# Patient Record
Sex: MALE | Race: WHITE | NOT HISPANIC OR LATINO | Employment: OTHER | URBAN - METROPOLITAN AREA
[De-identification: names, ages, dates, MRNs, and addresses within clinical notes are randomized per-mention and may not be internally consistent; named-entity substitution may affect disease eponyms.]

---

## 2017-01-23 DIAGNOSIS — E78.5 DYSLIPIDEMIA: Primary | ICD-10-CM

## 2017-01-23 RX ORDER — ATORVASTATIN CALCIUM 20 MG/1
20 TABLET, FILM COATED ORAL DAILY
Qty: 90 TABLET | Refills: 2 | Status: SHIPPED | OUTPATIENT
Start: 2017-01-23 | End: 2017-03-03

## 2017-01-23 NOTE — TELEPHONE ENCOUNTER
Lipitor     Last Written Prescription Date: 09/12/16  Last Fill Quantity: 90, # refills: 1  Last Office Visit with [facility], [facility] or [facility] prescribing provider: 03/08/16       CHOL      123   9/9/2016  HDL       68   9/9/2016  LDL       36   9/9/2016  TRIG       96   9/9/2016  CHOLHDLRATIO      2.6   4/21/2015

## 2017-02-02 ENCOUNTER — TRANSFERRED RECORDS (OUTPATIENT)
Dept: HEALTH INFORMATION MANAGEMENT | Facility: CLINIC | Age: 62
End: 2017-02-02

## 2017-02-10 DIAGNOSIS — E11.65 TYPE 2 DIABETES MELLITUS WITH HYPERGLYCEMIA (H): Primary | ICD-10-CM

## 2017-03-03 ENCOUNTER — MYC MEDICAL ADVICE (OUTPATIENT)
Dept: FAMILY MEDICINE | Facility: CLINIC | Age: 62
End: 2017-03-03

## 2017-03-03 DIAGNOSIS — I10 ESSENTIAL HYPERTENSION, BENIGN: ICD-10-CM

## 2017-03-03 DIAGNOSIS — E78.5 DYSLIPIDEMIA: ICD-10-CM

## 2017-03-03 DIAGNOSIS — E11.65 TYPE 2 DIABETES MELLITUS WITH HYPERGLYCEMIA, WITHOUT LONG-TERM CURRENT USE OF INSULIN (H): Primary | ICD-10-CM

## 2017-03-03 RX ORDER — DOXAZOSIN 4 MG/1
4 TABLET ORAL DAILY
Qty: 90 TABLET | Refills: 0 | Status: SHIPPED | OUTPATIENT
Start: 2017-03-03 | End: 2017-08-07

## 2017-03-03 RX ORDER — ATORVASTATIN CALCIUM 20 MG/1
20 TABLET, FILM COATED ORAL DAILY
Qty: 90 TABLET | Refills: 0 | Status: SHIPPED | OUTPATIENT
Start: 2017-03-03 | End: 2017-08-07

## 2017-03-03 RX ORDER — METFORMIN HCL 500 MG
1000 TABLET, EXTENDED RELEASE 24 HR ORAL
Qty: 180 TABLET | Refills: 0 | Status: SHIPPED | OUTPATIENT
Start: 2017-03-03 | End: 2017-08-07

## 2017-03-03 NOTE — TELEPHONE ENCOUNTER
lipitor    Last Written Prescription Date: 12/07/2016  Last Fill Quantity: 90, # refills: 2  Last Office Visit with [facility], [facility] or  [facility] prescribing provider: 09/10/2016       Lab Results   Component Value Date    CHOL 123 09/09/2016     Lab Results   Component Value Date    HDL 68 09/09/2016     Lab Results   Component Value Date    LDL 36 09/09/2016     Lab Results   Component Value Date    TRIG 96 09/09/2016     Lab Results   Component Value Date    CHOLHDLRATIO 2.6 04/21/2015     glucophage   Last Written Prescription Date: 12/07/2016  Last Fill Quantity: 180, # refills: 1  Last Office Visit with [facility], [facility] or [facility] prescribing provider:  09/10/2016        BP Readings from Last 3 Encounters:   03/08/16 130/84   02/02/16 145/90   04/21/15 134/86     Lab Results   Component Value Date    MICROL 48 09/09/2016     No results found for: MICROALBUMIN  Creatinine   Date Value Ref Range Status   03/03/2016 0.86 0.66 - 1.25 mg/dL Final   ]  GFR Estimate   Date Value Ref Range Status   03/03/2016 >90  Non  GFR Calc   >60 mL/min/1.7m2 Final   04/21/2015 89 >60 mL/min/1.7m2 Final     Comment:     Non  GFR Calc   12/26/2012 85 >60 mL/min/1.7m2 Final     GFR Estimate If Black   Date Value Ref Range Status   03/03/2016 >90   GFR Calc   >60 mL/min/1.7m2 Final   04/21/2015 >90   GFR Calc   >60 mL/min/1.7m2 Final   12/26/2012 >90 >60 mL/min/1.7m2 Final     Lab Results   Component Value Date    CHOL 123 09/09/2016     Lab Results   Component Value Date    HDL 68 09/09/2016     Lab Results   Component Value Date    LDL 36 09/09/2016     Lab Results   Component Value Date    TRIG 96 09/09/2016     Lab Results   Component Value Date    CHOLHDLRATIO 2.6 04/21/2015     No results found for: AST  Lab Results   Component Value Date    ALT 38 08/04/2011     Lab Results   Component Value Date    A1C 6.7 09/09/2016    A1C 9.0 03/03/2016    A1C 8.8 04/21/2015    A1C 6.3  12/26/2012    A1C 6.1 08/04/2011     Potassium   Date Value Ref Range Status   03/03/2016 4.4 3.4 - 5.3 mmol/L Final     cardura     Last Written Prescription Date: 12/07/2016  Last Fill Quantity: 90, # refills: 3    Last Office Visit with [facility] or [facility] prescribing provider:  09/10/2016   Future Office Visit:      BP Readings from Last 3 Encounters:   03/08/16 130/84   02/02/16 145/90   04/21/15 134/86

## 2017-03-27 ENCOUNTER — DOCUMENTATION ONLY (OUTPATIENT)
Dept: LAB | Facility: CLINIC | Age: 62
End: 2017-03-27

## 2017-03-27 DIAGNOSIS — I10 ESSENTIAL HYPERTENSION: ICD-10-CM

## 2017-03-27 DIAGNOSIS — E11.65 TYPE 2 DIABETES MELLITUS WITH HYPERGLYCEMIA, WITHOUT LONG-TERM CURRENT USE OF INSULIN (H): Primary | ICD-10-CM

## 2017-08-04 DIAGNOSIS — E78.2 MIXED HYPERLIPIDEMIA: ICD-10-CM

## 2017-08-04 DIAGNOSIS — E11.65 TYPE 2 DIABETES MELLITUS WITH HYPERGLYCEMIA, WITHOUT LONG-TERM CURRENT USE OF INSULIN (H): ICD-10-CM

## 2017-08-04 DIAGNOSIS — I10 ESSENTIAL HYPERTENSION: ICD-10-CM

## 2017-08-04 LAB
ANION GAP SERPL CALCULATED.3IONS-SCNC: 5 MMOL/L (ref 3–14)
BUN SERPL-MCNC: 21 MG/DL (ref 7–30)
CALCIUM SERPL-MCNC: 9.4 MG/DL (ref 8.5–10.1)
CHLORIDE SERPL-SCNC: 103 MMOL/L (ref 94–109)
CHOLEST SERPL-MCNC: 181 MG/DL
CO2 SERPL-SCNC: 27 MMOL/L (ref 20–32)
CREAT SERPL-MCNC: 0.83 MG/DL (ref 0.66–1.25)
GFR SERPL CREATININE-BSD FRML MDRD: ABNORMAL ML/MIN/1.7M2
GLUCOSE SERPL-MCNC: 228 MG/DL (ref 70–99)
HBA1C MFR BLD: 8.4 % (ref 4.3–6)
HDLC SERPL-MCNC: 57 MG/DL
LDLC SERPL CALC-MCNC: 92 MG/DL
NONHDLC SERPL-MCNC: 124 MG/DL
POTASSIUM SERPL-SCNC: 4.2 MMOL/L (ref 3.4–5.3)
SODIUM SERPL-SCNC: 135 MMOL/L (ref 133–144)
TRIGL SERPL-MCNC: 159 MG/DL
TSH SERPL DL<=0.005 MIU/L-ACNC: 1.25 MU/L (ref 0.4–4)

## 2017-08-04 PROCEDURE — 36415 COLL VENOUS BLD VENIPUNCTURE: CPT | Performed by: PHYSICIAN ASSISTANT

## 2017-08-04 PROCEDURE — 80061 LIPID PANEL: CPT | Performed by: PHYSICIAN ASSISTANT

## 2017-08-04 PROCEDURE — 83036 HEMOGLOBIN GLYCOSYLATED A1C: CPT | Performed by: PHYSICIAN ASSISTANT

## 2017-08-04 PROCEDURE — 80048 BASIC METABOLIC PNL TOTAL CA: CPT | Performed by: PHYSICIAN ASSISTANT

## 2017-08-04 PROCEDURE — 84443 ASSAY THYROID STIM HORMONE: CPT | Performed by: PHYSICIAN ASSISTANT

## 2017-08-04 NOTE — LETTER
Celestine Simon  [address] [facility] [address]        August 9, 2017          Dear ,    We are writing to inform you of your test results. Your labs show cholesterol, kidney function and thyroid ok.  A1c is 8.4 which means that diabetes is not controlled.  I have not seen you in over a year.  Please set up an appointment to work on diabetes.    Please make an appointment with the doctor to review or follow up on your test results.  Appointments can be made by calling [phone].    Resulted Orders   Lipid Profile with reflex to direct LDL   Result Value Ref Range    Cholesterol 181 <200 mg/dL    Triglycerides 159 (H) <150 mg/dL      Comment:      Borderline high:  150-199 mg/dl   High:             200-499 mg/dl   Very high:       >499 mg/dl      HDL Cholesterol 57 >39 mg/dL    LDL Cholesterol Calculated 92 <100 mg/dL      Comment:      Desirable:       <100 mg/dl    Non HDL Cholesterol 124 <130 mg/dL   Basic metabolic panel  (Ca, Cl, CO2, Creat, Gluc, K, Na, BUN)   Result Value Ref Range    Sodium 135 133 - 144 mmol/L    Potassium 4.2 3.4 - 5.3 mmol/L    Chloride 103 94 - 109 mmol/L    Carbon Dioxide 27 20 - 32 mmol/L    Anion Gap 5 3 - 14 mmol/L    Glucose 228 (H) 70 - 99 mg/dL    Urea Nitrogen 21 7 - 30 mg/dL    Creatinine 0.83 0.66 - 1.25 mg/dL    GFR Estimate >90  Non  GFR Calc   >60 mL/min/1.7m2    GFR Estimate If Black >90   GFR Calc   >60 mL/min/1.7m2    Calcium 9.4 8.5 - 10.1 mg/dL   Hemoglobin A1c   Result Value Ref Range    Hemoglobin A1C 8.4 (H) 4.3 - 6.0 %   TSH with free T4 reflex   Result Value Ref Range    TSH 1.25 0.40 - 4.00 mU/L       If you have any questions or concerns, please call the clinic at the number listed above.       Sincerely,        Deborah Delgado PA-C/teodoro

## 2017-08-06 ENCOUNTER — E-VISIT (OUTPATIENT)
Dept: FAMILY MEDICINE | Facility: CLINIC | Age: 62
End: 2017-08-06
Payer: COMMERCIAL

## 2017-08-06 DIAGNOSIS — E11.65 TYPE 2 DIABETES MELLITUS WITH HYPERGLYCEMIA, WITHOUT LONG-TERM CURRENT USE OF INSULIN (H): ICD-10-CM

## 2017-08-06 DIAGNOSIS — I10 ESSENTIAL HYPERTENSION, BENIGN: ICD-10-CM

## 2017-08-06 DIAGNOSIS — E78.5 DYSLIPIDEMIA: ICD-10-CM

## 2017-08-06 PROCEDURE — 99207 ZZC NO BILLABLE SERVICE THIS VISIT: CPT | Performed by: PHYSICIAN ASSISTANT

## 2017-08-07 RX ORDER — DOXAZOSIN 4 MG/1
4 TABLET ORAL DAILY
Qty: 90 TABLET | Refills: 0 | Status: SHIPPED | OUTPATIENT
Start: 2017-08-07 | End: 2017-11-08

## 2017-08-07 RX ORDER — LISINOPRIL 20 MG/1
20 TABLET ORAL DAILY
Qty: 90 TABLET | Refills: 0 | Status: SHIPPED | OUTPATIENT
Start: 2017-08-07 | End: 2017-11-08

## 2017-08-07 RX ORDER — METFORMIN HCL 500 MG
1000 TABLET, EXTENDED RELEASE 24 HR ORAL
Qty: 180 TABLET | Refills: 0 | Status: SHIPPED | OUTPATIENT
Start: 2017-08-07 | End: 2017-11-08

## 2017-08-07 RX ORDER — ATORVASTATIN CALCIUM 20 MG/1
20 TABLET, FILM COATED ORAL DAILY
Qty: 90 TABLET | Refills: 0 | Status: SHIPPED | OUTPATIENT
Start: 2017-08-07 | End: 2017-11-08

## 2017-08-07 NOTE — PROGRESS NOTES
Rosalva Sprague,    Your labs show cholesterol, kidney function and thyroid ok.  A1c is 8.4 which means that diabetes is not controlled.  I have not seen you in over a year.  Please set up an appointment to work on diabetes.    Please call clinic at [phone] if you have questions.    Deborah Delgado PA-C

## 2017-09-07 ENCOUNTER — OFFICE VISIT (OUTPATIENT)
Dept: FAMILY MEDICINE | Facility: CLINIC | Age: 62
End: 2017-09-07
Payer: COMMERCIAL

## 2017-09-07 VITALS
OXYGEN SATURATION: 98 % | WEIGHT: 273.4 LBS | RESPIRATION RATE: 16 BRPM | HEART RATE: 74 BPM | TEMPERATURE: 98.7 F | BODY MASS INDEX: 37.08 KG/M2 | DIASTOLIC BLOOD PRESSURE: 84 MMHG | SYSTOLIC BLOOD PRESSURE: 136 MMHG

## 2017-09-07 DIAGNOSIS — F43.21 ADJUSTMENT DISORDER WITH DEPRESSED MOOD: Primary | ICD-10-CM

## 2017-09-07 PROCEDURE — 99214 OFFICE O/P EST MOD 30 MIN: CPT | Performed by: FAMILY MEDICINE
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
## 2017-09-07 NOTE — LETTER
[facility]  [address] [place] [address]  Phone: [phone]  Fax: [phone]    September 7, 2017        Celestine Simon  [address] [facility] [address]          To whom it may concern:    RE: Celestine Simon    Patient was seen and treated today at our clinic. He was seen with some acute depression and anxiety.  Needs work excuse for one month to begin with.     Please contact me for questions or concerns.      Sincerely,        Riccardo Akers MD

## 2017-09-07 NOTE — MR AVS SNAPSHOT
After Visit Summary   9/7/2017    Celestine Simon    MRN: [id number]           Patient Information     Date Of Birth          1955        Visit Information        Provider Department      9/7/2017 11:00 AM Riccardo Akers MD [facility]        Today's Diagnoses     Adjustment disorder with depressed mood    -  1      Care Instructions     1 lets get counseling set up    2. Take the zoloft     3. Come back in two weeks.               Follow-ups after your visit        Additional Services     MENTAL HEALTH REFERRAL       Your provider has referred you to: [facility]: [facility] - Counseling (Individual/Couples/Family) - [facility] [phone]   [email or web address]   *Patient will be contacted by [facility] scheduling partner[facility]), to schedule an appointment.  Patients may also call [facility] to schedule.    All scheduling is subject to the client's specific insurance plan & benefits, provider/location availability, and provider clinical specialities.  Please arrive 15 minutes early for your first appointment and bring your completed paperwork.    Please be aware that coverage of these services is subject to the terms and limitations of your health insurance plan.  Call member services at your health plan with any benefit or coverage questions.                  Who to contact     If you have questions or need follow up information about today's clinic visit or your schedule please contact [facility] directly at [phone].  Normal or non-critical lab and imaging results will be communicated to you by MyChart, letter or phone within 4 business days after the clinic has received the results. If you do not hear from us within 7 days, please contact the clinic through MyChart or phone. If you have a critical or abnormal lab result, we will notify you  by phone as soon as possible.  Submit refill requests through [facility] or call your pharmacy and they will forward the refill request to us. Please allow 3 business days for your refill to be completed.          Additional Information About Your Visit        [employer] Information     [employer] gives you secure access to your electronic health record. If you see a primary care provider, you can also send messages to your care team and make appointments. If you have questions, please call your primary care clinic.  If you do not have a primary care provider, please call [phone] and they will assist you.        Care EveryWhere ID     This is your Care EveryWhere ID. This could be used by other organizations to access your [place] medical records  [id number]        Your Vitals Were     Pulse Temperature Respirations Pulse Oximetry BMI (Body Mass Index)       74 98.7  F (37.1  C) (Tympanic) 16 98% 37.08 kg/m2        Blood Pressure from Last 3 Encounters:   09/07/17 136/84   03/08/16 130/84   02/02/16 145/90    Weight from Last 3 Encounters:   09/07/17 273 lb 6.4 oz (124 kg)   03/08/16 273 lb (123.8 kg)   02/02/16 276 lb 12.8 oz (125.6 kg)              We Performed the Following     MENTAL HEALTH REFERRAL          Today's Medication Changes          These changes are accurate as of: 9/7/17 11:21 AM.  If you have any questions, ask your nurse or doctor.               Start taking these medicines.        Dose/Directions    sertraline 50 MG tablet   Commonly known as:  ZOLOFT   Used for:  Adjustment disorder with depressed mood   Started by:  Riccardo Akers MD        Take 1/2 tablet (25 mg) for 1-2 weeks, then increase to 1 tablet orally daily   Quantity:  30 tablet   Refills:  3            Where to get your medicines      These medications were sent to [id number] - [place], [place] - [address]  [address], [facility] [address]    Hours:  Closed 10-16-08 business to [facility]  Phone:  [phone]     sertraline 50 MG tablet                Primary Care Provider Office Phone # Fax #    Deborah Delgado PA-C [phone] [address] [place] [id number]        Equal Access to Services     HALI CRISOSTOMO : Hadii aad ku hadaleko Soomaali, waaxda luqadaha, qaybta kaalmada adeegyada, waxvega donin zaheern mariolito shepherd lanegartalha morton. So [facility] [phone].    ATENCIÓN: Si habla español, tiene a haro disposición servicios gratuitos de asistencia lingüística. Llame al [phone].    We comply with applicable federal civil rights laws and [place] laws. We do not discriminate on the basis of race, color, national origin, age, disability sex, sexual orientation or gender identity.            Thank you!     Thank you for choosing [facility]  for your care. Our goal is always to provide you with excellent care. Hearing back from our patients is one way we can continue to improve our services. Please take a few minutes to complete the written survey that you may receive in the mail after your visit with us. Thank you!             Your Updated Medication List - Protect others around you: Learn how to safely use, store and throw away your medicines at www.disposemymeds.org.          This list is accurate as of: 9/7/17 11:21 AM.  Always use your most recent med list.                   Brand Name Dispense Instructions for use Diagnosis    aspirin 81 MG tablet     100 tablet    Take by mouth every 24 hours    Essential hypertension, benign, Type 2 diabetes, HbA1c goal < 7% (H)       atorvastatin 20 MG tablet    LIPITOR    90 tablet    Take 1 tablet (20 mg) by mouth daily    Dyslipidemia       doxazosin 4 MG tablet    CARDURA    90 tablet    Take 1 tablet (4 mg) by mouth daily    Essential hypertension, benign       * IN TOUCH [facility]     90 each    1 strip once a week Tests weekly    Type 2 diabetes mellitus with hyperglycemia (H)       * Blood Glucose Monitoring Suppl Misc      100 each    1 strip once a week Tests weekly    Type 2 diabetes mellitus with hyperglycemia (H)       LANCETS REGULAR Misc     100 Each    One Touch US Lancet- Used twice daily    Type II or unspecified type diabetes mellitus without mention of complication, not stated as uncontrolled       lisinopril 20 MG tablet    PRINIVIL/ZESTRIL    90 tablet    Take 1 tablet (20 mg) by mouth daily    Essential hypertension, benign       metFORMIN 500 MG 24 hr tablet    GLUCOPHAGE-XR    180 tablet    Take 2 tablets (1,000 mg) by mouth daily (with dinner) Must be seen before further refills (Nov 2017).    Type 2 diabetes mellitus with hyperglycemia, without long-term current use of insulin (H)       Multi-vitamin Tabs tablet   Generic drug:  multivitamin, therapeutic with minerals      one daily        sertraline 50 MG tablet    ZOLOFT    30 tablet    Take 1/2 tablet (25 mg) for 1-2 weeks, then increase to 1 tablet orally daily    Adjustment disorder with depressed mood       sitagliptin 100 MG tablet    JANUVIA    90 tablet    Take 1 tablet (100 mg) by mouth daily    Type 2 diabetes mellitus with hyperglycemia, without long-term current use of insulin (H)       * Notice:  This list has 2 medication(s) that are the same as other medications prescribed for you. Read the directions carefully, and ask your doctor or other care provider to review them with you.

## 2017-09-11 ENCOUNTER — OFFICE VISIT (OUTPATIENT)
Dept: PSYCHOLOGY | Facility: CLINIC | Age: 62
End: 2017-09-11
Attending: FAMILY MEDICINE
Payer: COMMERCIAL

## 2017-09-11 DIAGNOSIS — F43.21 ADJUSTMENT DISORDER WITH DEPRESSED MOOD: Primary | ICD-10-CM

## 2017-09-11 PROCEDURE — 90834 PSYTX W PT 45 MINUTES: CPT | Performed by: MARRIAGE & FAMILY THERAPIST

## 2017-09-11 ASSESSMENT — PATIENT HEALTH QUESTIONNAIRE - PHQ9
5. POOR APPETITE OR OVEREATING: NOT AT ALL
5. POOR APPETITE OR OVEREATING: NOT AT ALL
SUM OF ALL RESPONSES TO PHQ QUESTIONS 1-9: 3

## 2017-09-11 ASSESSMENT — ANXIETY QUESTIONNAIRES
6. BECOMING EASILY ANNOYED OR IRRITABLE: NOT AT ALL
5. BEING SO RESTLESS THAT IT IS HARD TO SIT STILL: NOT AT ALL
IF YOU CHECKED OFF ANY PROBLEMS ON THIS QUESTIONNAIRE, HOW DIFFICULT HAVE THESE PROBLEMS MADE IT FOR YOU TO DO YOUR WORK, TAKE CARE OF THINGS AT HOME, OR GET ALONG WITH OTHER PEOPLE: NOT DIFFICULT AT ALL
2. NOT BEING ABLE TO STOP OR CONTROL WORRYING: NOT AT ALL
GAD7 TOTAL SCORE: 1
2. NOT BEING ABLE TO STOP OR CONTROL WORRYING: NOT AT ALL
6. BECOMING EASILY ANNOYED OR IRRITABLE: NOT AT ALL
3. WORRYING TOO MUCH ABOUT DIFFERENT THINGS: NOT AT ALL
1. FEELING NERVOUS, ANXIOUS, OR ON EDGE: SEVERAL DAYS
7. FEELING AFRAID AS IF SOMETHING AWFUL MIGHT HAPPEN: NOT AT ALL
3. WORRYING TOO MUCH ABOUT DIFFERENT THINGS: NOT AT ALL
5. BEING SO RESTLESS THAT IT IS HARD TO SIT STILL: NOT AT ALL
7. FEELING AFRAID AS IF SOMETHING AWFUL MIGHT HAPPEN: NOT AT ALL
1. FEELING NERVOUS, ANXIOUS, OR ON EDGE: SEVERAL DAYS
IF YOU CHECKED OFF ANY PROBLEMS ON THIS QUESTIONNAIRE, HOW DIFFICULT HAVE THESE PROBLEMS MADE IT FOR YOU TO DO YOUR WORK, TAKE CARE OF THINGS AT HOME, OR GET ALONG WITH OTHER PEOPLE: NOT DIFFICULT AT ALL
GAD7 TOTAL SCORE: 1

## 2017-09-11 NOTE — MR AVS SNAPSHOT
MRN:[id number]                      After Visit Summary   9/11/2017    Celestine Simon    MRN: [id number]           Visit Information        Provider Department      9/11/2017 11:00 AM Barrett Pyle LMFT [facility] Generic      MyChart Information     MyChart gives you secure access to your electronic health record. If you see a primary care provider, you can also send messages to your care team and make appointments. If you have questions, please call your primary care clinic.  If you do not have a primary care provider, please call [phone] and they will assist you.        Care EveryWhere ID     This is your Care EveryWhere ID. This could be used by other organizations to access your [place] [facility] medical records  [id number]        Equal Access to Services     HALI CRISOSTOMO : Ava Tan, watimothy suazo, qarory kaalshashi salgado, rodney morton. So [facility] [phone].    ATENCIÓN: Si habla español, tiene a haro disposición servicios gratuitos de asistencia lingüística. Llame al [phone].    We comply with applicable federal civil rights laws and [place] laws. We do not discriminate on the basis of race, color, national origin, age, disability sex, sexual orientation or gender identity.
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
## 2017-09-12 ASSESSMENT — ANXIETY QUESTIONNAIRES: GAD7 TOTAL SCORE: 1

## 2017-11-06 ENCOUNTER — DOCUMENTATION ONLY (OUTPATIENT)
Dept: LAB | Facility: CLINIC | Age: 62
End: 2017-11-06

## 2017-11-06 DIAGNOSIS — E11.65 TYPE 2 DIABETES MELLITUS WITH HYPERGLYCEMIA, WITHOUT LONG-TERM CURRENT USE OF INSULIN (H): Primary | ICD-10-CM

## 2017-11-07 DIAGNOSIS — E11.65 TYPE 2 DIABETES MELLITUS WITH HYPERGLYCEMIA, WITHOUT LONG-TERM CURRENT USE OF INSULIN (H): ICD-10-CM

## 2017-11-07 LAB
CREAT UR-MCNC: 177 MG/DL
HBA1C MFR BLD: 9.1 % (ref 4.3–6)
MICROALBUMIN UR-MCNC: 22 MG/L
MICROALBUMIN/CREAT UR: 12.71 MG/G CR (ref 0–17)

## 2017-11-07 PROCEDURE — 83036 HEMOGLOBIN GLYCOSYLATED A1C: CPT | Performed by: PHYSICIAN ASSISTANT

## 2017-11-07 PROCEDURE — 36415 COLL VENOUS BLD VENIPUNCTURE: CPT | Performed by: PHYSICIAN ASSISTANT

## 2017-11-07 PROCEDURE — 82043 UR ALBUMIN QUANTITATIVE: CPT | Performed by: PHYSICIAN ASSISTANT

## 2017-11-08 ENCOUNTER — OFFICE VISIT (OUTPATIENT)
Dept: FAMILY MEDICINE | Facility: CLINIC | Age: 62
End: 2017-11-08
Payer: COMMERCIAL

## 2017-11-08 VITALS
TEMPERATURE: 98.1 F | RESPIRATION RATE: 20 BRPM | WEIGHT: 278.2 LBS | SYSTOLIC BLOOD PRESSURE: 132 MMHG | DIASTOLIC BLOOD PRESSURE: 86 MMHG | HEART RATE: 68 BPM | BODY MASS INDEX: 37.73 KG/M2

## 2017-11-08 DIAGNOSIS — I10 ESSENTIAL HYPERTENSION, BENIGN: ICD-10-CM

## 2017-11-08 DIAGNOSIS — M62.08 RECTUS DIASTASIS: ICD-10-CM

## 2017-11-08 DIAGNOSIS — E78.5 DYSLIPIDEMIA: ICD-10-CM

## 2017-11-08 DIAGNOSIS — Z11.59 NEED FOR HEPATITIS C SCREENING TEST: ICD-10-CM

## 2017-11-08 DIAGNOSIS — E11.65 TYPE 2 DIABETES MELLITUS WITH HYPERGLYCEMIA, WITHOUT LONG-TERM CURRENT USE OF INSULIN (H): Primary | ICD-10-CM

## 2017-11-08 PROCEDURE — 99214 OFFICE O/P EST MOD 30 MIN: CPT | Performed by: PHYSICIAN ASSISTANT

## 2017-11-08 PROCEDURE — 99207 C FOOT EXAM  NO CHARGE: CPT | Performed by: PHYSICIAN ASSISTANT

## 2017-11-08 RX ORDER — METFORMIN HCL 500 MG
1000 TABLET, EXTENDED RELEASE 24 HR ORAL
Qty: 180 TABLET | Refills: 0 | Status: SHIPPED | OUTPATIENT
Start: 2017-11-08 | End: 2018-02-08

## 2017-11-08 RX ORDER — DOXAZOSIN 4 MG/1
4 TABLET ORAL DAILY
Qty: 90 TABLET | Refills: 3 | Status: SHIPPED | OUTPATIENT
Start: 2017-11-08 | End: 2018-08-09

## 2017-11-08 RX ORDER — ATORVASTATIN CALCIUM 20 MG/1
20 TABLET, FILM COATED ORAL DAILY
Qty: 90 TABLET | Refills: 3 | Status: SHIPPED | OUTPATIENT
Start: 2017-11-08 | End: 2018-08-09

## 2017-11-08 RX ORDER — LISINOPRIL 20 MG/1
20 TABLET ORAL DAILY
Qty: 90 TABLET | Refills: 0 | Status: SHIPPED | OUTPATIENT
Start: 2017-11-08 | End: 2018-08-09 | Stop reason: ALTCHOICE
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
## 2017-11-08 NOTE — MR AVS SNAPSHOT
After Visit Summary   11/8/2017    Celestine Simon    MRN: [id number]           Patient Information     Date Of Birth          1955        Visit Information        Provider Department      11/8/2017 8:20 AM Deborah Delgado PA-C [facility]        Today's Diagnoses     Need for hepatitis C screening test    -  1    Dyslipidemia        BENIGN HYPERTENSION        Type 2 diabetes mellitus with hyperglycemia, without long-term current use of insulin (H)          Care Instructions    Sugars much too high  Decided to make lifestyle changes and recheck within a month  Bring sugar log  Ideally get some readings of before breakfast, before lunch, before evening meal, and before bed - don't have to test all times every day, but get variety  Goals: morning sugars (before eating) must be under 140, but prefer under 120  2 hours after eating never above 180    If belly starts to bother, will send to surgeon.    Call if questions               Follow-ups after your visit        Future tests that were ordered for you today     Open Future Orders        Priority Expected Expires Ordered    Hepatitis C Screen Reflex to HCV RNA Quant and Genotype Routine  11/8/2018 11/8/2017    **A1C FUTURE 3mo Routine 2/6/2018 3/8/2018 11/8/2017            Who to contact     If you have questions or need follow up information about today's clinic visit or your schedule please contact [facility] directly at [phone].  Normal or non-critical lab and imaging results will be communicated to you by MyChart, letter or phone within 4 business days after the clinic has received the results. If you do not hear from us within 7 days, please contact the clinic through MyChart or phone. If you have a critical or abnormal lab result, we will notify you by phone as soon as possible.  Submit refill requests through [employer] or call your pharmacy and they will forward the refill request to us. Please  allow 3 business days for your refill to be completed.          Additional Information About Your Visit        MyChart Information     [employer] gives you secure access to your electronic health record. If you see a primary care provider, you can also send messages to your care team and make appointments. If you have questions, please call your primary care clinic.  If you do not have a primary care provider, please call [phone] and they will assist you.        Care EveryWhere ID     This is your Care EveryWhere ID. This could be used by other organizations to access your [place] medical records  [id number]        Your Vitals Were     Pulse Temperature Respirations BMI (Body Mass Index)          68 98.1  F (36.7  C) (Tympanic) 20 37.73 kg/m2         Blood Pressure from Last 3 Encounters:   11/08/17 132/86   09/07/17 136/84   03/08/16 130/84    Weight from Last 3 Encounters:   11/08/17 278 lb 3.2 oz (126.2 kg)   09/07/17 273 lb 6.4 oz (124 kg)   03/08/16 273 lb (123.8 kg)              We Performed the Following     FOOT EXAM          Today's Medication Changes          These changes are accurate as of: 11/8/17  9:12 AM.  If you have any questions, ask your nurse or doctor.               These medicines have changed or have updated prescriptions.        Dose/Directions    metFORMIN 500 MG 24 hr tablet   Commonly known as:  GLUCOPHAGE-XR   This may have changed:  additional instructions   Used for:  Type 2 diabetes mellitus with hyperglycemia, without long-term current use of insulin (H)   Changed by:  Deborah Delgado, PAVandanaC        Dose:  1000 mg   Take 2 tablets (1,000 mg) by mouth daily (with dinner)   Quantity:  180 tablet   Refills:  0            Where to get your medicines      These medications were sent to [id number] [address]  [address] ([place], [place]) [address]    Hours:  Closed 10-16-08 business to [facility] [place] Phone:  [phone]     atorvastatin 20  MG tablet    doxazosin 4 MG tablet    lisinopril 20 MG tablet    metFORMIN 500 MG 24 hr tablet    sitagliptin 100 MG tablet                Primary Care Provider Office Phone # Fax #    Deborah Delgado PA-C [phone] [address] [facility] [address]        Equal Access to Services     HALI CRISOSTOMO : Hadii aad ku hadasho Soomaali, waaxda luqadaha, qaybta kaalmada adeegyada, waxay idiin hayaan adeeg joao laZandradeandre . So [facility] [phone].    ATENCIÓN: Si habla español, tiene a haro disposición servicios gratuitos de asistencia lingüística. Justin al [phone].    We comply with applicable federal civil rights laws and [place] laws. We do not discriminate on the basis of race, color, national origin, age, disability, sex, sexual orientation, or gender identity.            Thank you!     Thank you for choosing [facility]  for your care. Our goal is always to provide you with excellent care. Hearing back from our patients is one way we can continue to improve our services. Please take a few minutes to complete the written survey that you may receive in the mail after your visit with us. Thank you!             Your Updated Medication List - Protect others around you: Learn how to safely use, store and throw away your medicines at www.disposemymeds.org.          This list is accurate as of: 11/8/17  9:12 AM.  Always use your most recent med list.                   Brand Name Dispense Instructions for use Diagnosis    aspirin 81 MG tablet     100 tablet    Take by mouth every 24 hours    Essential hypertension, benign, Type 2 diabetes, HbA1c goal < 7% (H)       atorvastatin 20 MG tablet    LIPITOR    90 tablet    Take 1 tablet (20 mg) by mouth daily    Dyslipidemia       doxazosin 4 MG tablet    CARDURA    90 tablet    Take 1 tablet (4 mg) by mouth daily    Essential hypertension, benign       * IN TOUCH Misc     90 each    1 strip once a week Tests weekly    Type 2  diabetes mellitus with hyperglycemia (H)       * Blood Glucose Monitoring Suppl Misc     100 each    1 strip once a week Tests weekly    Type 2 diabetes mellitus with hyperglycemia (H)       LANCETS REGULAR Misc     100 Each    One Touch US Lancet- Used twice daily    Type II or unspecified type diabetes mellitus without mention of complication, not stated as uncontrolled       lisinopril 20 MG tablet    PRINIVIL/ZESTRIL    90 tablet    Take 1 tablet (20 mg) by mouth daily    Essential hypertension, benign       metFORMIN 500 MG 24 hr tablet    GLUCOPHAGE-XR    180 tablet    Take 2 tablets (1,000 mg) by mouth daily (with dinner)    Type 2 diabetes mellitus with hyperglycemia, without long-term current use of insulin (H)       Multi-vitamin Tabs tablet   Generic drug:  multivitamin, therapeutic with minerals      one daily        sitagliptin 100 MG tablet    JANUVIA    90 tablet    Take 1 tablet (100 mg) by mouth daily    Type 2 diabetes mellitus with hyperglycemia, without long-term current use of insulin (H)       * Notice:  This list has 2 medication(s) that are the same as other medications prescribed for you. Read the directions carefully, and ask your doctor or other care provider to review them with you.

## 2017-11-08 NOTE — NURSING NOTE
Chief Complaint   Patient presents with     Diabetes       Initial /86 (BP Location: Right arm, Cuff Size: Adult Large)  Pulse 68  Temp 98.1  F (36.7  C) (Tympanic)  Resp 20  Wt 278 lb 3.2 oz (126.2 kg)  BMI 37.73 kg/m2 Estimated body mass index is 37.73 kg/(m^2) as calculated from the following:    Height as of 3/8/16: 6' (1.829 m).    Weight as of this encounter: 278 lb 3.2 oz (126.2 kg).  Medication Reconciliation: complete    Health Maintenance that is potentially due pending provider review:  Diabetes labs     Possibly completing today per provider review.    Is there anyone who you would like to be able to receive your results? Not Applicable  If yes have patient fill out AGUILAR  Karo Liu M.A.

## 2017-11-08 NOTE — PATIENT INSTRUCTIONS
Sugars much too high  Decided to make lifestyle changes and recheck within a month  Bring sugar log  Ideally get some readings of before breakfast, before lunch, before evening meal, and before bed - don't have to test all times every day, but get variety  Goals: morning sugars (before eating) must be under 140, but prefer under 120  2 hours after eating never above 180    If belly starts to bother, will send to surgeon.    Call if questions

## 2018-02-07 DIAGNOSIS — Z11.59 NEED FOR HEPATITIS C SCREENING TEST: ICD-10-CM

## 2018-02-07 DIAGNOSIS — E11.65 TYPE 2 DIABETES MELLITUS WITH HYPERGLYCEMIA, WITHOUT LONG-TERM CURRENT USE OF INSULIN (H): ICD-10-CM

## 2018-02-07 LAB — HBA1C MFR BLD: 7.9 % (ref 4.3–6)

## 2018-02-07 PROCEDURE — 83036 HEMOGLOBIN GLYCOSYLATED A1C: CPT | Performed by: PHYSICIAN ASSISTANT

## 2018-02-07 PROCEDURE — 86803 HEPATITIS C AB TEST: CPT | Performed by: PHYSICIAN ASSISTANT

## 2018-02-07 PROCEDURE — 36415 COLL VENOUS BLD VENIPUNCTURE: CPT | Performed by: PHYSICIAN ASSISTANT

## 2018-02-08 ENCOUNTER — OFFICE VISIT (OUTPATIENT)
Dept: FAMILY MEDICINE | Facility: CLINIC | Age: 63
End: 2018-02-08
Payer: COMMERCIAL

## 2018-02-08 VITALS
RESPIRATION RATE: 16 BRPM | BODY MASS INDEX: 36.7 KG/M2 | WEIGHT: 271 LBS | HEART RATE: 76 BPM | SYSTOLIC BLOOD PRESSURE: 132 MMHG | HEIGHT: 72 IN | DIASTOLIC BLOOD PRESSURE: 92 MMHG | TEMPERATURE: 97.6 F

## 2018-02-08 DIAGNOSIS — R80.9 TYPE 2 DIABETES MELLITUS WITH MICROALBUMINURIA, WITHOUT LONG-TERM CURRENT USE OF INSULIN (H): ICD-10-CM

## 2018-02-08 DIAGNOSIS — E11.29 TYPE 2 DIABETES MELLITUS WITH MICROALBUMINURIA, WITHOUT LONG-TERM CURRENT USE OF INSULIN (H): ICD-10-CM

## 2018-02-08 DIAGNOSIS — Z12.11 SPECIAL SCREENING FOR MALIGNANT NEOPLASMS, COLON: ICD-10-CM

## 2018-02-08 DIAGNOSIS — E66.01 SEVERE OBESITY (BMI 35.0-39.9) WITH COMORBIDITY (H): Primary | ICD-10-CM

## 2018-02-08 DIAGNOSIS — I10 ESSENTIAL HYPERTENSION, BENIGN: ICD-10-CM

## 2018-02-08 LAB — HCV AB SERPL QL IA: NONREACTIVE

## 2018-02-08 PROCEDURE — 99214 OFFICE O/P EST MOD 30 MIN: CPT | Performed by: PHYSICIAN ASSISTANT

## 2018-02-08 RX ORDER — METFORMIN HCL 500 MG
1000 TABLET, EXTENDED RELEASE 24 HR ORAL
Qty: 180 TABLET | Refills: 0 | Status: SHIPPED | OUTPATIENT
Start: 2018-02-08 | End: 2018-05-03

## 2018-02-08 RX ORDER — LISINOPRIL AND HYDROCHLOROTHIAZIDE 12.5; 2 MG/1; MG/1
1 TABLET ORAL DAILY
Qty: 30 TABLET | Refills: 0 | Status: SHIPPED | OUTPATIENT
Start: 2018-02-08 | End: 2018-03-13

## 2018-02-08 RX ORDER — LISINOPRIL 20 MG/1
20 TABLET ORAL DAILY
Qty: 90 TABLET | Refills: 0 | Status: CANCELLED | OUTPATIENT
Start: 2018-02-08

## 2018-02-08 NOTE — PATIENT INSTRUCTIONS
Diabetes -  a1c improved, is acceptable if stays under 8.0 at this point  Suggest considering victoza or similar.  See print out with med options recommended by me and allowed by your FAA  Keep working on the weight loss as you're recognizing   Consider seeing diabetic educator for lifestyle mer ideas  Need to see eye doctor yearly    BP -  Too high  New goal under 130/80  Change med to lisinopril-Hydrochlorothiazide combo  Set up lab only appt within a month, AND update me on BP for refill  Weight loss also helps     Strongly recommend flu shot     Do colonoscopy     Recheck 3 months
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
## 2018-02-08 NOTE — NURSING NOTE
Chief Complaint   Patient presents with     Diabetes       Initial BP (!) 149/98 (BP Location: Left arm, Patient Position: Chair, Cuff Size: Adult Large)  Pulse 76  Temp 97.6  F (36.4  C) (Tympanic)  Resp 16  Ht 6' (1.829 m)  Wt 271 lb (122.9 kg)  BMI 36.75 kg/m2 Estimated body mass index is 36.75 kg/(m^2) as calculated from the following:    Height as of this encounter: 6' (1.829 m).    Weight as of this encounter: 271 lb (122.9 kg).      Health Maintenance that is potentially due pending provider review:  NONE        Is there anyone who you would like to be able to receive your results? No  If yes have patient fill out AGUILAR

## 2018-02-08 NOTE — MR AVS SNAPSHOT
After Visit Summary   2/8/2018    Celestine Simon    MRN: [id number]           Patient Information     Date Of Birth          1955        Visit Information        Provider Department      2/8/2018 9:00 AM Deborah Delgado PA-C [facility]        Today's Diagnoses     Severe obesity (BMI 35.0-39.9) with comorbidity (H)    -  1    Type 2 diabetes mellitus with hyperglycemia, without long-term current use of insulin (H)        BENIGN HYPERTENSION        Special screening for malignant neoplasms, colon          Care Instructions    Diabetes -  a1c improved, is acceptable if stays under 8.0 at this point  Suggest considering victoza or similar.  See print out with med options recommended by me and allowed by your FAA  Keep working on the weight loss as you're recognizing   Consider seeing diabetic educator for lifestyle tweak ideas  Need to see eye doctor yearly    BP -  Too high  New goal under 130/80  Change med to lisinopril-Hydrochlorothiazide combo  Set up lab only appt within a month, AND update me on BP for refill  Weight loss also helps     Strongly recommend flu shot     Do colonoscopy     Recheck 3 months            Follow-ups after your visit        Additional Services     GASTROENTEROLOGY ADULT REF PROCEDURE ONLY [facility] [phone]       Last Lab Result: Creatinine (mg/dL)       Date                     Value                 08/04/2017               0.83             ----------  Body mass index is 36.75 kg/(m^2).     Needed:  No  Language:  English    Patient will be contacted to schedule procedure.     Please be aware that coverage of these services is subject to the terms and limitations of your health insurance plan.  Call member services at your health plan with any benefit or coverage questions.  Any procedures must be performed at a [facility] facility OR coordinated by your clinic's referral office.    Please bring the following with you to  your appointment:    (1) Any X-Rays, CTs or MRIs which have been performed.  Contact the facility where they were done to arrange for  prior to your scheduled appointment.    (2) List of current medications   (3) This referral request   (4) Any documents/labs given to you for this referral                  Future tests that were ordered for you today     Open Future Orders        Priority Expected Expires Ordered    **Basic metabolic panel FUTURE anytime Routine 2/8/2018 2/8/2019 2/8/2018            Who to contact     If you have questions or need follow up information about today's clinic visit or your schedule please contact [facility] directly at [phone].  Normal or non-critical lab and imaging results will be communicated to you by MyChart, letter or phone within 4 business days after the clinic has received the results. If you do not hear from us within 7 days, please contact the clinic through Aryngahart or phone. If you have a critical or abnormal lab result, we will notify you by phone as soon as possible.  Submit refill requests through PhatNoise or call your pharmacy and they will forward the refill request to us. Please allow 3 business days for your refill to be completed.          Additional Information About Your Visit        MyChart Information     PhatNoise gives you secure access to your electronic health record. If you see a primary care provider, you can also send messages to your care team and make appointments. If you have questions, please call your primary care clinic.  If you do not have a primary care provider, please call [phone] and they will assist you.        Care EveryWhere ID     This is your Care EveryWhere ID. This could be used by other organizations to access your [place] medical records  [id number]        Your Vitals Were     Pulse Temperature Respirations Height BMI (Body Mass Index)       76 97.6  F (36.4  C) (Tympanic) 16 6' (1.829 m) 36.75 kg/m2         Blood Pressure from Last 3 Encounters:   02/08/18 (!) 149/98   11/08/17 132/86   09/07/17 136/84    Weight from Last 3 Encounters:   02/08/18 271 lb (122.9 kg)   11/08/17 278 lb 3.2 oz (126.2 kg)   09/07/17 273 lb 6.4 oz (124 kg)              We Performed the Following     GASTROENTEROLOGY ADULT REF PROCEDURE ONLY [facility] [phone]          Today's Medication Changes          These changes are accurate as of 2/8/18 10:05 AM.  If you have any questions, ask your nurse or doctor.               Start taking these medicines.        Dose/Directions    lisinopril-hydrochlorothiazide 20-12.5 MG per tablet   Commonly known as:  PRINZIDE/ZESTORETIC   Used for:  Essential hypertension, benign   Started by:  Deborah Delgado PA-C        Dose:  1 tablet   Take 1 tablet by mouth daily   Quantity:  30 tablet   Refills:  0            Where to get your medicines      These medications were sent to [facility][id number] [facility] [address] [address]    Hours:  Closed 10-16-08 business to [facility] Phone:  [phone]     lisinopril-hydrochlorothiazide 20-12.5 MG per tablet    metFORMIN 500 MG 24 hr tablet    sitagliptin 100 MG tablet                Primary Care Provider Office Phone # Fax #    Deborah Delgado PA-C [phone] [address] [facility] [address]        Equal Access to Services     HARIKA CRISOSTOMO AH: Hadii conner thurston hadasho Soomaali, waaxda luqadaha, qaybta kaalmada adeegyada, waxay katharine morton. So [facility] [phone].    ATENCIÓN: Si habla español, tiene a haro disposición servicios gratuitos de asistencia lingüística. Llame al [phone].    We comply with applicable federal civil rights laws and [place] laws. We do not discriminate on the basis of race, color, national origin, age, disability, sex, sexual orientation, or gender identity.            Thank you!     Thank you for choosing  [facility]  for your care. Our goal is always to provide you with excellent care. Hearing back from our patients is one way we can continue to improve our services. Please take a few minutes to complete the written survey that you may receive in the mail after your visit with us. Thank you!             Your Updated Medication List - Protect others around you: Learn how to safely use, store and throw away your medicines at www.disposemymeds.org.          This list is accurate as of 2/8/18 10:05 AM.  Always use your most recent med list.                   Brand Name Dispense Instructions for use Diagnosis    aspirin 81 MG tablet     100 tablet    Take by mouth every 24 hours    Essential hypertension, benign, Type 2 diabetes, HbA1c goal < 7% (H)       atorvastatin 20 MG tablet    LIPITOR    90 tablet    Take 1 tablet (20 mg) by mouth daily    Dyslipidemia       doxazosin 4 MG tablet    CARDURA    90 tablet    Take 1 tablet (4 mg) by mouth daily    Essential hypertension, benign       * IN TOUCH Misc     90 each    1 strip once a week Tests weekly    Type 2 diabetes mellitus with hyperglycemia (H)       * Blood Glucose Monitoring Suppl Misc     100 each    1 strip once a week Tests weekly    Type 2 diabetes mellitus with hyperglycemia (H)       LANCETS REGULAR Misc     100 Each    One Touch US Lancet- Used twice daily    Type II or unspecified type diabetes mellitus without mention of complication, not stated as uncontrolled       lisinopril 20 MG tablet    PRINIVIL/ZESTRIL    90 tablet    Take 1 tablet (20 mg) by mouth daily    Essential hypertension, benign       lisinopril-hydrochlorothiazide 20-12.5 MG per tablet    PRINZIDE/ZESTORETIC    30 tablet    Take 1 tablet by mouth daily    Essential hypertension, benign       metFORMIN 500 MG 24 hr tablet    GLUCOPHAGE-XR    180 tablet    Take 2 tablets (1,000 mg) by mouth daily (with dinner)    Type 2 diabetes mellitus with hyperglycemia, without  long-term current use of insulin (H)       Multi-vitamin Tabs tablet   Generic drug:  multivitamin, therapeutic with minerals      one daily        sitagliptin 100 MG tablet    JANUVIA    90 tablet    Take 1 tablet (100 mg) by mouth daily    Type 2 diabetes mellitus with hyperglycemia, without long-term current use of insulin (H)       * Notice:  This list has 2 medication(s) that are the same as other medications prescribed for you. Read the directions carefully, and ask your doctor or other care provider to review them with you.

## 2018-02-14 ENCOUNTER — MYC MEDICAL ADVICE (OUTPATIENT)
Dept: FAMILY MEDICINE | Facility: CLINIC | Age: 63
End: 2018-02-14

## 2018-02-14 NOTE — TELEPHONE ENCOUNTER
RN please call to screen for influenza.  If sounds like influenza like illness, needs tamiflu treatment dosing - please send if needed.

## 2018-02-14 NOTE — TELEPHONE ENCOUNTER
I talked with Celestine and he says he just has a cold, not the flu.  He is just returning from [place] and grad kids all had colds.  Violet Escobedo RN

## 2018-02-18 ENCOUNTER — OFFICE VISIT (OUTPATIENT)
Dept: URGENT CARE | Facility: URGENT CARE | Age: 63
End: 2018-02-18
Payer: COMMERCIAL

## 2018-02-18 VITALS
TEMPERATURE: 97.7 F | BODY MASS INDEX: 36.62 KG/M2 | OXYGEN SATURATION: 95 % | DIASTOLIC BLOOD PRESSURE: 90 MMHG | RESPIRATION RATE: 16 BRPM | SYSTOLIC BLOOD PRESSURE: 126 MMHG | WEIGHT: 270 LBS | HEART RATE: 108 BPM

## 2018-02-18 DIAGNOSIS — H65.93 BILATERAL NON-SUPPURATIVE OTITIS MEDIA: ICD-10-CM

## 2018-02-18 DIAGNOSIS — J01.90 ACUTE SINUSITIS WITH SYMPTOMS > 10 DAYS: Primary | ICD-10-CM

## 2018-02-18 PROCEDURE — 99213 OFFICE O/P EST LOW 20 MIN: CPT | Performed by: NURSE PRACTITIONER

## 2018-02-18 NOTE — NURSING NOTE
Chief Complaint   Patient presents with     Sinus Problem     about a week.  Ears are plugged, dental pain, discharge of yellow/ green. Fever earlier in week.  Neck is swollen.  Flew [place] last week.  took 2 325 mg Aspirin       Initial /90 (BP Location: Right arm, Cuff Size: Adult Large)  Pulse 108  Temp 97.7  F (36.5  C) (Tympanic)  Resp 16  Wt 270 lb (122.5 kg)  SpO2 95%  BMI 36.62 kg/m2 Estimated body mass index is 36.62 kg/(m^2) as calculated from the following:    Height as of 2/8/18: 6' (1.829 m).    Weight as of this encounter: 270 lb (122.5 kg).      Health Maintenance that is potentially due pending provider review:  NONE    n/a    Is there anyone who you would like to be able to receive your results? Not Applicable  If yes have patient fill out AGUILAR Liu M.A.

## 2018-02-18 NOTE — MR AVS SNAPSHOT
After Visit Summary   2/18/2018    Celestine Simon    MRN: [id number]           Patient Information     Date Of Birth          1955        Visit Information        Provider Department      2/18/2018 12:35 PM Jade Liu APRN [facility]        Today's Diagnoses     Acute sinusitis with symptoms > 10 days    -  1    Bilateral non-suppurative otitis media          Care Instructions    Increase rest and fluids. Tylenol and/or Ibuprofen for comfort. Cool mist vaporizer. If your symptoms worsen or do not resolve follow up with your primary care provider in 1 week and sooner if needed.        Indications for emergent return to emergency department discussed with patient, who verbalized good understanding and agreement.  Patient understands the limitations of today's evaluation.           Sinusitis (Antibiotic Treatment)    The sinuses are air-filled spaces within the bones of the face. They connect to the inside of the nose. Sinusitis is an inflammation of the tissue lining the sinus cavity. Sinus inflammation can occur during a cold. It can also be due to allergies to pollens and other particles in the air. Sinusitis can cause symptoms of sinus congestion and fullness. A sinus infection causes fever, headache and facial pain. There is often green or yellow drainage from the nose or into the back of the throat (post-nasal drip). You have been given antibiotics to treat this condition.  Home care:    Take the full course of antibiotics as instructed. Do not stop taking them, even if you feel better.    Drink plenty of water, hot tea, and other liquids. This may help thin mucus. It also may promote sinus drainage.    Heat may help soothe painful areas of the face. Use a towel soaked in hot water. Or,  the shower and direct the hot spray onto your face. Using a vaporizer along with a menthol rub at night may also help.     An expectorant containing  guaifenesin may help thin the mucus and promote drainage from the sinuses.    Over-the-counter decongestants may be used unless a similar medicine was prescribed. Nasal sprays work the fastest. Use one that contains phenylephrine or oxymetazoline. First blow the nose gently. Then use the spray. Do not use these medicines more often than directed on the label or symptoms may get worse. You may also use tablets containing pseudoephedrine. Avoid products that combine ingredients, because side effects may be increased. Read labels. You can also ask the pharmacist for help. (NOTE: Persons with high blood pressure should not use decongestants. They can raise blood pressure.)    Over-the-counter antihistamines may help if allergies contributed to your sinusitis.      Do not use nasal rinses or irrigation during an acute sinus infection, unless told to by your health care provider. Rinsing may spread the infection to other sinuses.    Use acetaminophen or ibuprofen to control pain, unless another pain medicine was prescribed. (If you have chronic liver or kidney disease or ever had a stomach ulcer, talk with your doctor before using these medicines. Aspirin should never be used in anyone under 18 years of age who is ill with a fever. It may cause severe liver damage.)    Don't smoke. This can worsen symptoms.  Follow-up care  Follow up with your healthcare provider or our staff if you are not improving within the next week.  When to seek medical advice  Call your healthcare provider if any of these occur:    Facial pain or headache becoming more severe    Stiff neck    Unusual drowsiness or confusion    Swelling of the forehead or eyelids    Vision problems, including blurred or double vision    Fever of 100.4 F (38 C) or higher, or as directed by your healthcare provider    Seizure    Breathing problems    Symptoms not resolving within 10 days  Date Last Reviewed: 4/13/2015 2000-2017 [employer] [address]  [place] [address]. All rights reserved. This information is not intended as a substitute for professional medical care. Always follow your healthcare professional's instructions.                Follow-ups after your visit        Follow-up notes from your care team     See patient instructions section of the AVS Return if symptoms worsen or fail to improve, for Follow up with your primary care provider.      Your next 10 appointments already scheduled     Apr 09, 2018   Procedure with Nicolas Herbert MD   [facility] ([facility])    [address] [facility] [address] [phone]           The medical center is located at [address] [facility]. (between [address] and [address] in [place], [place] miles north of [place]).              Who to contact     If you have questions or need follow up information about today's clinic visit or your schedule please contact [facility] directly at [phone].  Normal or non-critical lab and imaging results will be communicated to you by MyChart, letter or phone within 4 business days after the clinic has received the results. If you do not hear from us within 7 days, please contact the clinic through Bokeehart or phone. If you have a critical or abnormal lab result, we will notify you by phone as soon as possible.  Submit refill requests through Visante or call your pharmacy and they will forward the refill request to us. Please allow 3 business days for your refill to be completed.          Additional Information About Your Visit        Bokeehart Information     Visante gives you secure access to your electronic health record. If you see a primary care provider, you can also send messages to your care team and make appointments. If you have questions, please call your primary care clinic.  If you do not have a primary care provider, please call [phone] and they will assist you.        Care  EveryWhere ID     This is your Care EveryWhere ID. This could be used by other organizations to access your [place] medical records  [id number]        Your Vitals Were     Pulse Temperature Respirations Pulse Oximetry BMI (Body Mass Index)       108 97.7  F (36.5  C) (Tympanic) 16 95% 36.62 kg/m2        Blood Pressure from Last 3 Encounters:   02/18/18 126/90   02/08/18 (!) 132/92   11/08/17 132/86    Weight from Last 3 Encounters:   02/18/18 270 lb (122.5 kg)   02/08/18 271 lb (122.9 kg)   11/08/17 278 lb 3.2 oz (126.2 kg)              Today, you had the following     No orders found for display         Today's Medication Changes          These changes are accurate as of 2/18/18  3:03 PM.  If you have any questions, ask your nurse or doctor.               Start taking these medicines.        Dose/Directions    amoxicillin-clavulanate 875-125 MG per tablet   Commonly known as:  AUGMENTIN   Used for:  Acute sinusitis with symptoms > 10 days, Bilateral non-suppurative otitis media   Started by:  Jade Liu, APRN CNP        Dose:  1 tablet   Take 1 tablet by mouth 2 times daily for 10 days   Quantity:  20 tablet   Refills:  0            Where to get your medicines      These medications were sent to [id number] [address]  [address] [place] [address]    Hours:  Closed 10-16-08 business to [facility] Phone:  [phone]     amoxicillin-clavulanate 875-125 MG per tablet                Primary Care Provider Office Phone # Fax #    Deborah Delgado PA-C [phone] [address] [facility] [address]        Equal Access to Services     HALI CRISOSTOMO AH: Ava Tan, arcenio suazo, elizabteh kaalmada naomi, rodney Hall [facility] [phone].    ATENCIÓN: Si habla español, tiene a haro disposición servicios gratuitos de asistencia lingüística. Llame al [phone].    We comply with  applicable federal civil rights laws and [place] laws. We do not discriminate on the basis of race, color, national origin, age, disability, sex, sexual orientation, or gender identity.            Thank you!     Thank you for choosing [facility]  for your care. Our goal is always to provide you with excellent care. Hearing back from our patients is one way we can continue to improve our services. Please take a few minutes to complete the written survey that you may receive in the mail after your visit with us. Thank you!             Your Updated Medication List - Protect others around you: Learn how to safely use, store and throw away your medicines at www.disposemymeds.org.          This list is accurate as of 2/18/18  3:03 PM.  Always use your most recent med list.                   Brand Name Dispense Instructions for use Diagnosis    amoxicillin-clavulanate 875-125 MG per tablet    AUGMENTIN    20 tablet    Take 1 tablet by mouth 2 times daily for 10 days    Acute sinusitis with symptoms > 10 days, Bilateral non-suppurative otitis media       aspirin 81 MG tablet     100 tablet    Take by mouth every 24 hours    Essential hypertension, benign, Type 2 diabetes, HbA1c goal < 7% (H)       atorvastatin 20 MG tablet    LIPITOR    90 tablet    Take 1 tablet (20 mg) by mouth daily    Dyslipidemia       doxazosin 4 MG tablet    CARDURA    90 tablet    Take 1 tablet (4 mg) by mouth daily    Essential hypertension, benign       * IN TOUCH Misc     90 each    1 strip once a week Tests weekly    Type 2 diabetes mellitus with hyperglycemia (H)       * Blood Glucose Monitoring Suppl Misc     100 each    1 strip once a week Tests weekly    Type 2 diabetes mellitus with hyperglycemia (H)       LANCETS REGULAR Misc     100 Each    One Touch US Lancet- Used twice daily    Type II or unspecified type diabetes mellitus without mention of complication, not stated as uncontrolled       lisinopril 20  MG tablet    PRINIVIL/ZESTRIL    90 tablet    Take 1 tablet (20 mg) by mouth daily    Essential hypertension, benign       lisinopril-hydrochlorothiazide 20-12.5 MG per tablet    PRINZIDE/ZESTORETIC    30 tablet    Take 1 tablet by mouth daily    Essential hypertension, benign       metFORMIN 500 MG 24 hr tablet    GLUCOPHAGE-XR    180 tablet    Take 2 tablets (1,000 mg) by mouth daily (with dinner)    Type 2 diabetes mellitus with microalbuminuria, without long-term current use of insulin (H)       Multi-vitamin Tabs tablet   Generic drug:  multivitamin, therapeutic with minerals      one daily        sitagliptin 100 MG tablet    JANUVIA    90 tablet    Take 1 tablet (100 mg) by mouth daily    Type 2 diabetes mellitus with microalbuminuria, without long-term current use of insulin (H)       * Notice:  This list has 2 medication(s) that are the same as other medications prescribed for you. Read the directions carefully, and ask your doctor or other care provider to review them with you.

## 2018-02-18 NOTE — PATIENT INSTRUCTIONS
Increase rest and fluids. Tylenol and/or Ibuprofen for comfort. Cool mist vaporizer. If your symptoms worsen or do not resolve follow up with your primary care provider in 1 week and sooner if needed.        Indications for emergent return to emergency department discussed with patient, who verbalized good understanding and agreement.  Patient understands the limitations of today's evaluation.           Sinusitis (Antibiotic Treatment)    The sinuses are air-filled spaces within the bones of the face. They connect to the inside of the nose. Sinusitis is an inflammation of the tissue lining the sinus cavity. Sinus inflammation can occur during a cold. It can also be due to allergies to pollens and other particles in the air. Sinusitis can cause symptoms of sinus congestion and fullness. A sinus infection causes fever, headache and facial pain. There is often green or yellow drainage from the nose or into the back of the throat (post-nasal drip). You have been given antibiotics to treat this condition.  Home care:    Take the full course of antibiotics as instructed. Do not stop taking them, even if you feel better.    Drink plenty of water, hot tea, and other liquids. This may help thin mucus. It also may promote sinus drainage.    Heat may help soothe painful areas of the face. Use a towel soaked in hot water. Or,  the shower and direct the hot spray onto your face. Using a vaporizer along with a menthol rub at night may also help.     An expectorant containing guaifenesin may help thin the mucus and promote drainage from the sinuses.    Over-the-counter decongestants may be used unless a similar medicine was prescribed. Nasal sprays work the fastest. Use one that contains phenylephrine or oxymetazoline. First blow the nose gently. Then use the spray. Do not use these medicines more often than directed on the label or symptoms may get worse. You may also use tablets containing pseudoephedrine. Avoid products  that combine ingredients, because side effects may be increased. Read labels. You can also ask the pharmacist for help. (NOTE: Persons with high blood pressure should not use decongestants. They can raise blood pressure.)    Over-the-counter antihistamines may help if allergies contributed to your sinusitis.      Do not use nasal rinses or irrigation during an acute sinus infection, unless told to by your health care provider. Rinsing may spread the infection to other sinuses.    Use acetaminophen or ibuprofen to control pain, unless another pain medicine was prescribed. (If you have chronic liver or kidney disease or ever had a stomach ulcer, talk with your doctor before using these medicines. Aspirin should never be used in anyone under 18 years of age who is ill with a fever. It may cause severe liver damage.)    Don't smoke. This can worsen symptoms.  Follow-up care  Follow up with your healthcare provider or our staff if you are not improving within the next week.  When to seek medical advice  Call your healthcare provider if any of these occur:    Facial pain or headache becoming more severe    Stiff neck    Unusual drowsiness or confusion    Swelling of the forehead or eyelids    Vision problems, including blurred or double vision    Fever of 100.4 F (38 C) or higher, or as directed by your healthcare provider    Seizure    Breathing problems    Symptoms not resolving within 10 days  Date Last Reviewed: 4/13/2015 2000-2017 [employer]. [address] [place] [address], [place] [address]. All rights reserved. This information is not intended as a substitute for professional medical care. Always follow your healthcare professional's instructions.
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
## 2018-02-20 ENCOUNTER — MYC MEDICAL ADVICE (OUTPATIENT)
Dept: FAMILY MEDICINE | Facility: CLINIC | Age: 63
End: 2018-02-20

## 2018-03-07 DIAGNOSIS — I10 ESSENTIAL HYPERTENSION, BENIGN: ICD-10-CM

## 2018-03-07 RX ORDER — LISINOPRIL 20 MG/1
20 TABLET ORAL DAILY
Qty: 90 TABLET | Refills: 0 | Status: CANCELLED | OUTPATIENT
Start: 2018-03-07

## 2018-03-09 ENCOUNTER — ALLIED HEALTH/NURSE VISIT (OUTPATIENT)
Dept: FAMILY MEDICINE | Facility: CLINIC | Age: 63
End: 2018-03-09
Payer: COMMERCIAL

## 2018-03-09 VITALS — SYSTOLIC BLOOD PRESSURE: 122 MMHG | DIASTOLIC BLOOD PRESSURE: 84 MMHG

## 2018-03-09 DIAGNOSIS — I10 ESSENTIAL HYPERTENSION: Primary | ICD-10-CM

## 2018-03-09 PROCEDURE — 99207 ZZC NO BILLABLE SERVICE THIS VISIT: CPT | Performed by: PHYSICIAN ASSISTANT

## 2018-03-13 ENCOUNTER — MYC REFILL (OUTPATIENT)
Dept: FAMILY MEDICINE | Facility: CLINIC | Age: 63
End: 2018-03-13

## 2018-03-13 DIAGNOSIS — I10 ESSENTIAL HYPERTENSION, BENIGN: ICD-10-CM

## 2018-03-13 RX ORDER — LISINOPRIL AND HYDROCHLOROTHIAZIDE 12.5; 2 MG/1; MG/1
1 TABLET ORAL DAILY
Qty: 90 TABLET | Refills: 2 | Status: SHIPPED | OUTPATIENT
Start: 2018-03-13 | End: 2018-08-09

## 2018-03-13 RX ORDER — LISINOPRIL AND HYDROCHLOROTHIAZIDE 12.5; 2 MG/1; MG/1
1 TABLET ORAL DAILY
Qty: 60 TABLET | Refills: 0 | Status: SHIPPED | OUTPATIENT
Start: 2018-03-13 | End: 2018-03-13

## 2018-03-13 NOTE — TELEPHONE ENCOUNTER
Message from [employer]:  Original authorizing provider: HAMIDA Kruse would like a refill of the following medications:  lisinopril-hydrochlorothiazide (PRINZIDE/ZESTORETIC) 20-12.5 MG per tablet [Deborah Delgado PA-C]    Preferred pharmacy: [facility] #[id number] [place] [id number] [facility]    Comment:  Please renew with 60 day supply, then 2 additional 90 day refills so my prescriptions stay matched. LAVONNE Gonzalez called this request in last Wednesday the March 7th and they have had no response from the clinic. I haven't had Lisinopril since Saturday. Thanks.

## 2018-03-28 ENCOUNTER — TRANSFERRED RECORDS (OUTPATIENT)
Dept: HEALTH INFORMATION MANAGEMENT | Facility: CLINIC | Age: 63
End: 2018-03-28

## 2018-04-02 ENCOUNTER — ANESTHESIA EVENT (OUTPATIENT)
Dept: GASTROENTEROLOGY | Facility: CLINIC | Age: 63
End: 2018-04-02
Payer: COMMERCIAL

## 2018-04-02 ASSESSMENT — LIFESTYLE VARIABLES: TOBACCO_USE: 1

## 2018-04-02 NOTE — ANESTHESIA PREPROCEDURE EVALUATION
Anesthesia Evaluation     . Pt has had prior anesthetic. Type: General and MAC    No history of anesthetic complications          ROS/MED HX    ENT/Pulmonary:     (+)tobacco use, Past use , . .    Neurologic:  - neg neurologic ROS     Cardiovascular:     (+) Dyslipidemia, hypertension----. : . . . :. .       METS/Exercise Tolerance:  >4 METS   Hematologic:  - neg hematologic  ROS       Musculoskeletal:  - neg musculoskeletal ROS       GI/Hepatic:  - neg GI/hepatic ROS       Renal/Genitourinary:  - ROS Renal section negative       Endo: Comment: Morbid Obesity    (+) type II DM Obesity, .      Psychiatric:  - neg psychiatric ROS       Infectious Disease:  - neg infectious disease ROS       Malignancy:      - no malignancy   Other:    - neg other ROS                 Physical Exam  Normal systems: cardiovascular, pulmonary and dental    Airway   Mallampati: II  TM distance: >3 FB  Neck ROM: full    Dental     Cardiovascular   Rhythm and rate: regular and normal      Pulmonary    breath sounds clear to auscultation                    Anesthesia Plan      History & Physical Review  History and physical reviewed and following examination; no interval change.    ASA Status:  2 .        Plan for MAC and General with Propofol and Intravenous induction. Maintenance will be TIVA.  Reason for MAC:  Deep or markedly invasive procedure (G8)         Postoperative Care      Consents  Anesthetic plan, risks, benefits and alternatives discussed with:  Patient..                          .

## 2018-04-09 ENCOUNTER — ANESTHESIA (OUTPATIENT)
Dept: GASTROENTEROLOGY | Facility: CLINIC | Age: 63
End: 2018-04-09
Payer: COMMERCIAL

## 2018-04-09 ENCOUNTER — HOSPITAL ENCOUNTER (OUTPATIENT)
Facility: CLINIC | Age: 63
Discharge: HOME | End: 2018-04-09
Attending: SURGERY | Admitting: SURGERY
Payer: COMMERCIAL

## 2018-04-09 ENCOUNTER — SURGERY (OUTPATIENT)
Age: 63
End: 2018-04-09

## 2018-04-09 VITALS
WEIGHT: 265 LBS | OXYGEN SATURATION: 95 % | DIASTOLIC BLOOD PRESSURE: 99 MMHG | HEART RATE: 83 BPM | SYSTOLIC BLOOD PRESSURE: 133 MMHG | RESPIRATION RATE: 18 BRPM | TEMPERATURE: 98 F | HEIGHT: 72 IN | BODY MASS INDEX: 35.89 KG/M2

## 2018-04-09 LAB
COLONOSCOPY: NORMAL
GLUCOSE BLDC GLUCOMTR-MCNC: 210 MG/DL (ref 70–99)

## 2018-04-09 PROCEDURE — 45385 COLONOSCOPY W/LESION REMOVAL: CPT | Mod: PT | Performed by: SURGERY

## 2018-04-09 PROCEDURE — 25000128 H RX IP 250 OP 636: Performed by: SURGERY

## 2018-04-09 PROCEDURE — 88305 TISSUE EXAM BY PATHOLOGIST: CPT | Mod: 26 | Performed by: SURGERY

## 2018-04-09 PROCEDURE — 82962 GLUCOSE BLOOD TEST: CPT

## 2018-04-09 PROCEDURE — 25000128 H RX IP 250 OP 636: Performed by: NURSE ANESTHETIST, CERTIFIED REGISTERED

## 2018-04-09 PROCEDURE — 25000125 ZZHC RX 250: Performed by: SURGERY

## 2018-04-09 PROCEDURE — 25000125 ZZHC RX 250: Performed by: NURSE ANESTHETIST, CERTIFIED REGISTERED

## 2018-04-09 PROCEDURE — 88305 TISSUE EXAM BY PATHOLOGIST: CPT | Performed by: SURGERY

## 2018-04-09 PROCEDURE — 37000008 ZZH ANESTHESIA TECHNICAL FEE, 1ST 30 MIN: Performed by: SURGERY

## 2018-04-09 RX ORDER — SODIUM CHLORIDE, SODIUM LACTATE, POTASSIUM CHLORIDE, CALCIUM CHLORIDE 600; 310; 30; 20 MG/100ML; MG/100ML; MG/100ML; MG/100ML
INJECTION, SOLUTION INTRAVENOUS CONTINUOUS
Status: DISCONTINUED | OUTPATIENT
Start: 2018-04-09 | End: 2018-04-09 | Stop reason: HOSPADM

## 2018-04-09 RX ORDER — ONDANSETRON 2 MG/ML
4 INJECTION INTRAMUSCULAR; INTRAVENOUS
Status: DISCONTINUED | OUTPATIENT
Start: 2018-04-09 | End: 2018-04-09 | Stop reason: HOSPADM

## 2018-04-09 RX ORDER — PROPOFOL 10 MG/ML
INJECTION, EMULSION INTRAVENOUS PRN
Status: DISCONTINUED | OUTPATIENT
Start: 2018-04-09 | End: 2018-04-09

## 2018-04-09 RX ORDER — LIDOCAINE 40 MG/G
CREAM TOPICAL
Status: DISCONTINUED | OUTPATIENT
Start: 2018-04-09 | End: 2018-04-09 | Stop reason: HOSPADM

## 2018-04-09 RX ORDER — LIDOCAINE HYDROCHLORIDE 10 MG/ML
INJECTION, SOLUTION INFILTRATION; PERINEURAL PRN
Status: DISCONTINUED | OUTPATIENT
Start: 2018-04-09 | End: 2018-04-09

## 2018-04-09 RX ORDER — PROPOFOL 10 MG/ML
INJECTION, EMULSION INTRAVENOUS CONTINUOUS PRN
Status: DISCONTINUED | OUTPATIENT
Start: 2018-04-09 | End: 2018-04-09

## 2018-04-09 RX ADMIN — LIDOCAINE HYDROCHLORIDE 0.5 ML: 10 INJECTION, SOLUTION EPIDURAL; INFILTRATION; INTRACAUDAL; PERINEURAL at 07:40

## 2018-04-09 RX ADMIN — LIDOCAINE HYDROCHLORIDE 50 MG: 10 INJECTION, SOLUTION INFILTRATION; PERINEURAL at 08:48

## 2018-04-09 RX ADMIN — PROPOFOL 100 MG: 10 INJECTION, EMULSION INTRAVENOUS at 08:48

## 2018-04-09 RX ADMIN — SODIUM CHLORIDE, POTASSIUM CHLORIDE, SODIUM LACTATE AND CALCIUM CHLORIDE: 600; 310; 30; 20 INJECTION, SOLUTION INTRAVENOUS at 07:40

## 2018-04-09 RX ADMIN — PROPOFOL 50 MG: 10 INJECTION, EMULSION INTRAVENOUS at 08:53

## 2018-04-09 RX ADMIN — PROPOFOL 175 MCG/KG/MIN: 10 INJECTION, EMULSION INTRAVENOUS at 08:48
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
## 2018-04-09 NOTE — ANESTHESIA CARE TRANSFER NOTE
Patient: Celestine Simon    Procedure(s):  colonoscopy - Wound Class: II-Clean Contaminated    Diagnosis: screening  Diagnosis Additional Information: No value filed.    Anesthesia Type:   MAC, General     Note:  Airway :Nasal Cannula  Patient transferred to:Phase II  Comments: Patient's VSS. Spontaneous respirations. Patient awake and oriented. IV patent. Report to RN.Handoff Report: Identifed the Patient, Identified the Reponsible Provider, Reviewed the pertinent medical history, Discussed the surgical course, Reviewed Intra-OP anesthesia mangement and issues during anesthesia, Set expectations for post-procedure period and Allowed opportunity for questions and acknowledgement of understanding      Vitals: (Last set prior to Anesthesia Care Transfer)    CRNA VITALS  4/9/2018 0837 - 4/9/2018 0907      4/9/2018             Pulse: 81    SpO2: (!)  89 %                Electronically Signed By: ANNE-MARIE Baca CRNA  April 9, 2018  9:07 AM

## 2018-04-09 NOTE — ANESTHESIA POSTPROCEDURE EVALUATION
Patient: Celestine Simon    Procedure(s):  colonoscopy - Wound Class: II-Clean Contaminated    Diagnosis:screening  Diagnosis Additional Information: No value filed.    Anesthesia Type:  MAC, General    Note:  Anesthesia Post Evaluation    Patient location during evaluation: Phase 2 and Bedside  Patient participation: Able to fully participate in evaluation  Level of consciousness: awake and alert  Pain management: adequate  Airway patency: patent  Cardiovascular status: acceptable  Respiratory status: acceptable  Hydration status: acceptable  PONV: none     Anesthetic complications: None          Last vitals:  Vitals:    04/09/18 0710   BP: (!) 141/93   Pulse: 83   Resp: 18   Temp: 36.7  C (98  F)   SpO2: 94%         Electronically Signed By: ANNE-MARIE Baca CRNA  April 9, 2018  9:58 AM

## 2018-04-10 LAB — COPATH REPORT: NORMAL

## 2018-05-03 ENCOUNTER — MYC REFILL (OUTPATIENT)
Dept: FAMILY MEDICINE | Facility: CLINIC | Age: 63
End: 2018-05-03

## 2018-05-03 DIAGNOSIS — E11.29 TYPE 2 DIABETES MELLITUS WITH MICROALBUMINURIA, WITHOUT LONG-TERM CURRENT USE OF INSULIN (H): ICD-10-CM

## 2018-05-03 DIAGNOSIS — R80.9 TYPE 2 DIABETES MELLITUS WITH MICROALBUMINURIA, WITHOUT LONG-TERM CURRENT USE OF INSULIN (H): ICD-10-CM

## 2018-05-03 RX ORDER — METFORMIN HCL 500 MG
1000 TABLET, EXTENDED RELEASE 24 HR ORAL
Qty: 180 TABLET | Refills: 0 | Status: SHIPPED | OUTPATIENT
Start: 2018-05-03 | End: 2018-08-09 | Stop reason: DRUGHIGH

## 2018-05-03 NOTE — TELEPHONE ENCOUNTER
Message from [employer]:  Original authorizing provider: HAMIDA Kruse would like a refill of the following medications:  metFORMIN (GLUCOPHAGE-XR) 500 MG 24 hr tablet [Deborah Delgado PA-C]  sitagliptin (JANUVIA) 100 MG tablet [Deborah Delgado PA-C]    Preferred pharmacy: [facility] #[id number] [address]    Comment:  I need 2 refills of these meds called into [facility] please.

## 2018-08-03 ENCOUNTER — DOCUMENTATION ONLY (OUTPATIENT)
Dept: LAB | Facility: CLINIC | Age: 63
End: 2018-08-03

## 2018-08-03 DIAGNOSIS — E11.29 TYPE 2 DIABETES MELLITUS WITH MICROALBUMINURIA, WITHOUT LONG-TERM CURRENT USE OF INSULIN (H): Primary | ICD-10-CM

## 2018-08-03 DIAGNOSIS — R80.9 TYPE 2 DIABETES MELLITUS WITH MICROALBUMINURIA, WITHOUT LONG-TERM CURRENT USE OF INSULIN (H): Primary | ICD-10-CM

## 2018-08-03 DIAGNOSIS — E78.5 HYPERLIPIDEMIA LDL GOAL <100: ICD-10-CM

## 2018-08-07 DIAGNOSIS — I10 ESSENTIAL HYPERTENSION, BENIGN: ICD-10-CM

## 2018-08-07 DIAGNOSIS — E78.5 HYPERLIPIDEMIA LDL GOAL <100: ICD-10-CM

## 2018-08-07 DIAGNOSIS — R80.9 TYPE 2 DIABETES MELLITUS WITH MICROALBUMINURIA, WITHOUT LONG-TERM CURRENT USE OF INSULIN (H): ICD-10-CM

## 2018-08-07 DIAGNOSIS — E11.29 TYPE 2 DIABETES MELLITUS WITH MICROALBUMINURIA, WITHOUT LONG-TERM CURRENT USE OF INSULIN (H): ICD-10-CM

## 2018-08-07 LAB
ANION GAP SERPL CALCULATED.3IONS-SCNC: 5 MMOL/L (ref 3–14)
BUN SERPL-MCNC: 20 MG/DL (ref 7–30)
CALCIUM SERPL-MCNC: 9.4 MG/DL (ref 8.5–10.1)
CHLORIDE SERPL-SCNC: 103 MMOL/L (ref 94–109)
CHOLEST SERPL-MCNC: 126 MG/DL
CO2 SERPL-SCNC: 29 MMOL/L (ref 20–32)
CREAT SERPL-MCNC: 0.99 MG/DL (ref 0.66–1.25)
GFR SERPL CREATININE-BSD FRML MDRD: 77 ML/MIN/1.7M2
GLUCOSE SERPL-MCNC: 181 MG/DL (ref 70–99)
HBA1C MFR BLD: 8.6 % (ref 0–5.6)
HDLC SERPL-MCNC: 54 MG/DL
LDLC SERPL CALC-MCNC: 40 MG/DL
NONHDLC SERPL-MCNC: 72 MG/DL
POTASSIUM SERPL-SCNC: 4.2 MMOL/L (ref 3.4–5.3)
SODIUM SERPL-SCNC: 137 MMOL/L (ref 133–144)
TRIGL SERPL-MCNC: 158 MG/DL

## 2018-08-07 PROCEDURE — 80061 LIPID PANEL: CPT | Performed by: PHYSICIAN ASSISTANT

## 2018-08-07 PROCEDURE — 80048 BASIC METABOLIC PNL TOTAL CA: CPT | Performed by: PHYSICIAN ASSISTANT

## 2018-08-07 PROCEDURE — 83036 HEMOGLOBIN GLYCOSYLATED A1C: CPT | Performed by: PHYSICIAN ASSISTANT

## 2018-08-07 PROCEDURE — 36415 COLL VENOUS BLD VENIPUNCTURE: CPT | Performed by: PHYSICIAN ASSISTANT

## 2018-08-09 ENCOUNTER — OFFICE VISIT (OUTPATIENT)
Dept: FAMILY MEDICINE | Facility: CLINIC | Age: 63
End: 2018-08-09
Payer: COMMERCIAL

## 2018-08-09 ENCOUNTER — MYC REFILL (OUTPATIENT)
Dept: FAMILY MEDICINE | Facility: CLINIC | Age: 63
End: 2018-08-09

## 2018-08-09 VITALS
BODY MASS INDEX: 35.56 KG/M2 | HEART RATE: 60 BPM | RESPIRATION RATE: 18 BRPM | DIASTOLIC BLOOD PRESSURE: 86 MMHG | WEIGHT: 262.2 LBS | SYSTOLIC BLOOD PRESSURE: 126 MMHG

## 2018-08-09 DIAGNOSIS — E11.00 TYPE 2 DIABETES MELLITUS WITH HYPEROSMOLARITY WITHOUT COMA, WITHOUT LONG-TERM CURRENT USE OF INSULIN (H): Primary | ICD-10-CM

## 2018-08-09 DIAGNOSIS — I10 ESSENTIAL HYPERTENSION, BENIGN: ICD-10-CM

## 2018-08-09 DIAGNOSIS — E11.29 TYPE 2 DIABETES MELLITUS WITH MICROALBUMINURIA, WITHOUT LONG-TERM CURRENT USE OF INSULIN (H): ICD-10-CM

## 2018-08-09 DIAGNOSIS — E78.5 DYSLIPIDEMIA: ICD-10-CM

## 2018-08-09 DIAGNOSIS — R80.9 TYPE 2 DIABETES MELLITUS WITH MICROALBUMINURIA, WITHOUT LONG-TERM CURRENT USE OF INSULIN (H): ICD-10-CM

## 2018-08-09 PROCEDURE — 99214 OFFICE O/P EST MOD 30 MIN: CPT | Performed by: NURSE PRACTITIONER

## 2018-08-09 RX ORDER — LISINOPRIL AND HYDROCHLOROTHIAZIDE 12.5; 2 MG/1; MG/1
1 TABLET ORAL DAILY
Qty: 90 TABLET | Refills: 2 | Status: SHIPPED | OUTPATIENT
Start: 2018-08-09 | End: 2018-12-26

## 2018-08-09 RX ORDER — ATORVASTATIN CALCIUM 20 MG/1
20 TABLET, FILM COATED ORAL DAILY
Qty: 90 TABLET | Refills: 3 | Status: SHIPPED | OUTPATIENT
Start: 2018-08-09 | End: 2021-12-07

## 2018-08-09 RX ORDER — DOXAZOSIN 4 MG/1
4 TABLET ORAL DAILY
Qty: 90 TABLET | Refills: 3 | Status: SHIPPED | OUTPATIENT
Start: 2018-08-09 | End: 2021-12-07

## 2018-08-09 ASSESSMENT — PAIN SCALES - GENERAL: PAINLEVEL: NO PAIN (0)

## 2018-08-09 NOTE — MR AVS SNAPSHOT
After Visit Summary   8/9/2018    Celestine Simon    MRN: [id number]           Patient Information     Date Of Birth          1955        Visit Information        Provider Department      8/9/2018 10:40 AM Ling Olvera NP [facility]        Today's Diagnoses     Type 2 diabetes mellitus with hyperosmolarity without coma, without long-term current use of insulin (H)    -  1    Dyslipidemia        BENIGN HYPERTENSION           Follow-ups after your visit        Follow-up notes from your care team     Return in about 3 months (around 11/9/2018).      Future tests that were ordered for you today     Open Future Orders        Priority Expected Expires Ordered    **A1C FUTURE 3mo Routine 11/7/2018 12/7/2018 8/9/2018            Who to contact     If you have questions or need follow up information about today's clinic visit or your schedule please contact [facility] directly at [phone].  Normal or non-critical lab and imaging results will be communicated to you by MyChart, letter or phone within 4 business days after the clinic has received the results. If you do not hear from us within 7 days, please contact the clinic through [employer] or phone. If you have a critical or abnormal lab result, we will notify you by phone as soon as possible.  Submit refill requests through [email or web address] or call your pharmacy and they will forward the refill request to us. Please allow 3 business days for your refill to be completed.          Additional Information About Your Visit        MyChart Information     [email or web address] gives you secure access to your electronic health record. If you see a primary care provider, you can also send messages to your care team and make appointments. If you have questions, please call your primary care clinic.  If you do not have a primary care provider, please call [phone] and they will assist you.        Care EveryWhere ID     This is your Care  EveryWhere ID. This could be used by other organizations to access your [facility] medical records  [id number]        Your Vitals Were     Pulse Respirations BMI (Body Mass Index)             60 18 35.56 kg/m2          Blood Pressure from Last 3 Encounters:   08/09/18 126/86   04/09/18 (!) 133/99   02/20/18 122/84    Weight from Last 3 Encounters:   08/09/18 262 lb 3.2 oz (118.9 kg)   04/09/18 265 lb (120.2 kg)   02/18/18 270 lb (122.5 kg)                 Today's Medication Changes          These changes are accurate as of 8/9/18 11:43 AM.  If you have any questions, ask your nurse or doctor.               Start taking these medicines.        Dose/Directions    metFORMIN 1000 MG tablet   Commonly known as:  GLUCOPHAGE   Used for:  Type 2 diabetes mellitus with hyperosmolarity without coma, without long-term current use of insulin (H)   Replaces:  metFORMIN 500 MG 24 hr tablet   Started by:  Ling Olvera NP        Dose:  1000 mg   Take 1 tablet (1,000 mg) by mouth 2 times daily (with meals)   Quantity:  180 tablet   Refills:  3         Stop taking these medicines if you haven't already. Please contact your care team if you have questions.     lisinopril 20 MG tablet   Commonly known as:  PRINIVIL/ZESTRIL   Stopped by:  Ling Olvera NP           metFORMIN 500 MG 24 hr tablet   Commonly known as:  GLUCOPHAGE-XR   Replaced by:  metFORMIN 1000 MG tablet   Stopped by:  Ling Olvera NP                Where to get your medicines      These medications were sent to [facility] #[id number] [place] [id number] [address]    Hours:  Closed 10-16-08 business to [facility] Phone:  [phone]     atorvastatin 20 MG tablet    doxazosin 4 MG tablet    lisinopril-hydrochlorothiazide 20-12.5 MG per tablet    metFORMIN 1000 MG tablet                Primary Care Provider Office Phone # Fax #    Deborah Delgado PA-C [phone]        [address] [place] [address]        Equal Access to Services     HALI CRISOSTOMO : Hadii aad ku hadalekdao Karissaali, waruthannda lupatricaadaha, qaybta andrezjaydekobe salgado, rodney clevelandkipmariposa morton. So [facility] [phone].    ATENCIÓN: Si habla español, tiene a haro disposición servicios gratuitos de asistencia lingüística. Llame al [phone].    We comply with applicable federal civil rights laws and [place] laws. We do not discriminate on the basis of race, color, national origin, age, disability, sex, sexual orientation, or gender identity.            Thank you!     Thank you for choosing [facility]  for your care. Our goal is always to provide you with excellent care. Hearing back from our patients is one way we can continue to improve our services. Please take a few minutes to complete the written survey that you may receive in the mail after your visit with us. Thank you!             Your Updated Medication List - Protect others around you: Learn how to safely use, store and throw away your medicines at www.disposemymeds.org.          This list is accurate as of 8/9/18 11:43 AM.  Always use your most recent med list.                   Brand Name Dispense Instructions for use Diagnosis    aspirin 81 MG tablet     100 tablet    Take by mouth every 24 hours    Essential hypertension, benign, Type 2 diabetes, HbA1c goal < 7% (H)       atorvastatin 20 MG tablet    LIPITOR    90 tablet    Take 1 tablet (20 mg) by mouth daily    Dyslipidemia       doxazosin 4 MG tablet    CARDURA    90 tablet    Take 1 tablet (4 mg) by mouth daily    Essential hypertension, benign       * IN TOUCH Misc     90 each    1 strip once a week Tests weekly    Type 2 diabetes mellitus with hyperglycemia (H)       * Blood Glucose Monitoring Suppl Misc     100 each    1 strip once a week Tests weekly    Type 2 diabetes mellitus with hyperglycemia (H)       LANCETS REGULAR Misc     100 Each    One Touch US Lancet-  Used twice daily    Type II or unspecified type diabetes mellitus without mention of complication, not stated as uncontrolled       lisinopril-hydrochlorothiazide 20-12.5 MG per tablet    PRINZIDE/ZESTORETIC    90 tablet    Take 1 tablet by mouth daily    Essential hypertension, benign       metFORMIN 1000 MG tablet    GLUCOPHAGE    180 tablet    Take 1 tablet (1,000 mg) by mouth 2 times daily (with meals)    Type 2 diabetes mellitus with hyperosmolarity without coma, without long-term current use of insulin (H)       Multi-vitamin Tabs tablet   Generic drug:  multivitamin, therapeutic with minerals      one daily        sitagliptin 100 MG tablet    JANUVIA    90 tablet    Take 1 tablet (100 mg) by mouth daily    Type 2 diabetes mellitus with microalbuminuria, without long-term current use of insulin (H)       * Notice:  This list has 2 medication(s) that are the same as other medications prescribed for you. Read the directions carefully, and ask your doctor or other care provider to review them with you.

## 2018-08-09 NOTE — NURSING NOTE
Chief Complaint   Patient presents with     Recheck Medication       Initial /86 (BP Location: Right arm, Patient Position: Chair, Cuff Size: Adult Large)  Pulse 60  Resp 18  Wt 262 lb 3.2 oz (118.9 kg)  BMI 35.56 kg/m2 Estimated body mass index is 35.56 kg/(m^2) as calculated from the following:    Height as of 4/9/18: 6' (1.829 m).    Weight as of this encounter: 262 lb 3.2 oz (118.9 kg).      Health Maintenance that is potentially due pending provider review:  NONE    Tara Tipton MA  10:51 AM 8/9/2018  .

## 2018-08-09 NOTE — TELEPHONE ENCOUNTER
Message from [employer]:  Original authorizing provider: HAMIDA Kruse would like a refill of the following medications:  sitagliptin (JANUVIA) 100 MG tablet [Deborah Delgado PA-C]    Preferred pharmacy: [facility] #[id number] [facility] [address] [facility]    Comment:  Went to [facility] pharmacy and they didn't have my prescription for Januvia as discussed with Ling Myles. Will you please follow up with [employer]. Thanks!

## 2018-08-09 NOTE — PROGRESS NOTES
SUBJECTIVE:   Celestine Simon is a 63 year old male who presents to clinic today for the following health issues:      Diabetes Follow-up  PT had labs done on 8/7/18 and A1C was 8.6      Patient is checking blood sugars: 1 times a week, ranging between 140-180    Diabetic concerns: other - A1C is 8.6     Symptoms of hypoglycemia (low blood sugar): none     Paresthesias (numbness or burning in feet) or sores: No     Date of last diabetic eye exam: May 2018    BP Readings from Last 2 Encounters:   08/09/18 126/86   04/09/18 (!) 133/99     Hemoglobin A1C (%)   Date Value   08/07/2018 8.6 (H)   02/07/2018 7.9 (H)     LDL Cholesterol Calculated (mg/dL)   Date Value   08/07/2018 40   08/04/2017 92     Problem list and histories reviewed & adjusted, as indicated.  Additional history: as documented    Patient Active Problem List   Diagnosis     Essential hypertension     Severe obesity (BMI 35.0-39.9) with comorbidity (H)     Type 2 diabetes mellitus with microalbuminuria, without long-term current use of insulin (H)     HYPERLIPIDEMIA LDL GOAL <100     H/O splenectomy     Past Surgical History:   Procedure Laterality Date     APPENDECTOMY      age 20     [place] GASTROSCOPY       HC REMOVAL GALLBLADDER      age 20     SURGICAL HISTORY OF -       hip repair due to tractor accident age 19     SURGICAL HISTORY OF -       tonsilectomy ? age     SURGICAL HISTORY OF -   1998    flexiblesigmoidoscopy       Social History   Substance Use Topics     Smoking status: Former Smoker     Smokeless tobacco: Never Used      Comment: 30 years ago     Alcohol use Yes      Comment: occ     Family History   Problem Relation Age of Onset     Cerebrovascular Disease Father      53     Cardiovascular Father      53     Coronary Artery Disease Father 53     MI     Crohn Disease Father      Respiratory Maternal Grandfather      Emphsemia smoking     Cardiovascular Paternal Grandfather      Lipids Mother          Current Outpatient Prescriptions    Medication Sig Dispense Refill     aspirin 81 MG tablet Take by mouth every 24 hours  100 tablet 3     atorvastatin (LIPITOR) 20 MG tablet Take 1 tablet (20 mg) by mouth daily 90 tablet 3     Blood Glucose Monitoring Suppl (IN TOUCH) MISC 1 strip once a week Tests weekly 90 each 0     Blood Glucose Monitoring Suppl MISC 1 strip once a week Tests weekly 100 each 1     doxazosin (CARDURA) 4 MG tablet Take 1 tablet (4 mg) by mouth daily 90 tablet 3     LANCETS REGULAR MISC One Touch US Lancet- Used twice daily 100 Each 6     lisinopril-hydrochlorothiazide (PRINZIDE/ZESTORETIC) 20-12.5 MG per tablet Take 1 tablet by mouth daily 90 tablet 2     metFORMIN (GLUCOPHAGE) 1000 MG tablet Take 1 tablet (1,000 mg) by mouth 2 times daily (with meals) 180 tablet 3     MULTI-VITAMIN OR TABS one daily       sitagliptin (JANUVIA) 100 MG tablet Take 1 tablet (100 mg) by mouth daily 90 tablet 0     [DISCONTINUED] atorvastatin (LIPITOR) 20 MG tablet Take 1 tablet (20 mg) by mouth daily 90 tablet 3     [DISCONTINUED] doxazosin (CARDURA) 4 MG tablet Take 1 tablet (4 mg) by mouth daily 90 tablet 3     [DISCONTINUED] lisinopril-hydrochlorothiazide (PRINZIDE/ZESTORETIC) 20-12.5 MG per tablet Take 1 tablet by mouth daily 90 tablet 2     [DISCONTINUED] metFORMIN (GLUCOPHAGE-XR) 500 MG 24 hr tablet Take 2 tablets (1,000 mg) by mouth daily (with dinner) 180 tablet 0     No Known Allergies    Reviewed and updated as needed this visit by clinical staff  Tobacco  Allergies  Meds  Problems  Med Hx  Surg Hx  Fam Hx  Soc Hx        Reviewed and updated as needed this visit by Provider  Allergies  Meds  Problems         ROS:  CONSTITUTIONAL: NEGATIVE for fever, chills, change in weight  RESP: NEGATIVE for significant cough or SOB  CV: NEGATIVE for chest pain, palpitations or peripheral edema  PSYCHIATRIC: NEGATIVE for changes in mood or affect  ROS otherwise negative    OBJECTIVE:     /86 (BP Location: Right arm, Patient Position:  Chair, Cuff Size: Adult Large)  Pulse 60  Resp 18  Wt 262 lb 3.2 oz (118.9 kg)  BMI 35.56 kg/m2  Body mass index is 35.56 kg/(m^2).  GENERAL: healthy, alert and no distress  RESP: lungs clear to auscultation - no rales, rhonchi or wheezes  CV: regular rate and rhythm, normal S1 S2, no S3 or S4, no murmur, click or rub, no peripheral edema and peripheral pulses strong  ABDOMEN: soft, nontender, no hepatosplenomegaly, no masses and bowel sounds normal  MS: no gross musculoskeletal defects noted, no edema  SKIN: no suspicious lesions or rashes  PSYCH: mentation appears normal, affect normal/bright  Diabetic foot exam: normal DP and PT pulses, no trophic changes or ulcerative lesions and normal sensory exam    Diagnostic Test Results:  Results for orders placed or performed in visit on 08/07/18   **Basic metabolic panel FUTURE anytime   Result Value Ref Range    Sodium 137 133 - 144 mmol/L    Potassium 4.2 3.4 - 5.3 mmol/L    Chloride 103 94 - 109 mmol/L    Carbon Dioxide 29 20 - 32 mmol/L    Anion Gap 5 3 - 14 mmol/L    Glucose 181 (H) 70 - 99 mg/dL    Urea Nitrogen 20 7 - 30 mg/dL    Creatinine 0.99 0.66 - 1.25 mg/dL    GFR Estimate 77 >60 mL/min/1.7m2    GFR Estimate If Black >90 >60 mL/min/1.7m2    Calcium 9.4 8.5 - 10.1 mg/dL   Hemoglobin A1c   Result Value Ref Range    Hemoglobin A1C 8.6 (H) 0 - 5.6 %   Lipid panel reflex to direct LDL Fasting   Result Value Ref Range    Cholesterol 126 <200 mg/dL    Triglycerides 158 (H) <150 mg/dL    HDL Cholesterol 54 >39 mg/dL    LDL Cholesterol Calculated 40 <100 mg/dL    Non HDL Cholesterol 72 <130 mg/dL       ASSESSMENT/PLAN:     1. Type 2 diabetes mellitus with hyperosmolarity without coma, without long-term current use of insulin (H)  Increase Metformin to 1000mg twice daily.  Continue Januvia daily.  Check BS in the morning for 2 weeks. If sugars are still high, he will contact me and I will start Actos at a low dose.  Repeat A1C in 3 months. (Consulted with MLT  Pharmacist due to FAA regulation restrictions and patient refusal to do injectables)  - metFORMIN (GLUCOPHAGE) 1000 MG tablet; Take 1 tablet (1,000 mg) by mouth 2 times daily (with meals)  Dispense: 180 tablet; Refill: 3    2. Dyslipidemia  Lab looks great.  Refilled of Lipitor for 1 year.  - atorvastatin (LIPITOR) 20 MG tablet; Take 1 tablet (20 mg) by mouth daily  Dispense: 90 tablet; Refill: 3    3. BENIGN HYPERTENSION  BP within normal range.  Refill of lisinopril/HCTZ for 1 year.  - lisinopril-hydrochlorothiazide (PRINZIDE/ZESTORETIC) 20-12.5 MG per tablet; Take 1 tablet by mouth daily  Dispense: 90 tablet; Refill: 2  - doxazosin (CARDURA) 4 MG tablet; Take 1 tablet (4 mg) by mouth daily  Dispense: 90 tablet; Refill: 3    Ling Olvera NP  [facility]    Addendum (8/21/18):  Adding Actos, discontinuing Januvia due to cost with new insurance.  Adding MLT consult for management due to FAA regulations and patients unwillingness to use insulin.

## 2018-08-21 RX ORDER — PIOGLITAZONEHYDROCHLORIDE 15 MG/1
15 TABLET ORAL DAILY
Qty: 30 TABLET | Refills: 2 | Status: SHIPPED | OUTPATIENT
Start: 2018-08-21 | End: 2018-09-06

## 2018-08-23 ENCOUNTER — TELEPHONE (OUTPATIENT)
Dept: PHARMACY | Facility: OTHER | Age: 63
End: 2018-08-23

## 2018-08-23 NOTE — TELEPHONE ENCOUNTER
MTM referral from: [facility] visit (referral by provider)    MTM referral outreach attempt #1 on August 23, 2018 at 1:03 PM      Outcome: Patient is not interested at this time because he wants to wait and set up the apt himself, will route to MTM Pharmacist/Provider as an FYI. Thank you for the referral.     Naila Brothers, MTM Coordinator

## 2018-09-06 ENCOUNTER — OFFICE VISIT (OUTPATIENT)
Dept: PHARMACY | Facility: CLINIC | Age: 63
End: 2018-09-06

## 2018-09-06 VITALS — SYSTOLIC BLOOD PRESSURE: 124 MMHG | DIASTOLIC BLOOD PRESSURE: 84 MMHG

## 2018-09-06 DIAGNOSIS — I10 ESSENTIAL HYPERTENSION: ICD-10-CM

## 2018-09-06 DIAGNOSIS — E11.00 TYPE 2 DIABETES MELLITUS WITH HYPEROSMOLARITY WITHOUT COMA, WITHOUT LONG-TERM CURRENT USE OF INSULIN (H): Primary | ICD-10-CM

## 2018-09-06 DIAGNOSIS — E78.5 HYPERLIPIDEMIA LDL GOAL <100: ICD-10-CM

## 2018-09-06 PROCEDURE — 99605 MTMS BY PHARM NP 15 MIN: CPT | Performed by: PHARMACIST

## 2018-09-06 PROCEDURE — 99607 MTMS BY PHARM ADDL 15 MIN: CPT | Performed by: PHARMACIST

## 2018-09-06 RX ORDER — PIOGLITAZONEHYDROCHLORIDE 30 MG/1
30 TABLET ORAL DAILY
Qty: 30 TABLET | Refills: 2 | Status: SHIPPED | OUTPATIENT
Start: 2018-09-06 | End: 2018-12-11

## 2018-09-06 NOTE — MR AVS SNAPSHOT
After Visit Summary   9/6/2018    Celestine Simon    MRN: [id number]           Patient Information     Date Of Birth          1955        Visit Information        Provider Department      9/6/2018 8:00 AM David Lewis, [facility]        Today's Diagnoses     Type 2 diabetes mellitus with hyperosmolarity without coma, without long-term current use of insulin (H)    -  1      Care Instructions    Recommendations from today's MTM visit:                                                    MTM (medication therapy management) is a service provided by a clinical pharmacist designed to help you get the most of out of your medicines.   Today we reviewed what your medicines are for, how to know if they are working, that your medicines are safe and how to make your medicine regimen as easy as possible.     1. Increase pioglitazone to 30 mg daily.  Ok to use up remaining supply of 15 mg tablets, but make sure you are only taking 30 mg once you get new prescription.      Next MTM visit: I will send you a message in 4 weeks if I don't hear from you sooner to see how things are going with your blood sugars.      To schedule another MTM appointment, please call the clinic directly or you may call the MTM scheduling line at [phone] or toll-free at [phone].     My Clinical Pharmacist's contact information:                                                      It was a pleasure seeing you today!  Please feel free to contact me with any questions or concerns you have.      Andrew Lewis, PharmD, [facility]  Medication Therapy Management Pharmacist  Pager: [phone]    You may receive a survey about the MTM services you received.  I would appreciate your feedback to help me serve you better in the future. Please fill it out and return it when you can. Your comments will be anonymous.                Follow-ups after your visit        Who to contact     If you have questions or need  follow up information about today's clinic visit or your schedule please contact [facility] directly at [phone].  Normal or non-critical lab and imaging results will be communicated to you by Finders[employer]hart, letter or phone within 4 business days after the clinic has received the results. If you do not hear from us within 7 days, please contact the clinic through Find[employer] or phone. If you have a critical or abnormal lab result, we will notify you by phone as soon as possible.  Submit refill requests through [employer] or call your pharmacy and they will forward the refill request to us. Please allow 3 business days for your refill to be completed.          Additional Information About Your Visit        FindersfeeharEntitle Information     [employer] gives you secure access to your electronic health record. If you see a primary care provider, you can also send messages to your care team and make appointments. If you have questions, please call your primary care clinic.  If you do not have a primary care provider, please call [phone] and they will assist you.        Care EveryWhere ID     This is your Care EveryWhere ID. This could be used by other organizations to access your [facility] medical records  [id number]         Blood Pressure from Last 3 Encounters:   09/06/18 124/84   08/09/18 126/86   04/09/18 (!) 133/99    Weight from Last 3 Encounters:   08/09/18 262 lb 3.2 oz (118.9 kg)   04/09/18 265 lb (120.2 kg)   02/18/18 270 lb (122.5 kg)              Today, you had the following     No orders found for display         Today's Medication Changes          These changes are accurate as of 9/6/18  8:12 AM.  If you have any questions, ask your nurse or doctor.               These medicines have changed or have updated prescriptions.        Dose/Directions    pioglitazone 30 MG tablet   Commonly known as:  ACTOS   This may have changed:    - medication strength  - how much to take   Used for:  Type 2 diabetes  mellitus with hyperosmolarity without coma, without long-term current use of insulin (H)        Dose:  30 mg   Take 1 tablet (30 mg) by mouth daily   Quantity:  30 tablet   Refills:  2            Where to get your medicines      These medications were sent to [id number] - [place], [place] - [address]  [address] [address]    Hours:  Closed 10-16-08 business to [facility] Phone:  [phone]     pioglitazone 30 MG tablet                Primary Care Provider Office Phone # Fax #    Deborah Delgado PA-C [phone] [address] [facility] [address]        Equal Access to Services     HALI CRISOSTOMO : Ava Tan, arcenio suazo, elizabeth salgado, rodney kidd . So [facility] [phone].    ATENCIÓN: Si habla español, tiene a haro disposición servicios gratuitos de asistencia lingüística. Llame al [phone].    We comply with applicable federal civil rights laws and [place] laws. We do not discriminate on the basis of race, color, national origin, age, disability, sex, sexual orientation, or gender identity.            Thank you!     Thank you for choosing [facility]  for your care. Our goal is always to provide you with excellent care. Hearing back from our patients is one way we can continue to improve our services. Please take a few minutes to complete the written survey that you may receive in the mail after your visit with us. Thank you!             Your Updated Medication List - Protect others around you: Learn how to safely use, store and throw away your medicines at www.disposemymeds.org.          This list is accurate as of 9/6/18  8:12 AM.  Always use your most recent med list.                   Brand Name Dispense Instructions for use Diagnosis    aspirin 81 MG tablet     100 tablet    Take by mouth every 24 hours    Essential hypertension, benign, Type 2 diabetes,  HbA1c goal < 7% (H)       atorvastatin 20 MG tablet    LIPITOR    90 tablet    Take 1 tablet (20 mg) by mouth daily    Dyslipidemia       doxazosin 4 MG tablet    CARDURA    90 tablet    Take 1 tablet (4 mg) by mouth daily    Essential hypertension, benign       * IN TOUCH Misc     90 each    1 strip once a week Tests weekly    Type 2 diabetes mellitus with hyperglycemia (H)       * Blood Glucose Monitoring Suppl Misc     100 each    1 strip once a week Tests weekly    Type 2 diabetes mellitus with hyperglycemia (H)       LANCETS REGULAR Misc     100 Each    One Touch US Lancet- Used twice daily    Type II or unspecified type diabetes mellitus without mention of complication, not stated as uncontrolled       lisinopril-hydrochlorothiazide 20-12.5 MG per tablet    PRINZIDE/ZESTORETIC    90 tablet    Take 1 tablet by mouth daily    Essential hypertension, benign       metFORMIN 1000 MG tablet    GLUCOPHAGE    180 tablet    Take 1 tablet (1,000 mg) by mouth 2 times daily (with meals)    Type 2 diabetes mellitus with hyperosmolarity without coma, without long-term current use of insulin (H)       Multi-vitamin Tabs tablet   Generic drug:  multivitamin, therapeutic with minerals      one daily        pioglitazone 30 MG tablet    ACTOS    30 tablet    Take 1 tablet (30 mg) by mouth daily    Type 2 diabetes mellitus with hyperosmolarity without coma, without long-term current use of insulin (H)       sitagliptin 100 MG tablet    JANUVIA    90 tablet    Take 1 tablet (100 mg) by mouth daily    Type 2 diabetes mellitus with microalbuminuria, without long-term current use of insulin (H)       * Notice:  This list has 2 medication(s) that are the same as other medications prescribed for you. Read the directions carefully, and ask your doctor or other care provider to review them with you.

## 2018-09-06 NOTE — PATIENT INSTRUCTIONS
Recommendations from today's MTM visit:                                                    MTM (medication therapy management) is a service provided by a clinical pharmacist designed to help you get the most of out of your medicines.   Today we reviewed what your medicines are for, how to know if they are working, that your medicines are safe and how to make your medicine regimen as easy as possible.     1. Increase pioglitazone to 30 mg daily.  Ok to use up remaining supply of 15 mg tablets, but make sure you are only taking 30 mg once you get new prescription.      Next MTM visit: I will send you a message in 4 weeks if I don't hear from you sooner to see how things are going with your blood sugars.      To schedule another MTM appointment, please call the clinic directly or you may call the MTM scheduling line at [phone] or toll-free at [phone].     My Clinical Pharmacist's contact information:                                                      It was a pleasure seeing you today!  Please feel free to contact me with any questions or concerns you have.      Andrew Lewis, [place], [facility]  Medication Therapy Management Pharmacist  Pager: [phone]    You may receive a survey about the MTM services you received.  I would appreciate your feedback to help me serve you better in the future. Please fill it out and return it when you can. Your comments will be anonymous.

## 2018-09-06 NOTE — PROGRESS NOTES
SUBJECTIVE/OBJECTIVE:                           Celestine Simon is a 63 year old male coming in for an initial visit for Medication Therapy Management.  He was referred to me from ANNE-MARIE Koehler, CNP.    Chief Complaint: Diabetes management.    Allergies/ADRs: None  Tobacco: History of tobacco dependence - quit 40 years ago  Alcohol: 3-4 beverages / week  Caffeine: 2-3 cups/day of coffee  Activity: 5 to 10 K steps per day.    PMH: Reviewed in Epic    Medication Adherence/Access:  Patient takes medications directly from bottles.  Patient takes medications 2 time(s) per day.   Per patient, misses medication 0 times per week.     Diabetes:  Pt currently taking metformin IR 1000 mg twice daily and pioglitazone 15 mg daily. Pt has stopped taking Januvia as he did not find to be effective and he is now on high deductible plan that does not allow good coverage of brand name medications.  Prefers to stay on generic medications.  Not interested in starting GLP-1 agonist at this time, but realizes that this may be next step. Pt is not experiencing side effects.  SMBG: one time daily.   Ranges (patient reported): 180-190 mg/dL (did not change much with switch from Januvia to Actos)  Patient is not experiencing hypoglycemia  Recent symptoms of high blood sugar? none  Eye exam: up to date  Foot exam: up to date  ACEi/ARB: Yes: lisinopril.   Urine Albumin:   Lab Results   Component Value Date    UMALCR 12.71 11/07/2017    Aspirin: Taking 81mg daily and denies side effects  Diet/Exercise: Fresh fruits and vegetables.  Whole grain breads.  Two meals a day.  Trying to avoid snacks.  Increasing activity and trying to lose weight with his wife.     Hyperlipidemia: Current therapy includes atorvastatin 20 mg once daily.  Pt reports no significant myalgias or other side effects.    Hypertension: Current medications include lisinopril-hydrochlorothiazide 20-12.5 mg daily and doxazosin 4 mg daily.  Patient does not self-monitor BP.   Patient reports no current medication side effects.    Today's Vitals: /84    Recent Labs   Lab Test  08/07/18   0753  08/04/17   0840   04/21/15   1221  12/26/12   0815   CHOL  126  181   < >  149  154   HDL  54  57   < >  58  61   LDL  40  92   < >  49  74   TRIG  158*  159*   < >  209*  92   CHOLHDLRATIO   --    --    --   2.6  3.0    < > = values in this interval not displayed.     Lab Results   Component Value Date    A1C 8.6 08/07/2018    A1C 7.9 02/07/2018    A1C 9.1 11/07/2017    A1C 8.4 08/04/2017    A1C 6.7 09/09/2016     ASSESSMENT:                             Current medications were reviewed today.     Medication Adherence: excellent, no issues identified    Diabetes: Needs Improvement. Patient is not meeting A1c goal of < 8%. Self monitoring of blood glucose is not at goal of fasting  mg/dL and post prandial < 180 mg/dL.  Given patient's preference to keep costs down and FAA list limitation he would likely benefit from increasing TZD dose.    Hyperlipidemia: Stable. Pt is on moderate intensity statin which is indicated based on 2013 ACC/AHA guidelines for lipid management.      Hypertension: Stable. Patient is meeting BP goal of < 140/90mmHg.     PLAN:                            1. Increase pioglitazone to 30 mg daily.     I spent 30 minutes with this patient today. All changes were made via collaborative practice agreement with Deborah Delgado. A copy of the visit note was provided to the patient's primary care provider.    Will follow up in 4 weeks via [employer].    The patient was given a summary of these recommendations as an after visit summary.     Andrew Lewis, PharmD, BCACP  Medication Therapy Management Pharmacist  Pager: [phone]

## 2018-09-13 ENCOUNTER — MYC REFILL (OUTPATIENT)
Dept: FAMILY MEDICINE | Facility: CLINIC | Age: 63
End: 2018-09-13

## 2018-09-13 DIAGNOSIS — E11.65 TYPE 2 DIABETES MELLITUS WITH HYPERGLYCEMIA (H): ICD-10-CM

## 2018-09-13 NOTE — TELEPHONE ENCOUNTER
Message from [employer]:  Original authorizing provider: HAMIDA Kruse would like a refill of the following medications:  Blood Glucose Monitoring Suppl MISC [Deborah Delgado PA-C]    Preferred pharmacy: [id number] [address]    Comment:  Need more One Touch test strips please.

## 2018-09-19 ENCOUNTER — OFFICE VISIT (OUTPATIENT)
Dept: LAB | Facility: SCHOOL | Age: 63
End: 2018-09-19
Payer: COMMERCIAL

## 2018-09-19 VITALS
WEIGHT: 262 LBS | TEMPERATURE: 97.4 F | OXYGEN SATURATION: 96 % | HEART RATE: 69 BPM | BODY MASS INDEX: 35.49 KG/M2 | HEIGHT: 72 IN | DIASTOLIC BLOOD PRESSURE: 78 MMHG | SYSTOLIC BLOOD PRESSURE: 126 MMHG

## 2018-09-19 DIAGNOSIS — Z00.00 ENCOUNTER FOR WELLNESS EXAMINATION IN ADULT: Primary | ICD-10-CM

## 2018-09-19 PROCEDURE — 99213 OFFICE O/P EST LOW 20 MIN: CPT

## 2018-09-19 NOTE — PROGRESS NOTES
SUBJECTIVE:  CC: Celestine Simon is an 63 year old woman who presents for Wellness Check at [id number].     Review of Healthy Lifestyle:    Do you get at least three servings of calcium containing foods daily (dairy, green leafy vegetables, etc.)? yes     Do you have a high-fiber diet? yes     Amount of exercise or daily activities, outside of work: 2 day(s) per week    Do you wear sunscreen on a regular basis? No     Are you taking your medications regularly No    Have you had an eye exam in the past two years? yes    Do you see a dentist twice per year? yes    Do you have sleep apnea, excessive snoring or excessive daytime drowsiness? no    Do you use tobacco in any form? no       OBJECTIVE:    Vitals: There were no vitals taken for this visit.  BMI= There is no height or weight on file to calculate BMI.    HEARING: Right Ear:        500Hz:  RESPONSE- on Level:   20 db    1000 Hz: RESPONSE- on Level:   20 db    2000 Hz: RESPONSE- on Level:   20 db    4000 Hz: RESPONSE- on Level:   20 db     Left Ear:       500Hz:  RESPONSE- on Level:   20 db    1000 Hz: RESPONSE- on Level:   20 db    2000 Hz: RESPONSE- on Level:   20 db    4000 Hz: RESPONSE- on Level:   20 db     VISION:  Right eye:  20/20  Left eye:     20/20  Both eyes: 20/20  Corrective lenses?  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        COUNSELING:      reports that he has quit smoking. He has never used smokeless tobacco.    Estimated body mass index is 35.56 kg/(m^2) as calculated from the following:    Height as of 4/9/18: 6' (1.829 m).    Weight as of 8/9/18: 262 lb 3.2 oz (118.9 kg).       Counseling Resources  [employer]  [email or web address]    [place] SCHOOL PROVIDER  [facility]

## 2018-09-19 NOTE — NURSING NOTE
Initial /78  Pulse 69  Temp 97.4  F (36.3  C) (Tympanic)  Ht 6' (1.829 m)  Wt 262 lb (118.8 kg)  SpO2 96%  BMI 35.53 kg/m2 Estimated body mass index is 35.53 kg/(m^2) as calculated from the following:    Height as of this encounter: 6' (1.829 m).    Weight as of this encounter: 262 lb (118.8 kg). .    Heena Pearce CMA ([facility])

## 2018-09-19 NOTE — PATIENT INSTRUCTIONS
Celestine R Alfred has completed a Wellness Check at the [facility]  Clinic on 9/19/2018.      ____________________________________________  [employer] PROVID[employer]                                                                               Wellness Visit Recommendations:      See your regular primary care health provider every year in order to help stay healthy.    Review health changes.     Review your medicines if your doctor has prescribed any.    Talk to your provider about how often to have your cholesterol checked.    If you are at risk for diabetes, you should have a diabetes test (fasting glucose).    Shots: Get a flu shot each year. Get a tetanus shot every 10 years.     Review with your primary care provider other immunizations that you may need based on your age and/or medical/surgical history    Nutrition:     Eat at least 5 servings of fruits and vegetables each day.    Eat whole-grain bread, whole-wheat pasta and brown rice instead of white grains and rice.    Preventive Care to be reviewed by your primary care provider:    Females:        Cervical Cancer Screening                          Breast Cancer Screening                          Colon Cancer Screening  Males:             Prostrate Cancer Screening                          Colon Cancer Screening      Lifestyle:    Exercise at least 150 minutes a week (30 minutes a day, 5 days of the week). This will help you control your weight and help prevent disease or manage disease.    Limit alcohol to one drink per day or less depending on your past medical history.    No smoking.     Wear sunscreen to prevent skin cancer.    See your dentist every six months for an exam and cleaning.    Today's Vital Signs:  /78  Pulse 69  Temp 97.4  F (36.3  C) (Tympanic)  Ht 6' (1.829 m)  Wt 262 lb (118.8 kg)  SpO2 96%  BMI 35.53 kg/m2

## 2018-09-19 NOTE — MR AVS SNAPSHOT
After Visit Summary   9/19/2018    Ceelstine Simon    MRN: [id number]           Patient Information     Date Of Birth          1955        Visit Information        Provider Department      9/19/2018 9:00 AM Provider, [facility]31        Today's Diagnoses     Encounter for wellness examination in adult    -  1      Care Instructions                    Celestine Simon has completed a Wellness Check at the [facility] Clinic on 9/19/2018.      ____________________________________________  [facility] PROVIDER                                                                               Wellness Visit Recommendations:      See your regular primary care health provider every year in order to help stay healthy.    Review health changes.     Review your medicines if your doctor has prescribed any.    Talk to your provider about how often to have your cholesterol checked.    If you are at risk for diabetes, you should have a diabetes test (fasting glucose).    Shots: Get a flu shot each year. Get a tetanus shot every 10 years.     Review with your primary care provider other immunizations that you may need based on your age and/or medical/surgical history    Nutrition:     Eat at least 5 servings of fruits and vegetables each day.    Eat whole-grain bread, whole-wheat pasta and brown rice instead of white grains and rice.    Preventive Care to be reviewed by your primary care provider:    Females:        Cervical Cancer Screening                          Breast Cancer Screening                          Colon Cancer Screening  Males:             Prostrate Cancer Screening                          Colon Cancer Screening      Lifestyle:    Exercise at least 150 minutes a week (30 minutes a day, 5 days of the week). This will help you control your weight and help prevent disease or manage disease.    Limit alcohol to one drink per day or less depending on your  past medical history.    No smoking.     Wear sunscreen to prevent skin cancer.    See your dentist every six months for an exam and cleaning.    Today's Vital Signs:  /78  Pulse 69  Temp 97.4  F (36.3  C) (Tympanic)  Ht 6' (1.829 m)  Wt 262 lb (118.8 kg)  SpO2 96%  BMI 35.53 kg/m2          Follow-ups after your visit        Who to contact     If you have questions or need follow up information about today's clinic visit or your schedule please contact [facility] directly at [phone].  Normal or non-critical lab and imaging results will be communicated to you by Sunoviahart, letter or phone within 4 business days after the clinic has received the results. If you do not hear from us within 7 days, please contact the clinic through Cotapt or phone. If you have a critical or abnormal lab result, we will notify you by phone as soon as possible.  Submit refill requests through Thing5 or call your pharmacy and they will forward the refill request to us. Please allow 3 business days for your refill to be completed.          Additional Information About Your Visit        SunoviaharStreetline Information     Thing5 gives you secure access to your electronic health record. If you see a primary care provider, you can also send messages to your care team and make appointments. If you have questions, please call your primary care clinic.  If you do not have a primary care provider, please call [phone] and they will assist you.        Care EveryWhere ID     This is your Care EveryWhere ID. This could be used by other organizations to access your [facility] medical records  [id number]        Your Vitals Were     Pulse Temperature Height Pulse Oximetry BMI (Body Mass Index)       69 97.4  F (36.3  C) (Tympanic) 6' (1.829 m) 96% 35.53 kg/m2        Blood Pressure from Last 3 Encounters:   09/19/18 126/78   09/06/18 124/84   08/09/18 126/86    Weight from Last 3 Encounters:   09/19/18 262 lb (118.8 kg)    08/09/18 262 lb 3.2 oz (118.9 kg)   04/09/18 265 lb (120.2 kg)              Today, you had the following     No orders found for display       Primary Care Provider Office Phone # Fax #    Deborah Delgado PA-C [phone] [address] [facility] [address]        Equal Access to Services     HARIKA CRISOSTOMO : Hadii aad ku hadasho Soomaali, waaxda luqadaha, qaybta kaalmada adeegyada, waxay idiin hayaan adeeg kharash la'aan ah. So [facility] [phone].    ATENCIÓN: Si habla español, tiene a haro disposición servicios gratuitos de asistencia lingüística. Llame al [phone].    We comply with applicable federal civil rights laws and [place] laws. We do not discriminate on the basis of race, color, national origin, age, disability, sex, sexual orientation, or gender identity.            Thank you!     Thank you for choosing [facility] [address]  for your care. Our goal is always to provide you with excellent care. Hearing back from our patients is one way we can continue to improve our services. Please take a few minutes to complete the written survey that you may receive in the mail after your visit with us. Thank you!             Your Updated Medication List - Protect others around you: Learn how to safely use, store and throw away your medicines at www.disposemymeds.org.          This list is accurate as of 9/19/18  9:15 AM.  Always use your most recent med list.                   Brand Name Dispense Instructions for use Diagnosis    aspirin 81 MG tablet     100 tablet    Take by mouth every 24 hours    Essential hypertension, benign, Type 2 diabetes, HbA1c goal < 7% (H)       atorvastatin 20 MG tablet    LIPITOR    90 tablet    Take 1 tablet (20 mg) by mouth daily    Dyslipidemia       doxazosin 4 MG tablet    CARDURA    90 tablet    Take 1 tablet (4 mg) by mouth daily    Essential hypertension, benign       * IN TOUCH Misc     90 each    1 strip once a week Tests weekly     Type 2 diabetes mellitus with hyperglycemia (H)       * Blood Glucose Monitoring Suppl Misc     100 each    1 strip once a week Tests weekly    Type 2 diabetes mellitus with hyperglycemia (H)       LANCETS REGULAR Misc     100 Each    One Touch US Lancet- Used twice daily    Type II or unspecified type diabetes mellitus without mention of complication, not stated as uncontrolled       lisinopril-hydrochlorothiazide 20-12.5 MG per tablet    PRINZIDE/ZESTORETIC    90 tablet    Take 1 tablet by mouth daily    Essential hypertension, benign       metFORMIN 1000 MG tablet    GLUCOPHAGE    180 tablet    Take 1 tablet (1,000 mg) by mouth 2 times daily (with meals)    Type 2 diabetes mellitus with hyperosmolarity without coma, without long-term current use of insulin (H)       Multi-vitamin Tabs tablet   Generic drug:  multivitamin, therapeutic with minerals      one daily        pioglitazone 30 MG tablet    ACTOS    30 tablet    Take 1 tablet (30 mg) by mouth daily    Type 2 diabetes mellitus with hyperosmolarity without coma, without long-term current use of insulin (H)       * Notice:  This list has 2 medication(s) that are the same as other medications prescribed for you. Read the directions carefully, and ask your doctor or other care provider to review them with you.

## 2018-12-10 ENCOUNTER — TELEPHONE (OUTPATIENT)
Dept: FAMILY MEDICINE | Facility: CLINIC | Age: 63
End: 2018-12-10

## 2018-12-10 NOTE — TELEPHONE ENCOUNTER
Panel Management Review      Patient has the following on his problem list:     Diabetes    ASA: Passed    Last A1C  Lab Results   Component Value Date    A1C 8.6 08/07/2018    A1C 7.9 02/07/2018    A1C 9.1 11/07/2017    A1C 8.4 08/04/2017    A1C 6.7 09/09/2016     A1C tested: FAILED    Last LDL:    Lab Results   Component Value Date    CHOL 126 08/07/2018     Lab Results   Component Value Date    HDL 54 08/07/2018     Lab Results   Component Value Date    LDL 40 08/07/2018     Lab Results   Component Value Date    TRIG 158 08/07/2018     Lab Results   Component Value Date    CHOLHDLRATIO 2.6 04/21/2015     Lab Results   Component Value Date    NHDL 72 08/07/2018       Is the patient on a Statin? YES             Is the patient on Aspirin? YES    Medications     HMG CoA Reductase Inhibitors    atorvastatin (LIPITOR) 20 MG tablet    Salicylates    aspirin 81 MG tablet          Last three blood pressure readings:  BP Readings from Last 3 Encounters:   09/19/18 126/78   09/06/18 124/84   08/09/18 126/86       Date of last diabetes office visit: 8/09/2018     Tobacco History:     History   Smoking Status     Former Smoker   Smokeless Tobacco     Never Used     Comment: 30 years ago           Composite cancer screening  Chart review shows that this patient is due/due soon for the following None  Summary:    Patient is due/failing the following:   A1C    Action needed:   Patient needs office visit for Diabetes.    Type of outreach:    Phone, left message for patient to call back.     Questions for provider review:    None                                                                                                                                    Pati Chi MA

## 2018-12-11 DIAGNOSIS — E11.00 TYPE 2 DIABETES MELLITUS WITH HYPEROSMOLARITY WITHOUT COMA, WITHOUT LONG-TERM CURRENT USE OF INSULIN (H): ICD-10-CM

## 2018-12-11 RX ORDER — PIOGLITAZONEHYDROCHLORIDE 30 MG/1
TABLET ORAL
Qty: 30 TABLET | Refills: 0 | Status: SHIPPED | OUTPATIENT
Start: 2018-12-11 | End: 2018-12-26

## 2018-12-11 NOTE — TELEPHONE ENCOUNTER
"Requested Prescriptions   Pending Prescriptions Disp Refills     pioglitazone (ACTOS) 30 MG tablet [Pharmacy Med Name: PIOGLITAZONE 30 MG  TAB MACL] 30 tablet 1     Sig: TAKE ONE TABLET BY MOUTH ONE TIME DAILY    Thiazolidinedione Agents (TZDs)  Failed - 12/11/2018 10:12 AM       Failed - Patient has a normal ALT within the past 12 mos.    Recent Labs   Lab Test 08/04/11  0813   ALT 38            Failed - Patient has a normal AST within the past 12 mos.     No lab results found.         Failed - Patient has documented A1c within the specified period of time.    If HgbA1C is 8 or greater, it needs to be on file within the past 3 months.  If less than 8, must be on file within the past 6 months.     Recent Labs   Lab Test 08/07/18  0753   A1C 8.6*            Passed - Blood pressure less than 140/90 in past 6 months    BP Readings from Last 3 Encounters:   09/19/18 126/78   09/06/18 124/84   08/09/18 126/86                Passed - Patient has documented LDL within the past 12 mos.    Recent Labs   Lab Test 08/07/18  0753   LDL 40            Passed - Patient has had a Microalbumin in the past 12 mos.    Recent Labs   Lab Test 11/07/17  0816   MICROL 22   UMALCR 12.71            Passed - Diagnosis not CHF       Passed - Patient is age 18 or older       Passed - Patient has a normal serum Creatinine in the past 12 months    Recent Labs   Lab Test 08/07/18  0753   CR 0.99            Passed - Recent (6 mo) or future (30 days) visit within the authorizing provider's specialty    Patient had office visit in the last 6 months or has a visit in the next 30 days with authorizing provider or within the authorizing provider's specialty.  See \"Patient Info\" tab in inbasket, or \"Choose Columns\" in Meds & Orders section of the refill encounter.      Last Written Prescription Date:  9/6/18  Last Fill Quantity: 30,  # refills: 2  Last office visit: 9/19/2018 with prescribing provider:     Future Office Visit:              "

## 2018-12-24 ENCOUNTER — DOCUMENTATION ONLY (OUTPATIENT)
Dept: FAMILY MEDICINE | Facility: CLINIC | Age: 63
End: 2018-12-24

## 2018-12-24 DIAGNOSIS — R80.9 TYPE 2 DIABETES MELLITUS WITH MICROALBUMINURIA, WITHOUT LONG-TERM CURRENT USE OF INSULIN (H): Primary | ICD-10-CM

## 2018-12-24 DIAGNOSIS — R80.9 TYPE 2 DIABETES MELLITUS WITH MICROALBUMINURIA, WITHOUT LONG-TERM CURRENT USE OF INSULIN (H): ICD-10-CM

## 2018-12-24 DIAGNOSIS — E11.29 TYPE 2 DIABETES MELLITUS WITH MICROALBUMINURIA, WITHOUT LONG-TERM CURRENT USE OF INSULIN (H): ICD-10-CM

## 2018-12-24 DIAGNOSIS — E11.29 TYPE 2 DIABETES MELLITUS WITH MICROALBUMINURIA, WITHOUT LONG-TERM CURRENT USE OF INSULIN (H): Primary | ICD-10-CM

## 2018-12-24 LAB
CREAT UR-MCNC: 25 MG/DL
HBA1C MFR BLD: 7.6 % (ref 0–5.6)
MICROALBUMIN UR-MCNC: 12 MG/L
MICROALBUMIN/CREAT UR: 49.01 MG/G CR (ref 0–17)

## 2018-12-24 PROCEDURE — 36415 COLL VENOUS BLD VENIPUNCTURE: CPT | Performed by: PHYSICIAN ASSISTANT

## 2018-12-24 PROCEDURE — 82043 UR ALBUMIN QUANTITATIVE: CPT | Performed by: PHYSICIAN ASSISTANT

## 2018-12-24 PROCEDURE — 83036 HEMOGLOBIN GLYCOSYLATED A1C: CPT | Performed by: PHYSICIAN ASSISTANT

## 2018-12-26 ENCOUNTER — MYC MEDICAL ADVICE (OUTPATIENT)
Dept: FAMILY MEDICINE | Facility: CLINIC | Age: 63
End: 2018-12-26

## 2018-12-26 DIAGNOSIS — I10 ESSENTIAL HYPERTENSION, BENIGN: ICD-10-CM

## 2018-12-26 DIAGNOSIS — E11.00 TYPE 2 DIABETES MELLITUS WITH HYPEROSMOLARITY WITHOUT COMA, WITHOUT LONG-TERM CURRENT USE OF INSULIN (H): ICD-10-CM

## 2018-12-26 RX ORDER — PIOGLITAZONEHYDROCHLORIDE 30 MG/1
30 TABLET ORAL DAILY
Qty: 90 TABLET | Refills: 0 | Status: SHIPPED | OUTPATIENT
Start: 2018-12-26 | End: 2021-11-16

## 2018-12-26 RX ORDER — LISINOPRIL AND HYDROCHLOROTHIAZIDE 12.5; 2 MG/1; MG/1
1 TABLET ORAL DAILY
Qty: 90 TABLET | Refills: 0 | Status: SHIPPED | OUTPATIENT
Start: 2018-12-26 | End: 2021-12-07

## 2018-12-26 NOTE — RESULT ENCOUNTER NOTE
Rosalva Sprague,    Your A1c has improved a lot again, is now 7.6.  This is pretty good.  Urine protein worsened a bit but does vary, so I'm not alarmed.  You can see me now if you have any concerns.  Otherwise, I last saw you in Feb 2018 and I do need to see you in February/March.  I am hoping you'll be working on some weight loss this year as a great way to improve diabetes and overall health.      Deborah Delgado PA-C

## 2019-06-24 ENCOUNTER — TRANSFERRED RECORDS (OUTPATIENT)
Dept: HEALTH INFORMATION MANAGEMENT | Facility: CLINIC | Age: 64
End: 2019-06-24

## 2019-11-07 ENCOUNTER — HEALTH MAINTENANCE LETTER (OUTPATIENT)
Age: 64
End: 2019-11-07

## 2020-02-23 ENCOUNTER — HEALTH MAINTENANCE LETTER (OUTPATIENT)
Age: 65
End: 2020-02-23

## 2020-06-02 ENCOUNTER — TRANSFERRED RECORDS (OUTPATIENT)
Dept: HEALTH INFORMATION MANAGEMENT | Facility: CLINIC | Age: 65
End: 2020-06-02

## 2020-06-02 LAB — RETINOPATHY: NEGATIVE

## 2020-09-21 ENCOUNTER — OFFICE VISIT (OUTPATIENT)
Dept: LAB | Facility: SCHOOL | Age: 65
End: 2020-09-21
Payer: COMMERCIAL

## 2020-09-21 VITALS
SYSTOLIC BLOOD PRESSURE: 120 MMHG | RESPIRATION RATE: 18 BRPM | OXYGEN SATURATION: 96 % | DIASTOLIC BLOOD PRESSURE: 76 MMHG | TEMPERATURE: 98.4 F | WEIGHT: 264 LBS | HEIGHT: 72 IN | HEART RATE: 98 BPM | BODY MASS INDEX: 35.76 KG/M2

## 2020-09-21 DIAGNOSIS — Z00.00 WELLNESS EXAMINATION: Primary | ICD-10-CM

## 2020-09-21 PROCEDURE — 99213 OFFICE O/P EST LOW 20 MIN: CPT

## 2020-09-21 ASSESSMENT — MIFFLIN-ST. JEOR: SCORE: 2030.5

## 2020-09-21 NOTE — PATIENT INSTRUCTIONS
Celestine Simon has completed a Wellness Check at the [facility]31 Clinic on 9/21/2020.      ____________________________________________  [employer] PROVIDER                                                                               Wellness Visit Recommendations:      See your regular primary care health provider every year in order to help stay healthy.    Review health changes.     Review your medicines if your doctor has prescribed any.    Talk to your provider about how often to have your cholesterol checked.    If you are at risk for diabetes, you should have a diabetes test (fasting glucose).    Shots: Get a flu shot each year. Get a tetanus shot every 10 years.     Review with your primary care provider other immunizations that you may need based on your age and/or medical/surgical history    Nutrition:     Eat at least 5 servings of fruits and vegetables each day.    Eat whole-grain bread, whole-wheat pasta and brown rice instead of white grains and rice.    Preventive Care to be reviewed by your primary care provider:      Males:             Prostrate Cancer Screening                          Colon Cancer Screening      Lifestyle:    Exercise at least 150 minutes a week (30 minutes a day, 5 days of the week). This will help you control your weight and help prevent disease or manage disease.    Limit alcohol to one drink per day or less depending on your past medical history.    No smoking.     Wear sunscreen to prevent skin cancer.    See your dentist every six months for an exam and cleaning.    Today's Vital Signs:  /76 (BP Location: Right arm, Patient Position: Sitting, Cuff Size: Adult Large)   Pulse 98   Temp 98.4  F (36.9  C) (Tympanic)   Resp 18   Ht 1.829 m (6')   Wt 119.7 kg (264 lb)   SpO2 96%   BMI 35.80 kg/m

## 2020-09-21 NOTE — PROGRESS NOTES
SUBJECTIVE:  CC: Celestine Simon is an 65 year old woman who presents for Wellness Check at [id number].     Review of Healthy Lifestyle:    Do you get at least three servings of calcium containing foods daily (dairy, green leafy vegetables, etc.)? yes     Do you have a high-fiber diet? yes     Amount of exercise or daily activities, outside of work: 7 day(s) per week, walking    Do you wear sunscreen on a regular basis? No, wears long sleeves and hats    Are you taking your medications regularly Yes    Have you had an eye exam in the past two years? yes    Do you see a dentist twice per year? yes    Do you have sleep apnea, excessive snoring or excessive daytime drowsiness? no    Do you use tobacco in any form? no       OBJECTIVE:    Vitals: /76 (BP Location: Right arm, Patient Position: Sitting, Cuff Size: Adult Large)   Pulse 98   Temp 98.4  F (36.9  C) (Tympanic)   Resp 18   Ht 1.829 m (6')   Wt 119.7 kg (264 lb)   SpO2 96%   BMI 35.80 kg/m    BMI= Body mass index is 35.8 kg/m .    HEARING: :  Testing not done; parent declined - patient had specialty hearing test is past year    VISION:  Patient has received eye care in past year      Medication Reconciliation: complete    ASSESSMENT/PLAN:    COUNSELING:      reports that he has quit smoking. He has never used smokeless tobacco.    Estimated body mass index is 35.8 kg/m  as calculated from the following:    Height as of this encounter: 1.829 m (6').    Weight as of this encounter: 119.7 kg (264 lb).   Weight management plan: Discussed healthy diet and exercise guidelines    Counseling Resources  [employer] MyPlate  [email or web address].quitplan.com/    [employer] PROVIDER  [facility]

## 2020-11-29 ENCOUNTER — HEALTH MAINTENANCE LETTER (OUTPATIENT)
Age: 65
End: 2020-11-29

## 2021-01-22 ENCOUNTER — E-VISIT (OUTPATIENT)
Dept: FAMILY MEDICINE | Facility: CLINIC | Age: 66
End: 2021-01-22
Payer: COMMERCIAL

## 2021-01-22 DIAGNOSIS — R80.9 TYPE 2 DIABETES MELLITUS WITH MICROALBUMINURIA, WITHOUT LONG-TERM CURRENT USE OF INSULIN (H): ICD-10-CM

## 2021-01-22 DIAGNOSIS — Z20.822 ENCOUNTER FOR LABORATORY TESTING FOR COVID-19 VIRUS: Primary | ICD-10-CM

## 2021-01-22 DIAGNOSIS — E11.29 TYPE 2 DIABETES MELLITUS WITH MICROALBUMINURIA, WITHOUT LONG-TERM CURRENT USE OF INSULIN (H): ICD-10-CM

## 2021-01-22 PROCEDURE — 99421 OL DIG E/M SVC 5-10 MIN: CPT | Performed by: PHYSICIAN ASSISTANT

## 2021-01-25 NOTE — PATIENT INSTRUCTIONS
Thank you for choosing us for your care. Given your symptoms, I would like you to do a lab-only visit to determine what is causing them.  I have placed the orders.  Please schedule an appointment with the lab right here in [email or web address], or call [phone].  I will let you know when the results are back and next steps to take.

## 2021-03-02 DIAGNOSIS — Z20.822 ENCOUNTER FOR LABORATORY TESTING FOR COVID-19 VIRUS: ICD-10-CM

## 2021-03-02 LAB
LABORATORY COMMENT REPORT: NORMAL
SARS-COV-2 RNA RESP QL NAA+PROBE: NEGATIVE
SARS-COV-2 RNA RESP QL NAA+PROBE: NORMAL
SPECIMEN SOURCE: NORMAL
SPECIMEN SOURCE: NORMAL

## 2021-03-02 PROCEDURE — 36415 COLL VENOUS BLD VENIPUNCTURE: CPT | Performed by: PHYSICIAN ASSISTANT

## 2021-03-02 PROCEDURE — 87635 SARS-COV-2 COVID-19 AMP PRB: CPT | Performed by: PHYSICIAN ASSISTANT

## 2021-03-02 PROCEDURE — 86769 SARS-COV-2 COVID-19 ANTIBODY: CPT | Performed by: PHYSICIAN ASSISTANT

## 2021-03-03 LAB
SARS-COV-2 AB PNL SERPL IA: NEGATIVE
SARS-COV-2 IGG SERPL IA-ACNC: NORMAL

## 2021-03-03 NOTE — RESULT ENCOUNTER NOTE
Celestine,    Normal covid test result.  You can print a copy of this lab and result off My Chart if needed.    Please remember that you're due to see me in office soon.    Deborah

## 2021-03-20 ENCOUNTER — IMMUNIZATION (OUTPATIENT)
Dept: FAMILY MEDICINE | Facility: CLINIC | Age: 66
End: 2021-03-20
Payer: COMMERCIAL

## 2021-03-20 PROCEDURE — 91301 PR COVID VAC MODERNA 100 MCG/0.5 ML IM: CPT

## 2021-03-20 PROCEDURE — 0011A PR COVID VAC MODERNA 100 MCG/0.5 ML IM: CPT

## 2021-04-10 ENCOUNTER — HEALTH MAINTENANCE LETTER (OUTPATIENT)
Age: 66
End: 2021-04-10

## 2021-04-17 ENCOUNTER — IMMUNIZATION (OUTPATIENT)
Dept: FAMILY MEDICINE | Facility: CLINIC | Age: 66
End: 2021-04-17
Attending: FAMILY MEDICINE
Payer: COMMERCIAL

## 2021-04-17 PROCEDURE — 0012A PR COVID VAC MODERNA 100 MCG/0.5 ML IM: CPT

## 2021-04-17 PROCEDURE — 91301 PR COVID VAC MODERNA 100 MCG/0.5 ML IM: CPT

## 2021-05-06 ENCOUNTER — TELEPHONE (OUTPATIENT)
Dept: FAMILY MEDICINE | Facility: CLINIC | Age: 66
End: 2021-05-06

## 2021-05-06 NOTE — LETTER
[facility]  [address] [place] [id number]  [phone]      May 14, 2021    Celestine Simon                                                                                                                     [address] [address]  [facility] [address]            Dear Celestine Simon,    At [facility] we care about your health and well-being. A review of your chart has indicated that you are due for a diabetic check. Please contact us at [phone] to schedule your appointment.    If you have already had one or all of the above screening tests at another facility, please call us to update your chart.     You may contact the clinic at [phone] if you have any questions or concerns about this request.    Sincerely,    [facility] Care Staff/ ss

## 2021-05-06 NOTE — TELEPHONE ENCOUNTER
Patient Quality Outreach      Summary:    Patient has the following on his problem list/HM:   Diabetes    Last A1C:  Lab Results   Component Value Date    A1C 7.6 12/24/2018    A1C 8.6 08/07/2018       Last LDL:    Lab Results   Component Value Date    LDL 40 08/07/2018       Is the patient on a Statin? Yes          Is the patient on Aspirin? Yes    Medications     HMG CoA Reductase Inhibitors     atorvastatin (LIPITOR) 20 MG tablet       Salicylates     aspirin 81 MG tablet             Last three blood pressure readings:  BP Readings from Last 3 Encounters:   09/21/20 120/76   09/19/18 126/78   09/06/18 124/84            Tobacco Use      Smoking status: Former Smoker      Smokeless tobacco: Never Used      Tobacco comment: 30 years ago          Patient is due/failing the following:   A1C, LDL (Fasting), Eye Exam and Microalbumin    Type of outreach:    Phone, left message for patient/parent to call back.    Questions for provider review:    None                                                                                                                                     Ariana Anaya MA      Chart routed to Care Team.

## 2021-07-31 ENCOUNTER — HEALTH MAINTENANCE LETTER (OUTPATIENT)
Age: 66
End: 2021-07-31

## 2021-09-24 ENCOUNTER — TRANSFERRED RECORDS (OUTPATIENT)
Dept: HEALTH INFORMATION MANAGEMENT | Facility: CLINIC | Age: 66
End: 2021-09-24

## 2021-09-24 LAB — RETINOPATHY: NEGATIVE

## 2021-09-25 ENCOUNTER — HEALTH MAINTENANCE LETTER (OUTPATIENT)
Age: 66
End: 2021-09-25

## 2021-10-27 ENCOUNTER — TRANSFERRED RECORDS (OUTPATIENT)
Dept: HEALTH INFORMATION MANAGEMENT | Facility: CLINIC | Age: 66
End: 2021-10-27
Payer: COMMERCIAL

## 2021-11-16 ENCOUNTER — OFFICE VISIT (OUTPATIENT)
Dept: FAMILY MEDICINE | Facility: CLINIC | Age: 66
End: 2021-11-16
Payer: COMMERCIAL

## 2021-11-16 VITALS
HEART RATE: 88 BPM | SYSTOLIC BLOOD PRESSURE: 132 MMHG | WEIGHT: 271 LBS | HEIGHT: 72 IN | TEMPERATURE: 97.9 F | DIASTOLIC BLOOD PRESSURE: 86 MMHG | BODY MASS INDEX: 36.7 KG/M2 | RESPIRATION RATE: 16 BRPM

## 2021-11-16 DIAGNOSIS — M16.51 POST-TRAUMATIC OSTEOARTHRITIS OF RIGHT HIP: ICD-10-CM

## 2021-11-16 DIAGNOSIS — Z01.818 PREOP GENERAL PHYSICAL EXAM: Primary | ICD-10-CM

## 2021-11-16 DIAGNOSIS — E66.01 SEVERE OBESITY (BMI 35.0-39.9) WITH COMORBIDITY (H): ICD-10-CM

## 2021-11-16 DIAGNOSIS — E11.65 TYPE 2 DIABETES MELLITUS WITH HYPERGLYCEMIA, WITHOUT LONG-TERM CURRENT USE OF INSULIN (H): ICD-10-CM

## 2021-11-16 DIAGNOSIS — I10 ESSENTIAL HYPERTENSION: ICD-10-CM

## 2021-11-16 DIAGNOSIS — E78.5 HYPERLIPIDEMIA LDL GOAL <100: ICD-10-CM

## 2021-11-16 LAB
ALBUMIN SERPL-MCNC: 3.9 G/DL (ref 3.4–5)
ALP SERPL-CCNC: 44 U/L (ref 40–150)
ALT SERPL W P-5'-P-CCNC: 39 U/L (ref 0–70)
ANION GAP SERPL CALCULATED.3IONS-SCNC: 6 MMOL/L (ref 3–14)
AST SERPL W P-5'-P-CCNC: 23 U/L (ref 0–45)
BILIRUB SERPL-MCNC: 0.9 MG/DL (ref 0.2–1.3)
BUN SERPL-MCNC: 20 MG/DL (ref 7–30)
CALCIUM SERPL-MCNC: 9.8 MG/DL (ref 8.5–10.1)
CHLORIDE BLD-SCNC: 103 MMOL/L (ref 94–109)
CO2 SERPL-SCNC: 28 MMOL/L (ref 20–32)
CREAT SERPL-MCNC: 0.85 MG/DL (ref 0.66–1.25)
CREAT UR-MCNC: 130 MG/DL
ERYTHROCYTE [DISTWIDTH] IN BLOOD BY AUTOMATED COUNT: 13.4 % (ref 10–15)
GFR SERPL CREATININE-BSD FRML MDRD: >90 ML/MIN/1.73M2
GLUCOSE BLD-MCNC: 210 MG/DL (ref 70–99)
HBA1C MFR BLD: 10.3 % (ref 0–5.6)
HCT VFR BLD AUTO: 42.9 % (ref 40–53)
HGB BLD-MCNC: 14.5 G/DL (ref 13.3–17.7)
MCH RBC QN AUTO: 30.4 PG (ref 26.5–33)
MCHC RBC AUTO-ENTMCNC: 33.8 G/DL (ref 31.5–36.5)
MCV RBC AUTO: 90 FL (ref 78–100)
MICROALBUMIN UR-MCNC: 36 MG/L
MICROALBUMIN/CREAT UR: 27.69 MG/G CR (ref 0–17)
PLATELET # BLD AUTO: 236 10E3/UL (ref 150–450)
POTASSIUM BLD-SCNC: 4.2 MMOL/L (ref 3.4–5.3)
PROT SERPL-MCNC: 7.1 G/DL (ref 6.8–8.8)
RBC # BLD AUTO: 4.77 10E6/UL (ref 4.4–5.9)
SODIUM SERPL-SCNC: 137 MMOL/L (ref 133–144)
WBC # BLD AUTO: 5.7 10E3/UL (ref 4–11)

## 2021-11-16 PROCEDURE — 83036 HEMOGLOBIN GLYCOSYLATED A1C: CPT | Performed by: NURSE PRACTITIONER

## 2021-11-16 PROCEDURE — 93000 ELECTROCARDIOGRAM COMPLETE: CPT | Performed by: NURSE PRACTITIONER

## 2021-11-16 PROCEDURE — 85027 COMPLETE CBC AUTOMATED: CPT | Performed by: NURSE PRACTITIONER

## 2021-11-16 PROCEDURE — 36415 COLL VENOUS BLD VENIPUNCTURE: CPT | Performed by: NURSE PRACTITIONER

## 2021-11-16 PROCEDURE — 82043 UR ALBUMIN QUANTITATIVE: CPT | Performed by: NURSE PRACTITIONER

## 2021-11-16 PROCEDURE — 99204 OFFICE O/P NEW MOD 45 MIN: CPT | Performed by: NURSE PRACTITIONER

## 2021-11-16 PROCEDURE — 80053 COMPREHEN METABOLIC PANEL: CPT | Performed by: NURSE PRACTITIONER

## 2021-11-16 RX ORDER — PIOGLITAZONEHYDROCHLORIDE 15 MG/1
15 TABLET ORAL DAILY
Qty: 30 TABLET | Refills: 3 | Status: SHIPPED | OUTPATIENT
Start: 2021-11-16 | End: 2021-12-07

## 2021-11-16 ASSESSMENT — MIFFLIN-ST. JEOR: SCORE: 2057.25
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
## 2021-11-22 ENCOUNTER — VIRTUAL VISIT (OUTPATIENT)
Dept: PHARMACY | Facility: CLINIC | Age: 66
End: 2021-11-22
Attending: NURSE PRACTITIONER
Payer: COMMERCIAL

## 2021-11-22 DIAGNOSIS — E78.5 HYPERLIPIDEMIA LDL GOAL <100: ICD-10-CM

## 2021-11-22 DIAGNOSIS — R80.9 TYPE 2 DIABETES MELLITUS WITH MICROALBUMINURIA, WITHOUT LONG-TERM CURRENT USE OF INSULIN (H): Primary | ICD-10-CM

## 2021-11-22 DIAGNOSIS — Z78.9 TAKES DIETARY SUPPLEMENTS: ICD-10-CM

## 2021-11-22 DIAGNOSIS — E66.01 SEVERE OBESITY (BMI 35.0-39.9) WITH COMORBIDITY (H): ICD-10-CM

## 2021-11-22 DIAGNOSIS — I10 ESSENTIAL HYPERTENSION: ICD-10-CM

## 2021-11-22 DIAGNOSIS — E11.29 TYPE 2 DIABETES MELLITUS WITH MICROALBUMINURIA, WITHOUT LONG-TERM CURRENT USE OF INSULIN (H): Primary | ICD-10-CM

## 2021-11-22 PROCEDURE — 99607 MTMS BY PHARM ADDL 15 MIN: CPT | Performed by: PHARMACIST

## 2021-11-22 PROCEDURE — 99605 MTMS BY PHARM NP 15 MIN: CPT | Performed by: PHARMACIST

## 2021-11-22 RX ORDER — FLASH GLUCOSE SENSOR
1 KIT MISCELLANEOUS
Qty: 2 EACH | Refills: 3 | Status: SHIPPED | OUTPATIENT
Start: 2021-11-22 | End: 2022-03-02

## 2021-11-22 NOTE — PATIENT INSTRUCTIONS
Recommendations from today's MTM visit:                                                    MTM (medication therapy management) is a service provided by a clinical pharmacist designed to help you get the most of out of your medicines.   Today we reviewed what your medicines are for, how to know if they are working, that your medicines are safe and how to make your medicine regimen as easy as possible.      1. Start Freestyle Natalia 14-day. Please scan at least every 8 hours to capture all blood sugar data.    Follow-up: Return in 2 weeks (on 12/6/2021) for Medication Therapy Management.    It was great to speak with you today.  I value your experience and would be very thankful for your time with providing feedback on our clinic survey. You may receive a survey via email or text message in the next few days.     To schedule another MTM appointment, please call the clinic directly or you may call the MTM scheduling line at [phone] or toll-free at [phone].     My Clinical Pharmacist's contact information:                                                      Please feel free to contact me with any questions or concerns you have.      Jade Jasmine, PharmD, BCACP  Medication Therapy Management Pharmacist  Pager: [phone]

## 2021-11-22 NOTE — PROGRESS NOTES
Medication Therapy Management (MTM) Encounter    ASSESSMENT:                            Medication Adherence/Access: No issues identified    Type 2 Diabetes/Obesity: Patient is not meeting A1c goal of < 7.5%. Self monitoring of blood glucose is not at goal of fasting  mg/dL and post prandial < 180 mg/dL. Patient may benefit from continuous glucose monitoring, he's ok with $75/mo if not covered by insurance. Today we set-up the [employer] simon on his Android mobile device and also connected to my [employer] practice. Patient feels comfortable self-learning sensor application and will reach out if questions. We also discussed potential medication options - prefer GLP1 or SGLT2 to promote weight loss, however patient declines injectables. We will first see how CGM trends look then consider starting an SGLT-2 inhibitor if needed.    Hypertension: Patient is meeting blood pressure goal of < 140/90mmHg based on clinic readings. Will continue monitoring home BP as patient loses weight.    Hyperlipidemia: Stable. Patient is on moderate intensity statin which is indicated based on 2019 ACC/AHA guidelines for lipid management.      Supplements: Stable.    PLAN:                            1. Start Freestyle Natalia 14-day. Please scan at least every 8 hours to capture all blood sugar data.  2. Future considerations: Add on SGLT-2 inhibitor?     Follow-up: Return in 2 weeks (on 12/6/2021) for Medication Therapy Management.    SUBJECTIVE/OBJECTIVE:                          Celestine Simon is a 66 year old male was contacted via secure video for an initial visit. He was referred to me from LISA Page.      Reason for visit: Uncontrolled DM, needs A1c <7.5% for hip replacement surgery.    Allergies/ADRs: Reviewed in chart  Past Medical History: Reviewed in chart  Tobacco: He reports that he has quit smoking. He has never used smokeless tobacco.  Alcohol: 4-6 beverages / week    Medication Adherence/Access:   Patient  takes medications directly from bottles.  Patient takes medications 2 time(s) per day.   Per patient, misses medication 0 times per week.   Medication barriers: none.   The patient fills medications at [facility]: YES.    Type 2 Diabetes/Obesity: Currently taking metformin 1000mg twice daily (restarted ~2 months ago) and pioglitazone 15mg daily (restarted last week). Patient is not experiencing side effects.  Blood sugar monitoring: occasionally, typically fasting AM. Ranges (from patient's glucose log): See below.  Date BS Notes   9/1 223 (off meds)   9/24 240 (restarted metformin)   11/16 215    11/22 166 (restarted pioglitazone)   Symptoms of low blood sugar? none  Symptoms of high blood sugar? none  Eye exam: up to date  Foot exam: due  Diet/Exercise: Lost 6 lbs since last week. Previously was eating large portions and sweets. Now switched from white to whole grains. Eating mostly vegetables, fruit, and meat. He's very motivated.  Aspirin: Taking 81mg daily and denies side effects  Statin: Yes: atorvastatin.   ACEi/ARB: Yes: lisinopril.   Urine Albumin:   Lab Results   Component Value Date    UMALCR 27.69 (H) 11/16/2021      Lab Results   Component Value Date    A1C 10.3 11/16/2021    A1C 7.6 12/24/2018    A1C 8.6 08/07/2018    A1C 7.9 02/07/2018    A1C 9.1 11/07/2017    A1C 8.4 08/04/2017     Wt Readings from Last 4 Encounters:   11/16/21 271 lb (122.9 kg)   09/21/20 264 lb (119.7 kg)   09/19/18 262 lb (118.8 kg)   08/09/18 262 lb 3.2 oz (118.9 kg)     Hypertension: Current medications include lisinopril/HCTZ 20-12.5mg daily and doxazosin 4mg daily. Patient does self-monitor blood pressure. Home BP monitoring in range of 130-140's systolic over 80-90's diastolic, running higher due to weight gain. Patient reports no current medication side effects.  BP Readings from Last 3 Encounters:   11/16/21 132/86   09/21/20 120/76   09/19/18 126/78     Hyperlipidemia: Current therapy includes atorvastatin 20mg daily.   Patient reports no significant myalgias or other side effects.    Recent Labs   Lab Test 08/07/18  0753 08/04/17  0840 03/03/16  0827 04/21/15  1221   CHOL 126 181   < > 149   HDL 54 57   < > 58   LDL 40 92   < > 49   TRIG 158* 159*   < > 209*   CHOLHDLRATIO  --   --   --  2.6    < > = values in this interval not displayed.     Supplements: Currently taking multivitamin daily and glucosamine/chondroitin 1500mg daily. Also taking [employer] blood sugar support, although doesn't feel it's been very helpful. No concerns today.    Today's Vitals: There were no vitals taken for this visit.     Last Comprehensive Metabolic Panel:  Sodium   Date Value Ref Range Status   11/16/2021 137 133 - 144 mmol/L Final   08/07/2018 137 133 - 144 mmol/L Final     Potassium   Date Value Ref Range Status   11/16/2021 4.2 3.4 - 5.3 mmol/L Final   08/07/2018 4.2 3.4 - 5.3 mmol/L Final     Chloride   Date Value Ref Range Status   11/16/2021 103 94 - 109 mmol/L Final   08/07/2018 103 94 - 109 mmol/L Final     Carbon Dioxide   Date Value Ref Range Status   08/07/2018 29 20 - 32 mmol/L Final     Carbon Dioxide (CO2)   Date Value Ref Range Status   11/16/2021 28 20 - 32 mmol/L Final     Anion Gap   Date Value Ref Range Status   11/16/2021 6 3 - 14 mmol/L Final   08/07/2018 5 3 - 14 mmol/L Final     Glucose   Date Value Ref Range Status   11/16/2021 210 (H) 70 - 99 mg/dL Final   08/07/2018 181 (H) 70 - 99 mg/dL Final     Comment:     Fasting specimen     Urea Nitrogen   Date Value Ref Range Status   11/16/2021 20 7 - 30 mg/dL Final   08/07/2018 20 7 - 30 mg/dL Final     Creatinine   Date Value Ref Range Status   11/16/2021 0.85 0.66 - 1.25 mg/dL Final   08/07/2018 0.99 0.66 - 1.25 mg/dL Final     GFR Estimate   Date Value Ref Range Status   11/16/2021 >90 >60 mL/min/1.73m2 Final     Comment:     As of July 11, 2021, eGFR is calculated by the CKD-EPI creatinine equation, without race adjustment. eGFR can be influenced by muscle mass,  exercise, and diet. The reported eGFR is an estimation only and is only applicable if the renal function is stable.   08/07/2018 77 >60 mL/min/1.7m2 Final     Comment:     Non  GFR Calc     Calcium   Date Value Ref Range Status   11/16/2021 9.8 8.5 - 10.1 mg/dL Final   08/07/2018 9.4 8.5 - 10.1 mg/dL Final     ----------------    I spent 45 minutes with this patient today. All changes were made via collaborative practice agreement with Jaja Ervin. A copy of the visit note was provided to the patient's primary care and referring providers.    The patient was sent via [employer] a summary of these recommendations.     Jade Jasmine, PharmD, BCACP  Medication Therapy Management Pharmacist  Pager: [phone]    Telemedicine Visit Details  Type of service:  Video Conference via Jianshu  Start Time: 1400  End Time: 1445  Originating Location (patient location): Home  Distant Location (provider location):  [facility]     Medication Therapy Recommendations  Type 2 diabetes mellitus with microalbuminuria, without long-term current use of insulin (H)    Current Medication: Blood Glucose Monitoring Suppl (IN TOUCH) MISC   Rationale: More effective medication available - Ineffective medication - Effectiveness   Recommendation: Change Medication - FreeStyle Natalia 14 Day Sensor Misc - Change every 14 days   Status: Accepted per CPA

## 2021-11-30 ENCOUNTER — TELEPHONE (OUTPATIENT)
Dept: FAMILY MEDICINE | Facility: CLINIC | Age: 66
End: 2021-11-30
Payer: COMMERCIAL

## 2021-11-30 NOTE — TELEPHONE ENCOUNTER
Central Prior Authorization Team   Phone: [phone]      PA Initiation    Medication: Freestyle Natalia 14-day sensors-Initiated  Insurance Company: [employer] - Phone [phone] Fax [phone]  Pharmacy Filling the Rx: [facility] - [place] - [id number] [facility]  Filling Pharmacy Phone: [phone]  Filling Pharmacy Fax:    Start Date: 11/30/2021

## 2021-11-30 NOTE — TELEPHONE ENCOUNTER
Prior Authorization Retail Medication Request    Medication/Dose: Freestyle Natalia 14-day sensors  ICD code (if different than what is on RX):  E11.29  Previously Tried and Failed: OneTouch Ultra  Rationale:  Needs continuous glucose monitoring    Insurance Name:  [employer]  Insurance ID:  [id number]      Pharmacy Information (if different than what is on RX)  Name:  [facility]  Phone:  [phone]

## 2021-12-01 ENCOUNTER — TELEPHONE (OUTPATIENT)
Dept: FAMILY MEDICINE | Facility: CLINIC | Age: 66
End: 2021-12-01
Payer: COMMERCIAL

## 2021-12-01 NOTE — TELEPHONE ENCOUNTER
Per 11/30/21 telephone encounter Freestyle kelvin sensors were denied under pharmacy benefits will try submitting to medical side.

## 2021-12-01 NOTE — TELEPHONE ENCOUNTER
PRIOR AUTHORIZATION DENIED    Medication: Freestyle Natalia 14-day sensors-DENIED    Denial Date: 12/1/2021    Denial Rational:         Appeal Information: N/A

## 2021-12-02 NOTE — TELEPHONE ENCOUNTER
PA Initiation    Medication: Freestyle kelvin - Medical benefit  Insurance Company: [employer] - Phone [phone] Fax [phone]  Pharmacy Filling the Rx: [facility] - [place], [place] - [facility] [address]  Filling Pharmacy Phone:    Filling Pharmacy Fax:    Start Date: 12/2/2021    Central Prior Authorization Team   Phone: [phone]

## 2021-12-02 NOTE — TELEPHONE ENCOUNTER
PRIOR AUTHORIZATION DENIED    Medication: Freestyle kelvin - Medical benefit    Denial Date: 12/2/2021    Denial Rational: Denied under medical benefits as patient is non-insulin dependent        Appeal Information:

## 2021-12-03 ENCOUNTER — OFFICE VISIT (OUTPATIENT)
Dept: LAB | Facility: SCHOOL | Age: 66
End: 2021-12-03
Payer: COMMERCIAL

## 2021-12-03 VITALS
HEART RATE: 86 BPM | RESPIRATION RATE: 14 BRPM | TEMPERATURE: 98.2 F | SYSTOLIC BLOOD PRESSURE: 128 MMHG | DIASTOLIC BLOOD PRESSURE: 86 MMHG | HEIGHT: 72 IN | WEIGHT: 266 LBS | BODY MASS INDEX: 36.03 KG/M2 | OXYGEN SATURATION: 99 %

## 2021-12-03 DIAGNOSIS — Z00.00 WELLNESS EXAMINATION: ICD-10-CM

## 2021-12-03 DIAGNOSIS — Z00.8 ENCOUNTER FOR BIOMETRIC SCREENING: Primary | ICD-10-CM

## 2021-12-03 LAB
CHOLEST SERPL-MCNC: 120 MG/DL
FASTING STATUS PATIENT QL REPORTED: YES
GLUCOSE BLD-MCNC: 145 MG/DL (ref 60–99)
HDLC SERPL-MCNC: 59 MG/DL
LDLC SERPL CALC-MCNC: 39 MG/DL
NONHDLC SERPL-MCNC: 61 MG/DL
TRIGL SERPL-MCNC: 109 MG/DL

## 2021-12-03 PROCEDURE — 80061 LIPID PANEL: CPT | Performed by: NURSE PRACTITIONER

## 2021-12-03 PROCEDURE — 99213 OFFICE O/P EST LOW 20 MIN: CPT

## 2021-12-03 PROCEDURE — 36415 COLL VENOUS BLD VENIPUNCTURE: CPT

## 2021-12-03 PROCEDURE — 82947 ASSAY GLUCOSE BLOOD QUANT: CPT

## 2021-12-03 ASSESSMENT — MIFFLIN-ST. JEOR: SCORE: 2026.63

## 2021-12-03 NOTE — PROGRESS NOTES
"  SUBJECTIVE:  CC: Celestine is a 66 year old who presents for Wellness Check at [facility].     Review of Healthy Lifestyle:    Do you get at least three servings of calcium containing foods daily (dairy, green leafy vegetables, etc.)? yes     Do you have a high-fiber diet? yes     Amount of exercise or daily activities, outside of work: Not exercising at this time due to Hip Replacement in near future.  Normally is very active outside activities    Do you wear sunscreen on a regular basis? No     Are you taking your medications regularly Yes     Have you had an eye exam in the past two years? yes    Do you see a dentist twice per year? yes    Do you have sleep apnea, excessive snoring or excessive daytime drowsiness? no    Do you use tobacco in any form? no       OBJECTIVE:    Vitals: /86   Pulse 86   Temp 98.2  F (36.8  C) (Tympanic)   Resp 14   Ht 1.816 m (5' 11.5\")   Wt 120.7 kg (266 lb)   SpO2 99%   BMI 36.58 kg/m    BMI= Body mass index is 36.58 kg/m .    HEARING: Right Ear:        500Hz:  RESPONSE- on Level: 25 db   1000 Hz: RESPONSE- on Level: 25 db   2000 Hz: RESPONSE- on Level:   20 db    4000 Hz: RESPONSE- on Level: tone not heard    Left Ear:       500Hz:  RESPONSE- on Level: 25 db   1000 Hz: RESPONSE- on Level:   20 db    2000 Hz: RESPONSE- on Level:   20 db    4000 Hz: RESPONSE- on Level: tone not heard    VISION:  Right eye:  Decline  Left eye:     Decline  Both eyes: Decline  Corrective lenses?  Yes    Medication Reconciliation: complete    ASSESSMENT/PLAN:  (Z00.8) Encounter for biometric screening  (primary encounter diagnosis)  Comment: Labs sent  Plan: Glucose whole blood, Lipid Profile            (Z00.00) Wellness examination  Comment: No current needs  Plan:       COUNSELING:      reports that he has quit smoking. He has never used smokeless tobacco.    Estimated body mass index is 36.58 kg/m  as calculated from the following:    Height as of this " "encounter: 1.816 m (5' 11.5\").    Weight as of this encounter: 120.7 kg (266 lb).   Weight management plan: Discussed healthy diet and exercise guidelines    Counseling Resources  [place]DA's MyPlate  [email or web address]    [place] [facility] PROVIDER  [facility]     "

## 2021-12-03 NOTE — PATIENT INSTRUCTIONS
"                Celestine Simon has completed a Wellness Check at the [facility] on 12/3/2021.      ____________________________________________  [place] [employer] PROVID[employer]                                                                               Wellness Visit Recommendations:      See your regular primary care health provider every year in order to help stay healthy.    Review health changes.     Review your medicines if your doctor has prescribed any.    Talk to your provider about how often to have your cholesterol checked.    If you are at risk for diabetes, you should have a diabetes test (fasting glucose).    Shots: Get a flu shot each year. Get a tetanus shot every 10 years.     Review with your primary care provider other immunizations that you may need based on your age and/or medical/surgical history    Nutrition:     Eat at least 5 servings of fruits and vegetables each day.    Eat whole-grain bread, whole-wheat pasta and brown rice instead of white grains and rice.    Preventive Care to be reviewed by your primary care provider:    Females:        Cervical Cancer Screening                          Breast Cancer Screening                          Colon Cancer Screening  Males:             Prostrate Cancer Screening                          Colon Cancer Screening      Lifestyle:    Exercise at least 150 minutes a week (30 minutes a day, 5 days of the week). This will help you control your weight and help prevent disease or manage disease.    Limit alcohol to one drink per day or less depending on your past medical history.    No smoking.     Wear sunscreen to prevent skin cancer.    See your dentist every six months for an exam and cleaning.    Today's Vital Signs:  /86   Pulse 86   Temp 98.2  F (36.8  C) (Tympanic)   Resp 14   Ht 1.816 m (5' 11.5\")   Wt 120.7 kg (266 lb)   SpO2 99%   BMI 36.58 kg/m    "

## 2021-12-06 ENCOUNTER — VIRTUAL VISIT (OUTPATIENT)
Dept: PHARMACY | Facility: CLINIC | Age: 66
End: 2021-12-06
Payer: COMMERCIAL

## 2021-12-06 DIAGNOSIS — E66.01 SEVERE OBESITY (BMI 35.0-39.9) WITH COMORBIDITY (H): ICD-10-CM

## 2021-12-06 DIAGNOSIS — E11.29 TYPE 2 DIABETES MELLITUS WITH MICROALBUMINURIA, WITHOUT LONG-TERM CURRENT USE OF INSULIN (H): Primary | ICD-10-CM

## 2021-12-06 DIAGNOSIS — R80.9 TYPE 2 DIABETES MELLITUS WITH MICROALBUMINURIA, WITHOUT LONG-TERM CURRENT USE OF INSULIN (H): Primary | ICD-10-CM

## 2021-12-06 DIAGNOSIS — I10 ESSENTIAL HYPERTENSION: ICD-10-CM

## 2021-12-06 DIAGNOSIS — E78.5 HYPERLIPIDEMIA LDL GOAL <100: ICD-10-CM

## 2021-12-06 PROCEDURE — 99606 MTMS BY PHARM EST 15 MIN: CPT | Performed by: PHARMACIST

## 2021-12-06 PROCEDURE — 99607 MTMS BY PHARM ADDL 15 MIN: CPT | Performed by: PHARMACIST

## 2021-12-06 NOTE — PROGRESS NOTES
Medication Therapy Management (MTM) Encounter    ASSESSMENT:                            Medication Adherence/Access: No issues identified    Type 2 Diabetes/Obesity: Patient is not meeting A1c goal of < 7.5%, although improving based on GMI per CGM report and home A1c. Patient is not meeting average glucose goal of <150 mg/dL. Patient is meeting goal of >70% time in target with continuous glucose monitoring. Offered Actos dose increase or starting a SGLT-2 inhibitor, however patient opts to continue on current medications along with lifestyle modifications. This seems reasonable given he's highly motivated and seeing good glucose lowering thus far.    Hypertension: Patient is meeting blood pressure goal of < 140/90mmHg based on clinic readings. Will continue monitoring home BP as patient loses weight.    Hyperlipidemia: Stable. Patient is on moderate intensity statin which is indicated based on 2019 ACC/AHA guidelines for lipid management.      PLAN:                            1. Continue current medications along with CGM and lifestyle modifications.    Follow-up: Return in 4 weeks (on 1/3/2022) for Medication Therapy Management.    SUBJECTIVE/OBJECTIVE:                          Celestine Simon is a 66 year old male contacted via secure video for a follow-up visit. He was referred to me from Deborah Delgado PA-C.  Today's visit is a follow-up MTM visit from 11/22/21.     Reason for visit: Recheck blood sugars. Needs A1c <7.5% for hip replacement surgery.    Allergies/ADRs: Reviewed in chart  Past Medical History: Reviewed in chart  Tobacco: He reports that he has quit smoking. He has never used smokeless tobacco.  Alcohol: not currently using    Medication Adherence/Access: no issues reported    Type 2 Diabetes/Obesity: Currently taking metformin 1000mg twice daily (restarted ~2.5 months ago) and pioglitazone 15mg daily (restarted ~2 weeks ago). Patient is not experiencing side effects.  Blood sugar monitoring:  Continuous Glucose Monitor - Freestyle Natalia 14-day. Ranges (per LibreView): See below. Attempted prior auth for Natalia, but not covered under medical or pharmacy benefits because non-insulin dependent. Today did a home A1c test = 8.6%.     Symptoms of low blood sugar? none  Symptoms of high blood sugar? none  Eye exam: up to date  Foot exam: due  Diet/Exercise: Eating mostly protein and vegetables. Current weight is 259 lbs. Has lost about 10 lbs so far.  He's very motivated.  Aspirin: Taking 81mg daily and denies side effects  Statin: Yes: atorvastatin.   ACEi/ARB: Yes: lisinopril.   Urine Albumin:   Lab Results   Component Value Date    UMALCR 27.69 (H) 11/16/2021      Lab Results   Component Value Date    A1C 10.3 11/16/2021    A1C 7.6 12/24/2018    A1C 8.6 08/07/2018    A1C 7.9 02/07/2018    A1C 9.1 11/07/2017    A1C 8.4 08/04/2017     Wt Readings from Last 4 Encounters:   12/03/21 266 lb (120.7 kg)   11/16/21 271 lb (122.9 kg)   09/21/20 264 lb (119.7 kg)   09/19/18 262 lb (118.8 kg)     Hypertension: Current medications include lisinopril/HCTZ 20-12.5mg daily and doxazosin 4mg daily. Patient does self-monitor blood pressure. Home BP monitoring in range of 130-140's systolic over 80-90's diastolic, running higher due to weight gain. Patient reports no current medication side effects.  BP Readings from Last 3 Encounters:   12/03/21 128/86   11/16/21 132/86   09/21/20 120/76     Hyperlipidemia: Current therapy includes atorvastatin 20mg daily.  Patient reports no significant myalgias or other side effects.     Lab Results   Component Value Date    CHOL 120 12/03/2021    CHOL 126 08/07/2018     Lab Results   Component Value Date    HDL 59 12/03/2021    HDL 54 08/07/2018     Lab Results   Component Value Date    LDL 39 12/03/2021    LDL 40 08/07/2018     Lab Results   Component Value Date    TRIG 109 12/03/2021    TRIG 158 08/07/2018     Lab Results   Component Value Date    CHOLHDLRATIO 2.6 04/21/2015     Today's  Vitals: There were no vitals taken for this visit.     Last Comprehensive Metabolic Panel:  Sodium   Date Value Ref Range Status   11/16/2021 137 133 - 144 mmol/L Final   08/07/2018 137 133 - 144 mmol/L Final     Potassium   Date Value Ref Range Status   11/16/2021 4.2 3.4 - 5.3 mmol/L Final   08/07/2018 4.2 3.4 - 5.3 mmol/L Final     Chloride   Date Value Ref Range Status   11/16/2021 103 94 - 109 mmol/L Final   08/07/2018 103 94 - 109 mmol/L Final     Carbon Dioxide   Date Value Ref Range Status   08/07/2018 29 20 - 32 mmol/L Final     Carbon Dioxide (CO2)   Date Value Ref Range Status   11/16/2021 28 20 - 32 mmol/L Final     Anion Gap   Date Value Ref Range Status   11/16/2021 6 3 - 14 mmol/L Final   08/07/2018 5 3 - 14 mmol/L Final     Glucose   Date Value Ref Range Status   11/16/2021 210 (H) 70 - 99 mg/dL Final   08/07/2018 181 (H) 70 - 99 mg/dL Final     Comment:     Fasting specimen     Urea Nitrogen   Date Value Ref Range Status   11/16/2021 20 7 - 30 mg/dL Final   08/07/2018 20 7 - 30 mg/dL Final     Creatinine   Date Value Ref Range Status   11/16/2021 0.85 0.66 - 1.25 mg/dL Final   08/07/2018 0.99 0.66 - 1.25 mg/dL Final     GFR Estimate   Date Value Ref Range Status   11/16/2021 >90 >60 mL/min/1.73m2 Final     Comment:     As of July 11, 2021, eGFR is calculated by the CKD-EPI creatinine equation, without race adjustment. eGFR can be influenced by muscle mass, exercise, and diet. The reported eGFR is an estimation only and is only applicable if the renal function is stable.   08/07/2018 77 >60 mL/min/1.7m2 Final     Comment:     Non  GFR Calc     Calcium   Date Value Ref Range Status   11/16/2021 9.8 8.5 - 10.1 mg/dL Final   08/07/2018 9.4 8.5 - 10.1 mg/dL Final     ----------------    I spent 20 minutes with this patient today. All changes were made via collaborative practice agreement with Deborah Delgado PA-C. A copy of the visit note was provided to the patient's primary care  provider.    The patient was sent via [employer] a summary of these recommendations.     Jade Jasmine, PharmD, BCACP  Medication Therapy Management Pharmacist  Pager: [phone]    Telemedicine Visit Details  Type of service:  Video Conference via [email or web address]  Start Time: 1105  End Time: 1125  Originating Location (patient location): [place]  Distant Location (provider location):  [facility]     Medication Therapy Recommendations  No medication therapy recommendations to display

## 2021-12-06 NOTE — PATIENT INSTRUCTIONS
Recommendations from today's MTM visit:                                                    MTM (medication therapy management) is a service provided by a clinical pharmacist designed to help you get the most of out of your medicines.   Today we reviewed what your medicines are for, how to know if they are working, that your medicines are safe and how to make your medicine regimen as easy as possible.      1. Continue current medications along with continuous glucose monitoring and lifestyle modifications.    Follow-up: Return in 4 weeks (on 1/3/2022) for Medication Therapy Management.    It was great to speak with you today.  I value your experience and would be very thankful for your time with providing feedback on our clinic survey. You may receive a survey via email or text message in the next few days.     To schedule another MTM appointment, please call the clinic directly or you may call the MTM scheduling line at [phone] or toll-free at [phone].     My Clinical Pharmacist's contact information:                                                      Please feel free to contact me with any questions or concerns you have.      Jade Jasmine, PharmD, BCACP  Medication Therapy Management Pharmacist  Pager: [phone]

## 2021-12-06 NOTE — TELEPHONE ENCOUNTER
Thank you - Natalia not covered under pharmacy or medical benefits because non-insulin dependent. I discussed with patient at today's MTM visit. He's ok with paying for Natalia out-of-pocket for now.    Jade Jasmine, PharmD, [facility]  Medication Therapy Management Pharmacist  Pager: [phone]

## 2022-01-15 ENCOUNTER — HEALTH MAINTENANCE LETTER (OUTPATIENT)
Age: 67
End: 2022-01-15

## 2022-01-18 NOTE — PROGRESS NOTES
Medication Therapy Management (MTM) Encounter    ASSESSMENT:                            Medication Adherence/Access: No issues identified    Type 2 Diabetes/Obesity: Patient is not meeting A1c goal of < 7%. Patient is not meeting average glucose goal of <150 mg/dL. Patient is not meeting goal of >70% time in target with continuous glucose monitoring. Due for A1c recheck, he prefers to use home kits for now and hold off on labwork until his pre-op. Discussed options of increasing pioglitazone dose vs adding a SGLT-2 inhibitor. Patient opts for pioglitazone dose increase due to cost difference.    PLAN:                            1. Increase pioglitazone to 30mg daily. You can take 2 of the 15mg tablets to use up your current supply. I also sent a prescription to your pharmacy for 30mg tablets.  2. Schedule a pre-op visit when you feel ready based on your home A1c tests.    Follow-up: Return in 4 weeks (on 2/16/2022) for Medication Therapy Management, via video.    SUBJECTIVE/OBJECTIVE:                          Celestine Simon is a 66 year old male contacted via secure video for a follow-up visit.  Today's visit is a follow-up MTM visit from 12/6/21.     Reason for visit: Recheck blood sugars. Needs A1c <7.5% for hip replacement surgery.    Allergies/ADRs: Reviewed in chart  Past Medical History: Reviewed in chart  Tobacco: He reports that he has quit smoking. He has never used smokeless tobacco.  Alcohol: not currently using    Medication Adherence/Access: no issues reported    Type 2 Diabetes/Obesity: Currently taking metformin 1000mg twice daily (restarted ~4 months ago) and pioglitazone 15mg daily (restarted ~2 months ago). Patient is not experiencing side effects.  Blood sugar monitoring: Continuous Glucose Monitor - Freestyle Natalia 14-day. Ranges (per LibreView): See below. Today did a home A1c test = 7.3%.     Symptoms of low blood sugar? none  Symptoms of high blood sugar? none  Eye exam: up to date  Foot exam:  due  Diet/Exercise: Now down to 256 lbs, lost ~15 lbs in last 4 months. Breakfast is oatmeal, toast with eggs, and waffles. Feels he's at a plateau in terms of lifestyle changes.  Aspirin: Taking 81mg daily and denies side effects  Statin: Yes: atorvastatin.   ACEi/ARB: Yes: lisinopril.   Urine Albumin:   Lab Results   Component Value Date    UMALCR 27.69 (H) 11/16/2021      Lab Results   Component Value Date    A1C 10.3 11/16/2021    A1C 7.6 12/24/2018    A1C 8.6 08/07/2018    A1C 7.9 02/07/2018    A1C 9.1 11/07/2017    A1C 8.4 08/04/2017     Wt Readings from Last 4 Encounters:   12/03/21 266 lb (120.7 kg)   11/16/21 271 lb (122.9 kg)   09/21/20 264 lb (119.7 kg)   09/19/18 262 lb (118.8 kg)     Today's Vitals: There were no vitals taken for this visit.     Last Comprehensive Metabolic Panel:  Sodium   Date Value Ref Range Status   11/16/2021 137 133 - 144 mmol/L Final   08/07/2018 137 133 - 144 mmol/L Final     Potassium   Date Value Ref Range Status   11/16/2021 4.2 3.4 - 5.3 mmol/L Final   08/07/2018 4.2 3.4 - 5.3 mmol/L Final     Chloride   Date Value Ref Range Status   11/16/2021 103 94 - 109 mmol/L Final   08/07/2018 103 94 - 109 mmol/L Final     Carbon Dioxide   Date Value Ref Range Status   08/07/2018 29 20 - 32 mmol/L Final     Carbon Dioxide (CO2)   Date Value Ref Range Status   11/16/2021 28 20 - 32 mmol/L Final     Anion Gap   Date Value Ref Range Status   11/16/2021 6 3 - 14 mmol/L Final   08/07/2018 5 3 - 14 mmol/L Final     Glucose   Date Value Ref Range Status   11/16/2021 210 (H) 70 - 99 mg/dL Final   08/07/2018 181 (H) 70 - 99 mg/dL Final     Comment:     Fasting specimen     Urea Nitrogen   Date Value Ref Range Status   11/16/2021 20 7 - 30 mg/dL Final   08/07/2018 20 7 - 30 mg/dL Final     Creatinine   Date Value Ref Range Status   11/16/2021 0.85 0.66 - 1.25 mg/dL Final   08/07/2018 0.99 0.66 - 1.25 mg/dL Final     GFR Estimate   Date Value Ref Range Status   11/16/2021 >90 >60 mL/min/1.73m2  Final     Comment:     As of July 11, 2021, eGFR is calculated by the CKD-EPI creatinine equation, without race adjustment. eGFR can be influenced by muscle mass, exercise, and diet. The reported eGFR is an estimation only and is only applicable if the renal function is stable.   08/07/2018 77 >60 mL/min/1.7m2 Final     Comment:     Non  GFR Calc     Calcium   Date Value Ref Range Status   11/16/2021 9.8 8.5 - 10.1 mg/dL Final   08/07/2018 9.4 8.5 - 10.1 mg/dL Final       ----------------    I spent 20 minutes with this patient today. All changes were made via collaborative practice agreement with Deborah Delgado PA-C. A copy of the visit note was provided to the patient's provider(s).    The patient was sent via [employer] a summary of these recommendations.     Jade Jasmine, PharmD, BCACP  Medication Therapy Management Pharmacist  Pager: [phone]    Telemedicine Visit Details  Type of service:  Video Conference via [employer]  Start Time: 0834  End Time: 0854  Originating Location (patient location): Home  Distant Location (provider location):  [facility]     Medication Therapy Recommendations  Type 2 diabetes mellitus with hyperglycemia, without long-term current use of insulin (H)    Current Medication: pioglitazone (ACTOS) 15 MG tablet (Discontinued)   Rationale: Dose too low - Dosage too low - Effectiveness   Recommendation: Increase Dose - pioglitazone 30 MG tablet - Take 1 tablet by mouth daily.   Status: Accepted per CPA

## 2022-01-19 ENCOUNTER — VIRTUAL VISIT (OUTPATIENT)
Dept: PHARMACY | Facility: CLINIC | Age: 67
End: 2022-01-19
Payer: COMMERCIAL

## 2022-01-19 ENCOUNTER — MYC MEDICAL ADVICE (OUTPATIENT)
Dept: PHARMACY | Facility: CLINIC | Age: 67
End: 2022-01-19
Payer: COMMERCIAL

## 2022-01-19 DIAGNOSIS — E66.01 SEVERE OBESITY (BMI 35.0-39.9) WITH COMORBIDITY (H): ICD-10-CM

## 2022-01-19 DIAGNOSIS — E11.65 TYPE 2 DIABETES MELLITUS WITH HYPERGLYCEMIA, WITHOUT LONG-TERM CURRENT USE OF INSULIN (H): Primary | ICD-10-CM

## 2022-01-19 PROCEDURE — 99607 MTMS BY PHARM ADDL 15 MIN: CPT | Performed by: PHARMACIST

## 2022-01-19 PROCEDURE — 99605 MTMS BY PHARM NP 15 MIN: CPT | Performed by: PHARMACIST

## 2022-01-19 RX ORDER — PIOGLITAZONEHYDROCHLORIDE 30 MG/1
30 TABLET ORAL DAILY
Qty: 90 TABLET | Refills: 1 | Status: SHIPPED | OUTPATIENT
Start: 2022-01-19 | End: 2022-06-06

## 2022-01-19 NOTE — TELEPHONE ENCOUNTER
Thank you - addressed at MTM visit today.    Jade Jasmine, PharmD, [facility]  Medication Therapy Management Pharmacist  Pager: [phone]

## 2022-01-19 NOTE — PATIENT INSTRUCTIONS
Recommendations from today's MTM visit:                                                    MTM (medication therapy management) is a service provided by a clinical pharmacist designed to help you get the most of out of your medicines.   Today we reviewed what your medicines are for, how to know if they are working, that your medicines are safe and how to make your medicine regimen as easy as possible.      1. Increase pioglitazone to 30mg daily. You can take 2 of the 15mg tablets to use up your current supply. I also sent a prescription to your pharmacy for 30mg tablets.  2. Schedule a pre-op visit when you feel ready based on your home A1c tests.    Follow-up: Return in 4 weeks (on 2/16/2022) for Medication Therapy Management, via video.    It was great to speak with you today.  I value your experience and would be very thankful for your time with providing feedback on our clinic survey. You may receive a survey via email or text message in the next few days.     To schedule another MTM appointment, please call the clinic directly or you may call the MTM scheduling line at [phone] or toll-free at [phone].     My Clinical Pharmacist's contact information:                                                      Please feel free to contact me with any questions or concerns you have.      Jade Jasmine, PharmD, BCACP  Medication Therapy Management Pharmacist  Pager: [phone]

## 2022-01-26 ENCOUNTER — HOSPITAL ENCOUNTER (OUTPATIENT)
Dept: PHYSICAL THERAPY | Facility: CLINIC | Age: 67
Setting detail: THERAPIES SERIES
End: 2022-01-26
Attending: ORTHOPAEDIC SURGERY
Payer: COMMERCIAL

## 2022-01-26 ENCOUNTER — TRANSCRIBE ORDERS (OUTPATIENT)
Dept: OTHER | Age: 67
End: 2022-01-26
Payer: COMMERCIAL

## 2022-01-26 DIAGNOSIS — M16.11 PRIMARY OSTEOARTHRITIS OF RIGHT HIP: Primary | ICD-10-CM

## 2022-01-26 PROCEDURE — 97110 THERAPEUTIC EXERCISES: CPT | Mod: GP | Performed by: PHYSICAL THERAPIST

## 2022-01-26 PROCEDURE — 97161 PT EVAL LOW COMPLEX 20 MIN: CPT | Mod: GP | Performed by: PHYSICAL THERAPIST
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
## 2022-01-27 ENCOUNTER — TRANSCRIBE ORDERS (OUTPATIENT)
Dept: OTHER | Age: 67
End: 2022-01-27
Payer: COMMERCIAL

## 2022-01-27 DIAGNOSIS — M16.11 OSTEOARTHRITIS OF RIGHT HIP: Primary | ICD-10-CM

## 2022-02-03 ENCOUNTER — OFFICE VISIT (OUTPATIENT)
Dept: FAMILY MEDICINE | Facility: CLINIC | Age: 67
End: 2022-02-03
Payer: COMMERCIAL

## 2022-02-03 VITALS
WEIGHT: 250 LBS | TEMPERATURE: 97.4 F | DIASTOLIC BLOOD PRESSURE: 82 MMHG | HEIGHT: 72 IN | BODY MASS INDEX: 33.86 KG/M2 | RESPIRATION RATE: 16 BRPM | SYSTOLIC BLOOD PRESSURE: 130 MMHG | HEART RATE: 100 BPM

## 2022-02-03 DIAGNOSIS — E11.29 TYPE 2 DIABETES MELLITUS WITH MICROALBUMINURIA, WITHOUT LONG-TERM CURRENT USE OF INSULIN (H): ICD-10-CM

## 2022-02-03 DIAGNOSIS — E78.5 HYPERLIPIDEMIA LDL GOAL <100: ICD-10-CM

## 2022-02-03 DIAGNOSIS — R80.9 TYPE 2 DIABETES MELLITUS WITH MICROALBUMINURIA, WITHOUT LONG-TERM CURRENT USE OF INSULIN (H): ICD-10-CM

## 2022-02-03 DIAGNOSIS — M16.51 POST-TRAUMATIC OSTEOARTHRITIS OF RIGHT HIP: ICD-10-CM

## 2022-02-03 DIAGNOSIS — I10 ESSENTIAL HYPERTENSION: ICD-10-CM

## 2022-02-03 DIAGNOSIS — Z01.818 PREOP GENERAL PHYSICAL EXAM: Primary | ICD-10-CM

## 2022-02-03 LAB
ANION GAP SERPL CALCULATED.3IONS-SCNC: 9 MMOL/L (ref 3–14)
BUN SERPL-MCNC: 24 MG/DL (ref 7–30)
CALCIUM SERPL-MCNC: 9.9 MG/DL (ref 8.5–10.1)
CHLORIDE BLD-SCNC: 100 MMOL/L (ref 94–109)
CO2 SERPL-SCNC: 26 MMOL/L (ref 20–32)
CREAT SERPL-MCNC: 1.02 MG/DL (ref 0.66–1.25)
GFR SERPL CREATININE-BSD FRML MDRD: 82 ML/MIN/1.73M2
GLUCOSE BLD-MCNC: 105 MG/DL (ref 70–99)
HBA1C MFR BLD: 7.6 % (ref 0–5.6)
POTASSIUM BLD-SCNC: 4.3 MMOL/L (ref 3.4–5.3)
SODIUM SERPL-SCNC: 135 MMOL/L (ref 133–144)

## 2022-02-03 PROCEDURE — 99214 OFFICE O/P EST MOD 30 MIN: CPT | Performed by: NURSE PRACTITIONER

## 2022-02-03 PROCEDURE — 80048 BASIC METABOLIC PNL TOTAL CA: CPT | Performed by: NURSE PRACTITIONER

## 2022-02-03 PROCEDURE — 83036 HEMOGLOBIN GLYCOSYLATED A1C: CPT | Performed by: NURSE PRACTITIONER

## 2022-02-03 PROCEDURE — 36415 COLL VENOUS BLD VENIPUNCTURE: CPT | Performed by: NURSE PRACTITIONER

## 2022-02-03 ASSESSMENT — MIFFLIN-ST. JEOR: SCORE: 1954.05

## 2022-02-03 NOTE — PATIENT INSTRUCTIONS
Hold the lisinopril/hctz, metformin and Actos the morning of surgery     Hold the Aspirin and glucosamine-chondroitin now  Preparing for Your Surgery  Getting started  A nurse will call you to review your health history and instructions. They will give you an arrival time based on your scheduled surgery time. Please be ready to share:    Your doctor's clinic name and phone number    Your medical, surgical and anesthesia history    A list of allergies and sensitivities    A list of medicines, including herbal treatments and over-the-counter drugs    Whether the patient has a legal guardian (ask how to send us the papers in advance)  Please tell us if you're pregnant--or if there's any chance you might be pregnant. Some surgeries may injure a fetus (unborn baby), so they require a pregnancy test. Surgeries that are safe for a fetus don't always need a test, and you can choose whether to have one.   If you have a child who's having surgery, please ask for a copy of Preparing for Your Child's Surgery.    Preparing for surgery    Within 30 days of surgery: Have a pre-op exam (sometimes called an H&P, or History and Physical). This can be done at a clinic or pre-operative center.  ? If you're having a , you may not need this exam. Talk to your care team.    At your pre-op exam, talk to your care team about all medicines you take. If you need to stop any medicines before surgery, ask when to start taking them again.  ? We do this for your safety. Many medicines can make you bleed too much during surgery. Some change how well surgery (anesthesia) drugs work.    Call your insurance company to let them know you're having surgery. (If you don't have insurance, call [phone].)    Call your clinic if there's any change in your health. This includes signs of a cold or flu (sore throat, runny nose, cough, rash, fever). It also includes a scrape or scratch near the surgery site.    If you have questions on the day of  surgery, call your hospital or surgery center.  COVID testing  You may need to be tested for COVID-19 before having surgery. If so, your surgical team will give you instructions for scheduling this test, separate from your preoperative history and physical.  Eating and drinking guidelines  For your safety: Unless your surgeon tells you otherwise, follow the guidelines below.    Eat and drink as usual until 8 hours before surgery. After that, no food or milk.    Drink clear liquids until 2 hours before surgery. These are liquids you can see through, like water, Gatorade and Propel Water. You may also have black coffee and tea (no cream or milk).    Nothing by mouth within 2 hours of surgery. This includes gum, candy and breath mints.    If you drink alcohol: Stop drinking it the night before surgery.    If your care team tells you to take medicine on the morning of surgery, it's okay to take it with a sip of water.  Preventing infection    Shower or bathe the night before and morning of your surgery. Follow the instructions your clinic gave you. (If no instructions, use regular soap.)    Don't shave or clip hair near your surgery site. We'll remove the hair if needed.    Don't smoke or vape the morning of surgery. You may chew nicotine gum up to 2 hours before surgery. A nicotine patch is okay.  ? Note: Some surgeries require you to completely quit smoking and nicotine. Check with your surgeon.    Your care team will make every effort to keep you safe from infection. We will:  ? Clean our hands often with soap and water (or an alcohol-based hand rub).  ? Clean the skin at your surgery site with a special soap that kills germs.  ? Give you a special gown to keep you warm. (Cold raises the risk of infection.)  ? Wear special hair covers, masks, gowns and gloves during surgery.  ? Give antibiotic medicine, if prescribed. Not all surgeries need antibiotics.  What to bring on the day of surgery    Photo ID and insurance  card    Copy of your health care directive, if you have one    Glasses and hearing aides (bring cases)  ? You can't wear contacts during surgery    Inhaler and eye drops, if you use them (tell us about these when you arrive)    CPAP machine or breathing device, if you use them    A few personal items, if spending the night    If you have . . .  ? A pacemaker, ICD (cardiac defibrillator) or other implant: Bring the ID card.  ? An implanted stimulator: Bring the remote control.  ? A legal guardian: Bring a copy of the certified (court-stamped) guardianship papers.  Please remove any jewelry, including body piercings. Leave jewelry and other valuables at home.  If you're going home the day of surgery    You must have a responsible adult drive you home. They should stay with you overnight as well.    If you don't have someone to stay with you, and you aren't safe to go home alone, we may keep you overnight. Insurance often won't pay for this.  After surgery  If it's hard to control your pain or you need more pain medicine, please call your surgeon's office.  Questions?   If you have any questions for your care team, list them here: _________________________________________________________________________________________________________________________________________________________________________ ____________________________________ ____________________________________ ____________________________________  For informational purposes only. Not to replace the advice of your health care provider. Copyright   2003, 2019 [facility]. All rights reserved. Clinically reviewed by Meliza Barnett MD. [employer] [id number] - REV 07/21.
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
## 2022-02-06 ENCOUNTER — LAB (OUTPATIENT)
Dept: LAB | Facility: CLINIC | Age: 67
End: 2022-02-06
Attending: FAMILY MEDICINE
Payer: COMMERCIAL

## 2022-02-06 DIAGNOSIS — Z20.822 ENCOUNTER FOR LABORATORY TESTING FOR COVID-19 VIRUS: ICD-10-CM

## 2022-02-06 PROCEDURE — U0003 INFECTIOUS AGENT DETECTION BY NUCLEIC ACID (DNA OR RNA); SEVERE ACUTE RESPIRATORY SYNDROME CORONAVIRUS 2 (SARS-COV-2) (CORONAVIRUS DISEASE [COVID-19]), AMPLIFIED PROBE TECHNIQUE, MAKING USE OF HIGH THROUGHPUT TECHNOLOGIES AS DESCRIBED BY CMS-2020-01-R: HCPCS

## 2022-02-06 PROCEDURE — U0005 INFEC AGEN DETEC AMPLI PROBE: HCPCS

## 2022-02-07 LAB — SARS-COV-2 RNA RESP QL NAA+PROBE: NEGATIVE

## 2022-02-17 ENCOUNTER — TRANSFERRED RECORDS (OUTPATIENT)
Dept: HEALTH INFORMATION MANAGEMENT | Facility: CLINIC | Age: 67
End: 2022-02-17
Payer: COMMERCIAL

## 2022-02-23 ENCOUNTER — VIRTUAL VISIT (OUTPATIENT)
Dept: PHARMACY | Facility: CLINIC | Age: 67
End: 2022-02-23
Payer: COMMERCIAL

## 2022-02-23 DIAGNOSIS — E11.65 TYPE 2 DIABETES MELLITUS WITH HYPERGLYCEMIA, WITHOUT LONG-TERM CURRENT USE OF INSULIN (H): ICD-10-CM

## 2022-02-23 DIAGNOSIS — E66.01 SEVERE OBESITY (BMI 35.0-39.9) WITH COMORBIDITY (H): ICD-10-CM

## 2022-02-23 DIAGNOSIS — Z98.890 S/P HIP ARTHROSCOPY: Primary | ICD-10-CM

## 2022-02-23 PROCEDURE — 99606 MTMS BY PHARM EST 15 MIN: CPT | Performed by: PHARMACIST

## 2022-02-24 NOTE — PATIENT INSTRUCTIONS
Recommendations from today's MTM visit:                                                    MTM (medication therapy management) is a service provided by a clinical pharmacist designed to help you get the most of out of your medicines.   Today we reviewed what your medicines are for, how to know if they are working, that your medicines are safe and how to make your medicine regimen as easy as possible.      1. Continue current medications. Please monitor your weight and fluid retention as you recover from hip surgery. This can be a possible side effect of pioglitazone as well.    Follow-up: Return in 1 month (on 3/23/2022) for Medication Therapy Management.    It was great to speak with you today.  I value your experience and would be very thankful for your time with providing feedback on our clinic survey. You may receive a survey via email or text message in the next few days.     To schedule another MTM appointment, please call the clinic directly or you may call the MTM scheduling line at [phone] or toll-free at [phone].     My Clinical Pharmacist's contact information:                                                      Please feel free to contact me with any questions or concerns you have.      Jade Jasmine, PharmD, BCACP  Medication Therapy Management Pharmacist  Pager: [phone]

## 2022-03-01 DIAGNOSIS — R80.9 TYPE 2 DIABETES MELLITUS WITH MICROALBUMINURIA, WITHOUT LONG-TERM CURRENT USE OF INSULIN (H): ICD-10-CM

## 2022-03-01 DIAGNOSIS — E11.29 TYPE 2 DIABETES MELLITUS WITH MICROALBUMINURIA, WITHOUT LONG-TERM CURRENT USE OF INSULIN (H): ICD-10-CM

## 2022-03-02 RX ORDER — FLASH GLUCOSE SENSOR
1 KIT MISCELLANEOUS
Qty: 6 EACH | Refills: 1 | Status: SHIPPED | OUTPATIENT
Start: 2022-03-02 | End: 2022-09-07

## 2022-03-15 DIAGNOSIS — E78.5 DYSLIPIDEMIA: ICD-10-CM

## 2022-03-16 RX ORDER — ATORVASTATIN CALCIUM 20 MG/1
20 TABLET, FILM COATED ORAL DAILY
Qty: 90 TABLET | Refills: 1 | Status: SHIPPED | OUTPATIENT
Start: 2022-03-16 | End: 2022-09-07

## 2022-03-30 ENCOUNTER — TRANSFERRED RECORDS (OUTPATIENT)
Dept: HEALTH INFORMATION MANAGEMENT | Facility: CLINIC | Age: 67
End: 2022-03-30
Payer: COMMERCIAL

## 2022-05-07 ENCOUNTER — HEALTH MAINTENANCE LETTER (OUTPATIENT)
Age: 67
End: 2022-05-07

## 2022-05-11 ENCOUNTER — TRANSFERRED RECORDS (OUTPATIENT)
Dept: HEALTH INFORMATION MANAGEMENT | Facility: CLINIC | Age: 67
End: 2022-05-11
Payer: COMMERCIAL

## 2022-06-04 DIAGNOSIS — E11.65 TYPE 2 DIABETES MELLITUS WITH HYPERGLYCEMIA, WITHOUT LONG-TERM CURRENT USE OF INSULIN (H): ICD-10-CM

## 2022-06-04 DIAGNOSIS — E11.00 TYPE 2 DIABETES MELLITUS WITH HYPEROSMOLARITY WITHOUT COMA, WITHOUT LONG-TERM CURRENT USE OF INSULIN (H): ICD-10-CM

## 2022-06-04 DIAGNOSIS — I10 ESSENTIAL HYPERTENSION, BENIGN: ICD-10-CM

## 2022-06-06 RX ORDER — PIOGLITAZONEHYDROCHLORIDE 30 MG/1
TABLET ORAL
Qty: 90 TABLET | Refills: 1 | Status: SHIPPED | OUTPATIENT
Start: 2022-06-06 | End: 2023-01-27

## 2022-06-06 RX ORDER — DOXAZOSIN 4 MG/1
4 TABLET ORAL DAILY
Qty: 90 TABLET | Refills: 1 | Status: SHIPPED | OUTPATIENT
Start: 2022-06-06 | End: 2022-11-29

## 2022-06-06 RX ORDER — LISINOPRIL AND HYDROCHLOROTHIAZIDE 12.5; 2 MG/1; MG/1
1 TABLET ORAL DAILY
Qty: 90 TABLET | Refills: 1 | Status: SHIPPED | OUTPATIENT
Start: 2022-06-06 | End: 2022-11-29

## 2022-06-18 ENCOUNTER — ANESTHESIA EVENT (OUTPATIENT)
Dept: EMERGENCY MEDICINE | Facility: CLINIC | Age: 67
End: 2022-06-18
Payer: COMMERCIAL

## 2022-06-18 ENCOUNTER — APPOINTMENT (OUTPATIENT)
Dept: GENERAL RADIOLOGY | Facility: CLINIC | Age: 67
End: 2022-06-18
Attending: EMERGENCY MEDICINE
Payer: COMMERCIAL

## 2022-06-18 ENCOUNTER — HOSPITAL ENCOUNTER (EMERGENCY)
Facility: CLINIC | Age: 67
Discharge: HOME | End: 2022-06-18
Attending: EMERGENCY MEDICINE | Admitting: EMERGENCY MEDICINE
Payer: COMMERCIAL

## 2022-06-18 ENCOUNTER — ANESTHESIA (OUTPATIENT)
Dept: EMERGENCY MEDICINE | Facility: CLINIC | Age: 67
End: 2022-06-18
Payer: COMMERCIAL

## 2022-06-18 VITALS
TEMPERATURE: 97.7 F | RESPIRATION RATE: 16 BRPM | SYSTOLIC BLOOD PRESSURE: 141 MMHG | HEART RATE: 64 BPM | WEIGHT: 260 LBS | HEIGHT: 73 IN | BODY MASS INDEX: 34.46 KG/M2 | OXYGEN SATURATION: 90 % | DIASTOLIC BLOOD PRESSURE: 89 MMHG

## 2022-06-18 DIAGNOSIS — S73.004A DISLOCATION OF RIGHT HIP, INITIAL ENCOUNTER (H): ICD-10-CM

## 2022-06-18 LAB
HOLD SPECIMEN: NORMAL

## 2022-06-18 PROCEDURE — 99285 EMERGENCY DEPT VISIT HI MDM: CPT | Mod: 25 | Performed by: EMERGENCY MEDICINE

## 2022-06-18 PROCEDURE — 96374 THER/PROPH/DIAG INJ IV PUSH: CPT | Performed by: EMERGENCY MEDICINE

## 2022-06-18 PROCEDURE — 27265 TREAT HIP DISLOCATION: CPT | Mod: RT | Performed by: EMERGENCY MEDICINE

## 2022-06-18 PROCEDURE — 96361 HYDRATE IV INFUSION ADD-ON: CPT | Performed by: EMERGENCY MEDICINE

## 2022-06-18 PROCEDURE — 370N000003 HC ANESTHESIA WARD SERVICE

## 2022-06-18 PROCEDURE — 999N000065 XR PELVIS AND HIP RIGHT 1 VIEW

## 2022-06-18 PROCEDURE — 258N000003 HC RX IP 258 OP 636: Performed by: EMERGENCY MEDICINE

## 2022-06-18 PROCEDURE — 73501 X-RAY EXAM HIP UNI 1 VIEW: CPT | Mod: RT

## 2022-06-18 PROCEDURE — 250N000011 HC RX IP 250 OP 636: Performed by: EMERGENCY MEDICINE

## 2022-06-18 PROCEDURE — 27265 TREAT HIP DISLOCATION: CPT | Mod: 54 | Performed by: EMERGENCY MEDICINE

## 2022-06-18 PROCEDURE — 250N000011 HC RX IP 250 OP 636: Performed by: NURSE ANESTHETIST, CERTIFIED REGISTERED

## 2022-06-18 RX ORDER — SODIUM CHLORIDE 9 MG/ML
INJECTION, SOLUTION INTRAVENOUS CONTINUOUS
Status: DISCONTINUED | OUTPATIENT
Start: 2022-06-18 | End: 2022-06-18 | Stop reason: HOSPADM

## 2022-06-18 RX ORDER — LORAZEPAM 2 MG/ML
1 INJECTION INTRAMUSCULAR ONCE
Status: COMPLETED | OUTPATIENT
Start: 2022-06-18 | End: 2022-06-18

## 2022-06-18 RX ORDER — PROPOFOL 10 MG/ML
INJECTION, EMULSION INTRAVENOUS PRN
Status: DISCONTINUED | OUTPATIENT
Start: 2022-06-18 | End: 2022-06-18

## 2022-06-18 RX ADMIN — SODIUM CHLORIDE: 9 INJECTION, SOLUTION INTRAVENOUS at 07:45

## 2022-06-18 RX ADMIN — MIDAZOLAM 2 MG: 1 INJECTION INTRAMUSCULAR; INTRAVENOUS at 07:23

## 2022-06-18 RX ADMIN — PROPOFOL 100 MG: 10 INJECTION, EMULSION INTRAVENOUS at 07:26

## 2022-06-18 RX ADMIN — LORAZEPAM 1 MG: 2 INJECTION INTRAMUSCULAR; INTRAVENOUS at 06:30

## 2022-06-18 ASSESSMENT — LIFESTYLE VARIABLES: TOBACCO_USE: 1

## 2022-06-18 NOTE — ANESTHESIA PREPROCEDURE EVALUATION
Anesthesia Evaluation     . Pt has had prior anesthetic. Type: MAC.           ROS/MED HX    ENT/Pulmonary:  - neg pulmonary ROS   (+) tobacco use,     Neurologic:  - neg neurologic ROS     Cardiovascular:     (+) Dyslipidemia hypertension-----      METS/Exercise Tolerance:     Hematologic:  - neg hematologic  ROS       Musculoskeletal:  - neg musculoskeletal ROS       GI/Hepatic:  - neg GI/hepatic ROS       Renal/Genitourinary:  - neg Renal ROS       Endo:     (+) type I DM,       Psychiatric:  - neg psychiatric ROS       Infectious Disease:         Malignancy:       Other:    - neg other ROS                 Physical Exam  Normal systems: cardiovascular, pulmonary and dental    Airway   Mallampati: II  TM distance: > 3 FB  Neck ROM: full  Mouth opening: > 3 cm    Dental     Cardiovascular       Pulmonary                     Anesthesia Plan     ASA Status:  2       Anesthesia Plan Type Discussed: MAC  Induction Technique/Agent: Intravenous      Postoperative Care      Consents  Anesthetic plan, risks, benefits and alternatives discussed with:  Patient..                          .    Anesthesia Evaluation   Pt has had prior anesthetic. Type: MAC.        ROS/MED HX  ENT/Pulmonary:  - neg pulmonary ROS   (+) tobacco use,     Neurologic:  - neg neurologic ROS     Cardiovascular:     (+) Dyslipidemia hypertension-----    METS/Exercise Tolerance:     Hematologic:  - neg hematologic  ROS     Musculoskeletal:  - neg musculoskeletal ROS     GI/Hepatic:  - neg GI/hepatic ROS     Renal/Genitourinary:  - neg Renal ROS     Endo:     (+) type I DM,     Psychiatric/Substance Use:  - neg psychiatric ROS     Infectious Disease:       Malignancy:  - neg malignancy ROS     Other:  - neg other ROS            Physical Exam    Airway        Mallampati: II   TM distance: > 3 FB   Neck ROM: full   Mouth opening: > 3 cm    Respiratory Devices and Support         Dental  no notable dental history         Cardiovascular   cardiovascular exam  normal          Pulmonary   pulmonary exam normal                  Anesthesia Plan    ASA Status:  2      Anesthesia Type: MAC.   Induction: Intravenous.           Consents    Anesthesia Plan(s) and associated risks, benefits, and realistic alternatives discussed. Questions answered and patient/representative(s) expressed understanding.    - Discussed:     - Discussed with:  Patient         Postoperative Care            Comments:

## 2022-06-18 NOTE — ED TRIAGE NOTES
Patient reports he went to bed as usual last night, woke up with severe right hip pain. Hx of hip replacement in February.     Triage Assessment     Row Name 06/18/22 0538       Triage Assessment (Adult)    Airway WDL WDL       Respiratory WDL    Respiratory WDL WDL       Skin Circulation/Temperature WDL    Skin Circulation/Temperature WDL WDL       Cardiac WDL    Cardiac WDL WDL       Peripheral/Neurovascular WDL    Peripheral Neurovascular WDL WDL       Cognitive/Neuro/Behavioral WDL    Cognitive/Neuro/Behavioral WDL WDL

## 2022-06-18 NOTE — ED PROVIDER NOTES
History     Chief Complaint   Patient presents with     Hip Pain     HPI  John Simon is a 66 year old male who presents from home secondary to right hip pain that began early this morning about 3:00, he awoke, bent his knee and flexed his hip and developed severe pain along the right lateral hip, describes a swelling longitudinally, severe, sharp, worse with any movement, unable to bear weight.  Took some Aleve and aspirin and iced it, moderate relief.  Similar symptoms in the past brief none elicited.  Right hip ORIF February.  Denies recent injury.  Mowed the lawn and cut weeds yesterday for the second or third time since hip surgery.  Denies distal numbness or weakness.  Denies fever.  Denies associated low back pain.  Denies abdominal pain, no change in bowel or bladder.    Allergies:  No Known Allergies    Problem List:    Patient Active Problem List    Diagnosis Date Noted     H/O splenectomy 02/02/2016     Priority: Medium     ???  JOHN UNSURE.  NO INDICATION OF SPLENECTOMY IN RECORDS.         Type 2 diabetes mellitus with microalbuminuria, without long-term current use of insulin (H) 10/31/2010     Priority: Medium     MAC pos x 2 but now neg 2/7/18.       HYPERLIPIDEMIA LDL GOAL <100 10/31/2010     Priority: Medium     Essential hypertension 05/30/2006     Priority: Medium     Severe obesity (BMI 35.0-39.9) with comorbidity (H) 05/30/2006     Priority: Medium     Problem list name updated by automated process. Provider to review          Past Medical History:    Past Medical History:   Diagnosis Date     Essential hypertension      MEDICAL HISTORY OF -      MEDICAL HISTORY OF -      Type 2 diabetes mellitus with hyperglycemia (H)        Past Surgical History:    Past Surgical History:   Procedure Laterality Date     APPENDECTOMY      age 20     [place] GASTROSCOPY       HC REMOVAL GALLBLADDER      age 20     SURGICAL HISTORY OF -       hip repair due to tractor accident age 19     SURGICAL HISTORY OF -     "   tonsilectomy ? age     SURGICAL HISTORY OF -   1998    flexiblesigmoidoscopy       Family History:    Family History   Problem Relation Age of Onset     Cerebrovascular Disease Father         53     Cardiovascular Father         53     Coronary Artery Disease Father 53        MI     Crohn's Disease Father      Respiratory Maternal Grandfather         Emphsemia smoking     Cardiovascular Paternal Grandfather      Lipids Mother      Parkinsonism Mother      Colon Cancer Mother      Hyperlipidemia Mother        Social History:  Marital Status:   [2]  Social History     Tobacco Use     Smoking status: Former Smoker     Smokeless tobacco: Never Used     Tobacco comment: 50 years ago   Substance Use Topics     Alcohol use: Yes     Comment: occ     Drug use: No        Medications:    aspirin 81 MG tablet  atorvastatin (LIPITOR) 20 MG tablet  Blood Glucose Monitoring Suppl (IN TOUCH) MISC  Continuous Blood Gluc Sensor (FREE[employer]YLE SCOTT 14 DAY SENSOR) MISC  doxazosin (CARDURA) 4 MG tablet  GLUCOSAMINE-CHONDROITIN PO  HERBALS  LANCETS REGULAR MISC  lisinopril-hydrochlorothiazide (ZESTORETIC) 20-12.5 MG tablet  metFORMIN (GLUCOPHAGE) 1000 MG tablet  MULTI-VITAMIN OR TABS  pioglitazone (ACTOS) 30 MG tablet          Review of Systems  Problem focused review of systems otherwise negative    Physical Exam   BP: (!) 167/74  Pulse: 66  Temp: 97.7  F (36.5  C)  Resp: 16  Height: 185.4 cm (6' 1\")  Weight: 117.9 kg (260 lb)  SpO2: 95 %      Physical Exam  Nontoxic-appearing no respiratory distress alert and oriented comfortable on the gurney  Head atraumatic normocephalic  Skin pink warm dry  Abdomen soft obese nontender bowel sounds positive  Right femoral pulses strong, there is no groin tenderness redness or swelling, well-healed incision right hip, associated tenderness, no significant deformity appreciated, right low back is nontender, limited range of motion passive and active at the hip.  The remainder of the leg, knee, " lower leg are unremarkable to inspection and nontender.  Pedal pulses symmetrical and strong.  Bilateral mild pedal edema.  No right lower extremity erythema or warmth  ED Course          X-ray exam right anterior hip dislocation prosthetic hip  Sedation closed reduction right hip  Written consent after reviewing risk benefits  All questions answered  Pause prior to sedation to identify patient, right side and right procedure.           [facility]    -Dislocation - Lower Extremity    Date/Time: 6/18/2022 7:19 AM  Performed by: Brian Chi MD  Authorized by: Brian Chi MD     Risks, benefits and alternatives discussed.      LOCATION     Location:  Hip    Hip injury location:  R hip    Hip dislocation type: anterior      Etiology: spontaneous      Prosthesis: yes      PRE PROCEDURE DETAILS:     Distal perfusion: normal      Range of motion: reduced      PROCEDURE DETAILS      Manipulation performed: yes      Hip reduction method:  Direct traction and traction and counter traction    POST PROCEDURE DETAILS      Neurological function: normal      Distal perfusion: normal      Range of motion: improved        PROCEDURE  Describe Procedure:   Reduction of dislocated right hip  Patient Tolerance:  Patient tolerated the procedure well with no immediate complications[facility]    Procedure: Sedation    Date/Time: 6/18/2022 7:38 AM  Performed by: Brian Chi MD  Authorized by: Brian Chi MD     Risks, benefits and alternatives discussed.    ED EVALUATION:      I have performed an Emergency Department Evaluation including taking a history and physical examination, this evaluation will be documented in the electronic medical record for this ED encounter.      ASA Class: Class 2- mild systemic disease, no acute problems, no functional limitations    Mallampati: Grade 2- soft palate, base of uvula, tonsillar pillars, and portion of posterior  pharyngeal wall visible    NPO Status: appropriately NPO for procedure    UNIVERSAL PROTOCOL   Site Marked: No  Prior Images Obtained and Reviewed:  Yes  Required items: Required blood products, implants, devices and special equipment available    Patient identity confirmed:  Verbally with patient  Patient was reevaluated immediately before administering moderate or deep sedation or anesthesia  Confirmation Checklist:  Patient's identity using two indicators, relevant allergies, procedure was appropriate and matched the consent or emergent situation and correct equipment/implants were available  Time out: Immediately prior to the procedure a time out was called    Universal Protocol: the [facility] Universal Protocol was followed    Preparation: Patient was prepped and draped in usual sterile fashion      SEDATION  Patient Sedated: Yes    Sedation:  See MAR for details  Vital signs: Vital signs monitored during sedation      PROCEDURE  Describe Procedure: Sedation was maintained until the procedure was complete. The patient was monitored by staff until recovered.  Length of time physician/provider present for 1:1 monitoring during sedation: 10                  Critical Care time:  none               Results for orders placed or performed during the hospital encounter of 06/18/22 (from the past 24 hour(s))   [facility] Draw    Narrative    The following orders were created for panel order [facility] Draw.  Procedure                               Abnormality         Status                     ---------                               -----------         ------                     Extra Blue Top Tube[[id number]]                              Final result               Extra Red Top Tube[[id number]]                               Final result               Extra Green Top (Lithium...[[id number]]                      Final result               Extra Purple Top Tube[[id number]]                            Final result                  Please view results for these tests on the individual orders.   Extra Blue Top Tube   Result Value Ref Range    Hold Specimen JIC    Extra Red Top Tube   Result Value Ref Range    Hold Specimen JIC    Extra Green Top (Lithium Heparin) Tube   Result Value Ref Range    Hold Specimen JIC    Extra Purple Top Tube   Result Value Ref Range    Hold Specimen JIC    XR Hip Right 1 View    Narrative    EXAM: XR HIP RIGHT 1 VIEW  LOCATION: [facility]  DATE/TIME: 6/18/2022 6:50 AM    INDICATION: Pain  COMPARISON: None.      Impression    IMPRESSION: Dislocated right total hip replacement. No fractures identified.   XR Pelvis w Hip Right 1 View    Narrative    EXAM: XR PELVIS AND HIP RIGHT 1 VIEW  LOCATION: [facility]  DATE/TIME: 6/18/2022 8:42 AM    INDICATION: post reduction  COMPARISON: None.      Impression    IMPRESSION: Changes of a right total hip replacement. There is no dislocation. No acute periprosthetic fracture.       Medications   sodium chloride 0.9% infusion (0 mLs Intravenous Stopped 6/18/22 0905)   LORazepam (ATIVAN) injection 1 mg (1 mg Intravenous Given 6/18/22 0630)       Assessments & Plan (with Medical Decision Making)  66-year-old male presents with right hip pain details per HPI.  X-ray exam by my review as well as radiology read is significant for prosthetic hip that is dislocated.  It is relocated under conscious sedation without acute complication.  Patient able to ambulate without significant discomfort.  Instructed regarding limited ranges of motion specifically internal and external rotation and extreme of flexion.  Follow-up with orthopedics.  Return criteria reviewed     I have reviewed the nursing notes.    I have reviewed the findings, diagnosis, plan and need for follow up with the patient.       Discharge Medication List as of 6/18/2022  9:05 AM          Final diagnoses:   Dislocation of right hip, initial encounter (H)        6/18/2022   [facility] EMERGENCY DEPT     Brian Chi MD  06/18/22 1029

## 2022-06-18 NOTE — ANESTHESIA CARE TRANSFER NOTE
Patient: Celestine Simon    Procedure: * No procedures listed *       Diagnosis: * No pre-op diagnosis entered *  Diagnosis Additional Information: No value filed.    Anesthesia Type:   MAC     Note:      Level of Consciousness: drowsy  Oxygen Supplementation: nasal cannula    Independent Airway: airway patency satisfactory and stable    Vital Signs Stable: post-procedure vital signs reviewed and stable  Report to RN Given: handoff report given  Patient transferred to: Emergency Department    Handoff Report: Identifed the Patient, Identified the Reponsible Provider, Reviewed the pertinent medical history, Discussed the surgical course, Reviewed Intra-OP anesthesia mangement and issues during anesthesia, Set expectations for post-procedure period and Allowed opportunity for questions and acknowledgement of understanding      Vitals:  Vitals Value Taken Time   /69 06/18/22 0738   Temp     Pulse 68 06/18/22 0740   Resp 20 06/18/22 0740   SpO2 95 % 06/18/22 0740   Vitals shown include unvalidated device data.    Electronically Signed By: ANNE-MARIE Holden CRNA  June 18, 2022  7:41 AM

## 2022-06-18 NOTE — ANESTHESIA POSTPROCEDURE EVALUATION
Patient: Celestine Simon    Procedure: * No procedures listed *       Anesthesia Type:  MAC    Note:  Disposition: Outpatient   Postop Pain Control: Uneventful            Sign Out: Well controlled pain   PONV: No   Neuro/Psych: Uneventful            Sign Out: Acceptable/Baseline neuro status   Airway/Respiratory: Uneventful            Sign Out: Acceptable/Baseline resp. status   CV/Hemodynamics: Uneventful            Sign Out: Acceptable CV status; No obvious hypovolemia; No obvious fluid overload   Other NRE: NONE   DID A NON-ROUTINE EVENT OCCUR? No           Last vitals:  Vitals:    06/18/22 0730 06/18/22 0735 06/18/22 0738   BP: 115/77  112/69   Pulse: 65 67 66   Resp: (!) 7 22 14   Temp:      SpO2: (!) 82% 95% 94%       Electronically Signed By: ANNE-MARIE Holden CRNA  June 18, 2022  7:42 AM

## 2022-06-20 ENCOUNTER — TRANSFERRED RECORDS (OUTPATIENT)
Dept: GENERAL RADIOLOGY | Facility: CLINIC | Age: 67
End: 2022-06-20

## 2022-06-21 ENCOUNTER — HOSPITAL ENCOUNTER (EMERGENCY)
Facility: CLINIC | Age: 67
Discharge: HOME | End: 2022-06-22
Attending: FAMILY MEDICINE | Admitting: FAMILY MEDICINE
Payer: COMMERCIAL

## 2022-06-21 ENCOUNTER — ANESTHESIA EVENT (OUTPATIENT)
Dept: EMERGENCY MEDICINE | Facility: CLINIC | Age: 67
End: 2022-06-21
Payer: COMMERCIAL

## 2022-06-21 ENCOUNTER — APPOINTMENT (OUTPATIENT)
Dept: GENERAL RADIOLOGY | Facility: CLINIC | Age: 67
End: 2022-06-21
Attending: FAMILY MEDICINE
Payer: COMMERCIAL

## 2022-06-21 ENCOUNTER — ANESTHESIA (OUTPATIENT)
Dept: EMERGENCY MEDICINE | Facility: CLINIC | Age: 67
End: 2022-06-21
Payer: COMMERCIAL

## 2022-06-21 VITALS
RESPIRATION RATE: 9 BRPM | HEIGHT: 73 IN | SYSTOLIC BLOOD PRESSURE: 141 MMHG | BODY MASS INDEX: 34.46 KG/M2 | HEART RATE: 67 BPM | DIASTOLIC BLOOD PRESSURE: 88 MMHG | WEIGHT: 260 LBS | TEMPERATURE: 98.1 F | OXYGEN SATURATION: 95 %

## 2022-06-21 DIAGNOSIS — Z01.818 PREOP GENERAL PHYSICAL EXAM: ICD-10-CM

## 2022-06-21 DIAGNOSIS — S73.004A DISLOCATION OF RIGHT HIP, INITIAL ENCOUNTER (H): ICD-10-CM

## 2022-06-21 LAB
HOLD SPECIMEN: NORMAL
HOLD SPECIMEN: NORMAL

## 2022-06-21 PROCEDURE — 27265 TREAT HIP DISLOCATION: CPT | Mod: RT | Performed by: FAMILY MEDICINE

## 2022-06-21 PROCEDURE — 258N000003 HC RX IP 258 OP 636: Performed by: FAMILY MEDICINE

## 2022-06-21 PROCEDURE — 999N000065 XR HIP PORTABLE RIGHT 2-3 VIEWS

## 2022-06-21 PROCEDURE — 250N000009 HC RX 250

## 2022-06-21 PROCEDURE — 250N000011 HC RX IP 250 OP 636

## 2022-06-21 PROCEDURE — 99285 EMERGENCY DEPT VISIT HI MDM: CPT | Mod: 25 | Performed by: FAMILY MEDICINE

## 2022-06-21 PROCEDURE — 83036 HEMOGLOBIN GLYCOSYLATED A1C: CPT

## 2022-06-21 PROCEDURE — 250N000011 HC RX IP 250 OP 636: Performed by: EMERGENCY MEDICINE

## 2022-06-21 PROCEDURE — 36415 COLL VENOUS BLD VENIPUNCTURE: CPT | Performed by: FAMILY MEDICINE

## 2022-06-21 PROCEDURE — 72170 X-RAY EXAM OF PELVIS: CPT

## 2022-06-21 PROCEDURE — 370N000003 HC ANESTHESIA WARD SERVICE

## 2022-06-21 PROCEDURE — 96361 HYDRATE IV INFUSION ADD-ON: CPT | Performed by: FAMILY MEDICINE

## 2022-06-21 PROCEDURE — 96374 THER/PROPH/DIAG INJ IV PUSH: CPT | Performed by: FAMILY MEDICINE

## 2022-06-21 RX ORDER — HYDROMORPHONE HYDROCHLORIDE 1 MG/ML
0.5 INJECTION, SOLUTION INTRAMUSCULAR; INTRAVENOUS; SUBCUTANEOUS ONCE
Status: COMPLETED | OUTPATIENT
Start: 2022-06-21 | End: 2022-06-21

## 2022-06-21 RX ORDER — SODIUM CHLORIDE 9 MG/ML
1000 INJECTION, SOLUTION INTRAVENOUS CONTINUOUS
Status: DISCONTINUED | OUTPATIENT
Start: 2022-06-21 | End: 2022-06-22 | Stop reason: HOSPADM

## 2022-06-21 RX ORDER — LIDOCAINE HYDROCHLORIDE 10 MG/ML
INJECTION, SOLUTION INFILTRATION; PERINEURAL PRN
Status: DISCONTINUED | OUTPATIENT
Start: 2022-06-21 | End: 2022-06-23

## 2022-06-21 RX ORDER — PROPOFOL 10 MG/ML
INJECTION, EMULSION INTRAVENOUS PRN
Status: DISCONTINUED | OUTPATIENT
Start: 2022-06-21 | End: 2022-06-23

## 2022-06-21 RX ADMIN — HYDROMORPHONE HYDROCHLORIDE 0.5 MG: 1 INJECTION, SOLUTION INTRAMUSCULAR; INTRAVENOUS; SUBCUTANEOUS at 10:34

## 2022-06-21 RX ADMIN — LIDOCAINE HYDROCHLORIDE 50 MG: 10 INJECTION, SOLUTION INFILTRATION; PERINEURAL at 12:02

## 2022-06-21 RX ADMIN — PROPOFOL 100 MG: 10 INJECTION, EMULSION INTRAVENOUS at 12:02

## 2022-06-21 RX ADMIN — SODIUM CHLORIDE 500 ML: 900 INJECTION, SOLUTION INTRAVENOUS at 11:43

## 2022-06-21 RX ADMIN — PROPOFOL 50 MG: 10 INJECTION, EMULSION INTRAVENOUS at 12:05

## 2022-06-21 ASSESSMENT — LIFESTYLE VARIABLES: TOBACCO_USE: 1

## 2022-06-21 NOTE — DISCHARGE INSTRUCTIONS
ICD-10-CM    1. Dislocation of right hip, initial encounter (H)  S73.004A     same caution to prevent hip dislocation recurrence.  return for recurrence.  use the right leg as crutch while you stand and avoid excessive angulation. call to dr richter was placed.

## 2022-06-21 NOTE — LETTER
June 27, 2022      Celestine Simon  [address]  [facility] [address]        Dear ,    We are writing to inform you of your test results.    Your A1C is elevated.  I recommend follow-up with your primary care to discuss your diabetes to work further on getting this controlled.  I contacted your surgeon and he is okay to go ahead with surgery despite elevated A1C.    Resulted Orders   HEMOGLOBIN A1C   Result Value Ref Range    Hemoglobin A1C 9.4 (H) 0.0 - 5.6 %      Comment:      Normal <5.7%   Prediabetes 5.7-6.4%    Diabetes 6.5% or higher     Note: Adopted from ADA consensus guidelines.       If you have any questions or concerns, please call the clinic at the number listed above.       Sincerely,      Ling Olvera NP

## 2022-06-21 NOTE — ED PROVIDER NOTES
History     Chief Complaint   Patient presents with     Hip Pain     HPI  John Simon is a 66 year old male who presents with a history of hypertension overweight type 2 diabetes status post splenectomy.  On Cardura atorvastatin metformin Actos.    History of a hip replacement in early this year on the right side.  He presented to the emergency department on Saturday morning after a dislocation that occurred at 3 AM after he awoke and bent his knee and flex to have a and dislocated.  Some severe pain.  He   Required hip reduction under anesthesia on Saturday and this was successfully reduced.       Arrives again today for recurrent hip dislocation.  This occurred today when he was simply in a chair and was planning to get out of the chair stood on his right leg and dislocated.  He arrived by EMS.  Moderate pain and given Dilaudid in the emergency department with relief.  No other injury sustained.      Allergies:  No Known Allergies    Problem List:    Patient Active Problem List    Diagnosis Date Noted     H/O splenectomy 02/02/2016     Priority: Medium     ???  JOHN UNSURE.  NO INDICATION OF SPLENECTOMY IN RECORDS.         Type 2 diabetes mellitus with microalbuminuria, without long-term current use of insulin (H) 10/31/2010     Priority: Medium     MAC pos x 2 but now neg 2/7/18.       HYPERLIPIDEMIA LDL GOAL <100 10/31/2010     Priority: Medium     Essential hypertension 05/30/2006     Priority: Medium     Severe obesity (BMI 35.0-39.9) with comorbidity (H) 05/30/2006     Priority: Medium     Problem list name updated by automated process. Provider to review          Past Medical History:    Past Medical History:   Diagnosis Date     Essential hypertension      MEDICAL HISTORY OF -      MEDICAL HISTORY OF -      Type 2 diabetes mellitus with hyperglycemia (H)        Past Surgical History:    Past Surgical History:   Procedure Laterality Date     APPENDECTOMY      age 20     [place] GASTROSCOPY       HC REMOVAL  "GALLBLADDER      age 20     SURGICAL HISTORY OF -       hip repair due to tractor accident age 19     SURGICAL HISTORY OF -       tonsilectomy ? age     SURGICAL HISTORY OF -   1998    flexiblesigmoidoscopy       Family History:    Family History   Problem Relation Age of Onset     Cerebrovascular Disease Father         53     Cardiovascular Father         53     Coronary Artery Disease Father 53        MI     Crohn's Disease Father      Respiratory Maternal Grandfather         Emphsemia smoking     Cardiovascular Paternal Grandfather      Lipids Mother      Parkinsonism Mother      Colon Cancer Mother      Hyperlipidemia Mother        Social History:  Marital Status:   [2]  Social History     Tobacco Use     Smoking status: Former Smoker     Smokeless tobacco: Never Used     Tobacco comment: 50 years ago   Substance Use Topics     Alcohol use: Yes     Comment: occ     Drug use: No        Medications:    aspirin 81 MG tablet  atorvastatin (LIPITOR) 20 MG tablet  Blood Glucose Monitoring Suppl (IN TOUCH) MISC  Continuous Blood Gluc Sensor ([employer] SCOTT 14 DAY SENSOR) MISC  doxazosin (CARDURA) 4 MG tablet  GLUCOSAMINE-CHONDROITIN PO  HERBALS  LANCETS REGULAR MISC  lisinopril-hydrochlorothiazide (ZESTORETIC) 20-12.5 MG tablet  metFORMIN (GLUCOPHAGE) 1000 MG tablet  MULTI-VITAMIN OR TABS  pioglitazone (ACTOS) 30 MG tablet          Review of Systems   ROS:  5 point ROS negative except as noted above in HPI, including Gen., Resp., CV, GI &  system review.      Physical Exam   BP: (!) 168/90  Pulse: 79  Temp: 98.1  F (36.7  C)  Resp: 18  Height: 185.4 cm (6' 1\")  Weight: 117.9 kg (260 lb)  SpO2: 94 %      Physical Exam  Constitutional:       General: He is in acute distress.   HENT:      Head: Atraumatic.   Eyes:      Conjunctiva/sclera: Conjunctivae normal.   Cardiovascular:      Rate and Rhythm: Normal rate and regular rhythm.      Heart sounds: No murmur heard.  Pulmonary:      Effort: Pulmonary effort is " normal. No respiratory distress.      Breath sounds: Normal breath sounds. No stridor. No wheezing or rhonchi.   Abdominal:      General: Abdomen is flat. There is no distension.      Palpations: Abdomen is soft.      Tenderness: There is no abdominal tenderness.   Musculoskeletal:      Cervical back: Neck supple.      Right lower leg: No edema.      Left lower leg: No edema.   Skin:     Coloration: Skin is not pale.      Findings: No rash.   Neurological:      Mental Status: He is alert.         The right hip has tenderness to palpation laterally.  There is shortening of the right leg.  There are normal dorsalis pedis posterior tibial pulses as well as femoral pulses.  No other deformities.  No signs of fracture.    ED Course                 [facility]    -Dislocation - Lower Extremity    Date/Time: 6/21/2022 12:36 PM  Performed by: Brian Stinson MD  Authorized by: Brian Stinson MD     Risks, benefits and alternatives discussed.      LOCATION     Location:  Hip    Hip injury location:  R hip    Hip dislocation type: posterior      Etiology: spontaneous      Prosthesis: yes      PRE PROCEDURE DETAILS:     Distal perfusion: normal      Range of motion: reduced      PROCEDURE DETAILS      Manipulation performed: yes      Reduction method: captain puga.    Reduction successful: yes      Reduction confirmed with imaging: yes      POST PROCEDURE DETAILS      Neurological function: normal      Distal perfusion: normal      Range of motion: improved                    Critical Care time:  none               Results for orders placed or performed during the hospital encounter of 06/21/22 (from the past 24 hour(s))   [facility] Draw    Narrative    The following orders were created for panel order [facility] Draw.  Procedure                               Abnormality         Status                     ---------                               -----------         ------                     Extra Green  Top (Lithium...[[id number]]                      Final result               Extra Purple Top Tube[[id number]]                            Final result                 Please view results for these tests on the individual orders.   Extra Green Top (Lithium Heparin) Tube   Result Value Ref Range    Hold Specimen JIC    Extra Purple Top Tube   Result Value Ref Range    Hold Specimen JIC    XR Pelvis Port 1/2 Views    Narrative    XR PELVIS PORT 1/2 VIEWS 6/21/2022 11:02 AM     HISTORY: Hip dislocation    COMPARISON: 6/18/2022      Impression    IMPRESSION: Right LETICIA. Superior and lateral dislocation of the femoral  component is new. No fractures are identified.    IMAN CORNELIUS MD         SYSTEM ID:  [id number]   XR Hip Port Right G/E 2 Views    Narrative    XR HIP PORTABLE RIGHT 2-3 VIEWS 6/21/2022 12:18 PM     HISTORY: hip dislocation    COMPARISON: Examination performed earlier in the day      Impression    IMPRESSION: Right LETICIA. The femoral component has been reduced in the  interval. No fractures are evident.    IMAN CORNELIUS MD         SYSTEM ID:  [id number]       Medications   HYDROmorphone (PF) (DILAUDID) injection 0.5 mg (0.5 mg Intravenous Given 6/21/22 1034)       Assessments & Plan (with Medical Decision Making)     MDM: Celestine Simon is a 66 year old male who presents with a recurrent hip dislocation 2 of these occurring within the last week without major trauma with either event.  This occurred spontaneously as if just standing up to stand.  Dr. Dietz for performed his hip replacements earlier this year.  Call placed to Dr. Dietz to discuss management outpatient.  He certainly has had 2 dislocations and it appears the hip is quite unstable.  Patient noted he was prescribed physical therapy to try and tighten the hip girdle after the last dislocation and he actually saw Dr. Dietz yesterday in clinic.    The hip was replaced here and I was surprised that it relocated without any clunk or obvious  indication that it is back in place it simply slipped into position as Captain Esteban maneuver was performed.  I was able to tell that the hip was back only by leg length equal to the opposite side and then confirmation on x-ray.  I did speak with Dr. Dietz they will contact the patient regarding revision of his surgery.  Made recommendations as below.   I have reviewed the nursing notes.    I have reviewed the findings, diagnosis, plan and need for follow up with the patient.      New Prescriptions    No medications on file       Final diagnoses:   Dislocation of right hip, initial encounter (H) - same caution to prevent hip dislocation recurrence.  return for recurrence.  use the right leg as crutch while you stand and avoid excessive angulation. call to dr dietz was placed.       6/21/2022   [facility] EMERGENCY DEPT     Brian Stinson MD  06/21/22 8380

## 2022-06-21 NOTE — ANESTHESIA PREPROCEDURE EVALUATION
Anesthesia Evaluation     . Pt has had prior anesthetic. Type: General and MAC.    No history of anesthetic complications          ROS/MED HX    ENT/Pulmonary:     (+) tobacco use, Past use,     Neurologic:  - neg neurologic ROS     Cardiovascular:     (+) hypertension-----      METS/Exercise Tolerance:  >4 METS   Hematologic:  - neg hematologic  ROS       Musculoskeletal:  - neg musculoskeletal ROS       GI/Hepatic:         Renal/Genitourinary:         Endo:     (+) type II DM, Obesity,       Psychiatric:  - neg psychiatric ROS       Infectious Disease:         Malignancy:       Other:                     Physical Exam  Normal systems: cardiovascular, pulmonary and dental    Airway   Mallampati: II  TM distance: > 3 FB  Neck ROM: full  Mouth opening: > 3 cm    Dental     Cardiovascular       Pulmonary                     Anesthesia Plan     ASA Status:  2, emergent        Anesthesia Plan Type Discussed: MAC  Justification for MAC: straight local not clinically adequate  Induction Technique/Agent: Intravenous and Propofol      Postoperative Care  Pain management: IV analgesics.    Consents  Anesthetic plan, risks, benefits and alternatives discussed with:  Patient.  Use of blood products discussed: No . Use of blood products discussed: No .  .                          .    Anesthesia Evaluation   Pt has had prior anesthetic. Type: General and MAC.    No history of anesthetic complications       ROS/MED HX  ENT/Pulmonary:     (+) tobacco use, Past use,     Neurologic:  - neg neurologic ROS     Cardiovascular:     (+) hypertension-----    METS/Exercise Tolerance: >4 METS    Hematologic:  - neg hematologic  ROS     Musculoskeletal:  - neg musculoskeletal ROS     GI/Hepatic:       Renal/Genitourinary:       Endo:     (+) type II DM, Obesity,     Psychiatric/Substance Use:  - neg psychiatric ROS     Infectious Disease:       Malignancy:  - neg malignancy ROS     Other:              Physical Exam    Airway         Mallampati: II   TM distance: > 3 FB   Neck ROM: full   Mouth opening: > 3 cm    Respiratory Devices and Support         Dental  no notable dental history         Cardiovascular   cardiovascular exam normal          Pulmonary   pulmonary exam normal                  Anesthesia Plan    ASA Status:  2, emergent       Anesthesia Type: MAC.     - Reason for MAC: straight local not clinically adequate   Induction: Intravenous, Propofol.           Consents    Anesthesia Plan(s) and associated risks, benefits, and realistic alternatives discussed. Questions answered and patient/representative(s) expressed understanding.     - Discussed: Risks, Benefits and Alternatives for BOTH SEDATION and the PROCEDURE were discussed     - Discussed with:  Patient      - Extended Intubation/Ventilatory Support Discussed: No.      - Patient is DNR/DNI Status: No    Use of blood products discussed: No .     Postoperative Care    Pain management: IV analgesics.        Comments:

## 2022-06-21 NOTE — ED TRIAGE NOTES
Pt presents with right hip pain and non-weight bearing to RLE. Hip dislocated this weekend and was seen here to have it put back in place. Had reconstruction surgery in February. Right leg shorter than left. CMS intact.     Triage Assessment     Row Name 06/21/22 1012       Triage Assessment (Adult)    Airway WDL WDL       Respiratory WDL    Respiratory WDL WDL       Skin Circulation/Temperature WDL    Skin Circulation/Temperature WDL WDL       Cardiac WDL    Cardiac WDL WDL       Peripheral/Neurovascular WDL    Peripheral Neurovascular WDL X;neurovascular assessment lower;pulse assessment;capillary refill    Capillary Refill, General less than/equal to 3 secs    Capillary Refill, RLE less than/equal to 3 secs    Pulse Assessment dorsalis pedis       RLE Neurovascular Assessment    Temperature RLE warm    Color RLE no discoloration    Sensation RLE no numbness;no tingling       Pulse Dorsalis Pedis    Left Dorsalis Pedis Pulse 2+ (normal)    Right Dorsalis Pedis Pulse 2+ (normal)       Cognitive/Neuro/Behavioral WDL    Cognitive/Neuro/Behavioral WDL WDL       New Coma Scale    Best Eye Response 4-->(E4) spontaneous    Best Motor Response 6-->(M6) obeys commands    Best Verbal Response 5-->(V5) oriented    New Coma Scale Score 15

## 2022-06-23 ENCOUNTER — OFFICE VISIT (OUTPATIENT)
Dept: FAMILY MEDICINE | Facility: CLINIC | Age: 67
End: 2022-06-23
Payer: COMMERCIAL

## 2022-06-23 VITALS
WEIGHT: 263 LBS | SYSTOLIC BLOOD PRESSURE: 126 MMHG | TEMPERATURE: 97 F | DIASTOLIC BLOOD PRESSURE: 82 MMHG | BODY MASS INDEX: 34.85 KG/M2 | HEART RATE: 96 BPM | RESPIRATION RATE: 18 BRPM | OXYGEN SATURATION: 95 % | HEIGHT: 73 IN

## 2022-06-23 DIAGNOSIS — S73.004S HIP DISLOCATION, RIGHT, SEQUELA: ICD-10-CM

## 2022-06-23 DIAGNOSIS — E78.5 HYPERLIPIDEMIA LDL GOAL <100: ICD-10-CM

## 2022-06-23 DIAGNOSIS — R80.9 TYPE 2 DIABETES MELLITUS WITH MICROALBUMINURIA, WITHOUT LONG-TERM CURRENT USE OF INSULIN (H): ICD-10-CM

## 2022-06-23 DIAGNOSIS — I10 ESSENTIAL HYPERTENSION: ICD-10-CM

## 2022-06-23 DIAGNOSIS — E11.29 TYPE 2 DIABETES MELLITUS WITH MICROALBUMINURIA, WITHOUT LONG-TERM CURRENT USE OF INSULIN (H): ICD-10-CM

## 2022-06-23 DIAGNOSIS — Z01.818 PREOP GENERAL PHYSICAL EXAM: Primary | ICD-10-CM

## 2022-06-23 LAB — HBA1C MFR BLD: 9.4 % (ref 0–5.6)

## 2022-06-23 PROCEDURE — 99214 OFFICE O/P EST MOD 30 MIN: CPT | Performed by: NURSE PRACTITIONER

## 2022-06-23 ASSESSMENT — PAIN SCALES - GENERAL: PAINLEVEL: MILD PAIN (2)

## 2022-06-23 NOTE — PATIENT INSTRUCTIONS
Hold Actos and Metformin the morning of surgery.  Okay to take lisinopril-hydrochlorothiazide,   Preparing for Your Surgery  Getting started  A nurse will call you to review your health history and instructions. They will give you an arrival time based on your scheduled surgery time. Please be ready to share:  Your doctor's clinic name and phone number  Your medical, surgical and anesthesia history  A list of allergies and sensitivities  A list of medicines, including herbal treatments and over-the-counter drugs  Whether the patient has a legal guardian (ask how to send us the papers in advance)  Please tell us if you're pregnant--or if there's any chance you might be pregnant. Some surgeries may injure a fetus (unborn baby), so they require a pregnancy test. Surgeries that are safe for a fetus don't always need a test, and you can choose whether to have one.   If you have a child who's having surgery, please ask for a copy of Preparing for Your Child's Surgery.    Preparing for surgery  Within 30 days of surgery: Have a pre-op exam (sometimes called an H&P, or History and Physical). This can be done at a clinic or pre-operative center.  If you're having a , you may not need this exam. Talk to your care team.  At your pre-op exam, talk to your care team about all medicines you take. If you need to stop any medicines before surgery, ask when to start taking them again.  We do this for your safety. Many medicines can make you bleed too much during surgery. Some change how well surgery (anesthesia) drugs work.  Call your insurance company to let them know you're having surgery. (If you don't have insurance, call [phone].)  Call your clinic if there's any change in your health. This includes signs of a cold or flu (sore throat, runny nose, cough, rash, fever). It also includes a scrape or scratch near the surgery site.  If you have questions on the day of surgery, call your hospital or surgery  center.  COVID testing  You may need to be tested for COVID-19 before having surgery. If so, we will give you instructions.  Eating and drinking guidelines  For your safety: Unless your surgeon tells you otherwise, follow the guidelines below.  Eat and drink as usual until 8 hours before surgery. After that, no food or milk.  Drink clear liquids until 2 hours before surgery. These are liquids you can see through, like water, Gatorade and Propel Water. You may also have black coffee and tea (no cream or milk).  Nothing by mouth within 2 hours of surgery. This includes gum, candy and breath mints.  If you drink alcohol: Stop drinking it the night before surgery.  If your care team tells you to take medicine on the morning of surgery, it's okay to take it with a sip of water.  Preventing infection  Shower or bathe the night before and morning of your surgery. Follow the instructions your clinic gave you. (If no instructions, use regular soap.)  Don't shave or clip hair near your surgery site. We'll remove the hair if needed.  Don't smoke or vape the morning of surgery. You may chew nicotine gum up to 2 hours before surgery. A nicotine patch is okay.  Note: Some surgeries require you to completely quit smoking and nicotine. Check with your surgeon.  Your care team will make every effort to keep you safe from infection. We will:  Clean our hands often with soap and water (or an alcohol-based hand rub).  Clean the skin at your surgery site with a special soap that kills germs.  Give you a special gown to keep you warm. (Cold raises the risk of infection.)  Wear special hair covers, masks, gowns and gloves during surgery.  Give antibiotic medicine, if prescribed. Not all surgeries need antibiotics.  What to bring on the day of surgery  Photo ID and insurance card  Copy of your health care directive, if you have one  Glasses and hearing aides (bring cases)  You can't wear contacts during surgery  Inhaler and eye drops, if  you use them (tell us about these when you arrive)  CPAP machine or breathing device, if you use them  A few personal items, if spending the night  If you have . . .  A pacemaker, ICD (cardiac defibrillator) or other implant: Bring the ID card.  An implanted stimulator: Bring the remote control.  A legal guardian: Bring a copy of the certified (court-stamped) guardianship papers.  Please remove any jewelry, including body piercings. Leave jewelry and other valuables at home.  If you're going home the day of surgery  You must have a responsible adult drive you home. They should stay with you overnight as well.  If you don't have someone to stay with you, and you aren't safe to go home alone, we may keep you overnight. Insurance often won't pay for this.  After surgery  If it's hard to control your pain or you need more pain medicine, please call your surgeon's office.  Questions?   If you have any questions for your care team, list them here: _________________________________________________________________________________________________________________________________________________________________________ ____________________________________ ____________________________________ ____________________________________  For informational purposes only. Not to replace the advice of your health care provider. Copyright   2003, 2019 [facility]. All rights reserved. Clinically reviewed by Meliza Barnett MD. [employer] [id number] - REV 07/21.
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
## 2022-06-23 NOTE — ANESTHESIA CARE TRANSFER NOTE
Patient: Celestine Simon    Procedure: * No procedures listed *       Diagnosis: * No pre-op diagnosis entered *  Diagnosis Additional Information: No value filed.    Anesthesia Type:   MAC     Note:    Oropharynx: oropharynx clear of all foreign objects  Level of Consciousness: awake  Oxygen Supplementation: nasal cannula  Level of Supplemental Oxygen (L/min / FiO2): 3  Independent Airway: airway patency satisfactory and stable  Dentition: dentition unchanged  Vital Signs Stable: post-procedure vital signs reviewed and stable  Report to RN Given: handoff report given  Patient transferred to: Emergency Department    Handoff Report: Identifed the Patient, Identified the Reponsible Provider, Reviewed the pertinent medical history, Discussed the surgical course, Reviewed Intra-OP anesthesia mangement and issues during anesthesia, Set expectations for post-procedure period and Allowed opportunity for questions and acknowledgement of understanding      Vitals:  Vitals Value Taken Time   BP     Temp     Pulse     Resp     SpO2         Electronically Signed By: ANNE-MARIE Adhikari CRNA  June 23, 2022  7:56 AM

## 2022-06-24 ENCOUNTER — ANESTHESIA EVENT (OUTPATIENT)
Dept: SURGERY | Facility: CLINIC | Age: 67
DRG: 468 | End: 2022-06-24
Payer: COMMERCIAL

## 2022-06-24 ENCOUNTER — HOSPITAL ENCOUNTER (INPATIENT)
Facility: CLINIC | Age: 67
LOS: 1 days | Discharge: HOME | DRG: 468 | End: 2022-06-25
Attending: ORTHOPAEDIC SURGERY | Admitting: ORTHOPAEDIC SURGERY
Payer: COMMERCIAL

## 2022-06-24 ENCOUNTER — ANESTHESIA (OUTPATIENT)
Dept: SURGERY | Facility: CLINIC | Age: 67
DRG: 468 | End: 2022-06-24
Payer: COMMERCIAL

## 2022-06-24 ENCOUNTER — APPOINTMENT (OUTPATIENT)
Dept: RADIOLOGY | Facility: CLINIC | Age: 67
DRG: 468 | End: 2022-06-24
Attending: ORTHOPAEDIC SURGERY
Payer: COMMERCIAL

## 2022-06-24 DIAGNOSIS — Z96.641 HISTORY OF REVISION OF TOTAL REPLACEMENT OF RIGHT HIP JOINT: Primary | ICD-10-CM

## 2022-06-24 PROBLEM — M16.9 DEGENERATIVE JOINT DISEASE (DJD) OF HIP: Status: ACTIVE | Noted: 2022-06-24

## 2022-06-24 LAB
ABO/RH(D): NORMAL
ANTIBODY SCREEN: NEGATIVE
BASOPHILS # BLD AUTO: 0 10E3/UL (ref 0–0.2)
BASOPHILS NFR BLD AUTO: 0 %
CREAT SERPL-MCNC: 0.96 MG/DL (ref 0.7–1.3)
EOSINOPHIL # BLD AUTO: 0 10E3/UL (ref 0–0.7)
EOSINOPHIL NFR BLD AUTO: 0 %
ERYTHROCYTE [DISTWIDTH] IN BLOOD BY AUTOMATED COUNT: 14.3 % (ref 10–15)
ERYTHROCYTE [DISTWIDTH] IN BLOOD BY AUTOMATED COUNT: 14.4 % (ref 10–15)
GFR SERPL CREATININE-BSD FRML MDRD: 88 ML/MIN/1.73M2
GLUCOSE BLDC GLUCOMTR-MCNC: 134 MG/DL (ref 70–99)
GLUCOSE BLDC GLUCOMTR-MCNC: 189 MG/DL (ref 70–99)
GLUCOSE BLDC GLUCOMTR-MCNC: 216 MG/DL (ref 70–99)
HCT VFR BLD AUTO: 37.5 % (ref 40–53)
HCT VFR BLD AUTO: 42.5 % (ref 40–53)
HGB BLD-MCNC: 12.2 G/DL (ref 13.3–17.7)
HGB BLD-MCNC: 14.1 G/DL (ref 13.3–17.7)
IMM GRANULOCYTES # BLD: 0 10E3/UL
IMM GRANULOCYTES NFR BLD: 1 %
LYMPHOCYTES # BLD AUTO: 0.7 10E3/UL (ref 0.8–5.3)
LYMPHOCYTES NFR BLD AUTO: 8 %
MAGNESIUM SERPL-MCNC: 2 MG/DL (ref 1.8–2.6)
MCH RBC QN AUTO: 28.6 PG (ref 26.5–33)
MCH RBC QN AUTO: 28.7 PG (ref 26.5–33)
MCHC RBC AUTO-ENTMCNC: 32.5 G/DL (ref 31.5–36.5)
MCHC RBC AUTO-ENTMCNC: 33.2 G/DL (ref 31.5–36.5)
MCV RBC AUTO: 86 FL (ref 78–100)
MCV RBC AUTO: 88 FL (ref 78–100)
MONOCYTES # BLD AUTO: 0.1 10E3/UL (ref 0–1.3)
MONOCYTES NFR BLD AUTO: 2 %
NEUTROPHILS # BLD AUTO: 7.8 10E3/UL (ref 1.6–8.3)
NEUTROPHILS NFR BLD AUTO: 89 %
NRBC # BLD AUTO: 0 10E3/UL
NRBC BLD AUTO-RTO: 0 /100
PLATELET # BLD AUTO: 218 10E3/UL (ref 150–450)
PLATELET # BLD AUTO: 254 10E3/UL (ref 150–450)
POTASSIUM BLD-SCNC: 3.8 MMOL/L (ref 3.5–5)
POTASSIUM BLD-SCNC: 4 MMOL/L (ref 3.5–5)
RBC # BLD AUTO: 4.27 10E6/UL (ref 4.4–5.9)
RBC # BLD AUTO: 4.92 10E6/UL (ref 4.4–5.9)
SPECIMEN EXPIRATION DATE: NORMAL
WBC # BLD AUTO: 5.2 10E3/UL (ref 4–11)
WBC # BLD AUTO: 8.7 10E3/UL (ref 4–11)

## 2022-06-24 PROCEDURE — 250N000011 HC RX IP 250 OP 636: Performed by: NURSE ANESTHETIST, CERTIFIED REGISTERED

## 2022-06-24 PROCEDURE — 250N000009 HC RX 250: Performed by: NURSE ANESTHETIST, CERTIFIED REGISTERED

## 2022-06-24 PROCEDURE — 87075 CULTR BACTERIA EXCEPT BLOOD: CPT | Performed by: ORTHOPAEDIC SURGERY

## 2022-06-24 PROCEDURE — 0SP90JZ REMOVAL OF SYNTHETIC SUBSTITUTE FROM RIGHT HIP JOINT, OPEN APPROACH: ICD-10-PCS | Performed by: ORTHOPAEDIC SURGERY

## 2022-06-24 PROCEDURE — 258N000003 HC RX IP 258 OP 636: Performed by: ANESTHESIOLOGY

## 2022-06-24 PROCEDURE — 250N000011 HC RX IP 250 OP 636: Performed by: ORTHOPAEDIC SURGERY

## 2022-06-24 PROCEDURE — 250N000013 HC RX MED GY IP 250 OP 250 PS 637: Performed by: PHYSICIAN ASSISTANT

## 2022-06-24 PROCEDURE — 360N000078 HC SURGERY LEVEL 5, PER MIN: Performed by: ORTHOPAEDIC SURGERY

## 2022-06-24 PROCEDURE — 250N000011 HC RX IP 250 OP 636: Performed by: PHYSICIAN ASSISTANT

## 2022-06-24 PROCEDURE — 250N000011 HC RX IP 250 OP 636: Performed by: ANESTHESIOLOGY

## 2022-06-24 PROCEDURE — C1713 ANCHOR/SCREW BN/BN,TIS/BN: HCPCS | Performed by: ORTHOPAEDIC SURGERY

## 2022-06-24 PROCEDURE — 250N000013 HC RX MED GY IP 250 OP 250 PS 637: Performed by: ORTHOPAEDIC SURGERY

## 2022-06-24 PROCEDURE — 710N000010 HC RECOVERY PHASE 1, LEVEL 2, PER MIN: Performed by: ORTHOPAEDIC SURGERY

## 2022-06-24 PROCEDURE — 36415 COLL VENOUS BLD VENIPUNCTURE: CPT | Performed by: INTERNAL MEDICINE

## 2022-06-24 PROCEDURE — 36415 COLL VENOUS BLD VENIPUNCTURE: CPT | Performed by: PHYSICIAN ASSISTANT

## 2022-06-24 PROCEDURE — 999N000065 XR PELVIS PORT 1/2 VIEWS

## 2022-06-24 PROCEDURE — C1776 JOINT DEVICE (IMPLANTABLE): HCPCS | Performed by: ORTHOPAEDIC SURGERY

## 2022-06-24 PROCEDURE — 272N000001 HC OR GENERAL SUPPLY STERILE: Performed by: ORTHOPAEDIC SURGERY

## 2022-06-24 PROCEDURE — 87070 CULTURE OTHR SPECIMN AEROBIC: CPT | Performed by: ORTHOPAEDIC SURGERY

## 2022-06-24 PROCEDURE — 85027 COMPLETE CBC AUTOMATED: CPT | Performed by: PHYSICIAN ASSISTANT

## 2022-06-24 PROCEDURE — 250N000009 HC RX 250: Performed by: PHYSICIAN ASSISTANT

## 2022-06-24 PROCEDURE — 999N000063 XR HIP PORT RT 1 VW

## 2022-06-24 PROCEDURE — 84132 ASSAY OF SERUM POTASSIUM: CPT | Performed by: INTERNAL MEDICINE

## 2022-06-24 PROCEDURE — 258N000003 HC RX IP 258 OP 636: Performed by: ORTHOPAEDIC SURGERY

## 2022-06-24 PROCEDURE — 258N000003 HC RX IP 258 OP 636: Performed by: NURSE ANESTHETIST, CERTIFIED REGISTERED

## 2022-06-24 PROCEDURE — 370N000017 HC ANESTHESIA TECHNICAL FEE, PER MIN: Performed by: ORTHOPAEDIC SURGERY

## 2022-06-24 PROCEDURE — 82962 GLUCOSE BLOOD TEST: CPT

## 2022-06-24 PROCEDURE — P9041 ALBUMIN (HUMAN),5%, 50ML: HCPCS | Performed by: NURSE ANESTHETIST, CERTIFIED REGISTERED

## 2022-06-24 PROCEDURE — 86850 RBC ANTIBODY SCREEN: CPT | Performed by: INTERNAL MEDICINE

## 2022-06-24 PROCEDURE — 83735 ASSAY OF MAGNESIUM: CPT | Performed by: INTERNAL MEDICINE

## 2022-06-24 PROCEDURE — 250N000009 HC RX 250: Performed by: ANESTHESIOLOGY

## 2022-06-24 PROCEDURE — 84132 ASSAY OF SERUM POTASSIUM: CPT | Performed by: PHYSICIAN ASSISTANT

## 2022-06-24 PROCEDURE — 99223 1ST HOSP IP/OBS HIGH 75: CPT | Performed by: INTERNAL MEDICINE

## 2022-06-24 PROCEDURE — 82565 ASSAY OF CREATININE: CPT | Performed by: PHYSICIAN ASSISTANT

## 2022-06-24 PROCEDURE — 999N000141 HC STATISTIC PRE-PROCEDURE NURSING ASSESSMENT: Performed by: ORTHOPAEDIC SURGERY

## 2022-06-24 PROCEDURE — 0SR90JA REPLACEMENT OF RIGHT HIP JOINT WITH SYNTHETIC SUBSTITUTE, UNCEMENTED, OPEN APPROACH: ICD-10-PCS | Performed by: ORTHOPAEDIC SURGERY

## 2022-06-24 DEVICE — 6.5MM LOW PROFILE HEX SCREW 30MM
Type: IMPLANTABLE DEVICE | Site: HIP | Status: FUNCTIONAL
Brand: TRIDENT II

## 2022-06-24 DEVICE — TRIDENT II PSL CLUSTERHOLE HA ACETABULAR SHELL 58F
Type: IMPLANTABLE DEVICE | Site: HIP | Status: FUNCTIONAL
Brand: TRIDENT II

## 2022-06-24 DEVICE — X3 INSERT FOR ADM/ MDM
Type: IMPLANTABLE DEVICE | Site: HIP | Status: FUNCTIONAL
Brand: X3

## 2022-06-24 DEVICE — V40 FEMORAL HEAD
Type: IMPLANTABLE DEVICE | Site: HIP | Status: FUNCTIONAL
Brand: V40 HEAD

## 2022-06-24 DEVICE — 6.5MM LOW PROFILE HEX SCREW 20MM
Type: IMPLANTABLE DEVICE | Site: HIP | Status: FUNCTIONAL
Brand: TRIDENT II

## 2022-06-24 DEVICE — 6.5MM LOW PROFILE HEX SCREW 25MM
Type: IMPLANTABLE DEVICE | Site: HIP | Status: FUNCTIONAL
Brand: TRIDENT II

## 2022-06-24 DEVICE — LINER- CEMENTLESS
Type: IMPLANTABLE DEVICE | Site: HIP | Status: FUNCTIONAL
Brand: MDM

## 2022-06-24 RX ORDER — NALOXONE HYDROCHLORIDE 0.4 MG/ML
0.4 INJECTION, SOLUTION INTRAMUSCULAR; INTRAVENOUS; SUBCUTANEOUS
Status: DISCONTINUED | OUTPATIENT
Start: 2022-06-24 | End: 2022-06-25 | Stop reason: HOSPADM

## 2022-06-24 RX ORDER — HYDROMORPHONE HCL IN WATER/PF 6 MG/30 ML
0.2 PATIENT CONTROLLED ANALGESIA SYRINGE INTRAVENOUS EVERY 5 MIN PRN
Status: DISCONTINUED | OUTPATIENT
Start: 2022-06-24 | End: 2022-06-24 | Stop reason: HOSPADM

## 2022-06-24 RX ORDER — ACETAMINOPHEN 325 MG/1
650 TABLET ORAL EVERY 4 HOURS PRN
Status: DISCONTINUED | OUTPATIENT
Start: 2022-06-27 | End: 2022-06-25 | Stop reason: HOSPADM

## 2022-06-24 RX ORDER — PIOGLITAZONEHYDROCHLORIDE 15 MG/1
30 TABLET ORAL DAILY
Status: DISCONTINUED | OUTPATIENT
Start: 2022-06-25 | End: 2022-06-25 | Stop reason: HOSPADM

## 2022-06-24 RX ORDER — OXYCODONE HYDROCHLORIDE 5 MG/1
5 TABLET ORAL EVERY 4 HOURS PRN
Status: DISCONTINUED | OUTPATIENT
Start: 2022-06-24 | End: 2022-06-25 | Stop reason: HOSPADM

## 2022-06-24 RX ORDER — BUPIVACAINE HYDROCHLORIDE 7.5 MG/ML
INJECTION, SOLUTION INTRASPINAL
Status: COMPLETED | OUTPATIENT
Start: 2022-06-24 | End: 2022-06-24

## 2022-06-24 RX ORDER — SODIUM CHLORIDE, SODIUM LACTATE, POTASSIUM CHLORIDE, CALCIUM CHLORIDE 600; 310; 30; 20 MG/100ML; MG/100ML; MG/100ML; MG/100ML
INJECTION, SOLUTION INTRAVENOUS CONTINUOUS
Status: DISCONTINUED | OUTPATIENT
Start: 2022-06-24 | End: 2022-06-25 | Stop reason: HOSPADM

## 2022-06-24 RX ORDER — LIDOCAINE 40 MG/G
CREAM TOPICAL
Status: DISCONTINUED | OUTPATIENT
Start: 2022-06-24 | End: 2022-06-25 | Stop reason: HOSPADM

## 2022-06-24 RX ORDER — METHOCARBAMOL 750 MG/1
750 TABLET, FILM COATED ORAL EVERY 6 HOURS PRN
Status: DISCONTINUED | OUTPATIENT
Start: 2022-06-24 | End: 2022-06-25 | Stop reason: HOSPADM

## 2022-06-24 RX ORDER — DEXAMETHASONE SODIUM PHOSPHATE 4 MG/ML
INJECTION, SOLUTION INTRA-ARTICULAR; INTRALESIONAL; INTRAMUSCULAR; INTRAVENOUS; SOFT TISSUE PRN
Status: DISCONTINUED | OUTPATIENT
Start: 2022-06-24 | End: 2022-06-24

## 2022-06-24 RX ORDER — OXYCODONE HYDROCHLORIDE 5 MG/1
5 TABLET ORAL EVERY 4 HOURS PRN
Status: DISCONTINUED | OUTPATIENT
Start: 2022-06-24 | End: 2022-06-24 | Stop reason: HOSPADM

## 2022-06-24 RX ORDER — ACETAMINOPHEN 325 MG/1
975 TABLET ORAL ONCE
Status: COMPLETED | OUTPATIENT
Start: 2022-06-24 | End: 2022-06-24

## 2022-06-24 RX ORDER — ONDANSETRON 2 MG/ML
4 INJECTION INTRAMUSCULAR; INTRAVENOUS EVERY 6 HOURS PRN
Status: DISCONTINUED | OUTPATIENT
Start: 2022-06-24 | End: 2022-06-25 | Stop reason: HOSPADM

## 2022-06-24 RX ORDER — LIDOCAINE 40 MG/G
CREAM TOPICAL
Status: DISCONTINUED | OUTPATIENT
Start: 2022-06-24 | End: 2022-06-24 | Stop reason: HOSPADM

## 2022-06-24 RX ORDER — NALOXONE HYDROCHLORIDE 0.4 MG/ML
0.2 INJECTION, SOLUTION INTRAMUSCULAR; INTRAVENOUS; SUBCUTANEOUS
Status: DISCONTINUED | OUTPATIENT
Start: 2022-06-24 | End: 2022-06-25 | Stop reason: HOSPADM

## 2022-06-24 RX ORDER — HYDROMORPHONE HCL IN WATER/PF 6 MG/30 ML
0.2 PATIENT CONTROLLED ANALGESIA SYRINGE INTRAVENOUS
Status: DISCONTINUED | OUTPATIENT
Start: 2022-06-24 | End: 2022-06-25 | Stop reason: HOSPADM

## 2022-06-24 RX ORDER — DIPHENHYDRAMINE HCL 25 MG
25 CAPSULE ORAL EVERY 6 HOURS PRN
Status: DISCONTINUED | OUTPATIENT
Start: 2022-06-24 | End: 2022-06-25 | Stop reason: HOSPADM

## 2022-06-24 RX ORDER — FAMOTIDINE 20 MG/1
20 TABLET, FILM COATED ORAL 2 TIMES DAILY
Status: DISCONTINUED | OUTPATIENT
Start: 2022-06-24 | End: 2022-06-25 | Stop reason: HOSPADM

## 2022-06-24 RX ORDER — CEFAZOLIN SODIUM/WATER 2 G/20 ML
2 SYRINGE (ML) INTRAVENOUS
Status: COMPLETED | OUTPATIENT
Start: 2022-06-24 | End: 2022-06-24

## 2022-06-24 RX ORDER — ATORVASTATIN CALCIUM 10 MG/1
20 TABLET, FILM COATED ORAL DAILY
Status: DISCONTINUED | OUTPATIENT
Start: 2022-06-25 | End: 2022-06-25 | Stop reason: HOSPADM

## 2022-06-24 RX ORDER — ALBUMIN, HUMAN INJ 5% 5 %
SOLUTION INTRAVENOUS CONTINUOUS PRN
Status: DISCONTINUED | OUTPATIENT
Start: 2022-06-24 | End: 2022-06-24

## 2022-06-24 RX ORDER — CEFAZOLIN SODIUM/WATER 2 G/20 ML
2 SYRINGE (ML) INTRAVENOUS SEE ADMIN INSTRUCTIONS
Status: DISCONTINUED | OUTPATIENT
Start: 2022-06-24 | End: 2022-06-24 | Stop reason: HOSPADM

## 2022-06-24 RX ORDER — DEXTROSE MONOHYDRATE 25 G/50ML
25-50 INJECTION, SOLUTION INTRAVENOUS
Status: DISCONTINUED | OUTPATIENT
Start: 2022-06-24 | End: 2022-06-25 | Stop reason: HOSPADM

## 2022-06-24 RX ORDER — NICOTINE POLACRILEX 4 MG
15-30 LOZENGE BUCCAL
Status: DISCONTINUED | OUTPATIENT
Start: 2022-06-24 | End: 2022-06-25 | Stop reason: HOSPADM

## 2022-06-24 RX ORDER — POLYETHYLENE GLYCOL 3350 17 G/17G
17 POWDER, FOR SOLUTION ORAL DAILY
Status: DISCONTINUED | OUTPATIENT
Start: 2022-06-25 | End: 2022-06-25 | Stop reason: HOSPADM

## 2022-06-24 RX ORDER — AMOXICILLIN 250 MG
1-2 CAPSULE ORAL DAILY PRN
Qty: 30 TABLET | Refills: 0
Start: 2022-06-24 | End: 2023-06-08

## 2022-06-24 RX ORDER — FENTANYL CITRATE 50 UG/ML
100 INJECTION, SOLUTION INTRAMUSCULAR; INTRAVENOUS
Status: DISCONTINUED | OUTPATIENT
Start: 2022-06-24 | End: 2022-06-24 | Stop reason: HOSPADM

## 2022-06-24 RX ORDER — FENTANYL CITRATE 50 UG/ML
25 INJECTION, SOLUTION INTRAMUSCULAR; INTRAVENOUS EVERY 5 MIN PRN
Status: DISCONTINUED | OUTPATIENT
Start: 2022-06-24 | End: 2022-06-24 | Stop reason: HOSPADM

## 2022-06-24 RX ORDER — KETAMINE HYDROCHLORIDE 10 MG/ML
INJECTION INTRAMUSCULAR; INTRAVENOUS PRN
Status: DISCONTINUED | OUTPATIENT
Start: 2022-06-24 | End: 2022-06-24

## 2022-06-24 RX ORDER — CEFAZOLIN SODIUM 2 G/100ML
2 INJECTION, SOLUTION INTRAVENOUS EVERY 8 HOURS
Status: COMPLETED | OUTPATIENT
Start: 2022-06-25 | End: 2022-06-25

## 2022-06-24 RX ORDER — SODIUM CHLORIDE, SODIUM LACTATE, POTASSIUM CHLORIDE, CALCIUM CHLORIDE 600; 310; 30; 20 MG/100ML; MG/100ML; MG/100ML; MG/100ML
INJECTION, SOLUTION INTRAVENOUS CONTINUOUS
Status: DISCONTINUED | OUTPATIENT
Start: 2022-06-24 | End: 2022-06-24 | Stop reason: HOSPADM

## 2022-06-24 RX ORDER — MAGNESIUM SULFATE 4 G/50ML
4 INJECTION INTRAVENOUS ONCE
Status: COMPLETED | OUTPATIENT
Start: 2022-06-24 | End: 2022-06-24

## 2022-06-24 RX ORDER — LISINOPRIL AND HYDROCHLOROTHIAZIDE 12.5; 2 MG/1; MG/1
1 TABLET ORAL DAILY
Status: DISCONTINUED | OUTPATIENT
Start: 2022-06-25 | End: 2022-06-25 | Stop reason: HOSPADM

## 2022-06-24 RX ORDER — TRANEXAMIC ACID 650 MG/1
1950 TABLET ORAL ONCE
Status: COMPLETED | OUTPATIENT
Start: 2022-06-24 | End: 2022-06-24

## 2022-06-24 RX ORDER — ACETAMINOPHEN 325 MG/1
650 TABLET ORAL EVERY 4 HOURS PRN
Qty: 100 TABLET | Refills: 0 | Status: SHIPPED | OUTPATIENT
Start: 2022-06-24 | End: 2023-06-08

## 2022-06-24 RX ORDER — ONDANSETRON 2 MG/ML
4 INJECTION INTRAMUSCULAR; INTRAVENOUS EVERY 30 MIN PRN
Status: DISCONTINUED | OUTPATIENT
Start: 2022-06-24 | End: 2022-06-24 | Stop reason: HOSPADM

## 2022-06-24 RX ORDER — PROPOFOL 10 MG/ML
INJECTION, EMULSION INTRAVENOUS CONTINUOUS PRN
Status: DISCONTINUED | OUTPATIENT
Start: 2022-06-24 | End: 2022-06-24

## 2022-06-24 RX ORDER — PROCHLORPERAZINE MALEATE 10 MG
10 TABLET ORAL EVERY 6 HOURS PRN
Status: DISCONTINUED | OUTPATIENT
Start: 2022-06-24 | End: 2022-06-25 | Stop reason: HOSPADM

## 2022-06-24 RX ORDER — ACETAMINOPHEN 325 MG/1
975 TABLET ORAL EVERY 8 HOURS
Status: DISCONTINUED | OUTPATIENT
Start: 2022-06-24 | End: 2022-06-25 | Stop reason: HOSPADM

## 2022-06-24 RX ORDER — HYDROXYZINE HYDROCHLORIDE 25 MG/1
25 TABLET, FILM COATED ORAL EVERY 6 HOURS PRN
Qty: 30 TABLET | Refills: 0 | Status: SHIPPED | OUTPATIENT
Start: 2022-06-24 | End: 2023-06-08

## 2022-06-24 RX ORDER — OXYCODONE HYDROCHLORIDE 5 MG/1
5-10 TABLET ORAL EVERY 4 HOURS PRN
Qty: 25 TABLET | Refills: 0 | Status: SHIPPED | OUTPATIENT
Start: 2022-06-24 | End: 2023-06-08

## 2022-06-24 RX ORDER — HYDROMORPHONE HCL IN WATER/PF 6 MG/30 ML
0.4 PATIENT CONTROLLED ANALGESIA SYRINGE INTRAVENOUS
Status: DISCONTINUED | OUTPATIENT
Start: 2022-06-24 | End: 2022-06-25 | Stop reason: HOSPADM

## 2022-06-24 RX ORDER — HYDROXYZINE HYDROCHLORIDE 25 MG/1
25 TABLET, FILM COATED ORAL EVERY 6 HOURS PRN
Status: DISCONTINUED | OUTPATIENT
Start: 2022-06-24 | End: 2022-06-25 | Stop reason: HOSPADM

## 2022-06-24 RX ORDER — ONDANSETRON 4 MG/1
4 TABLET, ORALLY DISINTEGRATING ORAL EVERY 30 MIN PRN
Status: DISCONTINUED | OUTPATIENT
Start: 2022-06-24 | End: 2022-06-24 | Stop reason: HOSPADM

## 2022-06-24 RX ORDER — OXYCODONE HYDROCHLORIDE 5 MG/1
10 TABLET ORAL EVERY 4 HOURS PRN
Status: DISCONTINUED | OUTPATIENT
Start: 2022-06-24 | End: 2022-06-25 | Stop reason: HOSPADM

## 2022-06-24 RX ORDER — MEPERIDINE HYDROCHLORIDE 25 MG/ML
12.5 INJECTION INTRAMUSCULAR; INTRAVENOUS; SUBCUTANEOUS
Status: DISCONTINUED | OUTPATIENT
Start: 2022-06-24 | End: 2022-06-24 | Stop reason: HOSPADM

## 2022-06-24 RX ORDER — DOXAZOSIN 4 MG/1
4 TABLET ORAL DAILY
Status: DISCONTINUED | OUTPATIENT
Start: 2022-06-25 | End: 2022-06-25 | Stop reason: HOSPADM

## 2022-06-24 RX ORDER — EPHEDRINE SULFATE 50 MG/ML
INJECTION, SOLUTION INTRAMUSCULAR; INTRAVENOUS; SUBCUTANEOUS PRN
Status: DISCONTINUED | OUTPATIENT
Start: 2022-06-24 | End: 2022-06-24

## 2022-06-24 RX ORDER — AMOXICILLIN 250 MG
1 CAPSULE ORAL 2 TIMES DAILY
Status: DISCONTINUED | OUTPATIENT
Start: 2022-06-24 | End: 2022-06-25 | Stop reason: HOSPADM

## 2022-06-24 RX ORDER — FENTANYL CITRATE 50 UG/ML
25 INJECTION, SOLUTION INTRAMUSCULAR; INTRAVENOUS
Status: CANCELLED | OUTPATIENT
Start: 2022-06-24

## 2022-06-24 RX ORDER — BISACODYL 10 MG
10 SUPPOSITORY, RECTAL RECTAL DAILY PRN
Status: DISCONTINUED | OUTPATIENT
Start: 2022-06-24 | End: 2022-06-25 | Stop reason: HOSPADM

## 2022-06-24 RX ORDER — ONDANSETRON 4 MG/1
4 TABLET, ORALLY DISINTEGRATING ORAL EVERY 6 HOURS PRN
Status: DISCONTINUED | OUTPATIENT
Start: 2022-06-24 | End: 2022-06-25 | Stop reason: HOSPADM

## 2022-06-24 RX ADMIN — SODIUM CHLORIDE, POTASSIUM CHLORIDE, SODIUM LACTATE AND CALCIUM CHLORIDE: 600; 310; 30; 20 INJECTION, SOLUTION INTRAVENOUS at 22:15

## 2022-06-24 RX ADMIN — SODIUM CHLORIDE, POTASSIUM CHLORIDE, SODIUM LACTATE AND CALCIUM CHLORIDE: 600; 310; 30; 20 INJECTION, SOLUTION INTRAVENOUS at 17:00

## 2022-06-24 RX ADMIN — SODIUM CHLORIDE, POTASSIUM CHLORIDE, SODIUM LACTATE AND CALCIUM CHLORIDE: 600; 310; 30; 20 INJECTION, SOLUTION INTRAVENOUS at 17:53

## 2022-06-24 RX ADMIN — ACETAMINOPHEN 975 MG: 325 TABLET ORAL at 22:54

## 2022-06-24 RX ADMIN — MAGNESIUM SULFATE IN WATER 4 G: 80 INJECTION, SOLUTION INTRAVENOUS at 13:59

## 2022-06-24 RX ADMIN — ALBUMIN (HUMAN): 12.5 SOLUTION INTRAVENOUS at 16:31

## 2022-06-24 RX ADMIN — SENNOSIDES AND DOCUSATE SODIUM 1 TABLET: 50; 8.6 TABLET ORAL at 22:55

## 2022-06-24 RX ADMIN — PHENYLEPHRINE HYDROCHLORIDE 100 MCG: 10 INJECTION INTRAVENOUS at 16:14

## 2022-06-24 RX ADMIN — FENTANYL CITRATE 100 MCG: 50 INJECTION, SOLUTION INTRAMUSCULAR; INTRAVENOUS at 16:10

## 2022-06-24 RX ADMIN — PHENYLEPHRINE HYDROCHLORIDE 100 MCG: 10 INJECTION INTRAVENOUS at 16:20

## 2022-06-24 RX ADMIN — SODIUM CHLORIDE, POTASSIUM CHLORIDE, SODIUM LACTATE AND CALCIUM CHLORIDE: 600; 310; 30; 20 INJECTION, SOLUTION INTRAVENOUS at 13:59

## 2022-06-24 RX ADMIN — DEXAMETHASONE SODIUM PHOSPHATE 4 MG: 4 INJECTION, SOLUTION INTRA-ARTICULAR; INTRALESIONAL; INTRAMUSCULAR; INTRAVENOUS; SOFT TISSUE at 16:15

## 2022-06-24 RX ADMIN — PHENYLEPHRINE HYDROCHLORIDE 100 MCG: 10 INJECTION INTRAVENOUS at 16:19

## 2022-06-24 RX ADMIN — FAMOTIDINE 20 MG: 20 TABLET ORAL at 22:54

## 2022-06-24 RX ADMIN — LIDOCAINE HYDROCHLORIDE 30 MG: 10 INJECTION, SOLUTION INFILTRATION; PERINEURAL at 16:11

## 2022-06-24 RX ADMIN — ONDANSETRON 4 MG: 2 INJECTION INTRAMUSCULAR; INTRAVENOUS at 16:21

## 2022-06-24 RX ADMIN — Medication 10 MG: at 16:28

## 2022-06-24 RX ADMIN — ALBUMIN (HUMAN): 12.5 SOLUTION INTRAVENOUS at 17:30

## 2022-06-24 RX ADMIN — ACETAMINOPHEN 975 MG: 325 TABLET ORAL at 13:30

## 2022-06-24 RX ADMIN — Medication 2 G: at 15:57

## 2022-06-24 RX ADMIN — PHENYLEPHRINE HYDROCHLORIDE 100 MCG: 10 INJECTION INTRAVENOUS at 16:22

## 2022-06-24 RX ADMIN — TRANEXAMIC ACID 1950 MG: 650 TABLET ORAL at 13:31

## 2022-06-24 RX ADMIN — KETAMINE HYDROCHLORIDE 30 MG: 10 INJECTION, SOLUTION INTRAMUSCULAR; INTRAVENOUS at 16:12

## 2022-06-24 RX ADMIN — PHENYLEPHRINE HYDROCHLORIDE 0.5 MCG/KG/MIN: 10 INJECTION INTRAVENOUS at 16:16

## 2022-06-24 RX ADMIN — Medication 10 MG: at 16:31

## 2022-06-24 RX ADMIN — KETAMINE HYDROCHLORIDE 20 MG: 10 INJECTION, SOLUTION INTRAMUSCULAR; INTRAVENOUS at 16:18

## 2022-06-24 RX ADMIN — Medication 5 MG: at 16:34

## 2022-06-24 RX ADMIN — PHENYLEPHRINE HYDROCHLORIDE 100 MCG: 10 INJECTION INTRAVENOUS at 16:16

## 2022-06-24 RX ADMIN — BUPIVACAINE HYDROCHLORIDE IN DEXTROSE 1.6 ML: 7.5 INJECTION, SOLUTION SUBARACHNOID at 16:09

## 2022-06-24 RX ADMIN — PROPOFOL 100 MCG/KG/MIN: 10 INJECTION, EMULSION INTRAVENOUS at 16:10

## 2022-06-24 RX ADMIN — MIDAZOLAM 2 MG: 1 INJECTION INTRAMUSCULAR; INTRAVENOUS at 15:58

## 2022-06-24 RX ADMIN — CEFAZOLIN SODIUM 2 G: 2 INJECTION, SOLUTION INTRAVENOUS at 23:13

## 2022-06-24 NOTE — PHARMACY-ADMISSION MEDICATION HISTORY
Pharmacy Note - Admission Medication History    Pertinent Provider Information:    ______________________________________________________________________    Prior To Admission (PTA) med list completed and updated in EMR.       PTA Med List   Medication Sig Last Dose     aspirin 81 MG tablet Take 81 mg by mouth daily  6/20/2022 at Unknown time     atorvastatin (LIPITOR) 20 MG tablet Take 1 tablet (20 mg) by mouth daily 6/23/2022     doxazosin (CARDURA) 4 MG tablet Take 1 tablet (4 mg) by mouth daily 6/23/2022     GLUCOSAMINE-CHONDROITIN PO Take 2 capsules by mouth daily 6/20/2022 at Unknown time     HERBALS Take 1 each by mouth daily [employer] Blood Sugar Support. 6/20/2022 at Unknown time     lisinopril-hydrochlorothiazide (ZESTORETIC) 20-12.5 MG tablet Take 1 tablet by mouth daily 6/23/2022     metFORMIN (GLUCOPHAGE) 1000 MG tablet Take 1 tablet (1,000 mg) by mouth 2 times daily (with meals) 6/23/2022     MULTI-VITAMIN OR TABS Take 1 tablet by mouth daily  6/20/2022 at Unknown time     pioglitazone (ACTOS) 30 MG tablet TAKE 1 TABLET BY MOUTH ONCE DAILY 6/23/2022       Information source(s): Patient    Method of interview communication: in-person    Patient was asked about OTC/herbal products specifically.  PTA med list reflects this.    Based on the pharmacist's assessment, the PTA med list information appears reliable    Allergies were reviewed, assessed, and updated with the patient.      Patient does not use any multi-dose medications prior to admission.     Thank you for the opportunity to participate in the care of this patient.      Lavonne Fernandes RPH     6/24/2022     1:39 PM

## 2022-06-24 NOTE — BRIEF OP NOTE
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]  Post-operative diagnosis Same as pre-operative diagnosis    Procedure: Procedure(s):  RIGHT TOTAL HIP ARTHROPLASTY REVISION  Surgeon: Surgeon(s) and Role:     * Riccardo Dietz MD - Primary     * Luiz Cruz PA-C - Assisting  Anesthesia: Choice   Estimated Blood Loss: 300 ml    Drains: None  Specimens:   ID Type Source Tests Collected by Time Destination   A : #1 FLUID RIGHT HIP Swab Hip, Right ANAEROBIC BACTERIAL CULTURE ROUTINE, AEROBIC BACTERIAL CULTURE ROUTINE Riccardo Dietz MD 6/24/2022  4:37 PM    B : FLUID RIGHT HIP Swab Hip, Right ANAEROBIC BACTERIAL CULTURE ROUTINE, AEROBIC BACTERIAL CULTURE ROUTINE Riccardo Dietz MD 6/24/2022  4:40 PM      Findings:   None.  Complications: None.  Implants:   Implant Name Type Inv. Item Serial No.  Lot No. LRB No. Used Action   TRIDENT II PSL CLUSTERHOLE HA ACETABULAR SHELL    [employer] [id number] Right 1 Implanted   IMP SCR STRK LOW PROFILE HEX 6.5X30MM [id number] - [id number] Metallic Hardware/[employer] IMP SCR STRK LOW PROFILE HEX 6.5X30MM [id number]  [employer] [id number] Right 1 Implanted   IMP SCR STRK LOW PROFILE HEX 6.5X25MM [id number] - [id number] Metallic Hardware/[place] IMP SCR STRK LOW PROFILE HEX 6.5X25MM [id number]  [employer] [id number] Right 1 Implanted   6.5MM LOW PROFILE HEX SCR 20MM - [id number] Metallic Hardware/[place] 6.5MM LOW PROFILE HEX SCR 20MM  [employer] [id number] Right 1 Implanted   IMP INSERT ACET STRK MDM COCR HIP 0DEG 46MM SZ F 626-00-46F - [id number] Total Joint Component/Insert IMP INSERT ACET STRK MDM COCR HIP 0DEG 46MM  F 626-00-46F  [employer] [id number] Right 1 Implanted   IMP INSERT ACET STRK RSTRTN HIP ADM X3 [id number] 1236-2-302 - [id number] Total Joint Component/Insert IMP INSERT ACET STRK RSTRTN HIP ADM X3 [id number] 1236-2852  [employer]  [id number] Right 1 Implanted   IMP HEAD FEM STRK V40 VITALLIM COCR 28MM +12MM 6699-7-531 - [id number] Total Joint Component/Insert IMP HEAD FEM STRK V40 VITALLIM COCR 28MM +12MM [id number]  [employer] [id number] Right 1 Implanted

## 2022-06-24 NOTE — ANESTHESIA CARE TRANSFER NOTE
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    Anesthesia Type:   Spinal     Note:    Oropharynx: oropharynx clear of all foreign objects and spontaneously breathing  Level of Consciousness: drowsy  Oxygen Supplementation: face mask  Level of Supplemental Oxygen (L/min / FiO2): 8  Independent Airway: airway patency satisfactory and stable  Dentition: dentition unchanged  Vital Signs Stable: post-procedure vital signs reviewed and stable  Report to RN Given: handoff report given  Patient transferred to: PACU    Handoff Report: Set expectations for post-procedure period      Vitals:  Vitals Value Taken Time   /62 06/24/22 1850   Temp 36.3  C (97.3  F) 06/24/22 1847   Pulse 75 06/24/22 1851   Resp 20 06/24/22 1851   SpO2 96 % 06/24/22 1851   Vitals shown include unvalidated device data.    Electronically Signed By: ANNE-MARIE Moss CRNA  June 24, 2022  6:52 PM

## 2022-06-24 NOTE — OR NURSING
Pt completed his pre op 6/23. His A1c was 9.4. Per the pre op:    Type 2 diabetes mellitus with microalbuminuria, without long-term current use of insulin (H)  Last A1C in 2/22 was 7.6% will repeat lab today for recheck.  Today's A1c was 9.4%.  Surgeon's office was called and they will still do surgery tomorrow and sent a patient a note to follow-up with PCP on diabetes in clinic following surgery.

## 2022-06-24 NOTE — INTERVAL H&P NOTE
"I have reviewed the surgical (or preoperative) H&P that is linked to this encounter, and examined the patient. There are no significant changes    Clinical Conditions Present on Arrival:  Clinically Significant Risk Factors Present on Admission                   # Hypertension: home medication list includes antihypertensive(s)  # Obesity: Estimated body mass index is 34.17 kg/m  as calculated from the following:    Height as of this encounter: 1.854 m (6' 1\").    Weight as of this encounter: 117.5 kg (259 lb).       "
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
## 2022-06-24 NOTE — ANESTHESIA PROCEDURE NOTES
Intrathecal injection Procedure Note    Pre-Procedure   Staff -        Anesthesiologist:  Titus Lorenzo MD       Performed By: anesthesiologist       Location: OR       Procedure Start/Stop Times: 6/24/2022 4:06 PM and 6/24/2022 4:09 PM       Pre-Anesthestic Checklist: patient identified, IV checked, risks and benefits discussed, informed consent, monitors and equipment checked and pre-op evaluation  Timeout:       Correct Patient: Yes        Correct Procedure: Yes        Correct Site: Yes        Correct Position: Yes   Procedure Documentation  Procedure: intrathecal injection       Patient Position: sitting       Patient Prep/Sterile Barriers: sterile gloves, mask, patient draped       Skin prep: Chloraprep       Insertion Site: L3-4. (midline approach).       Needle Gauge: 24.        Needle Length (Inches): 4        Spinal Needle Type: Sarika tip       Introducer used    Assessment/Narrative         Paresthesias: No.       CSF fluid: clear.    Medication(s) Administered   0.75% Hyperbaric Bupivacaine (Intrathecal) - Intrathecal   1.6 mL - 6/24/2022 4:09:00 PM  Medication Administration Time: 6/24/2022 4:06 PM

## 2022-06-25 ENCOUNTER — APPOINTMENT (OUTPATIENT)
Dept: OCCUPATIONAL THERAPY | Facility: CLINIC | Age: 67
DRG: 468 | End: 2022-06-25
Attending: ORTHOPAEDIC SURGERY
Payer: COMMERCIAL

## 2022-06-25 ENCOUNTER — APPOINTMENT (OUTPATIENT)
Dept: PHYSICAL THERAPY | Facility: CLINIC | Age: 67
DRG: 468 | End: 2022-06-25
Attending: ORTHOPAEDIC SURGERY
Payer: COMMERCIAL

## 2022-06-25 VITALS
SYSTOLIC BLOOD PRESSURE: 125 MMHG | RESPIRATION RATE: 18 BRPM | TEMPERATURE: 98.1 F | OXYGEN SATURATION: 95 % | DIASTOLIC BLOOD PRESSURE: 65 MMHG | BODY MASS INDEX: 34.33 KG/M2 | HEART RATE: 85 BPM | WEIGHT: 259 LBS | HEIGHT: 73 IN

## 2022-06-25 PROBLEM — Z96.641 HISTORY OF REVISION OF TOTAL REPLACEMENT OF RIGHT HIP JOINT: Status: ACTIVE | Noted: 2022-06-25

## 2022-06-25 LAB
GLUCOSE BLDC GLUCOMTR-MCNC: 191 MG/DL (ref 70–99)
HGB BLD-MCNC: 12.6 G/DL (ref 13.3–17.7)

## 2022-06-25 PROCEDURE — 85018 HEMOGLOBIN: CPT | Performed by: ORTHOPAEDIC SURGERY

## 2022-06-25 PROCEDURE — 97166 OT EVAL MOD COMPLEX 45 MIN: CPT | Mod: GO

## 2022-06-25 PROCEDURE — 97110 THERAPEUTIC EXERCISES: CPT | Mod: GP

## 2022-06-25 PROCEDURE — 250N000011 HC RX IP 250 OP 636: Performed by: ORTHOPAEDIC SURGERY

## 2022-06-25 PROCEDURE — 97162 PT EVAL MOD COMPLEX 30 MIN: CPT | Mod: GP

## 2022-06-25 PROCEDURE — 97116 GAIT TRAINING THERAPY: CPT | Mod: GP

## 2022-06-25 PROCEDURE — 97535 SELF CARE MNGMENT TRAINING: CPT | Mod: GO

## 2022-06-25 PROCEDURE — 250N000013 HC RX MED GY IP 250 OP 250 PS 637: Performed by: ORTHOPAEDIC SURGERY

## 2022-06-25 PROCEDURE — 36415 COLL VENOUS BLD VENIPUNCTURE: CPT | Performed by: ORTHOPAEDIC SURGERY

## 2022-06-25 PROCEDURE — 120N000001 HC R&B MED SURG/OB

## 2022-06-25 PROCEDURE — 99232 SBSQ HOSP IP/OBS MODERATE 35: CPT | Performed by: FAMILY MEDICINE

## 2022-06-25 PROCEDURE — 250N000013 HC RX MED GY IP 250 OP 250 PS 637: Performed by: INTERNAL MEDICINE

## 2022-06-25 RX ADMIN — SENNOSIDES AND DOCUSATE SODIUM 1 TABLET: 50; 8.6 TABLET ORAL at 09:02

## 2022-06-25 RX ADMIN — LISINOPRIL AND HYDROCHLOROTHIAZIDE 1 TABLET: 12.5; 2 TABLET ORAL at 09:01

## 2022-06-25 RX ADMIN — PIOGLITAZONE 30 MG: 15 TABLET ORAL at 09:02

## 2022-06-25 RX ADMIN — FAMOTIDINE 20 MG: 20 TABLET ORAL at 09:02

## 2022-06-25 RX ADMIN — ACETAMINOPHEN 975 MG: 325 TABLET ORAL at 06:20

## 2022-06-25 RX ADMIN — ASPIRIN 325 MG: 325 TABLET ORAL at 09:01

## 2022-06-25 RX ADMIN — DOXAZOSIN 4 MG: 4 TABLET ORAL at 09:01

## 2022-06-25 RX ADMIN — CEFAZOLIN SODIUM 2 G: 2 INJECTION, SOLUTION INTRAVENOUS at 09:01

## 2022-06-25 RX ADMIN — ATORVASTATIN CALCIUM 20 MG: 10 TABLET, FILM COATED ORAL at 09:02

## 2022-06-25 ASSESSMENT — ACTIVITIES OF DAILY LIVING (ADL): ADLS_ACUITY_SCORE: 23

## 2022-06-25 NOTE — PROGRESS NOTES
06/25/22 0745   Quick Adds   Quick Adds Certification   Type of Visit Initial Occupational Therapy Evaluation   Living Environment   People in Home spouse   Current Living Arrangements house   Home Accessibility no concerns   Self-Care   Usual Activity Tolerance excellent   Current Activity Tolerance good   Equipment Currently Used at Home raised toilet seat;grab bar, tub/shower   Instrumental Activities of Daily Living (IADL)   Previous Responsibilities meal prep;yardwork;medication management;driving   IADL Comments shares with spouse; has support in place   General Information   Onset of Illness/Injury or Date of Surgery 06/24/22   Referring Physician Dr. Riccardo Dietz   Patient/Family Therapy Goal Statement (OT) Pt would like to return to [facility].   Additional Occupational Profile Info/Pertinent History of Current Problem moderate   Cognitive Status Examination   Orientation Status orientation to person, place and time   Visual Perception   Visual Impairment/Limitations WNL   Sensory   Sensory Quick Adds No deficits were identified   Pain Assessment   Patient Currently in Pain No   Integumentary/Edema   Integumentary/Edema no deficits were identifed   Posture   Posture not impaired   Range of Motion Comprehensive   General Range of Motion no range of motion deficits identified   Strength Comprehensive (MMT)   General Manual Muscle Testing (MMT) Assessment no strength deficits identified   Muscle Tone Assessment   Muscle Tone Quick Adds No deficits were identified   Coordination   Upper Extremity Coordination No deficits were identified   Bed Mobility   Bed Mobility No deficits identified   Comment (Bed Mobility) from flat surface; MIN v/cs for implementing safe txr technique   Transfers   Transfers No deficits identified   Transfer Comments MIN v/cs for implementing safe txr technique   Balance   Balance Assessment no deficits were identified   Balance Comments FWW utilized   Activities of Daily Living   BADL  Assessment/Intervention no deficits identified  (with ADL techniques and AE/DME in place)   Clinical Impression   Criteria for Skilled Therapeutic Interventions Met (OT) Evaluation only   OT Diagnosis ADL and fxl txr tasks affected s/p R LETICIA revision   Assessment of Occupational Performance 1-3 Performance Deficits   Identified Performance Deficits modified LB dressing, txrs, and bathing.   OT Discharge Planning   OT Discharge Recommendation (DC Rec) (S)  home with assist   OT Rationale for DC Rec Pt. able  to complete ADL and fxl txr tasks; support in place for IADLs.   Therapy Certification   Start of Care Date 06/25/22   Certification date from 06/25/22   Certification date to 07/25/22   Medical Diagnosis s/p R LETICIA revision   Total Evaluation Time (Minutes)   Total Evaluation Time (Minutes) 15   OT Goals   Therapy Frequency (OT) One time eval and treatment
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
## 2022-06-25 NOTE — ANESTHESIA POSTPROCEDURE EVALUATION
Patient: Celestine Simon    Procedure: Procedure(s):  RIGHT TOTAL HIP ARTHROPLASTY REVISION       Anesthesia Type:  Spinal    Note:  Disposition: Admission   Postop Pain Control: Uneventful            Sign Out: Well controlled pain   PONV: No   Neuro/Psych: Uneventful            Sign Out: Acceptable/Baseline neuro status   Airway/Respiratory: Uneventful            Sign Out: Acceptable/Baseline resp. status   CV/Hemodynamics: Uneventful            Sign Out: Acceptable CV status; No obvious hypovolemia; No obvious fluid overload   Other NRE: NONE   DID A NON-ROUTINE EVENT OCCUR? No           Last vitals:  Vitals Value Taken Time   /79 06/24/22 2130   Temp 36.3  C (97.4  F) 06/24/22 2130   Pulse 77 06/24/22 2132   Resp 16 06/24/22 2130   SpO2 95 % 06/24/22 2132   Vitals shown include unvalidated device data.    Electronically Signed By: JORGE ROJAS MD  June 24, 2022  10:04 PM

## 2022-06-25 NOTE — PROGRESS NOTES
06/25/22 0930   Quick Adds   Type of Visit Initial PT Evaluation   Living Environment   People in Home spouse   Current Living Arrangements house   Home Accessibility stairs to enter home   Number of Stairs, Main Entrance 3   Stair Railings, Main Entrance railing on right side (ascending)   Self-Care   Usual Activity Tolerance excellent   Current Activity Tolerance good   Equipment Currently Used at Home cane, straight;walker, rolling   Fall history within last six months no   General Information   Onset of Illness/Injury or Date of Surgery 06/24/22   Referring Physician Riccardo Dietz MD   Patient/Family Therapy Goals Statement (PT) hunt in the fall   Pertinent History of Current Problem (include personal factors and/or comorbidities that impact the POC) s/p LETICIA revision   Existing Precautions/Restrictions no hip IR;no hip ADD past midline;90 degree hip flexion;no pivoting or twisting   Weight-Bearing Status - LLE full weight-bearing   Weight-Bearing Status - RLE weight-bearing as tolerated   Range of Motion (ROM)   ROM Comment decreased ROM s/p R LETICIA rev   Strength (Manual Muscle Testing)   Strength Comments decreased strength s/p R LETICIA rev   Bed Mobility   Comment, (Bed Mobility) in chair   Transfers   Transfers sit-stand transfer   Sit-Stand Transfer   Sit-Stand Clark (Transfers) contact guard   Assistive Device (Sit-Stand Transfers) walker, front-wheeled   Gait/Stairs (Locomotion)   Clark Level (Gait) contact guard   Assistive Device (Gait) walker, front-wheeled   Distance in Feet (Required for LE Total Joints) 15   Pattern (Gait) step-through   Negotiation (Stairs) stairs independence;stairs assistive device;handrail location;number of steps   Clark Level (Stairs) contact guard   Handrail Location (Stairs) left side (descending);both sides   Number of Steps (Stairs) 4   Clinical Impression   Criteria for Skilled Therapeutic Intervention Yes, treatment indicated   PT Diagnosis (PT)  impaired functional mobility   Influenced by the following impairments weakness, pain   Functional limitations due to impairments gait, transfers, stairs   Clinical Presentation (PT Evaluation Complexity) Stable/Uncomplicated   Clinical Presentation Rationale pt presents as diagnosed   Clinical Decision Making (Complexity) moderate complexity   Planned Therapy Interventions (PT) bed mobility training;gait training;home exercise program;patient/family education;ROM (range of motion);stair training;strengthening;transfer training   Anticipated Equipment Needs at Discharge (PT) walker, rolling   Risk & Benefits of therapy have been explained care plan/treatment goals reviewed;patient   PT Discharge Planning   PT Discharge Recommendation (DC Rec) (S)  home with assist   PT Rationale for DC Rec home   Plan of Care Review   Plan of Care Reviewed With patient   Total Evaluation Time   Total Evaluation Time (Minutes) 10   Physical Therapy Goals   PT Frequency One time eval and treatment only   PT Predicted Duration/Target Date for Goal Attainment 06/25/22   PT Goals Transfers;Gait;Stairs;PT Goal 1   PT: Transfers Modified independent;Sit to/from stand;Assistive device;Goal Met   PT: Gait Modified independent;Rolling walker;150 feet;Goal Met   PT: Stairs 3 stairs;Rail on left;Supervision/stand-by assist;Goal Met   PT: Goal 1 independent with written HEP for LETICIA rehabilitation, Goal Met

## 2022-06-25 NOTE — PLAN OF CARE
Problem: Plan of Care - These are the overarching goals to be used throughout the patient stay.    Goal: Optimal Comfort and Wellbeing  Outcome: Met  Intervention: Monitor Pain and Promote Comfort  Recent Flowsheet Documentation  Taken 6/25/2022 7948 by Abdirashid Kincaid, RN  Pain Management Interventions: medication (see MAR)     Problem: Plan of Care - These are the overarching goals to be used throughout the patient stay.    Goal: Readiness for Transition of Care  Outcome: Met   Goal Outcome Evaluation:    Plan of Care Reviewed With: patient     Overall Patient Progress: improving     AVSS on RA. A/O. Pain controlled. Some drainage to dressing; ortho MD aware and okay no replacing. Pt adequate for DC. AVS discussed with pt and all questions answered. Verbalized understanding of meds and follow-up apts. Scripts given to pt. PIV removed. All belongings packed. Pt assisted to ride in wheelchair by nursing staff.

## 2022-06-25 NOTE — PROGRESS NOTES
Chart reviewed.  Pt will discharge home with assist from family.  Anticipate family transport.    EDUARD Rojas  6/25/2022  10:21 AM

## 2022-06-25 NOTE — PROGRESS NOTES
[facility]      OUTPATIENT OCCUPATIONAL THERAPY  EVALUATION  PLAN OF TREATMENT FOR OUTPATIENT REHABILITATION  (COMPLETE FOR INITIAL CLAIMS ONLY)  Patient's Last Name, First Name, M.I.  YOB: 1955  AlfredCelestine  DIVINE                          Provider's Name  [facility] Medical Record No.  [id number]                               Onset Date:      Start of Care Date:  (P) 06/25/22     Type:     ___PT   _X_OT   ___SLP Medical Diagnosis:                           OT Diagnosis:      Visits from SOC:  1   _________________________________________________________________________________  Plan of Treatment/Functional Goals    Planned Interventions:     Goals: See Occupational Therapy Goals on Care Plan in [facility] electronic health record.    Therapy Frequency:    Predicted Duration of Therapy Intervention:    _________________________________________________________________________________    I CERTIFY THE NEED FOR THESE SERVICES FURNISHED UNDER        THIS PLAN OF TREATMENT AND WHILE UNDER MY CARE     (Physician co-signature of this document indicates review and certification of the therapy plan).               ,        Riccardo Dietz MD           Initial Assessment        See Occupational Therapy evaluation dated (P) 06/25/22 in Epic electronic health record.

## 2022-06-25 NOTE — DISCHARGE SUMMARY
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    Following the procedure noted above the patient was transferred to the post-op floor and started on:    Therapy:  physical therapy  Anticoagulation Plan:  po qd  for 42 days  Pain Management: oxycodone prn  Weight bearing status: Weight bearing as tolerated       Flexion max 90 degrees, avoid internal rotation  The patient was followed and co-managed by the hospitalist service during the inpatient treatment course  Complications:  None  Consultations:  None     Pertinent Labs   Lab Results: personally reviewed.     Recent Labs   Lab Test 06/25/22  0544 06/24/22  2308 06/24/22  1407 02/03/22  1308 11/16/21  0929 11/16/21  0929 08/07/18  0753   HGB 12.6* 12.2* 14.1  --    < > 14.5  --    HCT  --  37.5* 42.5  --   --  42.9  --    MCV  --  88 86  --   --  90  --    PLT  --  218 254  --   --  236  --    NA  --   --   --  135  --  137 137    < > = values in this interval not displayed.          Discharge Information:  Condition at discharge: Stable  Discharge destination:  Discharged to home     Medications at discharge:  Current Discharge Medication List      START taking these medications    Details   acetaminophen (TYLENOL) 325 MG tablet Take 2  tablets (650 mg) by mouth every 4 hours as needed for other (mild pain)  Qty: 100 tablet, Refills: 0    Associated Diagnoses: History of revision of total replacement of right hip joint      aspirin (ASA) 325 MG EC tablet Take 1 tablet (325 mg) by mouth daily  Qty: 42 tablet, Refills: 0    Associated Diagnoses: History of revision of total replacement of right hip joint      hydrOXYzine (ATARAX) 25 MG tablet Take 1 tablet (25 mg) by mouth every 6 hours as needed for itching or anxiety (with pain, moderate pain)  Qty: 30 tablet, Refills: 0    Associated Diagnoses: History of revision of total replacement of right hip joint      oxyCODONE (ROXICODONE) 5 MG tablet Take 1-2 tablets (5-10 mg) by mouth every 4 hours as needed for moderate to severe pain  Qty: 25 tablet, Refills: 0    Associated Diagnoses: History of revision of total replacement of right hip joint      senna-docusate (SENOKOT-S/PERICOLACE) 8.6-50 MG tablet Take 1-2 tablets by mouth daily as needed for constipation Take while on oral narcotics to prevent or treat constipation.  Qty: 30 tablet, Refills: 0    Comments: While taking narcotics  Associated Diagnoses: History of revision of total replacement of right hip joint         CONTINUE these medications which have NOT CHANGED    Details   aspirin 81 MG tablet Take 81 mg by mouth daily   Qty: 100 tablet, Refills: 3    Associated Diagnoses: Essential hypertension, benign; Type 2 diabetes, HbA1c goal < 7% (H)      atorvastatin (LIPITOR) 20 MG tablet Take 1 tablet (20 mg) by mouth daily  Qty: 90 tablet, Refills: 1    Associated Diagnoses: Dyslipidemia      doxazosin (CARDURA) 4 MG tablet Take 1 tablet (4 mg) by mouth daily  Qty: 90 tablet, Refills: 1    Comments: This prescription was filled on 3/15/2022. Any refills authorized will be placed on file.  Associated Diagnoses: Essential hypertension, benign      GLUCOSAMINE-CHONDROITIN PO Take 2 capsules by mouth daily      HERBALS Take 1 each by mouth daily  [facility] Blood Sugar Support.      lisinopril-hydrochlorothiazide (ZESTORETIC) 20-12.5 MG tablet Take 1 tablet by mouth daily  Qty: 90 tablet, Refills: 1    Comments: This prescription was filled on 3/15/2022. Any refills authorized will be placed on file.  Associated Diagnoses: Essential hypertension, benign      metFORMIN (GLUCOPHAGE) 1000 MG tablet Take 1 tablet (1,000 mg) by mouth 2 times daily (with meals)  Qty: 180 tablet, Refills: 1    Comments: This prescription was filled on 3/15/2022. Any refills authorized will be placed on file.  Associated Diagnoses: Type 2 diabetes mellitus with hyperosmolarity without coma, without long-term current use of insulin (H)      MULTI-VITAMIN OR TABS Take 1 tablet by mouth daily       pioglitazone (ACTOS) 30 MG tablet TAKE 1 TABLET BY MOUTH ONCE DAILY  Qty: 90 tablet, Refills: 1    Comments: This prescription was filled on 3/15/2022. Any refills authorized will be placed on file.  Associated Diagnoses: Type 2 diabetes mellitus with hyperglycemia, without long-term current use of insulin (H)      Blood Glucose Monitoring Suppl (IN TOUCH) MISC 1 strip once a week Tests weekly- One Touch  Qty: 90 each, Refills: 1    Associated Diagnoses: Type 2 diabetes mellitus with hyperglycemia, without long-term current use of insulin (H)      Continuous Blood Gluc Sensor (FREESTYLE SCOTT 14 DAY SENSOR) MISC 1 Device every 14 days Change sensor as directed every 14 days  Qty: 6 each, Refills: 1    Comments: This prescription was filled on 2/10/2022. Any refills authorized will be placed on file.  Associated Diagnoses: Type 2 diabetes mellitus with microalbuminuria, without long-term current use of insulin (H)      LANCETS REGULAR MISC One Touch US Lancet- Used twice daily  Qty: 100 Each, Refills: 6    Associated Diagnoses: Type II or unspecified type diabetes mellitus without mention of complication, not stated as uncontrolled                        Follow-Up Care:  Patient should  be seen in the office in 14 days by the Orthopedic Surgeon/Physician Assistant.  Call [phone] for appointment or questions.    Riccardo Dietz MD

## 2022-06-25 NOTE — PROGRESS NOTES
"                  Orthopaedics Progress Note      Post-operative Day: 1 Day Post-Op    Procedure(s):  RIGHT TOTAL HIP ARTHROPLASTY REVISION      Subjective:    Pain: minimal  Chest pain, SOB:  No      Objective:  Blood pressure 128/74, pulse 75, temperature 97.6  F (36.4  C), temperature source Oral, resp. rate 16, height 1.854 m (6' 1\"), weight 117.5 kg (259 lb), SpO2 96 %.    Patient Vitals for the past 24 hrs:   BP Temp Temp src Pulse Resp SpO2 Height Weight   06/25/22 0520 128/74 97.6  F (36.4  C) Oral 75 16 96 % -- --   06/25/22 0120 117/59 97.4  F (36.3  C) Oral 75 16 93 % -- --   06/25/22 0020 123/62 97.3  F (36.3  C) Oral 75 16 90 % -- --   06/24/22 2320 115/82 97.4  F (36.3  C) Oral 72 16 93 % -- --   06/24/22 2250 134/66 97.2  F (36.2  C) Oral 77 18 94 % -- --   06/24/22 2220 139/70 97.2  F (36.2  C) Oral 79 17 94 % -- --   06/24/22 2150 -- -- -- -- -- 92 % -- --   06/24/22 2130 (!) 166/79 97.4  F (36.3  C) Temporal 70 16 92 % -- --   06/24/22 2100 (!) 151/80 -- -- 77 -- 97 % -- --   06/24/22 2030 (!) 145/71 -- -- 77 -- 97 % -- --   06/24/22 2000 (!) 140/71 -- -- 76 16 97 % -- --   06/24/22 1930 136/72 -- -- 72 12 96 % -- --   06/24/22 1920 132/70 97.1  F (36.2  C) Temporal 70 12 92 % -- --   06/24/22 1910 138/75 -- -- 72 10 94 % -- --   06/24/22 1900 129/68 -- -- 77 20 92 % -- --   06/24/22 1850 118/62 -- -- 76 16 94 % -- --   06/24/22 1847 118/62 97.3  F (36.3  C) Temporal 75 18 95 % -- --   06/24/22 1317 121/77 98.1  F (36.7  C) Temporal 85 18 95 % 1.854 m (6' 1\") 117.5 kg (259 lb)       Wt Readings from Last 4 Encounters:   06/24/22 117.5 kg (259 lb)   06/23/22 119.3 kg (263 lb)   06/21/22 117.9 kg (260 lb)   06/18/22 117.9 kg (260 lb)         Motor function, sensation, and circulation intact   Yes  Wound status: incisions are clean dry and intact. Yes  Calf tenderness: Right  No    Pertinent Labs   Lab Results: personally reviewed.     Recent Labs   Lab Test 06/25/22  0544 06/24/22  6691 " 06/24/22  1407 02/03/22  1308 11/16/21  0929 11/16/21  0929 08/07/18  0753   HGB 12.6* 12.2* 14.1  --    < > 14.5  --    HCT  --  37.5* 42.5  --   --  42.9  --    MCV  --  88 86  --   --  90  --    PLT  --  218 254  --   --  236  --    NA  --   --   --  135  --  137 137    < > = values in this interval not displayed.       Plan: Anticoagulation protocol: ASA 325mg po every day   x  42 days            Pain medications: oxycodone pprn            Weight bearing status:  WBAT            Disposition:  Home today             Continue cares and rehabilitation     Report completed by:  Riccardo Dietz MD  Date: 6/25/2022  Time: 7:04 AM

## 2022-06-25 NOTE — PROGRESS NOTES
[facility] MEDICINE PROGRESS NOTE      Identification/Summary: Celestine Simon is a 66 year old male with a past medical history of diabetes mellitus type 2, hypertension and hyperlipidemia who was admitted on 6/24/2022 for elective revision of right total hip arthroplasty because of recurrent dislocations. Hospital course is unremarkable.  He is ready to go home today.    Assessment and Plan:    1. 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  Patient reports he is doing very well today.  Plan: Home today per orthopedic surgical team.  Aspirin for DVT prophylaxis     2. DM2 with last a1C of 7.6  Plan: Resume home regimen     3. HTN and HL   Plan: Resume home regimen    4.  Obesity with BMI of 34    5.  Disposition: Discharged home per orthopedic surgical team          Zack Kincaid MD  [facility] Medicine  [facility]  Phone: #[phone]    Interval History/Subjective:  Patient reports she is doing well.  Minimal pain.  Passing gas.  No nausea.  Eating well.  No chest pain or shortness of breath or lightheadedness.  No other concerns or issues and ready to go home.    Physical Exam/Objective:  Temp:  [97.1  F (36.2  C)-98.1  F (36.7  C)] 98.1  F (36.7  C)  Pulse:  [70-85] 85  Resp:  [10-20] 18  BP: (115-166)/(59-82) 125/65  SpO2:  [90 %-97 %] 95 %  Body mass index is 34.17 kg/m .    GENERAL:  Alert, appears comfortable, in no acute distress, appears stated age   LUNGS:   Clear to auscultation bilaterally, no rales, rhonchi, or wheezing, symmetric chest rise on inhalation, respirations unlabored   HEART:  Regular rate and rhythm, S1 and S2 normal, no murmur, rub, or gallop    ABDOMEN:   Soft, non-tender   EXTREMITIES: Extremities normal, atraumatic, no cyanosis or edema    SKIN: Dry to touch, no exanthems in the visualized areas   NEURO: Alert, appears cognitively intact, moves all four extremities freely   PSYCH: Cooperative, behavior  "is appropriate      Data reviewed today: I personally reviewed all new medications, labs, imaging/diagnostics reports over the past 24 hours. Pertinent findings include:    Imaging:   Recent Results (from the past 24 hour(s))   POC US Guidance Needle Placement    Narrative    Ultrasound was performed as guidance to an anesthesia procedure.  Click   \"PACS images\" hyperlink below to view any stored images.  For specific   procedure details, view procedure note authored by anesthesia.   XR Hip Port Right 1 View    Narrative    EXAM: XR HIP PORT RT 1 VW  LOCATION: [facility]  DATE/TIME: 6/24/2022 5:41 PM    INDICATION: VERIFICATION FOR PLACEMENT  COMPARISON: None.      Impression    IMPRESSION: Intraoperative view shows postoperative changes right total hip arthroplasty. Post procedural air surrounding the joint.    XR Pelvis Port 1/2 Views    Narrative    EXAM: XR PELVIS PORT 1/2 VIEWS  LOCATION: [facility]  DATE/TIME: 6/24/2022 7:25 PM    INDICATION: Status post Hip surgery  COMPARISON: 06/21/2022.      Impression    IMPRESSION: Completed revision right total hip arthroplasty. Normal joint alignment. No evidence of periprosthetic fracture or other immediate complication. Postoperative air in the joint space and surrounding soft tissues.       Labs:  Most Recent 3 CBC's:Recent Labs   Lab Test 06/25/22  0544 06/24/22  2308 06/24/22  1407 11/16/21  0929   WBC  --  8.7 5.2 5.7   HGB 12.6* 12.2* 14.1 14.5   MCV  --  88 86 90   PLT  --  218 254 236     Most Recent 3 BMP's:Recent Labs   Lab Test 06/25/22  0905 06/24/22  2308 06/24/22  2255 06/24/22  1900 06/24/22  1407 06/24/22  1354 02/03/22  1308 11/16/21  0929 11/16/21  0929 08/07/18  0753   NA  --   --   --   --   --   --  135  --  137 137   POTASSIUM  --  4.0  --   --  3.8  --  4.3   < > 4.2 4.2   CHLORIDE  --   --   --   --   --   --  100  --  103 103   CO2  --   --   --   --   --   --  26  --  28 29   BUN  --   --   " --   --   --   --  24  --  20 20   CR  --   --   --   --  0.96  --  1.02  --  0.85 0.99   ANIONGAP  --   --   --   --   --   --  9  --  6 5   ALIZE  --   --   --   --   --   --  9.9  --  9.8 9.4   *  --  216* 189*  --    < > 105*   < > 210* 181*    < > = values in this interval not displayed.       Medications:   Personally Reviewed.  Medications     lactated ringers Stopped (06/25/22 0400)       acetaminophen  975 mg Oral Q8H     aspirin  325 mg Oral Daily     atorvastatin  20 mg Oral Daily     doxazosin  4 mg Oral Daily     famotidine  20 mg Oral BID    Or     famotidine  20 mg Intravenous BID     insulin aspart  1-7 Units Subcutaneous TID AC     insulin aspart  1-5 Units Subcutaneous At Bedtime     lisinopril-hydrochlorothiazide  1 tablet Oral Daily     metFORMIN  1,000 mg Oral BID w/meals     pioglitazone  30 mg Oral Daily     polyethylene glycol  17 g Oral Daily     senna-docusate  1 tablet Oral BID     sodium chloride (PF)  3 mL Intracatheter Q8H

## 2022-06-25 NOTE — PROGRESS NOTES
Patient vital signs are at baseline: Yes  Patient able to ambulate as they were prior to admission or with assist devices provided by therapies during their stay:  Yes  Patient MUST void prior to discharge:  Yes  Patient able to tolerate oral intake:  Yes  Pain has adequate pain control using Oral analgesics:  Yes  Does patient have an identified :  Yes  Has goal D/C date and time been discussed with patient:  Yes     Voided. Met ambulation goal. CMS intact. Sensation returning.     Tracee Lomas RN

## 2022-06-26 ENCOUNTER — TRANSFERRED RECORDS (OUTPATIENT)
Dept: FAMILY MEDICINE | Facility: CLINIC | Age: 67
End: 2022-06-26

## 2022-06-30 LAB
BACTERIA SNV CULT: NO GROWTH
BACTERIA SNV CULT: NO GROWTH

## 2022-07-09 LAB
BACTERIA SNV CULT: NORMAL
BACTERIA SNV CULT: NORMAL

## 2022-07-13 ENCOUNTER — TRANSFERRED RECORDS (OUTPATIENT)
Dept: HEALTH INFORMATION MANAGEMENT | Facility: CLINIC | Age: 67
End: 2022-07-13

## 2022-08-11 ENCOUNTER — TRANSFERRED RECORDS (OUTPATIENT)
Dept: FAMILY MEDICINE | Facility: CLINIC | Age: 67
End: 2022-08-11

## 2022-08-27 ENCOUNTER — HEALTH MAINTENANCE LETTER (OUTPATIENT)
Age: 67
End: 2022-08-27

## 2022-09-05 DIAGNOSIS — E78.5 DYSLIPIDEMIA: ICD-10-CM

## 2022-09-06 DIAGNOSIS — R80.9 TYPE 2 DIABETES MELLITUS WITH MICROALBUMINURIA, WITHOUT LONG-TERM CURRENT USE OF INSULIN (H): ICD-10-CM

## 2022-09-06 DIAGNOSIS — E11.29 TYPE 2 DIABETES MELLITUS WITH MICROALBUMINURIA, WITHOUT LONG-TERM CURRENT USE OF INSULIN (H): ICD-10-CM

## 2022-09-06 NOTE — TELEPHONE ENCOUNTER
Routing to ordering provider for consideration, not on refill protocol.           Tammy Ervin     RN MSN

## 2022-09-07 RX ORDER — FLASH GLUCOSE SENSOR
1 KIT MISCELLANEOUS
Qty: 1 EACH | Refills: 1 | Status: SHIPPED | OUTPATIENT
Start: 2022-09-07 | End: 2022-09-27

## 2022-09-07 RX ORDER — ATORVASTATIN CALCIUM 20 MG/1
20 TABLET, FILM COATED ORAL DAILY
Qty: 90 TABLET | Refills: 1 | Status: SHIPPED | OUTPATIENT
Start: 2022-09-07 | End: 2023-05-11

## 2022-09-14 ENCOUNTER — TELEPHONE (OUTPATIENT)
Dept: FAMILY MEDICINE | Facility: CLINIC | Age: 67
End: 2022-09-14

## 2022-09-14 NOTE — TELEPHONE ENCOUNTER
Patient Quality Outreach    Patient is due for the following:   Diabetes -  Diabetic Follow-Up Visit    Next Steps:   Patient was scheduled for diabetic check     Type of outreach:    Chart review performed, no outreach needed.AFR left message 9/6/2022 for patient to schedule appointment     Questions for provider review:    None     Elda Hdz, NGA

## 2022-09-26 DIAGNOSIS — E11.29 TYPE 2 DIABETES MELLITUS WITH MICROALBUMINURIA, WITHOUT LONG-TERM CURRENT USE OF INSULIN (H): ICD-10-CM

## 2022-09-26 DIAGNOSIS — R80.9 TYPE 2 DIABETES MELLITUS WITH MICROALBUMINURIA, WITHOUT LONG-TERM CURRENT USE OF INSULIN (H): ICD-10-CM

## 2022-09-27 RX ORDER — FLASH GLUCOSE SENSOR
1 KIT MISCELLANEOUS
Qty: 2 EACH | Refills: 0 | Status: SHIPPED | OUTPATIENT
Start: 2022-09-27 | End: 2023-05-12

## 2022-10-02 ENCOUNTER — MYC MEDICAL ADVICE (OUTPATIENT)
Dept: PHARMACY | Facility: CLINIC | Age: 67
End: 2022-10-02

## 2022-11-18 NOTE — PROGRESS NOTES
[facility]    Outpatient Physical Therapy Discharge Note  Patient: Celestine Simon  : 1955    Beginning/End Dates of Reporting Period:  2022 to 2022    Referring Provider: Dr. Dietz    Therapy Diagnosis:    R hip pain secondary to osteoarthrtis      Client Self Report: see eval    Objective Measurements:  Objective Measure: hip strength  Details: see eval    Goals:  Goal Identifier HEP   Goal Description Pt will be independent with HEP in order to maintain progressions upon discharge.   Target Date 22   Date Met   2022   Progress (detail required for progress note):       Plan:  Discharge from therapy.    Discharge:    Reason for Discharge: Patient has met all goals.  No further expectation of progress.  Patient has failed to schedule further appointments.    Equipment Issued: none    Discharge Plan: Patient to continue home program.    Katie Langford, PT, DPT, CLT  Physical Therapist & Certified Lymphedema Therapist  [facility]

## 2022-11-26 DIAGNOSIS — I10 ESSENTIAL HYPERTENSION, BENIGN: ICD-10-CM

## 2022-11-26 DIAGNOSIS — E11.00 TYPE 2 DIABETES MELLITUS WITH HYPEROSMOLARITY WITHOUT COMA, WITHOUT LONG-TERM CURRENT USE OF INSULIN (H): ICD-10-CM

## 2022-11-28 NOTE — SEDATION DOCUMENTATION
Pt waking up. Xrays complete   Severe episode of recurrent major depressive disorder, without psychotic features   F33.2

## 2022-11-29 RX ORDER — LISINOPRIL AND HYDROCHLOROTHIAZIDE 12.5; 2 MG/1; MG/1
1 TABLET ORAL DAILY
Qty: 90 TABLET | Refills: 0 | Status: SHIPPED | OUTPATIENT
Start: 2022-11-29 | End: 2023-05-11

## 2022-11-29 RX ORDER — DOXAZOSIN 4 MG/1
4 TABLET ORAL DAILY
Qty: 90 TABLET | Refills: 0 | Status: SHIPPED | OUTPATIENT
Start: 2022-11-29 | End: 2023-05-11

## 2022-12-26 ENCOUNTER — HEALTH MAINTENANCE LETTER (OUTPATIENT)
Age: 67
End: 2022-12-26

## 2023-02-07 NOTE — DISCHARGE INSTRUCTIONS
Follow-up with Dr. Dietz this week    Activity as tolerated and discussed    Return for pain or any other concern  
Low Segment Transverse C/S

## 2023-02-12 ENCOUNTER — TELEPHONE (OUTPATIENT)
Dept: FAMILY MEDICINE | Facility: CLINIC | Age: 68
End: 2023-02-12
Payer: COMMERCIAL

## 2023-02-13 NOTE — TELEPHONE ENCOUNTER
[facility] care team - Can you please call patient to schedule an office visit with a provider? He's overdue for diabetes recheck. Thanks!    Jade Jasmine, PharmD, BCACP  Medication Therapy Management Pharmacist  Pager: [phone]

## 2023-02-22 ENCOUNTER — TELEPHONE (OUTPATIENT)
Dept: FAMILY MEDICINE | Facility: CLINIC | Age: 68
End: 2023-02-22
Payer: COMMERCIAL

## 2023-02-22 ENCOUNTER — MYC MEDICAL ADVICE (OUTPATIENT)
Dept: FAMILY MEDICINE | Facility: CLINIC | Age: 68
End: 2023-02-22
Payer: COMMERCIAL

## 2023-02-22 NOTE — TELEPHONE ENCOUNTER
Patient Quality Outreach    Patient is due for the following:   Diabetes -  A1C, LDL (Fasting), Eye Exam, Microalbumin, Statin, BP Check, Diabetic Follow-Up Visit and Foot Exam    Next Steps:   Schedule a office visit for Diabetic Check  Annual Wellness Visit    Type of outreach:    Sent [employer] message. - pt was left a message 2/13/2023      Questions for provider review:    None     Elda Hdz, [facility]

## 2023-03-10 DIAGNOSIS — E78.5 DYSLIPIDEMIA: ICD-10-CM

## 2023-03-10 RX ORDER — ATORVASTATIN CALCIUM 20 MG/1
20 TABLET, FILM COATED ORAL DAILY
Qty: 90 TABLET | Refills: 1 | OUTPATIENT
Start: 2023-03-10

## 2023-03-10 NOTE — TELEPHONE ENCOUNTER
Patient was given steve refill x 1 and reminder to schedule appointment but has not followed through with this.  Julie Behrendt RN

## 2023-03-24 ENCOUNTER — TELEPHONE (OUTPATIENT)
Dept: PHARMACY | Facility: CLINIC | Age: 68
End: 2023-03-24
Payer: COMMERCIAL

## 2023-03-24 NOTE — TELEPHONE ENCOUNTER
We have been unable to reach this patient for MTM follow-up after several attempts. We will stop reaching out to the patient at this time. Please let us know if we can assist in this patient's care in the future.    Emily MckeonD, [facility]  Medication Therapy Management Pharmacist  Pager: [phone]

## 2023-04-16 ENCOUNTER — HEALTH MAINTENANCE LETTER (OUTPATIENT)
Age: 68
End: 2023-04-16

## 2023-05-10 ENCOUNTER — TELEPHONE (OUTPATIENT)
Dept: FAMILY MEDICINE | Facility: CLINIC | Age: 68
End: 2023-05-10
Payer: COMMERCIAL

## 2023-05-10 NOTE — TELEPHONE ENCOUNTER
Patient Quality Outreach    Patient is due for the following:   Diabetes -  Diabetic Follow-Up Visit  Physical Preventive Adult Physical- wellness     Next Steps:   Schedule a Adult Preventativeand diabetic check     Type of outreach:    Phone, left message for patient/parent to call back.      Questions for provider review:    None           Elda Hdz, CMA

## 2023-05-11 ENCOUNTER — MYC MEDICAL ADVICE (OUTPATIENT)
Dept: FAMILY MEDICINE | Facility: CLINIC | Age: 68
End: 2023-05-11
Payer: COMMERCIAL

## 2023-05-11 DIAGNOSIS — E11.29 TYPE 2 DIABETES MELLITUS WITH MICROALBUMINURIA, WITHOUT LONG-TERM CURRENT USE OF INSULIN (H): ICD-10-CM

## 2023-05-11 DIAGNOSIS — Z11.4 SCREENING FOR HIV (HUMAN IMMUNODEFICIENCY VIRUS): ICD-10-CM

## 2023-05-11 DIAGNOSIS — E11.00 TYPE 2 DIABETES MELLITUS WITH HYPEROSMOLARITY WITHOUT COMA, WITHOUT LONG-TERM CURRENT USE OF INSULIN (H): ICD-10-CM

## 2023-05-11 DIAGNOSIS — E78.5 DYSLIPIDEMIA: ICD-10-CM

## 2023-05-11 DIAGNOSIS — I10 ESSENTIAL HYPERTENSION, BENIGN: ICD-10-CM

## 2023-05-11 DIAGNOSIS — R80.9 TYPE 2 DIABETES MELLITUS WITH MICROALBUMINURIA, WITHOUT LONG-TERM CURRENT USE OF INSULIN (H): ICD-10-CM

## 2023-05-11 DIAGNOSIS — E11.65 TYPE 2 DIABETES MELLITUS WITH HYPERGLYCEMIA, WITHOUT LONG-TERM CURRENT USE OF INSULIN (H): ICD-10-CM

## 2023-05-11 DIAGNOSIS — I10 ESSENTIAL HYPERTENSION: ICD-10-CM

## 2023-05-11 RX ORDER — PIOGLITAZONEHYDROCHLORIDE 30 MG/1
30 TABLET ORAL DAILY
Qty: 30 TABLET | Refills: 0 | Status: SHIPPED | OUTPATIENT
Start: 2023-05-11 | End: 2023-06-08

## 2023-05-11 RX ORDER — DOXAZOSIN 4 MG/1
4 TABLET ORAL DAILY
Qty: 30 TABLET | Refills: 0 | Status: SHIPPED | OUTPATIENT
Start: 2023-05-11 | End: 2023-06-08

## 2023-05-11 RX ORDER — ATORVASTATIN CALCIUM 20 MG/1
20 TABLET, FILM COATED ORAL DAILY
Qty: 30 TABLET | Refills: 0 | Status: SHIPPED | OUTPATIENT
Start: 2023-05-11 | End: 2023-06-08

## 2023-05-11 RX ORDER — LISINOPRIL AND HYDROCHLOROTHIAZIDE 12.5; 2 MG/1; MG/1
1 TABLET ORAL DAILY
Qty: 30 TABLET | Refills: 0 | Status: SHIPPED | OUTPATIENT
Start: 2023-05-11 | End: 2023-06-08 | Stop reason: DRUGHIGH

## 2023-05-11 NOTE — TELEPHONE ENCOUNTER
"Has appointment scheduled for 6/8/23      Requested Prescriptions   Pending Prescriptions Disp Refills     metFORMIN (GLUCOPHAGE) 1000 MG tablet 180 tablet      Sig: Take 1 tablet (1,000 mg) by mouth 2 times daily (with meals) Due for diabetic follow up       Biguanide Agents Failed - 5/11/2023 12:36 PM        Failed - Patient has documented A1c within the specified period of time.     If HgbA1C is 8 or greater, it needs to be on file within the past 3 months.  If less than 8, must be on file within the past 6 months.     Recent Labs   Lab Test 06/21/22  1025   A1C 9.4*             Passed - Patient is age 10 or older        Passed - Patient's CR is NOT>1.4 OR Patient's EGFR is NOT<45 within past 12 mos.     Recent Labs   Lab Test 06/24/22  1407 11/16/21  0929 08/07/18  0753   GFRESTIMATED 88   < > 77   GFRESTBLACK  --   --  >90    < > = values in this interval not displayed.       Recent Labs   Lab Test 06/24/22  1407   CR 0.96             Passed - Patient does NOT have a diagnosis of CHF.        Passed - Medication is active on med list        Passed - Recent (6 mo) or future (30 days) visit within the authorizing provider's specialty     Patient had office visit in the last 6 months or has a visit in the next 30 days with authorizing provider or within the authorizing provider's specialty.  See \"Patient Info\" tab in inbasket, or \"Choose Columns\" in Meds & Orders section of the refill encounter.               doxazosin (CARDURA) 4 MG tablet 90 tablet      Sig: Take 1 tablet (4 mg) by mouth daily Due for follow up       Antiadrenergic Antihypertensives Failed - 5/11/2023 12:36 PM        Failed - Blood pressure less than 140/90 in past 6 months     BP Readings from Last 3 Encounters:   06/25/22 125/65   06/23/22 126/82   06/21/22 (!) 141/88                 Passed - Medication is active on med list        Passed - Patient is age 18 or older        Passed - Normal serum creatinine on file in past 12 months     Recent " "Labs   Lab Test 06/24/22  1407   CR 0.96       Ok to refill medication if creatinine is low          Passed - Recent (6 mo) or future (30 days) visit within the authorizing provider's specialty     Patient had office visit in the last 6 months or has a visit in the next 30 days with authorizing provider or within the authorizing provider's specialty.  See \"Patient Info\" tab in inbasket, or \"Choose Columns\" in Meds & Orders section of the refill encounter.           Alpha Blockers Passed - 5/11/2023 12:36 PM        Passed - Blood pressure under 140/90 in past 12 months     BP Readings from Last 3 Encounters:   06/25/22 125/65   06/23/22 126/82   06/21/22 (!) 141/88                 Passed - Recent (12 mo) or future (30 days) visit within the authorizing provider's specialty     Patient has had an office visit with the authorizing provider or a provider within the authorizing providers department within the previous 12 mos or has a future within next 30 days. See \"Patient Info\" tab in inbasket, or \"Choose Columns\" in Meds & Orders section of the refill encounter.              Passed - Patient does not have Tadalafil, Vardenafil, or Sildenafil on their medication list        Passed - Medication is active on med list        Passed - Patient is 18 years of age or older           lisinopril-hydrochlorothiazide (ZESTORETIC) 20-12.5 MG tablet 90 tablet      Sig: Take 1 tablet by mouth daily       Diuretics (Including Combos) Protocol Failed - 5/11/2023 12:36 PM        Failed - Normal serum sodium on file in past 12 months     Recent Labs   Lab Test 02/03/22  1308                 Passed - Blood pressure under 140/90 in past 12 months     BP Readings from Last 3 Encounters:   06/25/22 125/65   06/23/22 126/82   06/21/22 (!) 141/88                 Passed - Recent (12 mo) or future (30 days) visit within the authorizing provider's specialty     Patient has had an office visit with the authorizing provider or a provider " "within the authorizing providers department within the previous 12 mos or has a future within next 30 days. See \"Patient Info\" tab in inbasket, or \"Choose Columns\" in Meds & Orders section of the refill encounter.              Passed - Medication is active on med list        Passed - Patient is age 18 or older        Passed - Normal serum creatinine on file in past 12 months     Recent Labs   Lab Test 06/24/22  1407   CR 0.96              Passed - Normal serum potassium on file in past 12 months     Recent Labs   Lab Test 06/24/22  2308   POTASSIUM 4.0                   ACE Inhibitors (Including Combos) Protocol Passed - 5/11/2023 12:36 PM        Passed - Blood pressure under 140/90 in past 12 months     BP Readings from Last 3 Encounters:   06/25/22 125/65   06/23/22 126/82   06/21/22 (!) 141/88                 Passed - Recent (12 mo) or future (30 days) visit within the authorizing provider's specialty     Patient has had an office visit with the authorizing provider or a provider within the authorizing providers department within the previous 12 mos or has a future within next 30 days. See \"Patient Info\" tab in in[facility], or \"Choose Columns\" in Meds & Orders section of the refill encounter.              Passed - Medication is active on med list        Passed - Patient is age 18 or older        Passed - Normal serum creatinine on file in past 12 months     Recent Labs   Lab Test 06/24/22  1407   CR 0.96       Ok to refill medication if creatinine is low          Passed - Normal serum potassium on file in past 12 months     Recent Labs   Lab Test 06/24/22  2308   POTASSIUM 4.0                pioglitazone (ACTOS) 30 MG tablet 90 tablet      Sig: Take 1 tablet (30 mg) by mouth daily Due for diabetic follow up visit/labs before further fills       Thiazolidinedione Agents (TZDs)  Failed - 5/11/2023 12:36 PM        Failed - Patient has a normal ALT within the past 12 mos.     Recent Labs   Lab Test 11/16/21  0929   ALT " "39             Failed - Patient has a normal AST within the past 12 mos.      Recent Labs   Lab Test 11/16/21  0929   AST 23             Failed - Patient has documented A1c within the specified period of time.     If HgbA1C is 8 or greater, it needs to be on file within the past 3 months.  If less than 8, must be on file within the past 6 months.     Recent Labs   Lab Test 06/21/22  1025   A1C 9.4*             Passed - Diagnosis not CHF        Passed - Medication is active on med list        Passed - Patient is age 18 or older        Passed - Patient has a normal serum Creatinine in the past 12 months     Recent Labs   Lab Test 06/24/22  1407   CR 0.96       Ok to refill medication if creatinine is low          Passed - Recent (6 mo) or future (30 days) visit within the authorizing provider's specialty     Patient had office visit in the last 6 months or has a visit in the next 30 days with authorizing provider or within the authorizing provider's specialty.  See \"Patient Info\" tab in inRegaloCardet, or \"Choose Columns\" in Meds & Orders section of the refill encounter.               atorvastatin (LIPITOR) 20 MG tablet 90 tablet      Sig: Take 1 tablet (20 mg) by mouth daily       Statins Protocol Failed - 5/11/2023 12:36 PM        Failed - LDL on file in past 12 months     Recent Labs   Lab Test 12/03/21  0802   LDL 39             Passed - No abnormal creatine kinase in past 12 months     No lab results found.             Passed - Recent (12 mo) or future (30 days) visit within the authorizing provider's specialty     Patient has had an office visit with the authorizing provider or a provider within the authorizing providers department within the previous 12 mos or has a future within next 30 days. See \"Patient Info\" tab in inbasket, or \"Choose Columns\" in Meds & Orders section of the refill encounter.              Passed - Medication is active on med list        Passed - Patient is age 18 or older             "

## 2023-05-12 DIAGNOSIS — R80.9 TYPE 2 DIABETES MELLITUS WITH MICROALBUMINURIA, WITHOUT LONG-TERM CURRENT USE OF INSULIN (H): ICD-10-CM

## 2023-05-12 DIAGNOSIS — E11.29 TYPE 2 DIABETES MELLITUS WITH MICROALBUMINURIA, WITHOUT LONG-TERM CURRENT USE OF INSULIN (H): ICD-10-CM

## 2023-05-12 RX ORDER — FLASH GLUCOSE SENSOR
1 KIT MISCELLANEOUS
Qty: 2 EACH | Refills: 0 | Status: SHIPPED | OUTPATIENT
Start: 2023-05-12 | End: 2023-06-08

## 2023-05-12 NOTE — TELEPHONE ENCOUNTER
A prescription for Freestyle sensor sent. Patient has upcoming appt with PCP on 06/08/23.  Tiffanie CASTRO RN

## 2023-05-14 ENCOUNTER — TELEPHONE (OUTPATIENT)
Dept: FAMILY MEDICINE | Facility: CLINIC | Age: 68
End: 2023-05-14

## 2023-05-14 NOTE — TELEPHONE ENCOUNTER
Prior Authorization Retail Medication Request    Medication/Dose: FreeStyle Natalia 4 Day Sensor  ICD code (if different than what is on RX):     Previously Tried and Failed:     Rationale:  Pt stable    Insurance Name:  Andreina [id number]  Insurance ID:         Pharmacy Information (if different than what is on RX)  Name:     Phone:

## 2023-05-18 NOTE — TELEPHONE ENCOUNTER
Central Prior Authorization Team  Phone: [phone]    PA Initiation    Medication: FREESTYLE SCOTT 14 DAY SENSOR [facility]  Insurance Company: [employer] - Phone [phone] Fax [phone]  Pharmacy Filling the Rx: [facility] - [id number] [facility]  Filling Pharmacy Phone: [phone]  Filling Pharmacy Fax:    Start Date: 5/18/2023

## 2023-05-19 ENCOUNTER — MYC MEDICAL ADVICE (OUTPATIENT)
Dept: FAMILY MEDICINE | Facility: CLINIC | Age: 68
End: 2023-05-19

## 2023-05-19 NOTE — TELEPHONE ENCOUNTER
Central Prior Authorization Team  Phone: [phone]    PRIOR AUTHORIZATION DENIED    Medication: FREESTYLE SCOTT 14 DAY SENSOR [facility]  Insurance Company: [employer] - Phone [phone] Fax [phone]  Denial Date: 5/19/2023  Denial Rational:         Appeal Information:       Patient Notified:       [Negative] : Heme/Lymph [[email or web address]] : Postop

## 2023-05-24 ENCOUNTER — TRANSFERRED RECORDS (OUTPATIENT)
Dept: HEALTH INFORMATION MANAGEMENT | Facility: CLINIC | Age: 68
End: 2023-05-24
Payer: COMMERCIAL

## 2023-05-24 LAB
RETINOPATHY: NORMAL
RETINOPATHY: POSITIVE

## 2023-06-01 ENCOUNTER — TELEPHONE (OUTPATIENT)
Dept: FAMILY MEDICINE | Facility: CLINIC | Age: 68
End: 2023-06-01
Payer: COMMERCIAL

## 2023-06-01 NOTE — TELEPHONE ENCOUNTER
Patient Quality Outreach    Patient is due for the following:   Diabetes -  Eye Exam    Next Steps:   No follow up needed at this time.    Type of outreach:    Chart review performed, no outreach needed.      Questions for provider review:    None           Lila Aden CMA        
Daily Assessment

## 2023-06-05 ENCOUNTER — MYC MEDICAL ADVICE (OUTPATIENT)
Dept: FAMILY MEDICINE | Facility: CLINIC | Age: 68
End: 2023-06-05
Payer: COMMERCIAL

## 2023-06-06 ENCOUNTER — LAB (OUTPATIENT)
Dept: LAB | Facility: CLINIC | Age: 68
End: 2023-06-06
Payer: COMMERCIAL

## 2023-06-06 DIAGNOSIS — R80.9 TYPE 2 DIABETES MELLITUS WITH MICROALBUMINURIA, WITHOUT LONG-TERM CURRENT USE OF INSULIN (H): ICD-10-CM

## 2023-06-06 DIAGNOSIS — E11.29 TYPE 2 DIABETES MELLITUS WITH MICROALBUMINURIA, WITHOUT LONG-TERM CURRENT USE OF INSULIN (H): ICD-10-CM

## 2023-06-06 DIAGNOSIS — I10 ESSENTIAL HYPERTENSION: ICD-10-CM

## 2023-06-06 DIAGNOSIS — Z11.4 SCREENING FOR HIV (HUMAN IMMUNODEFICIENCY VIRUS): ICD-10-CM

## 2023-06-06 LAB
ANION GAP SERPL CALCULATED.3IONS-SCNC: 10 MMOL/L (ref 7–15)
BUN SERPL-MCNC: 18.9 MG/DL (ref 8–23)
CALCIUM SERPL-MCNC: 10 MG/DL (ref 8.8–10.2)
CHLORIDE SERPL-SCNC: 100 MMOL/L (ref 98–107)
CHOLEST SERPL-MCNC: 135 MG/DL
CREAT SERPL-MCNC: 1.14 MG/DL (ref 0.67–1.17)
CREAT UR-MCNC: 186.1 MG/DL
DEPRECATED HCO3 PLAS-SCNC: 27 MMOL/L (ref 22–29)
GFR SERPL CREATININE-BSD FRML MDRD: 71 ML/MIN/1.73M2
GLUCOSE SERPL-MCNC: 182 MG/DL (ref 70–99)
HBA1C MFR BLD: 8.9 % (ref 0–5.6)
HDLC SERPL-MCNC: 65 MG/DL
HIV 1+2 AB+HIV1 P24 AG SERPL QL IA: NONREACTIVE
LDLC SERPL CALC-MCNC: 49 MG/DL
MICROALBUMIN UR-MCNC: 19.2 MG/L
MICROALBUMIN/CREAT UR: 10.32 MG/G CR (ref 0–17)
NONHDLC SERPL-MCNC: 70 MG/DL
POTASSIUM SERPL-SCNC: 4.4 MMOL/L (ref 3.4–5.3)
SODIUM SERPL-SCNC: 137 MMOL/L (ref 136–145)
TRIGL SERPL-MCNC: 105 MG/DL

## 2023-06-06 PROCEDURE — 83036 HEMOGLOBIN GLYCOSYLATED A1C: CPT

## 2023-06-06 PROCEDURE — 80061 LIPID PANEL: CPT

## 2023-06-06 PROCEDURE — 82043 UR ALBUMIN QUANTITATIVE: CPT

## 2023-06-06 PROCEDURE — 87389 HIV-1 AG W/HIV-1&-2 AB AG IA: CPT

## 2023-06-06 PROCEDURE — 36415 COLL VENOUS BLD VENIPUNCTURE: CPT

## 2023-06-06 PROCEDURE — 80048 BASIC METABOLIC PNL TOTAL CA: CPT

## 2023-06-06 PROCEDURE — 82570 ASSAY OF URINE CREATININE: CPT

## 2023-06-07 NOTE — RESULT ENCOUNTER NOTE
Celestine  A1c has come down some but is still high at 8.9.  Goal at least under 8.  We'll discuss at your upcoming appt.  Other labs all look fine.  Deborah

## 2023-06-08 ENCOUNTER — MYC MEDICAL ADVICE (OUTPATIENT)
Dept: FAMILY MEDICINE | Facility: CLINIC | Age: 68
End: 2023-06-08

## 2023-06-08 ENCOUNTER — OFFICE VISIT (OUTPATIENT)
Dept: FAMILY MEDICINE | Facility: CLINIC | Age: 68
End: 2023-06-08
Payer: COMMERCIAL

## 2023-06-08 VITALS
BODY MASS INDEX: 37.17 KG/M2 | SYSTOLIC BLOOD PRESSURE: 129 MMHG | HEART RATE: 84 BPM | HEIGHT: 72 IN | RESPIRATION RATE: 24 BRPM | DIASTOLIC BLOOD PRESSURE: 80 MMHG | OXYGEN SATURATION: 96 % | TEMPERATURE: 98.1 F | WEIGHT: 274.4 LBS

## 2023-06-08 DIAGNOSIS — Z23 NEED FOR SHINGLES VACCINE: ICD-10-CM

## 2023-06-08 DIAGNOSIS — E78.5 DYSLIPIDEMIA: ICD-10-CM

## 2023-06-08 DIAGNOSIS — I10 ESSENTIAL HYPERTENSION: ICD-10-CM

## 2023-06-08 DIAGNOSIS — E11.65 TYPE 2 DIABETES MELLITUS WITH HYPERGLYCEMIA, WITHOUT LONG-TERM CURRENT USE OF INSULIN (H): Primary | ICD-10-CM

## 2023-06-08 DIAGNOSIS — E66.01 SEVERE OBESITY (BMI 35.0-39.9) WITH COMORBIDITY (H): ICD-10-CM

## 2023-06-08 DIAGNOSIS — E11.29 TYPE 2 DIABETES MELLITUS WITH MICROALBUMINURIA, WITHOUT LONG-TERM CURRENT USE OF INSULIN (H): Primary | ICD-10-CM

## 2023-06-08 DIAGNOSIS — Z12.11 COLON CANCER SCREENING: ICD-10-CM

## 2023-06-08 DIAGNOSIS — Z00.00 ROUTINE GENERAL MEDICAL EXAMINATION AT A HEALTH CARE FACILITY: ICD-10-CM

## 2023-06-08 DIAGNOSIS — R80.9 TYPE 2 DIABETES MELLITUS WITH MICROALBUMINURIA, WITHOUT LONG-TERM CURRENT USE OF INSULIN (H): Primary | ICD-10-CM

## 2023-06-08 DIAGNOSIS — Z23 NEED FOR DIPHTHERIA-TETANUS-PERTUSSIS (TDAP) VACCINE: ICD-10-CM

## 2023-06-08 PROBLEM — Z96.653 HISTORY OF TOTAL KNEE ARTHROPLASTY, BILATERAL: Status: ACTIVE | Noted: 2022-06-24

## 2023-06-08 PROCEDURE — 90472 IMMUNIZATION ADMIN EACH ADD: CPT | Performed by: PHYSICIAN ASSISTANT

## 2023-06-08 PROCEDURE — 99207 PR FOOT EXAM NO CHARGE: CPT | Mod: 25 | Performed by: PHYSICIAN ASSISTANT

## 2023-06-08 PROCEDURE — 90677 PCV20 VACCINE IM: CPT | Performed by: PHYSICIAN ASSISTANT

## 2023-06-08 PROCEDURE — 90750 HZV VACC RECOMBINANT IM: CPT | Performed by: PHYSICIAN ASSISTANT

## 2023-06-08 PROCEDURE — 90471 IMMUNIZATION ADMIN: CPT | Performed by: PHYSICIAN ASSISTANT

## 2023-06-08 PROCEDURE — 99214 OFFICE O/P EST MOD 30 MIN: CPT | Mod: 25 | Performed by: PHYSICIAN ASSISTANT

## 2023-06-08 PROCEDURE — 90715 TDAP VACCINE 7 YRS/> IM: CPT | Performed by: PHYSICIAN ASSISTANT

## 2023-06-08 PROCEDURE — 99397 PER PM REEVAL EST PAT 65+ YR: CPT | Mod: 25 | Performed by: PHYSICIAN ASSISTANT

## 2023-06-08 RX ORDER — DOXAZOSIN 4 MG/1
4 TABLET ORAL DAILY
Qty: 90 TABLET | Refills: 1 | Status: SHIPPED | OUTPATIENT
Start: 2023-06-08 | End: 2023-10-11

## 2023-06-08 RX ORDER — ASPIRIN 325 MG
325 TABLET, DELAYED RELEASE (ENTERIC COATED) ORAL DAILY
COMMUNITY
End: 2023-06-08 | Stop reason: DRUGHIGH

## 2023-06-08 RX ORDER — ASPIRIN 81 MG/1
81 TABLET ORAL DAILY
Start: 2023-06-08

## 2023-06-08 RX ORDER — HYDROCHLOROTHIAZIDE 12.5 MG/1
12.5 TABLET ORAL DAILY
Qty: 90 TABLET | Refills: 1 | Status: SHIPPED | OUTPATIENT
Start: 2023-06-08 | End: 2023-10-11

## 2023-06-08 RX ORDER — PIOGLITAZONEHYDROCHLORIDE 30 MG/1
30 TABLET ORAL DAILY
Qty: 90 TABLET | Refills: 1 | Status: SHIPPED | OUTPATIENT
Start: 2023-06-08 | End: 2023-10-11

## 2023-06-08 RX ORDER — ATORVASTATIN CALCIUM 20 MG/1
20 TABLET, FILM COATED ORAL DAILY
Qty: 90 TABLET | Refills: 1 | Status: SHIPPED | OUTPATIENT
Start: 2023-06-08 | End: 2023-10-11

## 2023-06-08 RX ORDER — METFORMIN HCL 500 MG
2000 TABLET, EXTENDED RELEASE 24 HR ORAL
Qty: 360 TABLET | Refills: 1 | Status: SHIPPED | OUTPATIENT
Start: 2023-06-08 | End: 2023-10-11

## 2023-06-08 RX ORDER — LISINOPRIL AND HYDROCHLOROTHIAZIDE 12.5; 2 MG/1; MG/1
1 TABLET ORAL DAILY
Qty: 30 TABLET | Refills: 0 | Status: CANCELLED | OUTPATIENT
Start: 2023-06-08

## 2023-06-08 RX ORDER — LISINOPRIL 40 MG/1
40 TABLET ORAL DAILY
Qty: 90 TABLET | Refills: 1 | Status: SHIPPED | OUTPATIENT
Start: 2023-06-08 | End: 2023-10-11

## 2023-06-08 RX ORDER — FLASH GLUCOSE SENSOR
1 KIT MISCELLANEOUS
Qty: 6 EACH | Refills: 1 | Status: SHIPPED | OUTPATIENT
Start: 2023-06-08 | End: 2024-04-29

## 2023-06-08 NOTE — PATIENT INSTRUCTIONS
Tetanus, shingles, pneumonia vaccine today  Suggest considering covid vaccine again in the fall     Change from aspirin 325 to aspirin 81 mg - less risk of stomach bleeds with no further benefit  Add semaglutide (rybelsus) oral to help diabetes.  If nausea can use ondansetron.  If difficulty tolerating, contact me.  Change metformin to extended release to reduce risk of side effects in combination with new med rybelsus  Stop lisinopril-hydrochlorothiazide - replace with separate pills so we can increase lisinopril.     Work on wt loss     Do mail the poop test since didn't think colonoscopy would happen this year    Preventive Health Recommendations:     See your health care provider every year to  Review health changes.   Discuss preventive care.    Review your medicines if your doctor has prescribed any.    Talk with your health care provider about whether you should have a test to screen for prostate cancer (PSA).  Every 3 years, have a diabetes test (fasting glucose). If you are at risk for diabetes, you should have this test more often.  Every 5 years, have a cholesterol test. Have this test more often if you are at risk for high cholesterol or heart disease.   Every 10 years, have a colonoscopy. Or, have a yearly FIT test (stool test). These exams will check for colon cancer.  Talk to with your health care provider about screening for Abdominal Aortic Aneurysm if you have a family history of AAA or have a history of smoking.    Shots:   Get a flu shot each year.   Get a tetanus shot every 10 years.   Talk to your doctor about your pneumonia vaccines. There are now two you should receive - Pneumovax (PPSV 23) and Prevnar (PCV 13).   Talk to your pharmacist about a shingles vaccine.   Talk to your doctor about the hepatitis B vaccine.  Nutrition:   Eat at least 5 servings of fruits and vegetables each day.   Eat whole-grain bread, whole-wheat pasta and brown rice instead of white grains and rice.   Get adequate  Calcium and Vitamin D.   Lifestyle  Exercise for at least 150 minutes a week (30 minutes a day, 5 days a week). This will help you control your weight and prevent disease.   Limit alcohol to one drink per day.   No smoking.   Wear sunscreen to prevent skin cancer.  See your dentist every six months for an exam and cleaning.  See your eye doctor every 1 to 2 years to screen for conditions such as glaucoma, macular degeneration, cataracts, etc.    Personalized Prevention Plan  You are due for the preventive services outlined below.  Your care team is available to assist you in scheduling these services.  If you have already completed any of these items, please share that information with your care team to update in your medical record.  Health Maintenance Due   Topic Date Due    Discuss Advance Care Planning  Never done    Zoster (Shingles) Vaccine (1 of 2) Never done    LUNG CANCER SCREENING  Never done    Pneumococcal Vaccine (2 - PCV) 03/07/2015    Diptheria Tetanus Pertussis (DTAP/TDAP/TD) Vaccine (2 - Td or Tdap) 04/30/2019    Diabetic Foot Exam  08/09/2019    COVID-19 Vaccine (3 - Moderna series) 06/12/2021    AORTIC ANEURYSM SCREENING (SYSTEM ASSIGNED)  Never done

## 2023-06-08 NOTE — PROGRESS NOTES
SUBJECTIVE:   CC: Celestine is an 67 year old who presents for preventative health visit.       6/8/2023     8:37 AM   Additional Questions   Roomed by Malka     Chief Complaint   Patient presents with     Physical     Patient does not have medicare     Diabetes     Imm/Inj     Patient states checked with his insurance and shingles is covered in clinic     History of Present Illness   Diabetes:   He presents for follow up of diabetes.  He is checking home blood glucose a few times a week. He checks blood glucose before meals.  Blood glucose is sometimes over 200 and never under 70. When his blood glucose is low, the patient is asymptomatic for confusion, blurred vision, lethargy and reports not feeling dizzy, shaky, or weak.  He is concerned about blood sugar frequently over 200.  He is not experiencing numbness or burning in feet, excessive thirst, blurry vision, weight changes or redness, sores or blisters on feet.     a1c 8.9.  Now very active after his hip replacement which involved 5 rounds of anesthesia as had issues with dislocations.  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    Hyperlipidemia:  He presents for follow up of hyperlipidemia.  He is taking medication to lower cholesterol. He is not having myalgia or other side effects to statin medications.    Hypertension: He presents for follow up of hypertension.  He does check blood pressure  regularly outside of the clinic. Outpatient blood pressures have not been over 140/90. He does not follow a low salt diet.   No chest pains, chest pressures, SOB or sudden change of endurance.       Reason for visit:  Please schedule 3 vaccines    He consumes 0 sweetened beverage(s) daily.He exercises with enough effort to increase his heart rate 10 to 19 minutes per day.  He exercises with enough effort to increase his heart rate 3 or less days per week.   He is taking medications regularly.    Patient has not started  Medicare yet  Has insurance from work    Today's PHQ-2 Score:       6/8/2023     7:26 AM   PHQ-2 ( 1999 Pf[employer])   Q1: Little interest or pleasure in doing things 0   Q2: Feeling down, depressed or hopeless 0   PHQ-2 Score 0   Q1: Little interest or pleasure in doing things Not at all   Q2: Feeling down, depressed or hopeless Not at all   PHQ-2 Score 0       Have you ever done Advance Care Planning? (For example, a Health Directive, POLST, or a discussion with a medical provider or your loved ones about your wishes): Yes, patient states has an Advance Care Planning document and will bring a copy to the clinic.    Social History     Tobacco Use     Smoking status: Former     Smokeless tobacco: Never     Tobacco comments:     50 years ago   Vaping Use     Vaping status: Former   Substance Use Topics     Alcohol use: Yes     Comment: occ     Last PSA: No results found for: PSA    Reviewed orders with patient. Reviewed health maintenance and updated orders accordingly - Yes  Labs reviewed in EPIC  BP Readings from Last 3 Encounters:   06/08/23 129/80   06/25/22 125/65   06/23/22 126/82    Wt Readings from Last 3 Encounters:   06/08/23 124.5 kg (274 lb 6.4 oz)   06/24/22 117.5 kg (259 lb)   06/23/22 119.3 kg (263 lb)         Reviewed and updated as needed this visit by clinical staff   Tobacco  Allergies  Meds  Problems  Med Hx  Surg Hx  Fam Hx          Reviewed and updated as needed this visit by Provider   Tobacco  Allergies  Meds  Problems  Med Hx  Surg Hx  Fam Hx           Review of Systems  CONSTITUTIONAL: NEGATIVE for fever, chills, change in weight  INTEGUMENTARY/SKIN: NEGATIVE for worrisome rashes, moles or lesions  EYES: NEGATIVE for vision changes or irritation  ENT: NEGATIVE for ear, mouth and throat problems  RESP: NEGATIVE for significant cough or SOB  CV: NEGATIVE for chest pain, palpitations or peripheral edema  GI: NEGATIVE for nausea, abdominal pain, heartburn, or change in bowel habits    "male: negative for dysuria, hematuria, decreased urinary stream, erectile dysfunction, urethral discharge  MUSCULOSKELETAL: NEGATIVE for significant arthralgias or myalgia  NEURO: NEGATIVE for weakness, dizziness or paresthesias  PSYCHIATRIC: NEGATIVE for changes in mood or affect    OBJECTIVE:   /80   Pulse 84   Temp 98.1  F (36.7  C)   Resp 24   Ht 1.816 m (5' 11.5\")   Wt 124.5 kg (274 lb 6.4 oz)   SpO2 96%   BMI 37.74 kg/m      Physical Exam  GENERAL: healthy, alert and no distress  EYES: Eyes grossly normal to inspection, PERRL and conjunctivae and sclerae normal  HENT: ear canals and TM's normal, nose and mouth without ulcers or lesions  NECK: no adenopathy, no asymmetry, masses, or scars and thyroid normal to palpation  RESP: lungs clear to auscultation - no rales, rhonchi or wheezes  CV: regular rate and rhythm, normal S1 S2, no S3 or S4, no murmur, click or rub, no peripheral edema and peripheral pulses strong  ABDOMEN: soft, nontender, no hepatosplenomegaly, no masses and bowel sounds normal  MS: no gross musculoskeletal defects noted, no edema  SKIN: no suspicious lesions or rashes  NEURO: Normal strength and tone, mentation intact and speech normal  PSYCH: mentation appears normal, affect normal/bright  Diabetic foot exam: normal DP and PT pulses, no trophic changes or ulcerative lesions and normal monofilament exam    Diagnostic Test Results:  Labs reviewed in Epic    ASSESSMENT/PLAN:   (E11.65) Type 2 diabetes mellitus with hyperglycemia, without long-term current use of insulin (H)  (primary encounter diagnosis)  Comment: uncontrolled, see after visit summary - add rybelsus, change metformin to ER  Plan: pioglitazone (ACTOS) 30 MG tablet, Semaglutide         (RYBELSUS) 3 MG tablet, Semaglutide (RYBELSUS)         7 MG tablet, Semaglutide (RYBELSUS) 14 MG         tablet, metFORMIN (GLUCOPHAGE XR) 500 MG 24 hr         tablet, aspirin 81 MG EC tablet, Continuous         Blood Gluc Sensor " (FREESTYLE SCOTT 14 DAY         SENSOR) MISC, FOOT EXAM    (E66.01) Severe obesity (BMI 35.0-39.9) with comorbidity (H)  Comment: uncontrolled, with comorbid DM2  Plan: Rybelsus chosen in part to facilitate wt loss    (E78.5) Dyslipidemia  Comment: stable refill  Plan: atorvastatin (LIPITOR) 20 MG tablet    (I10) Essential hypertension  Comment: stable refill  Plan: lisinopril (ZESTRIL) 40 MG tablet,         hydrochlorothiazide (HYDRODIURIL) 12.5 MG         tablet    (Z00.00) Routine general medical examination at a health care facility    (Z12.11) Colon cancer screening  Comment: due for 5 yr colonoscopy however feels unlikely to get this done in next 6 months or so, therefore opts for FIT today  Plan: Fecal colorectal cancer screen (FIT)    (Z23) Need for shingles vaccine  Plan: ZOSTER RECOMBINANT ADJUVANTED (SHINGRIX)    (Z23) Need for diphtheria-tetanus-pertussis (Tdap) vaccine  Plan: TDAP 10-64Y (ADACEL,BOOSTRIX)    Patient has been advised of split billing requirements and indicates understanding: Yes    Patient Instructions     Tetanus, shingles, pneumonia vaccine today  Suggest considering covid vaccine again in the fall     Change from aspirin 325 to aspirin 81 mg - less risk of stomach bleeds with no further benefit  Add semaglutide (rybelsus) oral to help diabetes.  If nausea can use ondansetron.  If difficulty tolerating, contact me.  Change metformin to extended release to reduce risk of side effects in combination with new med rybelsus   Work on wt loss     Do mail the poop test since didn't think colonoscopy would happen this year    Preventive Health Recommendations:     See your health care provider every year to    Review health changes.     Discuss preventive care.      Review your medicines if your doctor has prescribed any.      Talk with your health care provider about whether you should have a test to screen for prostate cancer (PSA).    Every 3 years, have a diabetes test (fasting glucose).  If you are at risk for diabetes, you should have this test more often.    Every 5 years, have a cholesterol test. Have this test more often if you are at risk for high cholesterol or heart disease.     Every 10 years, have a colonoscopy. Or, have a yearly FIT test (stool test). These exams will check for colon cancer.    Talk to with your health care provider about screening for Abdominal Aortic Aneurysm if you have a family history of AAA or have a history of smoking.    Shots:     Get a flu shot each year.     Get a tetanus shot every 10 years.     Talk to your doctor about your pneumonia vaccines. There are now two you should receive - Pneumovax (PPSV 23) and Prevnar (PCV 13).     Talk to your pharmacist about a shingles vaccine.     Talk to your doctor about the hepatitis B vaccine.  Nutrition:     Eat at least 5 servings of fruits and vegetables each day.     Eat whole-grain bread, whole-wheat pasta and brown rice instead of white grains and rice.     Get adequate Calcium and Vitamin D.   Lifestyle    Exercise for at least 150 minutes a week (30 minutes a day, 5 days a week). This will help you control your weight and prevent disease.     Limit alcohol to one drink per day.     No smoking.     Wear sunscreen to prevent skin cancer.    See your dentist every six months for an exam and cleaning.    See your eye doctor every 1 to 2 years to screen for conditions such as glaucoma, macular degeneration, cataracts, etc.    Personalized Prevention Plan  You are due for the preventive services outlined below.  Your care team is available to assist you in scheduling these services.  If you have already completed any of these items, please share that information with your care team to update in your medical record.  Health Maintenance Due   Topic Date Due     Discuss Advance Care Planning  Never done     Zoster (Shingles) Vaccine (1 of 2) Never done     LUNG CANCER SCREENING  Never done     Pneumococcal Vaccine (2 - PCV)  "03/07/2015     Diptheria Tetanus Pertussis (DTAP/TDAP/TD) Vaccine (2 - Td or Tdap) 04/30/2019     Diabetic Foot Exam  08/09/2019     COVID-19 Vaccine (3 - Moderna series) 06/12/2021     AORTIC ANEURYSM SCREENING (SYSTEM ASSIGNED)  Never done     COUNSELING:   Reviewed preventive health counseling, as reflected in patient instructions    BMI:   Estimated body mass index is 37.74 kg/m  as calculated from the following:    Height as of this encounter: 1.816 m (5' 11.5\").    Weight as of this encounter: 124.5 kg (274 lb 6.4 oz).   Weight management plan: Discussed healthy diet and exercise guidelines    He reports that he has quit smoking. He has never used smokeless tobacco.          Deborah Delgado PA-C  [facility]  "

## 2023-06-09 ENCOUNTER — TELEPHONE (OUTPATIENT)
Dept: FAMILY MEDICINE | Facility: CLINIC | Age: 68
End: 2023-06-09
Payer: COMMERCIAL

## 2023-06-09 NOTE — TELEPHONE ENCOUNTER
Prior Authorization Retail Medication Request    Medication/Dose: FreeStyle Natalia 4 Day Sensor  ICD code (if different than what is on RX):     Previously Tried and Failed:     Rationale:       Insurance Name:  Andreina YENYPTBXC  Insurance ID:         Pharmacy Information (if different than what is on RX)  Name:     Phone:

## 2023-06-23 ENCOUNTER — MYC MEDICAL ADVICE (OUTPATIENT)
Dept: FAMILY MEDICINE | Facility: CLINIC | Age: 68
End: 2023-06-23

## 2023-09-12 ENCOUNTER — MYC MEDICAL ADVICE (OUTPATIENT)
Dept: FAMILY MEDICINE | Facility: CLINIC | Age: 68
End: 2023-09-12
Payer: COMMERCIAL

## 2023-09-17 ENCOUNTER — HEALTH MAINTENANCE LETTER (OUTPATIENT)
Age: 68
End: 2023-09-17

## 2023-09-19 DIAGNOSIS — E11.65 TYPE 2 DIABETES MELLITUS WITH HYPERGLYCEMIA, WITHOUT LONG-TERM CURRENT USE OF INSULIN (H): ICD-10-CM

## 2023-09-19 RX ORDER — ORAL SEMAGLUTIDE 14 MG/1
TABLET ORAL
Qty: 30 TABLET | Refills: 0 | Status: SHIPPED | OUTPATIENT
Start: 2023-09-19 | End: 2023-10-11

## 2023-10-10 ENCOUNTER — MYC MEDICAL ADVICE (OUTPATIENT)
Dept: FAMILY MEDICINE | Facility: CLINIC | Age: 68
End: 2023-10-10
Payer: COMMERCIAL

## 2023-10-11 ENCOUNTER — OFFICE VISIT (OUTPATIENT)
Dept: FAMILY MEDICINE | Facility: CLINIC | Age: 68
End: 2023-10-11
Payer: COMMERCIAL

## 2023-10-11 VITALS
BODY MASS INDEX: 37.66 KG/M2 | SYSTOLIC BLOOD PRESSURE: 138 MMHG | RESPIRATION RATE: 18 BRPM | WEIGHT: 269 LBS | OXYGEN SATURATION: 95 % | TEMPERATURE: 97.4 F | DIASTOLIC BLOOD PRESSURE: 83 MMHG | HEART RATE: 89 BPM | HEIGHT: 71 IN

## 2023-10-11 DIAGNOSIS — Z23 NEED FOR VACCINATION: ICD-10-CM

## 2023-10-11 DIAGNOSIS — E66.01 SEVERE OBESITY (BMI 35.0-39.9) WITH COMORBIDITY (H): ICD-10-CM

## 2023-10-11 DIAGNOSIS — E11.29 TYPE 2 DIABETES MELLITUS WITH MICROALBUMINURIA, WITHOUT LONG-TERM CURRENT USE OF INSULIN (H): Primary | ICD-10-CM

## 2023-10-11 DIAGNOSIS — E78.5 DYSLIPIDEMIA: ICD-10-CM

## 2023-10-11 DIAGNOSIS — R80.9 TYPE 2 DIABETES MELLITUS WITH MICROALBUMINURIA, WITHOUT LONG-TERM CURRENT USE OF INSULIN (H): Primary | ICD-10-CM

## 2023-10-11 DIAGNOSIS — Q89.01 ASPLENIA: ICD-10-CM

## 2023-10-11 DIAGNOSIS — Z87.828 HISTORY OF TRAUMA: ICD-10-CM

## 2023-10-11 DIAGNOSIS — I10 ESSENTIAL HYPERTENSION: ICD-10-CM

## 2023-10-11 LAB — HBA1C MFR BLD: 7.6 % (ref 0–5.6)

## 2023-10-11 PROCEDURE — 90471 IMMUNIZATION ADMIN: CPT | Performed by: PHYSICIAN ASSISTANT

## 2023-10-11 PROCEDURE — 36415 COLL VENOUS BLD VENIPUNCTURE: CPT | Performed by: PHYSICIAN ASSISTANT

## 2023-10-11 PROCEDURE — 90746 HEPB VACCINE 3 DOSE ADULT IM: CPT | Performed by: PHYSICIAN ASSISTANT

## 2023-10-11 PROCEDURE — 90750 HZV VACC RECOMBINANT IM: CPT | Performed by: PHYSICIAN ASSISTANT

## 2023-10-11 PROCEDURE — 90472 IMMUNIZATION ADMIN EACH ADD: CPT | Performed by: PHYSICIAN ASSISTANT

## 2023-10-11 PROCEDURE — 99214 OFFICE O/P EST MOD 30 MIN: CPT | Mod: 25 | Performed by: PHYSICIAN ASSISTANT

## 2023-10-11 PROCEDURE — 83036 HEMOGLOBIN GLYCOSYLATED A1C: CPT | Performed by: PHYSICIAN ASSISTANT

## 2023-10-11 RX ORDER — DOXAZOSIN 4 MG/1
4 TABLET ORAL DAILY
Qty: 90 TABLET | Refills: 1 | Status: SHIPPED | OUTPATIENT
Start: 2023-10-11 | End: 2024-04-22

## 2023-10-11 RX ORDER — HYDROCHLOROTHIAZIDE 12.5 MG/1
12.5 TABLET ORAL DAILY
Qty: 90 TABLET | Refills: 1 | Status: SHIPPED | OUTPATIENT
Start: 2023-10-11 | End: 2024-04-22

## 2023-10-11 RX ORDER — PIOGLITAZONEHYDROCHLORIDE 30 MG/1
30 TABLET ORAL DAILY
Qty: 90 TABLET | Refills: 1 | Status: SHIPPED | OUTPATIENT
Start: 2023-10-11 | End: 2024-04-22

## 2023-10-11 RX ORDER — ATORVASTATIN CALCIUM 20 MG/1
20 TABLET, FILM COATED ORAL DAILY
Qty: 90 TABLET | Refills: 1 | Status: SHIPPED | OUTPATIENT
Start: 2023-10-11 | End: 2024-04-22

## 2023-10-11 RX ORDER — LISINOPRIL 40 MG/1
40 TABLET ORAL DAILY
Qty: 90 TABLET | Refills: 1 | Status: SHIPPED | OUTPATIENT
Start: 2023-10-11 | End: 2024-04-22

## 2023-10-11 RX ORDER — METFORMIN HCL 500 MG
2000 TABLET, EXTENDED RELEASE 24 HR ORAL
Qty: 360 TABLET | Refills: 1 | Status: SHIPPED | OUTPATIENT
Start: 2023-10-11 | End: 2024-04-22

## 2023-10-11 RX ORDER — ORAL SEMAGLUTIDE 14 MG/1
TABLET ORAL
Qty: 90 TABLET | Refills: 1 | Status: SHIPPED | OUTPATIENT
Start: 2023-10-11 | End: 2024-04-12

## 2023-10-11 ASSESSMENT — PAIN SCALES - GENERAL: PAINLEVEL: NO PAIN (0)

## 2023-10-11 NOTE — PROGRESS NOTES
Assessment & Plan     Type 2 diabetes mellitus with microalbuminuria, without long-term current use of insulin (H)  Now controlled, refill  - Hemoglobin A1c  - metFORMIN (GLUCOPHAGE XR) 500 MG 24 hr tablet  Dispense: 360 tablet; Refill: 1  - pioglitazone (ACTOS) 30 MG tablet  Dispense: 90 tablet; Refill: 1  - Semaglutide (RYBELSUS) 14 MG tablet  Dispense: 90 tablet; Refill: 1  - doxazosin (CARDURA) 4 MG tablet  Dispense: 90 tablet; Refill: 1    Severe obesity (BMI 35.0-39.9) with comorbidity (H)  Uncontrolled, down 5 pounds.  Does not want injectables.  Continue rybelsus.    Essential hypertension  controlled  - lisinopril (ZESTRIL) 40 MG tablet  Dispense: 90 tablet; Refill: 1  - hydrochlorothiazide (HYDRODIURIL) 12.5 MG tablet  Dispense: 90 tablet; Refill: 1    Dyslipidemia  refill  - atorvastatin (LIPITOR) 20 MG tablet  Dispense: 90 tablet; Refill: 1    History of trauma  Unsure if has spleen, agreeable to check since still impacting vaccines.  He tried and was unable to get his records from the 1970s.  - US Abdomen Limited     Patient Instructions   No med changes  If gut issues return we can discuss other meds  Try working on adding more physical activity and weight loss    Finishing hep B vaccine   Finishing shingles vaccine today  Declined flu and covid vaccines   Also option of RSV vaccine, but covid more important.  Medicare requires RSV gotten at pharmacy for coverage - unsure about your private insurance.    Ultrasound sometime to check if have spleen.  Call [phone] to set up your imaging testing.     Check with your next flight physical on if had ultrasound to check for abdominal aneurysm    HAMIDA [facility]    Dayo Sprague is a 68 year old, presenting for the following health issues:  Diabetes, Hypertension, and Lipids      10/11/2023     8:29 AM   Additional Questions   Roomed by axel cho cma     History of Present Illness     Diabetes:   He  presents for follow up of diabetes.   He is checking home blood glucose with a continuous glucose monitor.   He checks blood glucose before meals, after meals, before and after meals and at bedtime.  Blood glucose is sometimes over 200 and never under 70. He is aware of hypoglycemia symptoms including none.    He has no concerns regarding his diabetes at this time.   He is not experiencing numbness or burning in feet, excessive thirst, blurry vision, weight changes or redness, sores or blisters on feet.       A1c went from 8.9 to 7.6 and down 5 pounds with adding rybelsus to his metformin and actos 30.  Changed metformin to extended release but had bad diarrhea all summer, hemorrhoid issues during that.  He attributes diarrhea to the rybelsus but it has now finally improved, now formed but loose (which he had this tendency in past).  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  He notes vitality seems down, attributes to stress, doesn't wish labs.  Plans to do FIT now that hemorrhoids have resolved.    HTN - 130s/80s when he monitor.  No chest pains, chest pressures, SOB or sudden change of endurance.       Notes wife planning to retire next summer so they'll be on medicare at that time.      He eats 2-3 servings of fruits and vegetables daily.He consumes 0 sweetened beverage(s) daily.He exercises with enough effort to increase his heart rate 20 to 29 minutes per day.  He exercises with enough effort to increase his heart rate 4 days per week.   He is taking medications regularly.     Hypertension Follow-up  Do you check your blood pressure regularly outside of the clinic? Yes   Are you following a low salt diet? No  Are your blood pressures ever more than 140 on the top number (systolic) OR more   than 90 on the bottom number (diastolic), for example 140/90? No        Objective    There were no vitals taken for this visit.  There is  no height or weight on file to calculate BMI.      Answers submitted by the patient for this visit:  Diabetes Visit (Submitted on 10/6/2023)  Chief Complaint: Chronic problems general questions HPI Form  Frequency of checking blood sugars:: continous glucose monitor  What time of day are you checking your blood sugars : before meals, after meals, before and after meals, at bedtime  Have you had any blood sugars above 200?: Yes  Have you had any blood sugars below 70?: No  Hypoglycemia symptoms:: none  Diabetic concerns:: none  Paraesthesia present:: none of these symptoms  General Questionnaire (Submitted on 10/6/2023)  Chief Complaint: Chronic problems general questions HPI Form  How many servings of fruits and vegetables do you eat daily?: 2-3  On average, how many sweetened beverages do you drink each day (Examples: soda, juice, sweet tea, etc.  Do NOT count diet or artificially sweetened beverages)?: 0  How many minutes a day do you exercise enough to make your heart beat faster?: 20 to 29  How many days a week do you exercise enough to make your heart beat faster?: 4  How many days per week do you miss taking your medication?: 0

## 2023-10-11 NOTE — NURSING NOTE
"Chief Complaint   Patient presents with    Physical       Initial There were no vitals taken for this visit. Estimated body mass index is 37.74 kg/m  as calculated from the following:    Height as of 6/8/23: 1.816 m (5' 11.5\").    Weight as of 6/8/23: 124.5 kg (274 lb 6.4 oz).    Patient presents to the clinic using No DME    Is there anyone who you would like to be able to receive your results? No  If yes have patient fill out AGUILAR      "

## 2023-10-11 NOTE — PATIENT INSTRUCTIONS
No med changes  If gut issues return we can discuss other meds  Try working on adding more physical activity and weight loss    Finishing hep B vaccine   Finishing shingles vaccine today  Declined flu and covid vaccines   Also option of RSV vaccine, but covid more important.  Medicare requires RSV gotten at pharmacy for coverage - unsure about your private insurance.    Ultrasound sometime to check if have spleen.  Call [phone] to set up your imaging testing.     Check with your next flight physical on if had ultrasound to check for abdominal aneurysm

## 2023-10-13 ENCOUNTER — TELEPHONE (OUTPATIENT)
Dept: FAMILY MEDICINE | Facility: CLINIC | Age: 68
End: 2023-10-13

## 2023-10-13 ENCOUNTER — HOSPITAL ENCOUNTER (OUTPATIENT)
Dept: ULTRASOUND IMAGING | Facility: CLINIC | Age: 68
Discharge: HOME | End: 2023-10-13
Attending: PHYSICIAN ASSISTANT | Admitting: PHYSICIAN ASSISTANT
Payer: COMMERCIAL

## 2023-10-13 DIAGNOSIS — Z23 NEED FOR VACCINATION: ICD-10-CM

## 2023-10-13 DIAGNOSIS — Z23 NEED FOR VACCINATION: Primary | ICD-10-CM

## 2023-10-13 DIAGNOSIS — Q89.01 ASPLENIA: ICD-10-CM

## 2023-10-13 DIAGNOSIS — Z87.828 HISTORY OF TRAUMA: ICD-10-CM

## 2023-10-13 DIAGNOSIS — Q89.01 ASPLENIA: Primary | ICD-10-CM

## 2023-10-13 PROCEDURE — 76705 ECHO EXAM OF ABDOMEN: CPT

## 2023-10-14 NOTE — TELEPHONE ENCOUNTER
Haemophilus influenzae type b vaccine  Hib 1 dose  Not applicable   Meningococcal serotype ACWY vaccine  MenACWY (Menactra, Menveo, or MenQuadfi) 2 doses at least 8 weeks apart? Every 5 years   Meningococcal serotype B  MenB-FHbp (Trumenba) or MenB-4C (Bexsero) 2 doses of MenB-4C at least 1 month apart or 3 doses of MenB-FHbp at 0, 1 to 2, and 6 months? 1 year after completing the primary series and every 2 to 3 ye   Indication asplenia, careteam please help place orders.  Bexsero is flagging me as not being the right age group so not letting me sign   All orders pended with appropriate dates  I believe we have to order in bexsero so coordinate with patient on scheduling.

## 2023-10-14 NOTE — RESULT ENCOUNTER NOTE
Celestine  You were apparently correct in thinking that you didn't have your spleen.  Vaccines are needed.  One of the vaccines isn't always at the clinic so will need to be ordered in special for you.  Someone from the [facility] should call you to help schedule.  You'll need more vaccine doses 8 wks or more from first doses.  Let me know if you have questions.  Deborah

## 2023-10-14 NOTE — PROGRESS NOTES
Haemophilus influenzae type b vaccine  Hib 1 dose  Not applicable   Meningococcal serotype ACWY vaccine  MenACWY (Menactra, Menveo, or MenQuadfi) 2 doses at least 8 weeks apart? Every 5 years   Meningococcal serotype B  MenB-FHbp (Trumenba) or MenB-4C (Bexsero) 2 doses of MenB-4C at least 1 month apart or 3 doses of MenB-FHbp at 0, 1 to 2, and 6 months? 1 year after completing the primary series and every 2 to 3 ye   RN please help place orders.  Bexsero is flagging me as not being the right age group so not letting me sign the smartset for it.   I ordered the first doses of 2 meningococcal vaccines under 10/11 office visit.  Both being repeated at 8 wks.

## 2023-10-17 ENCOUNTER — MYC MEDICAL ADVICE (OUTPATIENT)
Dept: FAMILY MEDICINE | Facility: CLINIC | Age: 68
End: 2023-10-17
Payer: COMMERCIAL

## 2023-10-23 ENCOUNTER — MYC MEDICAL ADVICE (OUTPATIENT)
Dept: FAMILY MEDICINE | Facility: CLINIC | Age: 68
End: 2023-10-23
Payer: COMMERCIAL

## 2023-10-25 ENCOUNTER — TELEPHONE (OUTPATIENT)
Dept: FAMILY MEDICINE | Facility: CLINIC | Age: 68
End: 2023-10-25
Payer: COMMERCIAL

## 2023-10-25 NOTE — TELEPHONE ENCOUNTER
Prior Authorization Retail Medication Request    Medication/Dose: Rybelsus 14MG tablets   ICD code (if different than what is on RX):    Previously Tried and Failed:    Rationale:      Insurance Name:    Insurance ID:        Pharmacy Information (if different than what is on RX)  Name:  [facility]  Phone:  [phone]    Cover my meds:  Key: [id number]

## 2023-10-26 NOTE — TELEPHONE ENCOUNTER
PA Initiation    Medication: RYBELSUS 14 MG PO TABS  Insurance Company: [employer] acquired by [employer] - Phone [phone] Fax [phone]  Pharmacy Filling the Rx: [facility] - [place] - [id number] [address] [facility]  Filling Pharmacy Phone: [phone]  Filling Pharmacy Fax: [phone]  Start Date: 10/26/2023    CMM unable to find patient, called plan to initiate. Determination will be faxed.

## 2023-10-31 NOTE — TELEPHONE ENCOUNTER
Prior Authorization Approval    Medication: RYBELSUS 14 MG PO TABS  Authorization Effective Date: 10/26/2023  Authorization Expiration Date: 10/25/2024  Approved Dose/Quantity: 30/30ds   Reference #:     Insurance Company: [employer] - Phone [phone] Fax [phone]  Which Pharmacy is filling the prescription: [facility] - [place] - [address] [address] [facility]  Pharmacy Notified: y  Patient Notified: y - pharmacy to notify

## 2023-10-31 NOTE — TELEPHONE ENCOUNTER
Spoke with plan, they advised case was approved. Requested they resend determination letter. Will update encounter once received.

## 2024-02-04 ENCOUNTER — HEALTH MAINTENANCE LETTER (OUTPATIENT)
Age: 69
End: 2024-02-04

## 2024-03-05 ENCOUNTER — MYC MEDICAL ADVICE (OUTPATIENT)
Dept: FAMILY MEDICINE | Facility: CLINIC | Age: 69
End: 2024-03-05
Payer: COMMERCIAL

## 2024-03-05 DIAGNOSIS — R80.9 TYPE 2 DIABETES MELLITUS WITH MICROALBUMINURIA, WITHOUT LONG-TERM CURRENT USE OF INSULIN (H): Primary | ICD-10-CM

## 2024-03-05 DIAGNOSIS — Z87.891 HISTORY OF SMOKING: ICD-10-CM

## 2024-03-05 DIAGNOSIS — E11.29 TYPE 2 DIABETES MELLITUS WITH MICROALBUMINURIA, WITHOUT LONG-TERM CURRENT USE OF INSULIN (H): Primary | ICD-10-CM

## 2024-03-05 DIAGNOSIS — Z13.6 SCREENING FOR AAA (ABDOMINAL AORTIC ANEURYSM): ICD-10-CM

## 2024-03-05 NOTE — TELEPHONE ENCOUNTER
Patient asking for labs prior to his upcoming appt on 05/13/24.   Lab Results   Component Value Date    A1C 7.6 10/11/2023    A1C 8.9 06/06/2023    A1C 9.4 06/21/2022    A1C 7.6 02/03/2022    A1C 10.3 11/16/2021    A1C 7.6 12/24/2018    A1C 8.6 08/07/2018    A1C 7.9 02/07/2018    A1C 9.1 11/07/2017    A1C 8.4 08/04/2017     Last BMP and Lipids not due until 06/24. Please advise.  Tiffanie CASTRO RN

## 2024-04-12 DIAGNOSIS — E11.29 TYPE 2 DIABETES MELLITUS WITH MICROALBUMINURIA, WITHOUT LONG-TERM CURRENT USE OF INSULIN (H): ICD-10-CM

## 2024-04-12 DIAGNOSIS — R80.9 TYPE 2 DIABETES MELLITUS WITH MICROALBUMINURIA, WITHOUT LONG-TERM CURRENT USE OF INSULIN (H): ICD-10-CM

## 2024-04-12 RX ORDER — ORAL SEMAGLUTIDE 14 MG/1
TABLET ORAL
Qty: 30 TABLET | Refills: 0 | Status: SHIPPED | OUTPATIENT
Start: 2024-04-12 | End: 2024-05-13

## 2024-04-20 DIAGNOSIS — R80.9 TYPE 2 DIABETES MELLITUS WITH MICROALBUMINURIA, WITHOUT LONG-TERM CURRENT USE OF INSULIN (H): ICD-10-CM

## 2024-04-20 DIAGNOSIS — I10 ESSENTIAL HYPERTENSION: ICD-10-CM

## 2024-04-20 DIAGNOSIS — E11.29 TYPE 2 DIABETES MELLITUS WITH MICROALBUMINURIA, WITHOUT LONG-TERM CURRENT USE OF INSULIN (H): ICD-10-CM

## 2024-04-20 DIAGNOSIS — E78.5 DYSLIPIDEMIA: ICD-10-CM

## 2024-04-22 RX ORDER — PIOGLITAZONEHYDROCHLORIDE 30 MG/1
30 TABLET ORAL DAILY
Qty: 30 TABLET | Refills: 0 | Status: SHIPPED | OUTPATIENT
Start: 2024-04-22 | End: 2024-05-13

## 2024-04-22 RX ORDER — HYDROCHLOROTHIAZIDE 12.5 MG/1
12.5 TABLET ORAL EVERY MORNING
Qty: 30 TABLET | Refills: 0 | Status: SHIPPED | OUTPATIENT
Start: 2024-04-22 | End: 2024-05-13

## 2024-04-22 RX ORDER — LISINOPRIL 40 MG/1
40 TABLET ORAL DAILY
Qty: 30 TABLET | Refills: 0 | Status: SHIPPED | OUTPATIENT
Start: 2024-04-22 | End: 2024-05-13

## 2024-04-22 RX ORDER — DOXAZOSIN 4 MG/1
4 TABLET ORAL DAILY
Qty: 30 TABLET | Refills: 0 | Status: SHIPPED | OUTPATIENT
Start: 2024-04-22 | End: 2024-05-13

## 2024-04-22 RX ORDER — ATORVASTATIN CALCIUM 20 MG/1
20 TABLET, FILM COATED ORAL DAILY
Qty: 30 TABLET | Refills: 0 | Status: SHIPPED | OUTPATIENT
Start: 2024-04-22 | End: 2024-05-13 | Stop reason: ALTCHOICE

## 2024-04-22 RX ORDER — METFORMIN HCL 500 MG
TABLET, EXTENDED RELEASE 24 HR ORAL
Qty: 120 TABLET | Refills: 0 | Status: SHIPPED | OUTPATIENT
Start: 2024-04-22 | End: 2024-05-13

## 2024-04-22 NOTE — TELEPHONE ENCOUNTER
Has appointment scheduled for 5/13/24.      Requested Prescriptions   Pending Prescriptions Disp Refills    doxazosin (CARDURA) 4 MG tablet [Pharmacy Med Name: doxazosin 4 mg tablet] 30 tablet 5     Sig: TAKE 1 TABLET BY MOUTH ONCE DAILY       Alpha Blockers Passed - 4/20/2024  8:03 AM        Passed - Blood pressure under 140/90 in past 12 months     BP Readings from Last 3 Encounters:   10/11/23 138/83   06/08/23 129/80   06/25/22 125/65       No data recorded            Passed - Recent (12 mo) or future (30 days) visit within the authorizing provider's specialty     The patient must have completed an in-person or virtual visit within the past 12 months or has a future visit scheduled within the next 90 days with the authorizing provider s specialty.  Urgent care and e-visits do not quality as an office visit for this protocol.          Passed - Patient does not have Tadalafil, Vardenafil, or Sildenafil on their medication list        Passed - Medication is active on med list        Passed - Patient is 18 years of age or older       Antihypertensive Agents Passed - 4/20/2024  8:03 AM        Passed - Blood pressure under 140/90 in past 12 months     BP Readings from Last 3 Encounters:   10/11/23 138/83   06/08/23 129/80   06/25/22 125/65       No data recorded            Passed - Medication is active on med list        Passed - Has GFR on file in past 12 months and most recent value is normal        Passed - Medication indicated for associated diagnosis     Medication is associated with one or more of the following diagnoses:     Hypertension   Benign Prostatic Hypertrophy   Urolithiasis          Passed - Recent (12 mo) or future (90 days) visit within the authorizing provider's specialty     The patient must have completed an in-person or virtual visit within the past 12 months or has a future visit scheduled within the next 90 days with the authorizing provider s specialty.  Urgent care and e-visits do not quality  as an office visit for this protocol.          Passed - Patient is age 18 or older          pioglitazone (ACTOS) 30 MG tablet [Pharmacy Med Name: pioglitazone 30 mg tablet] 30 tablet 5     Sig: Take 1 tablet (30 mg) by mouth daily Due for diabetic follow up visit/labs before further fills       Thiazolidinedione Agents (TZDs)  Failed - 4/20/2024  8:03 AM        Failed - Patient has a normal ALT within the past 12 mos.     Recent Labs   Lab Test 11/16/21  0929   ALT 39             Failed - Patient has a normal AST within the past 12 mos.      Recent Labs   Lab Test 11/16/21  0929   AST 23             Failed - Patient has documented A1c within the specified period of time.     If HgbA1C is 8 or greater, it needs to be on file within the past 3 months.  If less than 8, must be on file within the past 6 months.     Recent Labs   Lab Test 10/11/23  0847   A1C 7.6*             Passed - Diagnosis not CHF        Passed - Medication is active on med list        Passed - Has GFR on file in past 12 months and most recent value is normal        Passed - Recent (6 mo) or future (90 days) visit within the authorizing provider's specialty     The patient must have completed an in-person or virtual visit within the past 6 months or has a future visit scheduled within the next 90 days with the authorizing provider s specialty.  Urgent care and e-visits do not quality as an office visit for this protocol.          Passed - Medication indicated for associated diagnosis     Medication is associated with one or more of the following diagnoses:   - Type 2 diabetes mellitus          Passed - Patient is age 18 or older          metFORMIN (GLUCOPHAGE XR) 500 MG 24 hr tablet [Pharmacy Med Name: metformin  mg tablet,extended release 24 hr] 120 tablet 5     Sig: TAKE 4 TABLETS BY MOUTH ONCE DAILY WITH FOOD       Biguanide Agents Failed - 4/20/2024  8:03 AM        Failed - Patient has documented A1c within the specified period of time.      If HgbA1C is 8 or greater, it needs to be on file within the past 3 months.  If less than 8, must be on file within the past 6 months.     Recent Labs   Lab Test 10/11/23  0847   A1C 7.6*             Passed - Patient is age 10 or older        Passed - Patient does NOT have a diagnosis of CHF.        Passed - Medication is active on med list        Passed - Medication indicated for associated diagnosis     Medication is associated with one or more of the following diagnoses:     Gestational diabetes mellitus     Hyperinsulinar obesity     Hypersecretion of ovarian androgens    Non-alcoholic fatty liver    Polycystic ovarian syndrome               Pre-diabetes (DM 2 prevention)    Type 2 diabetes mellitus     Weight gain, antipsychotic therapy-induced             Passed - Has GFR on file in past 12 months and most recent value is normal        Passed - Recent (6 mo) or future (90 days) visit within the authorizing provider's specialty     The patient must have completed an in-person or virtual visit within the past 6 months or has a future visit scheduled within the next 90 days with the authorizing provider s specialty.  Urgent care and e-visits do not quality as an office visit for this protocol.            hydroCHLOROthiazide 12.5 MG tablet [Pharmacy Med Name: hydrochlorothiazide 12.5 mg tablet] 30 tablet 5     Sig: TAKE 1 TABLET BY MOUTH EVERY MORNING       Diuretics (Including Combos) Protocol Passed - 4/20/2024  8:03 AM        Passed - Blood pressure under 140/90 in past 12 months     BP Readings from Last 3 Encounters:   10/11/23 138/83   06/08/23 129/80   06/25/22 125/65       No data recorded            Passed - Medication is active on med list        Passed - Medication indicated for associated diagnosis     Medication is associated with one or more of the following diagnoses:     Edema   Hypertension   Heart Failure   Meniere's Disease          Passed - Has GFR on file in past 12 months and most recent  value is normal        Passed - Recent (12 mo) or future (90 days) visit within the authorizing provider's specialty     The patient must have completed an in-person or virtual visit within the past 12 months or has a future visit scheduled within the next 90 days with the authorizing provider s specialty.  Urgent care and e-visits do not quality as an office visit for this protocol.          Passed - Patient is age 18 or older          lisinopril (ZESTRIL) 40 MG tablet [Pharmacy Med Name: lisinopril 40 mg tablet] 30 tablet 5     Sig: TAKE 1 TABLET BY MOUTH ONCE DAILY       ACE Inhibitors (Including Combos) Protocol Passed - 4/20/2024  8:03 AM        Passed - Blood pressure under 140/90 in past 12 months- Clinicial or Patient Reported     BP Readings from Last 3 Encounters:   10/11/23 138/83   06/08/23 129/80   06/25/22 125/65       No data recorded            Passed - Medication is active on med list        Passed - Medication indicated for associated diagnosis     Medication is associated with one or more of the following diagnoses:     Chronic Kidney Disease (CKD)   Coronary Artery Disease (CAD)   Diabetes   Heart Failure (HF)   Hypertension (HTN)   Nephropathy            Passed - Has GFR on file in past 12 months and most recent value is normal        Passed - Recent (12 mo) or future (90 days) visit within the authorizing provider's specialty     The patient must have completed an in-person or virtual visit within the past 12 months or has a future visit scheduled within the next 90 days with the authorizing provider s specialty.  Urgent care and e-visits do not quality as an office visit for this protocol.          Passed - Patient is age 18 or older        Passed - Normal serum creatinine on file in past 12 months     Recent Labs   Lab Test 06/06/23  0831   CR 1.14                 Passed - Normal serum potassium on file in past 12 months     Recent Labs   Lab Test 06/06/23  0831   POTASSIUM 4.4                atorvastatin (LIPITOR) 20 MG tablet [Pharmacy Med Name: atorvastatin 20 mg tablet] 30 tablet 5     Sig: TAKE 1 TABLET BY MOUTH ONCE DAILY       Antihyperlipidemic agents Passed - 4/20/2024  8:03 AM        Passed - LDL on file in the past 12 months        Passed - Medication is active on med list        Passed - Recent (12 mo) or future (90 days) visit within the authorizing provider's specialty     The patient must have completed an in-person or virtual visit within the past 12 months or has a future visit scheduled within the next 90 days with the authorizing provider s specialty.  Urgent care and e-visits do not quality as an office visit for this protocol.          Passed - Patient is age 18 years or older

## 2024-04-29 ENCOUNTER — MYC MEDICAL ADVICE (OUTPATIENT)
Dept: FAMILY MEDICINE | Facility: CLINIC | Age: 69
End: 2024-04-29
Payer: COMMERCIAL

## 2024-04-29 DIAGNOSIS — E11.65 TYPE 2 DIABETES MELLITUS WITH HYPERGLYCEMIA, WITHOUT LONG-TERM CURRENT USE OF INSULIN (H): ICD-10-CM

## 2024-04-29 RX ORDER — FLASH GLUCOSE SENSOR
1 KIT MISCELLANEOUS
Qty: 6 EACH | Refills: 1 | Status: SHIPPED | OUTPATIENT
Start: 2024-04-29

## 2024-05-11 ENCOUNTER — LAB (OUTPATIENT)
Dept: LAB | Facility: CLINIC | Age: 69
End: 2024-05-11
Payer: COMMERCIAL

## 2024-05-11 DIAGNOSIS — R80.9 TYPE 2 DIABETES MELLITUS WITH MICROALBUMINURIA, WITHOUT LONG-TERM CURRENT USE OF INSULIN (H): ICD-10-CM

## 2024-05-11 DIAGNOSIS — E11.29 TYPE 2 DIABETES MELLITUS WITH MICROALBUMINURIA, WITHOUT LONG-TERM CURRENT USE OF INSULIN (H): ICD-10-CM

## 2024-05-11 LAB
ANION GAP SERPL CALCULATED.3IONS-SCNC: 13 MMOL/L (ref 7–15)
BUN SERPL-MCNC: 35.9 MG/DL (ref 8–23)
CALCIUM SERPL-MCNC: 10.3 MG/DL (ref 8.8–10.2)
CHLORIDE SERPL-SCNC: 99 MMOL/L (ref 98–107)
CHOLEST SERPL-MCNC: 108 MG/DL
CREAT SERPL-MCNC: 1.69 MG/DL (ref 0.67–1.17)
CREAT UR-MCNC: 241 MG/DL
DEPRECATED HCO3 PLAS-SCNC: 25 MMOL/L (ref 22–29)
EGFRCR SERPLBLD CKD-EPI 2021: 44 ML/MIN/1.73M2
FASTING STATUS PATIENT QL REPORTED: YES
FASTING STATUS PATIENT QL REPORTED: YES
GLUCOSE SERPL-MCNC: 116 MG/DL (ref 70–99)
HBA1C MFR BLD: 7 % (ref 0–5.6)
HDLC SERPL-MCNC: 54 MG/DL
LDLC SERPL CALC-MCNC: 37 MG/DL
MICROALBUMIN UR-MCNC: 23.9 MG/L
MICROALBUMIN/CREAT UR: 9.92 MG/G CR (ref 0–17)
NONHDLC SERPL-MCNC: 54 MG/DL
POTASSIUM SERPL-SCNC: 4.2 MMOL/L (ref 3.4–5.3)
SODIUM SERPL-SCNC: 137 MMOL/L (ref 135–145)
TRIGL SERPL-MCNC: 86 MG/DL

## 2024-05-11 PROCEDURE — 82570 ASSAY OF URINE CREATININE: CPT

## 2024-05-11 PROCEDURE — 83036 HEMOGLOBIN GLYCOSYLATED A1C: CPT

## 2024-05-11 PROCEDURE — 80048 BASIC METABOLIC PNL TOTAL CA: CPT

## 2024-05-11 PROCEDURE — 36415 COLL VENOUS BLD VENIPUNCTURE: CPT

## 2024-05-11 PROCEDURE — 82043 UR ALBUMIN QUANTITATIVE: CPT

## 2024-05-11 PROCEDURE — 80061 LIPID PANEL: CPT

## 2024-05-13 ENCOUNTER — OFFICE VISIT (OUTPATIENT)
Dept: FAMILY MEDICINE | Facility: CLINIC | Age: 69
End: 2024-05-13
Attending: PHYSICIAN ASSISTANT
Payer: COMMERCIAL

## 2024-05-13 ENCOUNTER — ORDERS ONLY (AUTO-RELEASED) (OUTPATIENT)
Dept: FAMILY MEDICINE | Facility: CLINIC | Age: 69
End: 2024-05-13

## 2024-05-13 VITALS
HEART RATE: 83 BPM | OXYGEN SATURATION: 96 % | DIASTOLIC BLOOD PRESSURE: 74 MMHG | TEMPERATURE: 97.9 F | HEIGHT: 71 IN | SYSTOLIC BLOOD PRESSURE: 114 MMHG | BODY MASS INDEX: 34.86 KG/M2 | WEIGHT: 249 LBS | RESPIRATION RATE: 20 BRPM

## 2024-05-13 DIAGNOSIS — R94.4 DECREASED GFR: ICD-10-CM

## 2024-05-13 DIAGNOSIS — E11.29 TYPE 2 DIABETES MELLITUS WITH MICROALBUMINURIA, WITHOUT LONG-TERM CURRENT USE OF INSULIN (H): Primary | ICD-10-CM

## 2024-05-13 DIAGNOSIS — E83.52 HYPERCALCEMIA: ICD-10-CM

## 2024-05-13 DIAGNOSIS — R80.9 TYPE 2 DIABETES MELLITUS WITH MICROALBUMINURIA, WITHOUT LONG-TERM CURRENT USE OF INSULIN (H): Primary | ICD-10-CM

## 2024-05-13 DIAGNOSIS — I10 ESSENTIAL HYPERTENSION: ICD-10-CM

## 2024-05-13 DIAGNOSIS — E66.811 CLASS 1 OBESITY DUE TO EXCESS CALORIES WITH SERIOUS COMORBIDITY IN ADULT, UNSPECIFIED BMI: ICD-10-CM

## 2024-05-13 DIAGNOSIS — E66.09 CLASS 1 OBESITY DUE TO EXCESS CALORIES WITH SERIOUS COMORBIDITY IN ADULT, UNSPECIFIED BMI: ICD-10-CM

## 2024-05-13 DIAGNOSIS — Z12.11 COLON CANCER SCREENING: ICD-10-CM

## 2024-05-13 DIAGNOSIS — E78.5 DYSLIPIDEMIA: ICD-10-CM

## 2024-05-13 LAB
ANION GAP SERPL CALCULATED.3IONS-SCNC: 10 MMOL/L (ref 7–15)
BUN SERPL-MCNC: 36.4 MG/DL (ref 8–23)
CALCIUM SERPL-MCNC: 10.9 MG/DL (ref 8.8–10.2)
CHLORIDE SERPL-SCNC: 102 MMOL/L (ref 98–107)
CREAT SERPL-MCNC: 1.28 MG/DL (ref 0.67–1.17)
DEPRECATED HCO3 PLAS-SCNC: 28 MMOL/L (ref 22–29)
EGFRCR SERPLBLD CKD-EPI 2021: 62 ML/MIN/1.73M2
GLUCOSE SERPL-MCNC: 132 MG/DL (ref 70–99)
POTASSIUM SERPL-SCNC: 4.4 MMOL/L (ref 3.4–5.3)
SODIUM SERPL-SCNC: 140 MMOL/L (ref 135–145)

## 2024-05-13 PROCEDURE — 99214 OFFICE O/P EST MOD 30 MIN: CPT | Performed by: PHYSICIAN ASSISTANT

## 2024-05-13 PROCEDURE — G2211 COMPLEX E/M VISIT ADD ON: HCPCS | Performed by: PHYSICIAN ASSISTANT

## 2024-05-13 PROCEDURE — 36415 COLL VENOUS BLD VENIPUNCTURE: CPT | Performed by: PHYSICIAN ASSISTANT

## 2024-05-13 PROCEDURE — 80048 BASIC METABOLIC PNL TOTAL CA: CPT | Performed by: PHYSICIAN ASSISTANT

## 2024-05-13 RX ORDER — HYDROCHLOROTHIAZIDE 12.5 MG/1
12.5 TABLET ORAL EVERY MORNING
Qty: 90 TABLET | Refills: 0 | Status: SHIPPED | OUTPATIENT
Start: 2024-05-13 | End: 2024-06-24

## 2024-05-13 RX ORDER — LISINOPRIL 40 MG/1
40 TABLET ORAL DAILY
Qty: 90 TABLET | Refills: 0 | Status: SHIPPED | OUTPATIENT
Start: 2024-05-13 | End: 2024-06-24

## 2024-05-13 RX ORDER — PIOGLITAZONEHYDROCHLORIDE 30 MG/1
15 TABLET ORAL DAILY
Qty: 30 TABLET | Refills: 2 | Status: SHIPPED | OUTPATIENT
Start: 2024-05-13

## 2024-05-13 RX ORDER — METFORMIN HCL 500 MG
TABLET, EXTENDED RELEASE 24 HR ORAL
Qty: 360 TABLET | Refills: 0 | Status: SHIPPED | OUTPATIENT
Start: 2024-05-13

## 2024-05-13 RX ORDER — DOXAZOSIN 4 MG/1
4 TABLET ORAL DAILY
Qty: 90 TABLET | Refills: 0 | Status: SHIPPED | OUTPATIENT
Start: 2024-05-13 | End: 2024-06-24

## 2024-05-13 RX ORDER — ORAL SEMAGLUTIDE 14 MG/1
TABLET ORAL
Qty: 90 TABLET | Refills: 0 | Status: SHIPPED | OUTPATIENT
Start: 2024-05-13 | End: 2024-06-24

## 2024-05-13 RX ORDER — ATORVASTATIN CALCIUM 20 MG/1
20 TABLET, FILM COATED ORAL DAILY
Qty: 30 TABLET | Refills: 0 | Status: CANCELLED | OUTPATIENT
Start: 2024-05-13

## 2024-05-13 RX ORDER — ROSUVASTATIN CALCIUM 10 MG/1
10 TABLET, COATED ORAL DAILY
Qty: 90 TABLET | Refills: 0 | Status: SHIPPED | OUTPATIENT
Start: 2024-05-13 | End: 2024-06-24

## 2024-05-13 RX ORDER — METFORMIN HCL 500 MG
TABLET, EXTENDED RELEASE 24 HR ORAL
COMMUNITY
Start: 2024-05-13 | End: 2024-05-13

## 2024-05-13 RX ORDER — METFORMIN HCL 500 MG
TABLET, EXTENDED RELEASE 24 HR ORAL
Qty: 120 TABLET | Refills: 0 | Status: CANCELLED | OUTPATIENT
Start: 2024-05-13

## 2024-05-13 ASSESSMENT — PAIN SCALES - GENERAL: PAINLEVEL: MILD PAIN (2)

## 2024-05-13 NOTE — PATIENT INSTRUCTIONS
Continue metformin at 3 tabs daily for now - keep trying to space them out  Decrease pioglitazone to half tabs daily  Rybelsus on 90 day refill prior to loss of insurance.    Keep working on wt loss so we can keep cutting meds back    Check kidney lab today  Do not take hydrochlorothiazide or lisinopril for now, pending lab result    Discontinue atorvastatin, switch to rosuvastatin to see if reduces aches     Set up eye exam for diabetes    See me in 3 months - after 8/14  Plan vaccines, foot exam, reassess tremor, diabetes meds, BP meds

## 2024-05-13 NOTE — PROGRESS NOTES
"  Assessment & Plan     Type 2 diabetes mellitus with microalbuminuria, without long-term current use of insulin (H)  Controlled, decrease actos.  - Semaglutide (RYBELSUS) 14 MG tablet; TAKE 1 TABLET BY MOUTH DAILY.  - pioglitazone (ACTOS) 30 MG tablet; Take 0.5 tablets (15 mg) by mouth daily  - metFORMIN (GLUCOPHAGE XR) 500 MG 24 hr tablet; 3 tabs daily.  - rosuvastatin (CRESTOR) 10 MG tablet; Take 1 tablet (10 mg) by mouth daily    Decreased GFR  New problem, likely secondary to acute dehydration, question heat illness.  Hold lisinopril and hydrochlorothiazide - recheck today but he has not yet held these.  - Basic metabolic panel  (Ca, Cl, CO2, Creat, Gluc, K, Na, BUN)    Hypercalcemia  New problem, recheck  - Basic metabolic panel  (Ca, Cl, CO2, Creat, Gluc, K, Na, BUN); Future    Essential hypertension  Controlled per home measures - no room to decrease doses long term despite 20 pound wt loss.  - hydroCHLOROthiazide 12.5 MG tablet; Take 1 tablet (12.5 mg) by mouth every morning  - lisinopril (ZESTRIL) 40 MG tablet; Take 1 tablet (40 mg) by mouth daily    Dyslipidemia  At  goal    Class 1 obesity due to excess calories with serious comorbidity in adult, unspecified BMI  improved    Colon cancer screening  - JULIO CESAR([employer]); Future    The longitudinal plan of care for the diagnosis(es)/condition(s) as documented were addressed during this visit. Due to the added complexity in care, I will continue to support Celestine in the subsequent management and with ongoing continuity of care.    BMI  Estimated body mass index is 34.25 kg/m  as calculated from the following:    Height as of this encounter: 1.816 m (5' 11.5\").    Weight as of this encounter: 112.9 kg (249 lb).         Patient Instructions   Continue metformin at 3 tabs daily for now - keep trying to space them out  Decrease pioglitazone to half tabs daily  Rybelsus on 90 day refill prior to loss of insurance.    Keep working on wt loss so we can keep " cutting meds back    Check kidney lab today  Do not take hydrochlorothiazide or lisinopril for now, pending lab result    Discontinue atorvastatin, switch to rosuvastatin to see if reduces aches     Set up eye exam for diabetes    See me in 3 months - after 8/14  Plan vaccines, foot exam, reassess tremor, diabetes meds, BP meds    Subjective   Celestine is a 68 year old, presenting for the following health issues:  Tremors, Hypertension, Diabetes, Lipids, and Sinus Problem        5/13/2024     8:27 AM   Additional Questions   Accompanied by zoë cordova      History of Present Illness       Diabetes:   He presents for follow up of diabetes.   He is checking home blood glucose with a continuous glucose monitor.   He checks blood glucose after meals.  Blood glucose is sometimes over 200 and never under 70.  When his blood glucose is low, the patient is asymptomatic for confusion, blurred vision, lethargy and reports not feeling dizzy, shaky, or weak.   He has no concerns regarding his diabetes at this time.   He is not experiencing numbness or burning in feet, excessive thirst, blurry vision, weight changes or redness, sores or blisters on feet. The patient has had a diabetic eye exam in the last 12 months. Eye exam performed on a year ago, I'm due.. Location of last eye exam [facility].    Dec GFR on 5/11 lab - thinks he had heat stroke after working in [place] heat on his [employer].  GFR went from 71 in Oct to 44 now.    Has been really working on wt loss.  Focusing on 1400 justino diet.  Down 20 pounds since Oct.  Plans to continue wt loss.  Hasn't been so physically active - a lot has gone on this yr, hadn't felt great after 2nd joint replacement, and wife had kidney cancer.      Using kelvin that he gets much cheaper on [employer].  Uses for 2 wks then skips kelvin and then skips kelvin for a few weeks after his eating is in a routine.  Does not do any testing in those few weeks.  Lowest sugar he's seen is 100.    Hyperlipidemia:  He  presents for follow up of hyperlipidemia.   He is taking medication to lower cholesterol. He is not having myalgia or other side effects to statin medications.    Hypertension: He presents for follow up of hypertension.  He does check blood pressure  regularly outside of the clinic. Outpatient blood pressures have not been over 140/90. He does not follow a low salt diet.       Home BPs run upper 130s to maybe 140s/82.  But hasn't checked for about a month.      Gut symptoms still off and on.  Acid-y stomach - tre graceer helps.  Seems to be from the metformin not the rybelsus that causes the symptoms.  Is generally taking 3 rather than 4 metformin per day.        Reason for visit:  Have some occasional head and hand shakyness    Head and hand tremor not daily the time but some.  Seems more with doing things than at rest.  Mom had parkinsons in her 80s.  No difficulties with balance.      He eats 2-3 servings of fruits and vegetables daily.He consumes 0 sweetened beverage(s) daily.He exercises with enough effort to increase his heart rate 10 to 19 minutes per day.  He exercises with enough effort to increase his heart rate 4 days per week.   He is taking medications regularly.    Pt has sinus congestion, which goes into ears. This is year round.  Does not bother him.    Diabetes Follow-up    How often are you checking your blood sugar? Continuous glucose monitor  What time of day are you checking your blood sugars (select all that apply)?  Before and after meals  Have you had any blood sugars above 200?  Yes   Have you had any blood sugars below 70?  No  What symptoms do you notice when your blood sugar is low?  None and Not applicable  What concerns do you have today about your diabetes? None   Do you have any of these symptoms? (Select all that apply)  Blurry vision  Have you had a diabetic eye exam in the last 12 months?     Hyperlipidemia Follow-Up    Are you regularly taking any medication or supplement to  "lower your cholesterol?   Yes- lipitor   Are you having muscle aches or other side effects that you think could be caused by your cholesterol lowering medication?  no    Hypertension Follow-up    Do you check your blood pressure regularly outside of the clinic? Yes   Are you following a low salt diet? No  Are your blood pressures ever more than 140 on the top number (systolic) OR more   than 90 on the bottom number (diastolic), for example 140/90? Yes    BP Readings from Last 2 Encounters:   05/13/24 114/74   10/11/23 138/83     Hemoglobin A1C (%)   Date Value   05/11/2024 7.0 (H)   10/11/2023 7.6 (H)   12/24/2018 7.6 (H)   08/07/2018 8.6 (H)     LDL Cholesterol Calculated (mg/dL)   Date Value   05/11/2024 37   06/06/2023 49   08/07/2018 40   08/04/2017 92           Objective    /74 (BP Location: Right arm, Patient Position: Sitting, Cuff Size: Adult Large)   Pulse 83   Temp 97.9  F (36.6  C)   Resp 20   Ht 1.816 m (5' 11.5\")   Wt 112.9 kg (249 lb)   SpO2 96%   BMI 34.25 kg/m    Body mass index is 34.25 kg/m .          Signed Electronically by: Deborah Delgado PA-C    "

## 2024-05-13 NOTE — NURSING NOTE
"Chief Complaint   Patient presents with    Tremors    Hypertension    Diabetes    Lipids       Initial Resp 20   Ht 1.816 m (5' 11.5\")   Wt 112.9 kg (249 lb)   BMI 34.25 kg/m   Estimated body mass index is 34.25 kg/m  as calculated from the following:    Height as of this encounter: 1.816 m (5' 11.5\").    Weight as of this encounter: 112.9 kg (249 lb).    Patient presents to the clinic using No DME    Is there anyone who you would like to be able to receive your results? No  If yes have patient fill out AGUILAR      "

## 2024-05-13 NOTE — RESULT ENCOUNTER NOTE
Celestine  We'll mostly discuss labs tomorrow but I note that your kidney filter rate really dropped.  This could have been dehydration.  For now (if you see this note in time) do NOT take the hydrochlorothiazide or lisinopril until we can get recheck numbers tomorrow.  See you then,  Deborah

## 2024-05-14 NOTE — RESULT ENCOUNTER NOTE
Celestine  Calcium level went a bit high but kidney function improved a fair amount.  GFR (filter rate) went from 71 last year to 44 on Saturday and 62 on Monday.  Continue to stay off lisinopril and hydrochlorothiazide.  Recheck lab on Thurs or Friday please.  Let me know if you have questions.  Deborah

## 2024-05-20 ENCOUNTER — LAB (OUTPATIENT)
Dept: LAB | Facility: CLINIC | Age: 69
End: 2024-05-20
Payer: COMMERCIAL

## 2024-05-20 DIAGNOSIS — R94.4 DECREASED GFR: ICD-10-CM

## 2024-05-20 DIAGNOSIS — E83.52 HYPERCALCEMIA: ICD-10-CM

## 2024-05-20 LAB
ANION GAP SERPL CALCULATED.3IONS-SCNC: 11 MMOL/L (ref 7–15)
BUN SERPL-MCNC: 14.5 MG/DL (ref 8–23)
CALCIUM SERPL-MCNC: 9.6 MG/DL (ref 8.8–10.2)
CHLORIDE SERPL-SCNC: 103 MMOL/L (ref 98–107)
CREAT SERPL-MCNC: 1.01 MG/DL (ref 0.67–1.17)
DEPRECATED HCO3 PLAS-SCNC: 22 MMOL/L (ref 22–29)
EGFRCR SERPLBLD CKD-EPI 2021: 82 ML/MIN/1.73M2
GLUCOSE SERPL-MCNC: 121 MG/DL (ref 70–99)
POTASSIUM SERPL-SCNC: 4.4 MMOL/L (ref 3.4–5.3)
SODIUM SERPL-SCNC: 136 MMOL/L (ref 135–145)

## 2024-05-20 PROCEDURE — 36415 COLL VENOUS BLD VENIPUNCTURE: CPT

## 2024-05-20 PROCEDURE — 80048 BASIC METABOLIC PNL TOTAL CA: CPT

## 2024-05-20 NOTE — RESULT ENCOUNTER NOTE
Celestine  Kidney function improved back to its usual range.  High calcium also resolved.  You can now restart your lisinopril and hydrochlorothiazide.  Repeat labs in 1-2 wks.    Let me know if you have questions.  Deborah

## 2024-05-30 ENCOUNTER — LAB (OUTPATIENT)
Dept: LAB | Facility: CLINIC | Age: 69
End: 2024-05-30
Payer: COMMERCIAL

## 2024-05-30 DIAGNOSIS — R94.4 DECREASED GFR: ICD-10-CM

## 2024-05-30 DIAGNOSIS — E83.52 HYPERCALCEMIA: ICD-10-CM

## 2024-05-30 DIAGNOSIS — Z12.11 COLON CANCER SCREENING: Primary | ICD-10-CM

## 2024-05-30 LAB
ANION GAP SERPL CALCULATED.3IONS-SCNC: 15 MMOL/L (ref 7–15)
BUN SERPL-MCNC: 18.5 MG/DL (ref 8–23)
CALCIUM SERPL-MCNC: 10.1 MG/DL (ref 8.8–10.2)
CHLORIDE SERPL-SCNC: 104 MMOL/L (ref 98–107)
CREAT SERPL-MCNC: 0.92 MG/DL (ref 0.67–1.17)
DEPRECATED HCO3 PLAS-SCNC: 21 MMOL/L (ref 22–29)
EGFRCR SERPLBLD CKD-EPI 2021: >90 ML/MIN/1.73M2
GLUCOSE SERPL-MCNC: 116 MG/DL (ref 70–99)
NONINV COLON CA DNA+OCC BLD SCRN STL QL: POSITIVE
POTASSIUM SERPL-SCNC: 4.2 MMOL/L (ref 3.4–5.3)
SODIUM SERPL-SCNC: 140 MMOL/L (ref 135–145)

## 2024-05-30 PROCEDURE — 36415 COLL VENOUS BLD VENIPUNCTURE: CPT

## 2024-05-30 PROCEDURE — 80048 BASIC METABOLIC PNL TOTAL CA: CPT

## 2024-06-03 NOTE — RESULT ENCOUNTER NOTE
Celestine  Blood salts remain normal.  Kidney filter rate is actually now the best its been in a couple yrs - over twice what it was 3 wks ago.  No further testing needed on the recent decrease in kidney function.  Let me know if you have questions.  Deborah

## 2024-06-08 ENCOUNTER — MYC MEDICAL ADVICE (OUTPATIENT)
Dept: FAMILY MEDICINE | Facility: CLINIC | Age: 69
End: 2024-06-08
Payer: COMMERCIAL

## 2024-06-08 DIAGNOSIS — Z12.11 COLON CANCER SCREENING: Primary | ICD-10-CM

## 2024-06-21 DIAGNOSIS — R80.9 TYPE 2 DIABETES MELLITUS WITH MICROALBUMINURIA, WITHOUT LONG-TERM CURRENT USE OF INSULIN (H): ICD-10-CM

## 2024-06-21 DIAGNOSIS — M25.551 PAIN IN RIGHT HIP: ICD-10-CM

## 2024-06-21 DIAGNOSIS — I10 ESSENTIAL HYPERTENSION: ICD-10-CM

## 2024-06-21 DIAGNOSIS — E11.29 TYPE 2 DIABETES MELLITUS WITH MICROALBUMINURIA, WITHOUT LONG-TERM CURRENT USE OF INSULIN (H): ICD-10-CM

## 2024-06-21 RX ORDER — ORAL SEMAGLUTIDE 14 MG/1
TABLET ORAL
Qty: 30 TABLET | Refills: 2 | OUTPATIENT
Start: 2024-06-21

## 2024-06-21 RX ORDER — AMOXICILLIN 500 MG/1
TABLET, FILM COATED ORAL
Qty: 12 TABLET | Refills: 2 | OUTPATIENT
Start: 2024-06-21

## 2024-06-21 RX ORDER — HYDROCHLOROTHIAZIDE 12.5 MG/1
12.5 TABLET ORAL EVERY MORNING
Qty: 30 TABLET | Refills: 2 | OUTPATIENT
Start: 2024-06-21

## 2024-06-21 RX ORDER — DOXAZOSIN 4 MG/1
4 TABLET ORAL DAILY
Qty: 30 TABLET | Refills: 2 | OUTPATIENT
Start: 2024-06-21

## 2024-06-21 RX ORDER — ROSUVASTATIN CALCIUM 10 MG/1
10 TABLET, COATED ORAL DAILY
Qty: 90 TABLET | Refills: 0 | OUTPATIENT
Start: 2024-06-21

## 2024-06-21 RX ORDER — LISINOPRIL 40 MG/1
40 TABLET ORAL DAILY
Qty: 30 TABLET | Refills: 2 | OUTPATIENT
Start: 2024-06-21

## 2024-06-24 ENCOUNTER — OFFICE VISIT (OUTPATIENT)
Dept: URGENT CARE | Facility: URGENT CARE | Age: 69
End: 2024-06-24
Payer: COMMERCIAL

## 2024-06-24 VITALS
HEART RATE: 101 BPM | BODY MASS INDEX: 33.7 KG/M2 | OXYGEN SATURATION: 95 % | WEIGHT: 245 LBS | DIASTOLIC BLOOD PRESSURE: 73 MMHG | TEMPERATURE: 98.2 F | RESPIRATION RATE: 16 BRPM | SYSTOLIC BLOOD PRESSURE: 111 MMHG

## 2024-06-24 DIAGNOSIS — I10 ESSENTIAL HYPERTENSION: ICD-10-CM

## 2024-06-24 DIAGNOSIS — L08.9 LOCAL INFECTION OF SKIN AND SUBCUTANEOUS TISSUE: Primary | ICD-10-CM

## 2024-06-24 DIAGNOSIS — R80.9 TYPE 2 DIABETES MELLITUS WITH MICROALBUMINURIA, WITHOUT LONG-TERM CURRENT USE OF INSULIN (H): ICD-10-CM

## 2024-06-24 DIAGNOSIS — E11.29 TYPE 2 DIABETES MELLITUS WITH MICROALBUMINURIA, WITHOUT LONG-TERM CURRENT USE OF INSULIN (H): ICD-10-CM

## 2024-06-24 PROCEDURE — 99213 OFFICE O/P EST LOW 20 MIN: CPT | Performed by: NURSE PRACTITIONER

## 2024-06-24 RX ORDER — ORAL SEMAGLUTIDE 14 MG/1
TABLET ORAL
Qty: 90 TABLET | Refills: 1 | Status: SHIPPED | OUTPATIENT
Start: 2024-06-24

## 2024-06-24 RX ORDER — ROSUVASTATIN CALCIUM 10 MG/1
10 TABLET, COATED ORAL DAILY
Qty: 90 TABLET | Refills: 1 | Status: SHIPPED | OUTPATIENT
Start: 2024-06-24

## 2024-06-24 RX ORDER — HYDROCHLOROTHIAZIDE 12.5 MG/1
12.5 TABLET ORAL EVERY MORNING
Qty: 90 TABLET | Refills: 1 | Status: SHIPPED | OUTPATIENT
Start: 2024-06-24

## 2024-06-24 RX ORDER — CEPHALEXIN 500 MG/1
500 CAPSULE ORAL 3 TIMES DAILY
Qty: 21 CAPSULE | Refills: 0 | Status: SHIPPED | OUTPATIENT
Start: 2024-06-24 | End: 2024-07-01

## 2024-06-24 RX ORDER — DOXAZOSIN 4 MG/1
4 TABLET ORAL DAILY
Qty: 90 TABLET | Refills: 1 | Status: SHIPPED | OUTPATIENT
Start: 2024-06-24

## 2024-06-24 RX ORDER — LISINOPRIL 40 MG/1
40 TABLET ORAL DAILY
Qty: 90 TABLET | Refills: 1 | Status: SHIPPED | OUTPATIENT
Start: 2024-06-24

## 2024-06-24 NOTE — PATIENT INSTRUCTIONS
1) Take antibiotic as prescribed. Take this medication with food to avoid stomach upset.   2) Ibuprofen or Tylenol as needed for fever or pain.  3) Follow up in 7-10 days if not improving, sooner if worsening or other concerns.

## 2024-06-24 NOTE — PROGRESS NOTES
SUBJECTIVE:   Celestine Simon is a 68 year old male presenting with a chief complaint of   Chief Complaint   Patient presents with    Derm Problem     3-4 days ago pt noticed a rash, sore under his right arm, in the arm pit.       Past Medical History:   Diagnosis Date    Degenerative joint disease (DJD) of hip 06/24/2022    MEDICAL HISTORY OF -     Rheumatic fever as a child    MEDICAL HISTORY OF -     Tractor accident at age 19     Family History   Problem Relation Age of Onset    Lipids Mother     Parkinsonism Mother     Colon Cancer Mother     Hyperlipidemia Mother     Cerebrovascular Disease Father         53    Cardiovascular Father         53    Coronary Artery Disease Father 53        MI    Crohn's Disease Father     Respiratory Maternal Grandfather         Emphsemia smoking    Cardiovascular Paternal Grandfather      Current Outpatient Medications   Medication Sig Dispense Refill    aspirin 81 MG EC tablet Take 1 tablet (81 mg) by mouth daily      Blood Glucose Monitoring Suppl (IN TOUCH) MISC 1 strip once a week Tests weekly- One Touch 90 each 1    Continuous Glucose Sensor (FREESTYLE SCOTT 14 DAY SENSOR) MISC 1 Device every 14 days Change sensor as directed every 14 days 6 each 1    doxazosin (CARDURA) 4 MG tablet Take 1 tablet (4 mg) by mouth daily 90 tablet 0    GLUCOSAMINE-CHONDROITIN PO Take 2 capsules by mouth daily      HERBALS Take 1 each by mouth daily [employer] Blood Sugar Support.      hydroCHLOROthiazide 12.5 MG tablet Take 1 tablet (12.5 mg) by mouth every morning 90 tablet 0    LANCETS REGULAR MISC One Touch US Lancet- Used twice daily 100 Each 6    lisinopril (ZESTRIL) 40 MG tablet Take 1 tablet (40 mg) by mouth daily 90 tablet 0    metFORMIN (GLUCOPHAGE XR) 500 MG 24 hr tablet 3 tabs daily. 360 tablet 0    MULTI-VITAMIN OR TABS Take 1 tablet by mouth daily       pioglitazone (ACTOS) 30 MG tablet Take 0.5 tablets (15 mg) by mouth daily 30 tablet 2    rosuvastatin (CRESTOR) 10 MG tablet  Take 1 tablet (10 mg) by mouth daily 90 tablet 0    Semaglutide (RYBELSUS) 14 MG tablet TAKE 1 TABLET BY MOUTH DAILY. 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  Axilla with scattered raised red lesion with white tip; almost pimple like. Slightly tender to touch.         ASSESSMENT:  1. Local infection of skin and subcutaneous tissue    - cephALEXin (KEFLEX) 500 MG capsule; Take 1 capsule (500 mg) by mouth 3 times daily for 7 days  Dispense: 21 capsule; Refill: 0      PLAN:  1) Take antibiotic as prescribed. Take this medication with food to avoid stomach upset.   2) Ibuprofen or Tylenol as needed for fever or pain.  3) Follow up in 7-10 days if not improving, sooner if worsening or other concerns.

## 2024-06-26 ENCOUNTER — TRANSFERRED RECORDS (OUTPATIENT)
Dept: HEALTH INFORMATION MANAGEMENT | Facility: CLINIC | Age: 69
End: 2024-06-26
Payer: COMMERCIAL

## 2024-06-26 DIAGNOSIS — M25.551 PAIN IN RIGHT HIP: ICD-10-CM

## 2024-06-26 LAB — RETINOPATHY: NEGATIVE

## 2024-06-26 RX ORDER — AMOXICILLIN 500 MG/1
TABLET, FILM COATED ORAL
Qty: 12 TABLET | Refills: 2 | OUTPATIENT
Start: 2024-06-26

## 2024-06-26 NOTE — TELEPHONE ENCOUNTER
Call patient- we rarely treat with amoxicillin before dental appts if the only reason for it is history of total joint replacement.  If ortho wants this, they should order it OR Celestine needs to give me further info on why it has been recommended.

## 2024-08-13 ENCOUNTER — OFFICE VISIT (OUTPATIENT)
Dept: URGENT CARE | Facility: URGENT CARE | Age: 69
End: 2024-08-13
Payer: COMMERCIAL

## 2024-08-13 VITALS
TEMPERATURE: 99.2 F | DIASTOLIC BLOOD PRESSURE: 77 MMHG | RESPIRATION RATE: 18 BRPM | BODY MASS INDEX: 36.04 KG/M2 | HEART RATE: 92 BPM | WEIGHT: 262 LBS | OXYGEN SATURATION: 97 % | SYSTOLIC BLOOD PRESSURE: 140 MMHG

## 2024-08-13 DIAGNOSIS — L08.9 LOCAL INFECTION OF SKIN AND SUBCUTANEOUS TISSUE: Primary | ICD-10-CM

## 2024-08-13 PROBLEM — E66.01 CLASS 2 SEVERE OBESITY DUE TO EXCESS CALORIES WITH SERIOUS COMORBIDITY IN ADULT (H): Status: ACTIVE | Noted: 2024-08-13

## 2024-08-13 PROBLEM — E66.812 CLASS 2 SEVERE OBESITY DUE TO EXCESS CALORIES WITH SERIOUS COMORBIDITY IN ADULT (H): Status: ACTIVE | Noted: 2024-08-13

## 2024-08-13 PROCEDURE — 99213 OFFICE O/P EST LOW 20 MIN: CPT | Performed by: NURSE PRACTITIONER

## 2024-08-13 ASSESSMENT — ENCOUNTER SYMPTOMS
FATIGUE: 1
FEVER: 1
ABDOMINAL PAIN: 0
SINUS PRESSURE: 1
SINUS PAIN: 0
FREQUENCY: 0
DYSURIA: 0
HEMATURIA: 0
COUGH: 1
CHILLS: 1
SORE THROAT: 0
VOMITING: 0
SHORTNESS OF BREATH: 0
DIARRHEA: 1
WHEEZING: 0
NAUSEA: 0
STRIDOR: 0

## 2024-08-13 NOTE — PATIENT INSTRUCTIONS
Follow up with primary with in one week or return to clinic sooner if increased redness, swelling, drainage, pain or fever chills.
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
## 2024-09-01 ENCOUNTER — HEALTH MAINTENANCE LETTER (OUTPATIENT)
Age: 69
End: 2024-09-01

## 2024-09-04 ENCOUNTER — TELEPHONE (OUTPATIENT)
Dept: FAMILY MEDICINE | Facility: CLINIC | Age: 69
End: 2024-09-04
Payer: COMMERCIAL

## 2024-09-04 NOTE — TELEPHONE ENCOUNTER
Prior Authorization Retail Medication Request    Medication/Dose: Rybelsus 14MG tab  Diagnosis and ICD code (if different than what is on RX):    New/renewal/insurance change PA/secondary ins. PA:  Previously Tried and Failed:    Rationale:      Insurance   Primary:   Insurance ID:      Secondary (if applicable)  Insurance ID:      Pharmacy Information (if different than what is on RX)  Name:    Phone:    Fax:      Cover my meds: [id number]

## 2024-09-05 NOTE — TELEPHONE ENCOUNTER
Prior Authorization Approval    Medication: RYBELSUS 14 MG PO TABS  Authorization Effective Date: 9/1/2024  Authorization Expiration Date: 9/5/2025   Insurance Company: [employer] - Phone [phone] Fax [phone]  Which Pharmacy is filling the prescription: [facility] - [place] - [address] [address]  Pharmacy Notified: YES  Patient Notified: Instructed pharmacy to notify patient once order is ready.

## 2024-09-05 NOTE — TELEPHONE ENCOUNTER
PA Initiation    Medication: RYBELSUS 14 MG PO TABS  Insurance Company: [employer] - Phone [phone] Fax [phone]  Pharmacy Filling the Rx: [facility] - [place] - [id number] [facility]  Filling Pharmacy Phone: [phone]  Filling Pharmacy Fax:    Start Date: 9/5/2024

## 2024-10-05 ENCOUNTER — ANESTHESIA EVENT (OUTPATIENT)
Dept: GASTROENTEROLOGY | Facility: CLINIC | Age: 69
End: 2024-10-05
Payer: MEDICARE

## 2024-10-05 RX ORDER — NALOXONE HYDROCHLORIDE 0.4 MG/ML
0.1 INJECTION, SOLUTION INTRAMUSCULAR; INTRAVENOUS; SUBCUTANEOUS
Status: CANCELLED | OUTPATIENT
Start: 2024-10-05

## 2024-10-05 RX ORDER — DEXAMETHASONE SODIUM PHOSPHATE 4 MG/ML
4 INJECTION, SOLUTION INTRA-ARTICULAR; INTRALESIONAL; INTRAMUSCULAR; INTRAVENOUS; SOFT TISSUE
Status: CANCELLED | OUTPATIENT
Start: 2024-10-05

## 2024-10-05 RX ORDER — SODIUM CHLORIDE, SODIUM LACTATE, POTASSIUM CHLORIDE, CALCIUM CHLORIDE 600; 310; 30; 20 MG/100ML; MG/100ML; MG/100ML; MG/100ML
INJECTION, SOLUTION INTRAVENOUS CONTINUOUS
Status: CANCELLED | OUTPATIENT
Start: 2024-10-05

## 2024-10-05 ASSESSMENT — LIFESTYLE VARIABLES: TOBACCO_USE: 1
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
## 2024-10-07 SDOH — HEALTH STABILITY: PHYSICAL HEALTH: ON AVERAGE, HOW MANY MINUTES DO YOU ENGAGE IN EXERCISE AT THIS LEVEL?: 30 MIN

## 2024-10-07 SDOH — HEALTH STABILITY: PHYSICAL HEALTH: ON AVERAGE, HOW MANY DAYS PER WEEK DO YOU ENGAGE IN MODERATE TO STRENUOUS EXERCISE (LIKE A BRISK WALK)?: 3 DAYS

## 2024-10-07 ASSESSMENT — SOCIAL DETERMINANTS OF HEALTH (SDOH): HOW OFTEN DO YOU GET TOGETHER WITH FRIENDS OR RELATIVES?: TWICE A WEEK

## 2024-10-09 ENCOUNTER — MYC MEDICAL ADVICE (OUTPATIENT)
Dept: FAMILY MEDICINE | Facility: CLINIC | Age: 69
End: 2024-10-09
Payer: MEDICARE

## 2024-10-09 DIAGNOSIS — R80.9 TYPE 2 DIABETES MELLITUS WITH MICROALBUMINURIA, WITHOUT LONG-TERM CURRENT USE OF INSULIN (H): Primary | ICD-10-CM

## 2024-10-09 DIAGNOSIS — E66.811 CLASS 1 OBESITY DUE TO EXCESS CALORIES WITH SERIOUS COMORBIDITY IN ADULT, UNSPECIFIED BMI: ICD-10-CM

## 2024-10-09 DIAGNOSIS — E66.09 CLASS 1 OBESITY DUE TO EXCESS CALORIES WITH SERIOUS COMORBIDITY IN ADULT, UNSPECIFIED BMI: ICD-10-CM

## 2024-10-09 DIAGNOSIS — E11.29 TYPE 2 DIABETES MELLITUS WITH MICROALBUMINURIA, WITHOUT LONG-TERM CURRENT USE OF INSULIN (H): Primary | ICD-10-CM

## 2024-10-10 ENCOUNTER — HOSPITAL ENCOUNTER (OUTPATIENT)
Dept: CT IMAGING | Facility: CLINIC | Age: 69
Discharge: HOME | End: 2024-10-10
Attending: STUDENT IN AN ORGANIZED HEALTH CARE EDUCATION/TRAINING PROGRAM | Admitting: STUDENT IN AN ORGANIZED HEALTH CARE EDUCATION/TRAINING PROGRAM
Payer: MEDICARE

## 2024-10-10 ENCOUNTER — ANESTHESIA (OUTPATIENT)
Dept: GASTROENTEROLOGY | Facility: CLINIC | Age: 69
End: 2024-10-10
Payer: MEDICARE

## 2024-10-10 ENCOUNTER — HOSPITAL ENCOUNTER (OUTPATIENT)
Facility: CLINIC | Age: 69
Discharge: HOME | End: 2024-10-10
Attending: STUDENT IN AN ORGANIZED HEALTH CARE EDUCATION/TRAINING PROGRAM | Admitting: STUDENT IN AN ORGANIZED HEALTH CARE EDUCATION/TRAINING PROGRAM
Payer: MEDICARE

## 2024-10-10 VITALS
WEIGHT: 262 LBS | HEIGHT: 72 IN | SYSTOLIC BLOOD PRESSURE: 110 MMHG | BODY MASS INDEX: 35.49 KG/M2 | RESPIRATION RATE: 14 BRPM | OXYGEN SATURATION: 92 % | DIASTOLIC BLOOD PRESSURE: 73 MMHG | HEART RATE: 72 BPM | TEMPERATURE: 98.3 F

## 2024-10-10 DIAGNOSIS — E11.29 TYPE 2 DIABETES MELLITUS WITH MICROALBUMINURIA, WITHOUT LONG-TERM CURRENT USE OF INSULIN (H): ICD-10-CM

## 2024-10-10 DIAGNOSIS — E66.09 CLASS 1 OBESITY DUE TO EXCESS CALORIES WITH SERIOUS COMORBIDITY IN ADULT, UNSPECIFIED BMI: ICD-10-CM

## 2024-10-10 DIAGNOSIS — K63.89 COLONIC MASS: Primary | ICD-10-CM

## 2024-10-10 DIAGNOSIS — R80.9 TYPE 2 DIABETES MELLITUS WITH MICROALBUMINURIA, WITHOUT LONG-TERM CURRENT USE OF INSULIN (H): ICD-10-CM

## 2024-10-10 DIAGNOSIS — K63.89 COLONIC MASS: ICD-10-CM

## 2024-10-10 DIAGNOSIS — E66.811 CLASS 1 OBESITY DUE TO EXCESS CALORIES WITH SERIOUS COMORBIDITY IN ADULT, UNSPECIFIED BMI: ICD-10-CM

## 2024-10-10 LAB
ALBUMIN SERPL BCG-MCNC: 3.9 G/DL (ref 3.5–5.2)
ALP SERPL-CCNC: 49 U/L (ref 40–150)
ALT SERPL W P-5'-P-CCNC: 21 U/L (ref 0–70)
ANION GAP SERPL CALCULATED.3IONS-SCNC: 8 MMOL/L (ref 7–15)
AST SERPL W P-5'-P-CCNC: 18 U/L (ref 0–45)
BILIRUB SERPL-MCNC: 0.5 MG/DL
BUN SERPL-MCNC: 12.4 MG/DL (ref 8–23)
CALCIUM SERPL-MCNC: 9.5 MG/DL (ref 8.8–10.4)
CEA SERPL-MCNC: 5.4 NG/ML
CHLORIDE SERPL-SCNC: 104 MMOL/L (ref 98–107)
COLONOSCOPY: NORMAL
CREAT SERPL-MCNC: 1.15 MG/DL (ref 0.67–1.17)
EGFRCR SERPLBLD CKD-EPI 2021: 70 ML/MIN/1.73M2
ERYTHROCYTE [DISTWIDTH] IN BLOOD BY AUTOMATED COUNT: 18 % (ref 10–15)
EST. AVERAGE GLUCOSE BLD GHB EST-MCNC: 171 MG/DL
GLUCOSE BLDC GLUCOMTR-MCNC: 173 MG/DL (ref 70–99)
GLUCOSE SERPL-MCNC: 165 MG/DL (ref 70–99)
HBA1C MFR BLD: 7.6 %
HCO3 SERPL-SCNC: 25 MMOL/L (ref 22–29)
HCT VFR BLD AUTO: 33.5 % (ref 40–53)
HGB BLD-MCNC: 10.5 G/DL (ref 13.3–17.7)
MCH RBC QN AUTO: 24.4 PG (ref 26.5–33)
MCHC RBC AUTO-ENTMCNC: 31.3 G/DL (ref 31.5–36.5)
MCV RBC AUTO: 78 FL (ref 78–100)
PLATELET # BLD AUTO: 312 10E3/UL (ref 150–450)
POTASSIUM SERPL-SCNC: 4.3 MMOL/L (ref 3.4–5.3)
PROT SERPL-MCNC: 6.5 G/DL (ref 6.4–8.3)
RBC # BLD AUTO: 4.3 10E6/UL (ref 4.4–5.9)
SODIUM SERPL-SCNC: 137 MMOL/L (ref 135–145)
TSH SERPL DL<=0.005 MIU/L-ACNC: 1.53 UIU/ML (ref 0.3–4.2)
WBC # BLD AUTO: 6.4 10E3/UL (ref 4–11)

## 2024-10-10 PROCEDURE — 258N000003 HC RX IP 258 OP 636: Performed by: NURSE ANESTHETIST, CERTIFIED REGISTERED

## 2024-10-10 PROCEDURE — 82962 GLUCOSE BLOOD TEST: CPT

## 2024-10-10 PROCEDURE — 45381 COLONOSCOPY SUBMUCOUS NJX: CPT

## 2024-10-10 PROCEDURE — 370N000017 HC ANESTHESIA TECHNICAL FEE, PER MIN: Performed by: STUDENT IN AN ORGANIZED HEALTH CARE EDUCATION/TRAINING PROGRAM

## 2024-10-10 PROCEDURE — 250N000011 HC RX IP 250 OP 636: Performed by: STUDENT IN AN ORGANIZED HEALTH CARE EDUCATION/TRAINING PROGRAM

## 2024-10-10 PROCEDURE — 250N000009 HC RX 250: Performed by: NURSE ANESTHETIST, CERTIFIED REGISTERED

## 2024-10-10 PROCEDURE — 250N000011 HC RX IP 250 OP 636: Performed by: NURSE ANESTHETIST, CERTIFIED REGISTERED

## 2024-10-10 PROCEDURE — 45385 COLONOSCOPY W/LESION REMOVAL: CPT

## 2024-10-10 PROCEDURE — 80053 COMPREHEN METABOLIC PANEL: CPT | Performed by: STUDENT IN AN ORGANIZED HEALTH CARE EDUCATION/TRAINING PROGRAM

## 2024-10-10 PROCEDURE — 85027 COMPLETE CBC AUTOMATED: CPT | Performed by: STUDENT IN AN ORGANIZED HEALTH CARE EDUCATION/TRAINING PROGRAM

## 2024-10-10 PROCEDURE — 45385 COLONOSCOPY W/LESION REMOVAL: CPT | Performed by: STUDENT IN AN ORGANIZED HEALTH CARE EDUCATION/TRAINING PROGRAM

## 2024-10-10 PROCEDURE — 36415 COLL VENOUS BLD VENIPUNCTURE: CPT | Performed by: STUDENT IN AN ORGANIZED HEALTH CARE EDUCATION/TRAINING PROGRAM

## 2024-10-10 PROCEDURE — 84443 ASSAY THYROID STIM HORMONE: CPT

## 2024-10-10 PROCEDURE — 83036 HEMOGLOBIN GLYCOSYLATED A1C: CPT

## 2024-10-10 PROCEDURE — 88305 TISSUE EXAM BY PATHOLOGIST: CPT | Mod: TC | Performed by: STUDENT IN AN ORGANIZED HEALTH CARE EDUCATION/TRAINING PROGRAM

## 2024-10-10 PROCEDURE — 250N000009 HC RX 250: Performed by: STUDENT IN AN ORGANIZED HEALTH CARE EDUCATION/TRAINING PROGRAM

## 2024-10-10 PROCEDURE — 82378 CARCINOEMBRYONIC ANTIGEN: CPT | Performed by: STUDENT IN AN ORGANIZED HEALTH CARE EDUCATION/TRAINING PROGRAM

## 2024-10-10 PROCEDURE — 88341 IMHCHEM/IMCYTCHM EA ADD ANTB: CPT | Mod: TC | Performed by: STUDENT IN AN ORGANIZED HEALTH CARE EDUCATION/TRAINING PROGRAM

## 2024-10-10 PROCEDURE — 74177 CT ABD & PELVIS W/CONTRAST: CPT | Mod: MG

## 2024-10-10 PROCEDURE — 45381 COLONOSCOPY SUBMUCOUS NJX: CPT | Performed by: STUDENT IN AN ORGANIZED HEALTH CARE EDUCATION/TRAINING PROGRAM

## 2024-10-10 RX ORDER — SODIUM CHLORIDE, SODIUM LACTATE, POTASSIUM CHLORIDE, CALCIUM CHLORIDE 600; 310; 30; 20 MG/100ML; MG/100ML; MG/100ML; MG/100ML
INJECTION, SOLUTION INTRAVENOUS CONTINUOUS
Status: DISCONTINUED | OUTPATIENT
Start: 2024-10-10 | End: 2024-10-10 | Stop reason: HOSPADM

## 2024-10-10 RX ORDER — ONDANSETRON 2 MG/ML
INJECTION INTRAMUSCULAR; INTRAVENOUS PRN
Status: DISCONTINUED | OUTPATIENT
Start: 2024-10-10 | End: 2024-10-10

## 2024-10-10 RX ORDER — PROPOFOL 10 MG/ML
INJECTION, EMULSION INTRAVENOUS CONTINUOUS PRN
Status: DISCONTINUED | OUTPATIENT
Start: 2024-10-10 | End: 2024-10-10

## 2024-10-10 RX ORDER — PROPOFOL 10 MG/ML
INJECTION, EMULSION INTRAVENOUS PRN
Status: DISCONTINUED | OUTPATIENT
Start: 2024-10-10 | End: 2024-10-10

## 2024-10-10 RX ORDER — LIDOCAINE 40 MG/G
CREAM TOPICAL
Status: DISCONTINUED | OUTPATIENT
Start: 2024-10-10 | End: 2024-10-10 | Stop reason: HOSPADM

## 2024-10-10 RX ORDER — NALOXONE HYDROCHLORIDE 0.4 MG/ML
0.4 INJECTION, SOLUTION INTRAMUSCULAR; INTRAVENOUS; SUBCUTANEOUS
Status: CANCELLED | OUTPATIENT
Start: 2024-10-10

## 2024-10-10 RX ORDER — LIDOCAINE HYDROCHLORIDE 20 MG/ML
INJECTION, SOLUTION INFILTRATION; PERINEURAL PRN
Status: DISCONTINUED | OUTPATIENT
Start: 2024-10-10 | End: 2024-10-10

## 2024-10-10 RX ORDER — GLYCOPYRROLATE 0.2 MG/ML
INJECTION, SOLUTION INTRAMUSCULAR; INTRAVENOUS PRN
Status: DISCONTINUED | OUTPATIENT
Start: 2024-10-10 | End: 2024-10-10

## 2024-10-10 RX ORDER — NALOXONE HYDROCHLORIDE 0.4 MG/ML
0.2 INJECTION, SOLUTION INTRAMUSCULAR; INTRAVENOUS; SUBCUTANEOUS
Status: CANCELLED | OUTPATIENT
Start: 2024-10-10

## 2024-10-10 RX ORDER — IOPAMIDOL 755 MG/ML
129 INJECTION, SOLUTION INTRAVASCULAR ONCE
Status: COMPLETED | OUTPATIENT
Start: 2024-10-10 | End: 2024-10-10

## 2024-10-10 RX ORDER — FLUMAZENIL 0.1 MG/ML
0.2 INJECTION, SOLUTION INTRAVENOUS
Status: CANCELLED | OUTPATIENT
Start: 2024-10-10 | End: 2024-10-10

## 2024-10-10 RX ADMIN — PHENYLEPHRINE HYDROCHLORIDE 200 MCG: 10 INJECTION INTRAVENOUS at 07:48

## 2024-10-10 RX ADMIN — ONDANSETRON 4 MG: 2 INJECTION INTRAMUSCULAR; INTRAVENOUS at 07:24

## 2024-10-10 RX ADMIN — PHENYLEPHRINE HYDROCHLORIDE 200 MCG: 10 INJECTION INTRAVENOUS at 07:52

## 2024-10-10 RX ADMIN — PHENYLEPHRINE HYDROCHLORIDE 100 MCG: 10 INJECTION INTRAVENOUS at 07:45

## 2024-10-10 RX ADMIN — SODIUM CHLORIDE 72 ML: 9 INJECTION, SOLUTION INTRAVENOUS at 09:08

## 2024-10-10 RX ADMIN — GLYCOPYRROLATE 0.1 MG: 0.2 INJECTION, SOLUTION INTRAMUSCULAR; INTRAVENOUS at 07:24

## 2024-10-10 RX ADMIN — IOPAMIDOL 129 ML: 755 INJECTION, SOLUTION INTRAVENOUS at 09:08

## 2024-10-10 RX ADMIN — PROPOFOL 60 MG: 10 INJECTION, EMULSION INTRAVENOUS at 07:27

## 2024-10-10 RX ADMIN — PROPOFOL 150 MCG/KG/MIN: 10 INJECTION, EMULSION INTRAVENOUS at 07:27

## 2024-10-10 RX ADMIN — LIDOCAINE HYDROCHLORIDE 100 MG: 20 INJECTION, SOLUTION INFILTRATION; PERINEURAL at 07:27

## 2024-10-10 ASSESSMENT — ACTIVITIES OF DAILY LIVING (ADL)
ADLS_ACUITY_SCORE: 38

## 2024-10-10 NOTE — ANESTHESIA CARE TRANSFER NOTE
Patient: Celestine Simon    Procedure: Procedure(s):  COLONOSCOPY, FLEXIBLE, WITH LESION REMOVAL USING SNARE  TATTOOING, WITH COLONOSCOPY       Diagnosis: Colon cancer screening [Z12.11]  Diagnosis Additional Information: No value filed.    Anesthesia Type:   General     Note:    Oropharynx: oropharynx clear of all foreign objects and spontaneously breathing  Level of Consciousness: awake  Oxygen Supplementation: room air    Independent Airway: airway patency satisfactory and stable  Dentition: dentition unchanged  Vital Signs Stable: post-procedure vital signs reviewed and stable  Report to RN Given: handoff report given  Patient transferred to: Phase II    Handoff Report: Identifed the Patient, Identified the Reponsible Provider, Reviewed the pertinent medical history, Discussed the surgical course, Reviewed Intra-OP anesthesia mangement and issues during anesthesia, Set expectations for post-procedure period and Allowed opportunity for questions and acknowledgement of understanding      Vitals:  Vitals Value Taken Time   BP     Temp     Pulse     Resp     SpO2         Electronically Signed By: ANNE-MARIE Thacker CRNA  October 10, 2024  8:02 AM
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
## 2024-10-10 NOTE — ANESTHESIA POSTPROCEDURE EVALUATION
Patient: Celestine Simon    Procedure: Procedure(s):  COLONOSCOPY, FLEXIBLE, WITH LESION REMOVAL USING SNARE  TATTOOING, WITH COLONOSCOPY       Anesthesia Type:  General    Note:  Disposition: Outpatient   Postop Pain Control: Uneventful            Sign Out: Well controlled pain   PONV: No   Neuro/Psych: Uneventful            Sign Out: Acceptable/Baseline neuro status   Airway/Respiratory: Uneventful            Sign Out: Acceptable/Baseline resp. status   CV/Hemodynamics: Uneventful            Sign Out: Acceptable CV status; No obvious hypovolemia; No obvious fluid overload   Other NRE: NONE   DID A NON-ROUTINE EVENT OCCUR? No           Last vitals:  Vitals:    10/10/24 0629   BP: 124/76   Pulse: 86   Resp: 16   Temp: 37.2  C (98.9  F)   SpO2: 96%       Electronically Signed By: ANNE-MARIE Thacker CRNA  October 10, 2024  8:02 AM

## 2024-10-10 NOTE — LETTER
Celestine Simon  [id number]  [facility] [address]      October 14, 2024    Dear Celestine,  This letter is written to inform you of the results of your recent colonoscopy.  Your examination showed diverticulosis of your colon throughout and biopsy confirmed cancer in your sigmoid colon. There is also some evidence of possible spread of the cancer to lymph nodes in your pelvis and possibly the liver, which will likely need to be evaluated further with an MRI.     Given these findings, I will place an order for a pelvic MRI as well as a liver MRI. More importantly, I will be placing an urgent referral for you to be evaluated by a colorectal surgeon to discuss further treatment options, including possible surgery.    Please remember that this recommendation is made with the understanding that you are not experiencing persistent changes in bowel function, bleeding per rectum, and/or significant abdominal pain. If you experience these symptoms, please contact your primary care provider for a further evaluation.     If you have any questions or concerns about the results of your colonoscopy or the appropriate follow-up, please contact my assistant at [phone]    Sincerely,      Cj Tee MD   [facility]  ___                    Resulted Orders   Surgical Pathology Exam   Result Value Ref Range    Case Report       Surgical Pathology Report                         Case: [id number]                                  Authorizing Provider:  Cj Tee MD       Collected:           10/10/2024 07:46 AM          Ordering Location:     [facility]   Received:            10/10/2024 08:06 AM                                 [place]                                                                      Pathologist:           Chris Samaniego MD                                                                            Specimens:   A) - Large Intestine, Colon, Transverse, transverse colon polyp                                     B) - Large Intestine, Colon, Sigmoid/Rectal, Rectosigmoid mass                             Addendum       B: For further evaluation, stains on block B1 (mismatch repair (MMR) protein panel, including MLH1 clone ESO5/Optiview detection, MSH2 clone [id number]/Optiview detection, MSH6 clone [id number]/Ultraview detection, and PMS2 clone A16-4/Optiview detection) are performed with appropriate controls:    MLH1: Present  MSH2: Present  MSH6: Present  PMS2: Present    Interpretation: Normal pattern of mismatch repair (MMR) protein expression by immunohistochemical stains (low probability of MSI-H) (see comment).    COMMENT: All four DNA mismatch repair proteins are expressed in the tumor nuclei, which is the pattern of normal tissue and which strongly diminishes the likelihood of Oconnor Syndrome (Hereditary Nonpolyposis Colorectal Cancer; HNPCC) due to defective DNA mismatch repair.  Microsatellite instability (MSI) testing by PCR is recommended if the patient's family history meets the [place] guidelines or Revised [place] criteria for possible Oconnor syndrome.  If the tumor is MSI high this could indicate a false negative IHC test (reported to occur in about 2% of tested cases) or the possibility of an abnormality in one of the other genes involved in DNA mismatch repair.  The possibility that the tumor in this patient is due to an inherited defect in another gene not involved in mismatch repair cannot be ruled out by negative results by either IHC or MSI testing.    The following assays; ALK (D5F3), ER, HER2, PD-L1, BRAF, CD20, CD30 AZF, CD30 Formalin, MLH-1, MSH-2, MSH-6 and PMS-2, have not been validated on decalcified tissues. Results should be interpreted with caution given the possibility of false negative results on decalcified specimens.          Final Diagnosis       A: Large intestine,  transverse, polyp, biopsy/polypectomy:  - Benign mucosal lymphoid aggregate.     B: Large intestine, rectosigmoid, mass, biopsy:  - Invasive adenocarcinoma.  - Additional stains for mismatch repair (MMR) protein expression are pending and will be reported in an addendum.      Clinical Information       Procedure:  COLONOSCOPY, FLEXIBLE, WITH LESION REMOVAL USING SNARE  TATTOOING, WITH COLONOSCOPY      Gross Description       A(1). Large Intestine, Colon, Transverse, transverse colon polyp:  The specimen is received in formalin, labeled with the patient's name, medical record number and other identifying information and designated  transverse colon polyp . It consists of 4 tan soft tissue fragments ranging from 0.2-0.7 cm. Entirely submitted in one cassette.    B(2). Large Intestine, Colon, Sigmoid/Rectal, Rectosigmoid mass:  The specimen is received in formalin, labeled with the patient's name, medical record number and other identifying information and designated  rectosigmoid mass . It consists of 4 tan soft tissue fragments ranging from 0.2-0.5 cm. Entirely submitted in one cassette.   (ORI Ruelas) 10/10/2024 2:21 PM       Microscopic Description       A microscopic examination is performed.  Intradepartmental consultation was obtained on part B.  The pertinent findings in this case were reported to Dr. Tee on October 11, 2024 at 8:47 AM via Staff Message in the [facility] medical record by Dr. Samaniego.      Disclaimer         The following assays; ALK (D5F3), ER, HER2, PD-L1, BRAF, CD20, CD30 AZF, CD30 Formalin, MLH-1, MSH-2, MSH-6 and PMS-2, have not been validated on decalcified tissues. Results should be interpreted with caution given the possibility of false negative results on decalcified specimens.          MCRS Yes (A) N/A    Performing Labs       The technical component of this testing was completed at [facility].    Stain controls  for all stains resulted within this report have been reviewed and show appropriate reactivity.       Case Images     CEA   Result Value Ref Range    CEA 5.4 ng/mL      Comment:      Nonsmoker (past/never) <=5.0 ng/mL   Current smoker <=6.5 ng/mL     This result is obtained using the Roche Elecsys CEA method on the tylor e801 immunoassay analyzer. Results obtained with different assay methods or kits cannot be used interchangeably.

## 2024-10-10 NOTE — PROGRESS NOTES
OFELIA RODG DISCHARGE NOTE    Patient discharged to CT imaging at 8:38 AM via ambulation. Accompanied by spouse and staff. Discharge instructions reviewed with patient and spouse, opportunity offered to ask questions. Prescriptions - None ordered for discharge. All belongings sent with patient.  Labs drawn per Dr. Tee orders. Left IV in place upon discharge from endo for upcoming CT ABD to follow. CT will remove IV upon completion of procedure.     Olamide Pond RN

## 2024-10-11 LAB
PATH REPORT.ADDENDUM SPEC: ABNORMAL
PATH REPORT.COMMENTS IMP SPEC: ABNORMAL
PATH REPORT.COMMENTS IMP SPEC: YES
PATH REPORT.FINAL DX SPEC: ABNORMAL
PATH REPORT.GROSS SPEC: ABNORMAL
PATH REPORT.MICROSCOPIC SPEC OTHER STN: ABNORMAL
PATH REPORT.RELEVANT HX SPEC: ABNORMAL
PHOTO IMAGE: ABNORMAL

## 2024-10-11 PROCEDURE — 88305 TISSUE EXAM BY PATHOLOGIST: CPT | Mod: 26 | Performed by: PATHOLOGY

## 2024-10-11 PROCEDURE — 88342 IMHCHEM/IMCYTCHM 1ST ANTB: CPT | Mod: 26 | Performed by: PATHOLOGY

## 2024-10-11 PROCEDURE — 88341 IMHCHEM/IMCYTCHM EA ADD ANTB: CPT | Mod: 26 | Performed by: PATHOLOGY

## 2024-10-14 ENCOUNTER — PATIENT OUTREACH (OUTPATIENT)
Dept: SURGERY | Facility: CLINIC | Age: 69
End: 2024-10-14

## 2024-10-14 ENCOUNTER — OFFICE VISIT (OUTPATIENT)
Dept: FAMILY MEDICINE | Facility: CLINIC | Age: 69
End: 2024-10-14
Attending: PHYSICIAN ASSISTANT
Payer: MEDICARE

## 2024-10-14 VITALS
HEART RATE: 70 BPM | DIASTOLIC BLOOD PRESSURE: 83 MMHG | RESPIRATION RATE: 20 BRPM | BODY MASS INDEX: 36.54 KG/M2 | SYSTOLIC BLOOD PRESSURE: 135 MMHG | WEIGHT: 261 LBS | TEMPERATURE: 97.6 F | HEIGHT: 71 IN | OXYGEN SATURATION: 96 %

## 2024-10-14 DIAGNOSIS — E66.01 SEVERE OBESITY (BMI 35.0-39.9) WITH COMORBIDITY (H): ICD-10-CM

## 2024-10-14 DIAGNOSIS — D64.9 ANEMIA, UNSPECIFIED TYPE: ICD-10-CM

## 2024-10-14 DIAGNOSIS — C19 MALIGNANT NEOPLASM OF RECTOSIGMOID JUNCTION (H): ICD-10-CM

## 2024-10-14 DIAGNOSIS — R80.9 TYPE 2 DIABETES MELLITUS WITH MICROALBUMINURIA, WITHOUT LONG-TERM CURRENT USE OF INSULIN (H): ICD-10-CM

## 2024-10-14 DIAGNOSIS — I10 ESSENTIAL HYPERTENSION: ICD-10-CM

## 2024-10-14 DIAGNOSIS — Q89.01 ASPLENIA: ICD-10-CM

## 2024-10-14 DIAGNOSIS — C20 RECTAL CANCER (H): Primary | ICD-10-CM

## 2024-10-14 DIAGNOSIS — E78.5 DYSLIPIDEMIA: ICD-10-CM

## 2024-10-14 DIAGNOSIS — E11.628 TYPE 2 DIABETES MELLITUS WITH PRESSURE CALLUS (H): ICD-10-CM

## 2024-10-14 DIAGNOSIS — Z00.00 ENCOUNTER FOR MEDICARE ANNUAL WELLNESS EXAM: ICD-10-CM

## 2024-10-14 DIAGNOSIS — E11.29 TYPE 2 DIABETES MELLITUS WITH MICROALBUMINURIA, WITHOUT LONG-TERM CURRENT USE OF INSULIN (H): ICD-10-CM

## 2024-10-14 DIAGNOSIS — L84 TYPE 2 DIABETES MELLITUS WITH PRESSURE CALLUS (H): ICD-10-CM

## 2024-10-14 DIAGNOSIS — K76.9 LIVER LESION: Primary | ICD-10-CM

## 2024-10-14 DIAGNOSIS — N18.2 CKD (CHRONIC KIDNEY DISEASE) STAGE 2, GFR 60-89 ML/MIN: ICD-10-CM

## 2024-10-14 DIAGNOSIS — C19 MALIGNANT NEOPLASM OF RECTOSIGMOID JUNCTION (H): Primary | ICD-10-CM

## 2024-10-14 DIAGNOSIS — G47.33 OSA (OBSTRUCTIVE SLEEP APNEA): ICD-10-CM

## 2024-10-14 PROCEDURE — G0402 INITIAL PREVENTIVE EXAM: HCPCS | Performed by: PHYSICIAN ASSISTANT

## 2024-10-14 PROCEDURE — 99207 PR FOOT EXAM NO CHARGE: CPT | Performed by: PHYSICIAN ASSISTANT

## 2024-10-14 PROCEDURE — 11055 PARING/CUTG B9 HYPRKER LES 1: CPT | Mod: GZ | Performed by: PHYSICIAN ASSISTANT

## 2024-10-14 PROCEDURE — 99214 OFFICE O/P EST MOD 30 MIN: CPT | Mod: 25 | Performed by: PHYSICIAN ASSISTANT

## 2024-10-14 RX ORDER — DOXAZOSIN 4 MG/1
4 TABLET ORAL DAILY
Qty: 90 TABLET | Refills: 1 | Status: SHIPPED | OUTPATIENT
Start: 2024-10-14

## 2024-10-14 RX ORDER — ORAL SEMAGLUTIDE 14 MG/1
7-14 TABLET ORAL DAILY
Qty: 90 TABLET | Refills: 1 | Status: SHIPPED | OUTPATIENT
Start: 2024-10-14

## 2024-10-14 RX ORDER — LISINOPRIL 40 MG/1
40 TABLET ORAL DAILY
Qty: 90 TABLET | Refills: 1 | Status: SHIPPED | OUTPATIENT
Start: 2024-10-14

## 2024-10-14 RX ORDER — ROSUVASTATIN CALCIUM 10 MG/1
10 TABLET, COATED ORAL DAILY
Qty: 90 TABLET | Refills: 1 | Status: SHIPPED | OUTPATIENT
Start: 2024-10-14

## 2024-10-14 RX ORDER — METFORMIN HYDROCHLORIDE 500 MG/1
TABLET, EXTENDED RELEASE ORAL
Qty: 270 TABLET | Refills: 1 | Status: SHIPPED | OUTPATIENT
Start: 2024-10-14

## 2024-10-14 RX ORDER — HYDROCHLOROTHIAZIDE 12.5 MG/1
12.5 TABLET ORAL EVERY MORNING
Qty: 90 TABLET | Refills: 1 | Status: SHIPPED | OUTPATIENT
Start: 2024-10-14

## 2024-10-14 RX ORDER — PIOGLITAZONE 30 MG/1
15 TABLET ORAL DAILY
Qty: 90 TABLET | Refills: 1 | Status: SHIPPED | OUTPATIENT
Start: 2024-10-14

## 2024-10-14 ASSESSMENT — PAIN SCALES - GENERAL: PAINLEVEL: NO PAIN (0)

## 2024-10-14 NOTE — PROGRESS NOTES
Patient was called after receiving a referral from Dr. Tee for rectal cancer.     CT scan: Yes    10/10                   MRI: Yes  10/23 was not able to get liver MRI till 11/10   Blood work:Yes     10/10    Insurance Carrier: Medicare     Are images able/being pushed to our system? Yes    Colonoscopy Report: Yes         Pathology Report: Yes     Patient was offered a clinic appointment with Dr. Bearden on 10/24. Patient is in agreement with this appointment date, time, and location. Patient's questions and concerns were addressed to his stated satisfaction. Patient is in agreement with this plan of care.

## 2024-10-14 NOTE — PROGRESS NOTES
Preventive Care Visit  [facility]  Deborah Delgado PA-C, Family Medicine  Oct 14, 2024      Assessment & Plan     Rectal cancer (H)  New diagnosis today, continue to colorectal surgeon    Type 2 diabetes mellitus with microalbuminuria, without long-term current use of insulin (H)  Controlled though increase in A1c with wt gain, he has been taking less of medications trying to stretch them due to cost, however no change today - reassess in [place] given cancer diagnosis today  - rosuvastatin (CRESTOR) 10 MG tablet; Take 1 tablet (10 mg) by mouth daily.  - doxazosin (CARDURA) 4 MG tablet; Take 1 tablet (4 mg) by mouth daily.  - Semaglutide (RYBELSUS) 14 MG tablet; Take 7-14 mg by mouth daily.  - metFORMIN (GLUCOPHAGE XR) 500 MG 24 hr tablet; 3 tabs daily.  - pioglitazone (ACTOS) 30 MG tablet; Take 0.5 tablets (15 mg) by mouth daily.    Severe obesity (BMI 35.0-39.9) with comorbidity (H)  Uncontrolled, with comorbid HTN and DM2.  Monitor with new cancer diagnosis/upcoming treatment.    Essential hypertension  stable  - lisinopril (ZESTRIL) 40 MG tablet; Take 1 tablet (40 mg) by mouth daily.  - hydroCHLOROthiazide 12.5 MG tablet; Take 1 tablet (12.5 mg) by mouth every morning.    Dyslipidemia  stable    RISSA (obstructive sleep apnea)  Newly discussed old diagnosis.  Ongoing symptoms.  Patient minimizing today however wife is forthcoming with his symptoms.  This referral will be booked out so encourage to schedule now.  - Adult Sleep Eval & Management  Referral; Future    Anemia, unspecified type  In retrospective lab review, mild anemia back to 2022 unknown to me until today.  Likely but not definitively due to colon cancer, discussed labs today v at likely preop.  He defers today.    question of asplenia  Contradictory imaging results - therefore await upcoming MRI    Type 2 diabetes mellitus with pressure callus (H)  CKD (chronic kidney disease) stage 2, GFR 60-89  "ml/min  PROCEDURE NOTE: The area was cleansed with alcohol. Callous and dark center were debrided until healthy tissue seen.  No EBL.  Patient tolerated procedure well.   Plan: TRIM HYPERKERATOTIC SKIN LESION, ONE    Encounter for Medicare annual wellness exam    Patient Instructions   Ferrous gluconate - 1 tab daily to treat mild anemia (low blood) likely from the colon cancer.  Plan to take this for 1-2 months.  Will check at preop.    Decided no med changes for diabetes today  Do try to focus on a bit of healthy weight loss and avoid \"vacuum  eating\"  Eventually still want to get you off pioglitazone     Talk to your daughter about flu and covid vaccines    See if spleen shown on upcoming MRI - wait on vaccines that would be for lack of spleen       Patient Education  Preventive Care Advice   This is general advice given by our system to help you stay healthy. However, your care team may have specific advice just for you. Please talk to your care team about your preventive care needs.  Nutrition  Eat 5 or more servings of fruits and vegetables each day.  Try wheat bread, brown rice and whole grain pasta (instead of white bread, rice, and pasta).  Get enough calcium and vitamin D. Check the label on foods and aim for 100% of the RDA (recommended daily allowance).  Lifestyle  Exercise at least 150 minutes each week  (30 minutes a day, 5 days a week).  Do muscle strengthening activities 2 days a week. These help control your weight and prevent disease.  No smoking.  Wear sunscreen to prevent skin cancer.  Have a dental exam and cleaning every 6 months.  Yearly exams  See your health care team every year to talk about:  Any changes in your health.  Any medicines your care team has prescribed.  Preventive care, family planning, and ways to prevent chronic diseases.  Shots (vaccines)   HPV shots (up to age 26), if you've never had them before.  Hepatitis B shots (up to age 59), if you've never had them " before.  COVID-19 shot: Get this shot when it's due.  Flu shot: Get a flu shot every year.  Tetanus shot: Get a tetanus shot every 10 years.  Pneumococcal, hepatitis A, and RSV shots: Ask your care team if you need these based on your risk.  Shingles shot (for age 50 and up)  General health tests  Diabetes screening:  Starting at age 35, Get screened for diabetes at least every 3 years.  If you are younger than age 35, ask your care team if you should be screened for diabetes.  Cholesterol test: At age 39, start having a cholesterol test every 5 years, or more often if advised.  Bone density scan (DEXA): At age 50, ask your care team if you should have this scan for osteoporosis (brittle bones).  Hepatitis C: Get tested at least once in your life.  STIs (sexually transmitted infections)  Before age 24: Ask your care team if you should be screened for STIs.  After age 24: Get screened for STIs if you're at risk. You are at risk for STIs (including HIV) if:  You are sexually active with more than one person.  You don't use condoms every time.  You or a partner was diagnosed with a sexually transmitted infection.  If you are at risk for HIV, ask about PrEP medicine to prevent HIV.  Get tested for HIV at least once in your life, whether you are at risk for HIV or not.  Cancer screening tests  Cervical cancer screening: If you have a cervix, begin getting regular cervical cancer screening tests starting at age 21.  Breast cancer scan (mammogram): If you've ever had breasts, begin having regular mammograms starting at age 40. This is a scan to check for breast cancer.  Colon cancer screening: It is important to start screening for colon cancer at age 45.  Have a colonoscopy test every 10 years (or more often if you're at risk) Or, ask your provider about stool tests like a FIT test every year or Cologuard test every 3 years.  To learn more about your testing options, visit:   .  For help making a decision, visit:    https://bit.Ygle/lp02847.  Prostate cancer screening test: If you have a prostate, ask your care team if a prostate cancer screening test (PSA) at age 55 is right for you.  Lung cancer screening: If you are a current or former smoker ages 50 to 80, ask your care team if ongoing lung cancer screenings are right for you.  For informational purposes only. Not to replace the advice of your health care provider. Copyright   2023 [employer]. All rights reserved. Clinically reviewed by the  [facility] [employer] [facility]. Attention Point [id number] - REV 01/24.  Hearing Loss: Care Instructions  Overview     Hearing loss is a sudden or slow decrease in how well you hear. It can range from slight to profound. Permanent hearing loss can occur with aging. It also can happen when you are exposed long-term to loud noise. Examples include listening to loud music, riding motorcycles, or being around other loud machines.  Hearing loss can affect your work and home life. It can make you feel lonely or depressed. You may feel that you have lost your independence. But hearing aids and other devices can help you hear better and feel connected to others.  Follow-up care is a key part of your treatment and safety. Be sure to make and go to all appointments, and call your doctor if you are having problems. It's also a good idea to know your test results and keep a list of the medicines you take.  How can you care for yourself at home?  Avoid loud noises whenever possible. This helps keep your hearing from getting worse.  Always wear hearing protection around loud noises.  Wear a hearing aid as directed.  A professional can help you pick a hearing aid that will work best for you.  You can also get hearing aids over the counter for mild to moderate hearing loss.  Have hearing tests as your doctor suggests. They can show whether your hearing has changed. Your hearing aid may need to be adjusted.  Use other devices as needed. These  "may include:  Telephone amplifiers and hearing aids that can connect to a television, stereo, radio, or microphone.  Devices that use lights or vibrations. These alert you to the doorbell, a ringing telephone, or a baby monitor.  Television closed-captioning. This shows the words at the bottom of the screen. Most new TVs can do this.  TTY (text telephone). This lets you type messages back and forth on the telephone instead of talking or listening. These devices are also called TDD. When messages are typed on the keyboard, they are sent over the phone line to a receiving TTY. The message is shown on a monitor.  Use text messaging, social media, and email if it is hard for you to communicate by telephone.  Try to learn a listening technique called speechreading. It is not lipreading. You pay attention to people's gestures, expressions, posture, and tone of voice. These clues can help you understand what a person is saying. Face the person you are talking to, and have them face you. Make sure the lighting is good. You need to see the other person's face clearly.  Think about counseling if you need help to adjust to your hearing loss.  When should you call for help?  Watch closely for changes in your health, and be sure to contact your doctor if:    You think your hearing is getting worse.     You have new symptoms, such as dizziness or nausea.   Where can you learn more?  Go to https://www.Teach 'n Go.net/patiented  Enter R798 in the search box to learn more about \"Hearing Loss: Care Instructions.\"  Current as of: September 27, 2023  Content Version: 14.2 2024 [facility] [employer].   Care instructions adapted under license by your healthcare professional. If you have questions about a medical condition or this instruction, always ask your healthcare professional. Healthwise[employer] disclaims any warranty or liability for your use of this information.    Learning About Sleeping Well  What does sleeping well " mean?     Sleeping well means getting enough sleep to feel good and stay healthy. How much sleep is enough varies among people.  The number of hours you sleep and how you feel when you wake up are both important. If you do not feel refreshed, you probably need more sleep. Another sign of not getting enough sleep is feeling tired during the day.  Experts recommend that adults get at least 7 or more hours of sleep per day. Children and older adults need more sleep.  Why is getting enough sleep important?  Getting enough quality sleep is a basic part of good health. When your sleep suffers, your physical health, mood, and your thoughts can suffer too. You may find yourself feeling more grumpy or stressed. Not getting enough sleep also can lead to serious problems, including injury, accidents, anxiety, and depression.  What might cause poor sleeping?  Many things can cause sleep problems, including:  Changes to your sleep schedule.  Stress. Stress can be caused by fear about a single event, such as giving a speech. Or you may have ongoing stress, such as worry about work or school.  Depression, anxiety, and other mental or emotional conditions.  Changes in your sleep habits or surroundings. This includes changes that happen where you sleep, such as noise, light, or sleeping in a different bed. It also includes changes in your sleep pattern, such as having jet lag or working a late shift.  Health problems, such as pain, breathing problems, and restless legs syndrome.  Lack of regular exercise.  Using alcohol, nicotine, or caffeine before bed.  How can you help yourself?  Here are some tips that may help you sleep more soundly and wake up feeling more refreshed.  Your sleeping area   Use your bedroom only for sleeping and sex. A bit of light reading may help you fall asleep. But if it doesn't, do your reading elsewhere in the house. Try not to use your TV, computer, smartphone, or tablet while you are in bed.  Be sure  "your bed is big enough to stretch out comfortably, especially if you have a sleep partner.  Keep your bedroom quiet, dark, and cool. Use curtains, blinds, or a sleep mask to block out light. To block out noise, use earplugs, soothing music, or a \"white noise\" machine.  Your evening and bedtime routine   Create a relaxing bedtime routine. You might want to take a warm shower or bath, or listen to soothing music.  Go to bed at the same time every night. And get up at the same time every morning, even if you feel tired.  What to avoid   Limit caffeine (coffee, tea, caffeinated sodas) during the day, and don't have any for at least 6 hours before bedtime.  Avoid drinking alcohol before bedtime. Alcohol can cause you to wake up more often during the night.  Try not to smoke or use tobacco, especially in the evening. Nicotine can keep you awake.  Limit naps during the day, especially close to bedtime.  Avoid lying in bed awake for too long. If you can't fall asleep or if you wake up in the middle of the night and can't get back to sleep within about 20 minutes, get out of bed and go to another room until you feel sleepy.  Avoid taking medicine right before bed that may keep you awake or make you feel hyper or energized. Your doctor can tell you if your medicine may do this and if you can take it earlier in the day.  If you can't sleep   Imagine yourself in a peaceful, pleasant scene. Focus on the details and feelings of being in a place that is relaxing.  Get up and do a quiet or boring activity until you feel sleepy.  Avoid drinking any liquids before going to bed to help prevent waking up often to use the bathroom.  Where can you learn more?  Go to https://www.RidePal.net/patiented  Enter J942 in the search box to learn more about \"Learning About Sleeping Well.\"  Current as of: July 10, 2023  Content Version: 14.2 2024 [facility] [employer].   Care instructions adapted under license by your healthcare " professional. If you have questions about a medical condition or this instruction, always ask your healthcare professional. Healthwise, [employer] disclaims any warranty or liability for your use of this information.         Dayo Sprague is a 69 year old, presenting for the following:  Physical and Results (Colonscopy )        10/14/2024     8:14 AM   Additional Questions   Roomed by axel cho cma   Accompanied by wife        Via the Health Maintenance questionnaire, the patient has reported the following services have been completed -Colonscopy: [facility] 2024-10-10, this information has not been sent to the abstraction team.    Health Care Directive  Patient does not have a Health Care Directive or Living Will: Patient states has Advance Directive and will bring in a copy to clinic.    HPI  Pt would like to discuss coloscopy results.  Rectal cancer diagnosis received minutes ago.  Upcoming MRI, colorectal surgeon referral.    1-1.5 yrs fatigue, lack of stamina.    Regained ~17 pounds from May.      A1c went up to 7.6.  taking 3 metformin, pioglitazone 15 mg, rybelsus 7 mg.            10/7/2024   General Health   How would you rate your overall physical health? Good   Feel stress (tense, anxious, or unable to sleep) Only a little      (!) STRESS CONCERN      10/7/2024   Nutrition   Diet: Regular (no restrictions)          10/7/2024   Exercise   Days per week of moderate/strenous exercise 3 days   Average minutes spent exercising at this level 30 min          10/7/2024   Social Factors   Frequency of gathering with friends or relatives Twice a week   Worry food won't last until get money to buy more No   Food not last or not have enough money for food? No   Do you have housing? (Housing is defined as stable permanent housing and does not include staying ouside in a car, in a tent, in an abandoned building, in an overnight shelter, or couch-surfing.) Yes   Are you worried about losing your housing? No  "  Lack of transportation? No   Unable to get utilities (heat,electricity)? No            10/7/2024   Fall Risk   Fallen 2 or more times in the past year? No   Trouble with walking or balance? No             10/7/2024   Activities of Daily Living- Home Safety   Needs help with the following daily activites None of the above   Safety concerns in the home None of the above          10/7/2024   Dental   Dentist two times every year? Yes          10/7/2024   Hearing Screening   Hearing concerns? (!) IT'S HARD TO FOLLOW A CONVERSATION IN A NOISY RESTAURANT OR CROWDED ROOM.          10/7/2024   Driving Risk Screening   Patient/family members have concerns about driving No          10/7/2024   General Alertness/Fatigue Screening   Have you been more tired than usual lately? (!) YES          10/7/2024   Urinary Incontinence Screening   Bothered by leaking urine in past 6 months No          10/7/2024   TB Screening   Were you born outside of the [place]? No      Today's PHQ-2 Score:       10/14/2024     8:21 AM   PHQ-2 (  [employer])   Q1: Little interest or pleasure in doing things 0   Q2: Feeling down, depressed or hopeless 0   PHQ-2 Score 0         10/7/2024   Substance Use   Alcohol more than 3/day or more than 7/wk No   Do you have a current opioid prescription? No   How severe/bad is pain from 1 to 10? /10   Do you use any other substances recreationally? No        Social History     Tobacco Use    Smoking status: Former     Current packs/day: 0.00     Average packs/day: 2.0 packs/day for 4.0 years (8.0 ttl pk-yrs)     Types: Cigarettes, Cigars     Start date: 1975     Quit date: 1979     Years since quittin.3    Smokeless tobacco: Never    Tobacco comments:     50 years ago   Vaping Use    Vaping status: Former   Substance Use Topics    Alcohol use: Yes     Comment: Light    Drug use: No     Last PSA: No results found for: \"PSA\"  ASCVD Risk   The ASCVD Risk score (Pete COONEY, et al., 2019) failed to " "calculate for the following reasons:    The valid total cholesterol range is 130 to 320 mg/dL    Reviewed and updated as needed this visit by Provider                    Current providers sharing in care for this patient include:  Patient Care Team:  Deborah Delgado PA-C as PCP - General (Physician Assistant)  Deborah Delgado PA-C as Assigned PCP  Rachel Cheung NP as Assigned Pediatric Specialist Provider    The following health maintenance items are reviewed in Epic and correct as of today:  Health Maintenance   Topic Date Due    MENINGITIS IMMUNIZATION (2 - Risk 2-dose series) 05/02/2014    DIABETIC FOOT EXAM  06/08/2024    INFLUENZA VACCINE (1) 09/01/2024    COVID-19 Vaccine (4 - 2024-25 season) 09/01/2024    A1C  01/10/2025    COLORECTAL CANCER SCREENING  01/10/2025    LIPID  05/11/2025    MICROALBUMIN  05/11/2025    EYE EXAM  06/26/2025    BMP  10/10/2025    MEDICARE ANNUAL WELLNESS VISIT  10/14/2025    ANNUAL REVIEW OF HM ORDERS  10/14/2025    FALL RISK ASSESSMENT  10/14/2025    ADVANCE CARE PLANNING  10/14/2029    DTAP/TDAP/TD IMMUNIZATION (3 - Td or Tdap) 06/08/2033    HEPATITIS C SCREENING  Completed    PHQ-2 (once per calendar year)  Completed    Pneumococcal Vaccine: 65+ Years  Completed    ZOSTER IMMUNIZATION  Completed    RSV VACCINE  Completed    AORTIC ANEURYSM SCREENING (SYSTEM ASSIGNED)  Completed    HPV IMMUNIZATION  Aged Out    RSV MONOCLONAL ANTIBODY  Aged Out    LUNG CANCER SCREENING  Discontinued        Objective    Exam  /83   Pulse 70   Temp 97.6  F (36.4  C)   Resp 20   Ht 1.816 m (5' 11.5\")   Wt 118.4 kg (261 lb)   SpO2 96%   BMI 35.90 kg/m     Estimated body mass index is 35.9 kg/m  as calculated from the following:    Height as of this encounter: 1.816 m (5' 11.5\").    Weight as of this encounter: 118.4 kg (261 lb).    Physical Exam  GENERAL: alert and no distress  Diabetic foot exam: normal DP and PT pulses, callous with dark center at distal L 2nd toe, " onychomycosis, mild hammer deformity left 2nd toe,  no trophic changes, and normal monofilament exam       Vision Screen  Patient wears corrective lenses (select all that apply): Worn during vision screen  Vision Screen Results: Pass      Signed Electronically by: Deborah Delgado PA-C

## 2024-10-14 NOTE — PATIENT INSTRUCTIONS
"Ferrous gluconate - 1 tab daily to treat mild anemia (low blood) likely from the colon cancer.  Plan to take this for 1-2 months.  Will check at preop.    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 However, your care team may have specific advice just for you. Please talk to your care team about your preventive care needs.  Nutrition  Eat 5 or more servings of fruits and vegetables each day.  Try wheat bread, brown rice and whole grain pasta (instead of white bread, rice, and pasta).  Get enough calcium and vitamin D. Check the label on foods and aim for 100% of the RDA (recommended daily allowance).  Lifestyle  Exercise at least 150 minutes each week  (30 minutes a day, 5 days a week).  Do muscle strengthening activities 2 days a week. These help control your weight and prevent disease.  No smoking.  Wear sunscreen to prevent skin cancer.  Have a dental exam and cleaning every 6 months.  Yearly exams  See your health care team every year to talk about:  Any changes in your health.  Any medicines your care team has prescribed.  Preventive care, family planning, and ways to prevent chronic diseases.  Shots (vaccines)   HPV shots (up to age 26), if you've never had them before.  Hepatitis B shots (up to age 59), if you've never had them before.  COVID-19 shot: Get this shot when it's due.  Flu shot: Get a flu shot every year.  Tetanus shot: Get a tetanus shot every 10 years.  Pneumococcal, hepatitis A, and RSV shots: Ask your care team if you need these based on your risk.  Shingles shot (for age 50 and up)  General health " tests  Diabetes screening:  Starting at age 35, Get screened for diabetes at least every 3 years.  If you are younger than age 35, ask your care team if you should be screened for diabetes.  Cholesterol test: At age 39, start having a cholesterol test every 5 years, or more often if advised.  Bone density scan (DEXA): At age 50, ask your care team if you should have this scan for osteoporosis (brittle bones).  Hepatitis C: Get tested at least once in your life.  STIs (sexually transmitted infections)  Before age 24: Ask your care team if you should be screened for STIs.  After age 24: Get screened for STIs if you're at risk. You are at risk for STIs (including HIV) if:  You are sexually active with more than one person.  You don't use condoms every time.  You or a partner was diagnosed with a sexually transmitted infection.  If you are at risk for HIV, ask about PrEP medicine to prevent HIV.  Get tested for HIV at least once in your life, whether you are at risk for HIV or not.  Cancer screening tests  Cervical cancer screening: If you have a cervix, begin getting regular cervical cancer screening tests starting at age 21.  Breast cancer scan (mammogram): If you've ever had breasts, begin having regular mammograms starting at age 40. This is a scan to check for breast cancer.  Colon cancer screening: It is important to start screening for colon cancer at age 45.  Have a colonoscopy test every 10 years (or more often if you're at risk) Or, ask your provider about stool tests like a FIT test every year or Cologuard test every 3 years.  To learn more about your testing options, visit:   .  For help making a decision, visit:   https://bit.ly/sh12927.  Prostate cancer screening test: If you have a prostate, ask your care team if a prostate cancer screening test (PSA) at age 55 is right for you.  Lung cancer screening: If you are a current or former smoker ages 50 to 80, ask your care team if ongoing lung cancer  screenings are right for you.  For informational purposes only. Not to replace the advice of your health care provider. Copyright   2023 [facility]. All rights reserved. Clinically reviewed by the [facility]itions Program. Myoonet 204779 - REV 01/24.  Hearing Loss: Care Instructions  Overview     Hearing loss is a sudden or slow decrease in how well you hear. It can range from slight to profound. Permanent hearing loss can occur with aging. It also can happen when you are exposed long-term to loud noise. Examples include listening to loud music, riding motorcycles, or being around other loud machines.  Hearing loss can affect your work and home life. It can make you feel lonely or depressed. You may feel that you have lost your independence. But hearing aids and other devices can help you hear better and feel connected to others.  Follow-up care is a key part of your treatment and safety. Be sure to make and go to all appointments, and call your doctor if you are having problems. It's also a good idea to know your test results and keep a list of the medicines you take.  How can you care for yourself at home?  Avoid loud noises whenever possible. This helps keep your hearing from getting worse.  Always wear hearing protection around loud noises.  Wear a hearing aid as directed.  A professional can help you pick a hearing aid that will work best for you.  You can also get hearing aids over the counter for mild to moderate hearing loss.  Have hearing tests as your doctor suggests. They can show whether your hearing has changed. Your hearing aid may need to be adjusted.  Use other devices as needed. These may include:  Telephone amplifiers and hearing aids that can connect to a television, stereo, radio, or microphone.  Devices that use lights or vibrations. These alert you to the doorbell, a ringing telephone, or a baby monitor.  Television closed-captioning. This shows the words at the  "bottom of the screen. Most new TVs can do this.  TTY (text telephone). This lets you type messages back and forth on the telephone instead of talking or listening. These devices are also called TDD. When messages are typed on the keyboard, they are sent over the phone line to a receiving TTY. The message is shown on a monitor.  Use text messaging, social media, and email if it is hard for you to communicate by telephone.  Try to learn a listening technique called speechreading. It is not lipreading. You pay attention to people's gestures, expressions, posture, and tone of voice. These clues can help you understand what a person is saying. Face the person you are talking to, and have them face you. Make sure the lighting is good. You need to see the other person's face clearly.  Think about counseling if you need help to adjust to your hearing loss.  When should you call for help?  Watch closely for changes in your health, and be sure to contact your doctor if:    You think your hearing is getting worse.     You have new symptoms, such as dizziness or nausea.   Where can you learn more?  Go to https://www.[employer].net/patiented  Enter R798 in the search box to learn more about \"Hearing Loss: Care Instructions.\"  Current as of: September 27, 2023  Content Version: 14.2 2024 [employer]   Care instructions adapted under license by your healthcare professional. If you have questions about a medical condition or this instruction, always ask your healthcare professional. [employer] disclaims any warranty or liability for your use of this information.    Learning About Sleeping Well  What does sleeping well mean?     Sleeping well means getting enough sleep to feel good and stay healthy. How much sleep is enough varies among people.  The number of hours you sleep and how you feel when you wake up are both important. If you do not feel refreshed, you probably need more sleep. Another sign of " "not getting enough sleep is feeling tired during the day.  Experts recommend that adults get at least 7 or more hours of sleep per day. Children and older adults need more sleep.  Why is getting enough sleep important?  Getting enough quality sleep is a basic part of good health. When your sleep suffers, your physical health, mood, and your thoughts can suffer too. You may find yourself feeling more grumpy or stressed. Not getting enough sleep also can lead to serious problems, including injury, accidents, anxiety, and depression.  What might cause poor sleeping?  Many things can cause sleep problems, including:  Changes to your sleep schedule.  Stress. Stress can be caused by fear about a single event, such as giving a speech. Or you may have ongoing stress, such as worry about work or school.  Depression, anxiety, and other mental or emotional conditions.  Changes in your sleep habits or surroundings. This includes changes that happen where you sleep, such as noise, light, or sleeping in a different bed. It also includes changes in your sleep pattern, such as having jet lag or working a late shift.  Health problems, such as pain, breathing problems, and restless legs syndrome.  Lack of regular exercise.  Using alcohol, nicotine, or caffeine before bed.  How can you help yourself?  Here are some tips that may help you sleep more soundly and wake up feeling more refreshed.  Your sleeping area   Use your bedroom only for sleeping and sex. A bit of light reading may help you fall asleep. But if it doesn't, do your reading elsewhere in the house. Try not to use your TV, computer, smartphone, or tablet while you are in bed.  Be sure your bed is big enough to stretch out comfortably, especially if you have a sleep partner.  Keep your bedroom quiet, dark, and cool. Use curtains, blinds, or a sleep mask to block out light. To block out noise, use earplugs, soothing music, or a \"white noise\" machine.  Your evening and " "bedtime routine   Create a relaxing bedtime routine. You might want to take a warm shower or bath, or listen to soothing music.  Go to bed at the same time every night. And get up at the same time every morning, even if you feel tired.  What to avoid   Limit caffeine (coffee, tea, caffeinated sodas) during the day, and don't have any for at least 6 hours before bedtime.  Avoid drinking alcohol before bedtime. Alcohol can cause you to wake up more often during the night.  Try not to smoke or use tobacco, especially in the evening. Nicotine can keep you awake.  Limit naps during the day, especially close to bedtime.  Avoid lying in bed awake for too long. If you can't fall asleep or if you wake up in the middle of the night and can't get back to sleep within about 20 minutes, get out of bed and go to another room until you feel sleepy.  Avoid taking medicine right before bed that may keep you awake or make you feel hyper or energized. Your doctor can tell you if your medicine may do this and if you can take it earlier in the day.  If you can't sleep   Imagine yourself in a peaceful, pleasant scene. Focus on the details and feelings of being in a place that is relaxing.  Get up and do a quiet or boring activity until you feel sleepy.  Avoid drinking any liquids before going to bed to help prevent waking up often to use the bathroom.  Where can you learn more?  Go to https://www.[employer].net/patiented  Enter J942 in the search box to learn more about \"Learning About Sleeping Well.\"  Current as of: July 10, 2023  Content Version: 14.2 2024 [facility] [employer].   Care instructions adapted under license by your healthcare professional. If you have questions about a medical condition or this instruction, always ask your healthcare professional. [employer] disclaims any warranty or liability for your use of this information.       "

## 2024-10-14 NOTE — NURSING NOTE
"Chief Complaint   Patient presents with    Physical    Results     Colonscopy        Initial Resp 20   Ht 1.816 m (5' 11.5\")   Wt 118.4 kg (261 lb)   BMI 35.90 kg/m   Estimated body mass index is 35.9 kg/m  as calculated from the following:    Height as of this encounter: 1.816 m (5' 11.5\").    Weight as of this encounter: 118.4 kg (261 lb).    Patient presents to the clinic using No DME    Is there anyone who you would like to be able to receive your results? No  If yes have patient fill out AGUILAR      "

## 2024-10-15 ENCOUNTER — PATIENT OUTREACH (OUTPATIENT)
Dept: GASTROENTEROLOGY | Facility: CLINIC | Age: 69
End: 2024-10-15
Payer: MEDICARE

## 2024-10-15 ENCOUNTER — PRE VISIT (OUTPATIENT)
Dept: SURGERY | Facility: CLINIC | Age: 69
End: 2024-10-15
Payer: MEDICARE

## 2024-10-15 NOTE — CONFIDENTIAL NOTE
COLON AND RECTAL SURGERY PRE-VISIT:  Diagnosis, Referred by & from: Rectosigmoid cancer. Referral per colonoscopy.   Appt date: 10/24/2024 with Dr. Bearden at [facility]   NOTES STATUS DETAILS   OFFICE NOTE from referring provider [facility]:  10/10/2024- Colonoscopy   OFFICE NOTE from other specialist [facility]:  10/14/2024- Dr. Delgado    DISCHARGE SUMMARY from hospital N/A    DISCHARGE REPORT from the ER N/A    OPERATIVE REPORT N/A    MEDICATION LIST [facility]:   LABS     ANAL PAP/CEA [facility]:  10/10/2024- CEA=5.4   DIAGNOSTIC PROCEDURES     PFC TESTING  N/A    COLONOSCOPY [facility]:  10/10/2024- Dr. Tee  [Final Diagnosis  A: Large intestine, transverse, polyp, biopsy/polypectomy:  - Benign mucosal lymphoid aggregate.      B: Large intestine, rectosigmoid, mass, biopsy:  - Invasive adenocarcinoma.  - Additional stains for mismatch repair (MMR) protein expression are pending and will be reported in an addendum.]    4/9/2024- Dr. Herbert  1/11/2008- Dr. Kim    UPPER ENDOSCOPY (EGD) N/A    FLEX SIGMOIDOSCOPY N/A    ERCP N/A    IMAGING (DISC & REPORT)      CT [facility]:  10/10/2024- CT CAP   MRI [facility]:  (Future) 11/10/2024- MR Abd.  (Future) 10/23/2024- MR Rectum   XRAY N/A    ULTRASOUND [facility]:  10/13/2024- US Abd.     Records Requested   10/15/2024 No other records requested at this time. PRE-VISIT COMPLETE.        All relevant records are bookmarked under Chau Penaloza, MONIK.  Chau Chong, MONROE  Colon and Rectal Surgery

## 2024-10-21 ENCOUNTER — TELEPHONE (OUTPATIENT)
Dept: SURGERY | Facility: CLINIC | Age: 69
End: 2024-10-21
Payer: MEDICARE

## 2024-10-21 NOTE — TELEPHONE ENCOUNTER
Diagnosis, Referred by & from: Rectosigmoid Cancer   Appt date: 10/23/2024   NOTES STATUS DETAILS   OFFICE NOTE from referring provider N/A    OFFICE NOTE from other specialist [facility]:  10/14/24, 5/13/24 - PCC OV with ORI Ortiz   DISCHARGE SUMMARY from hospital N/A    DISCHARGE REPORT from the ER N/A    OPERATIVE REPORT N/A    MEDICATION LIST Internal    LABS     ANAL PAP/CEA [facility]:  10/10/24 - CEA   BIOPSIES/PATHOLOGY RELATED TO DIAGNOSIS [facility]:  10/10/24 - Colon Biopsy (Case: [id number])  4/9/18 - Colon Biopsy (Case: [id number])  1/11/08 - Colon Biopsy (Case: )   DIAGNOSTIC PROCEDURES     COLONOSCOPY (most recent all time after 5 years) [facility]:  10/10/24 - Colonoscopy  4/9/18 - Colonoscopy  1/11/08 - Colonoscopy   IMAGING (DISC & REPORT)      CT [facility]:  10/10/24 - CT Chest/Abd/Pelvis   MRI [facility]:  (FADUMO) 10/23/24 - MRI Rectum   ULTRASOUND  (ENDOANAL/ENDORECTAL) [facility]:  10/13/23 - US Abdomen

## 2024-10-21 NOTE — TELEPHONE ENCOUNTER
[facility] Call Center    Phone Message    May a detailed message be left on voicemail: yes     Reason for Call: Other: Patient called to see if he can change providers from Dr. Shaina Bearden, to Dr. Shahida Sheets.  Patient is able to take the 10/23/24 4pm appt with Dr. Sheets IF she is able to read the 10/23/24 MRI results from that morning. He has not seen Dr. Bearden yet.  Please follow up with patient.     Action Taken: Message routed to:  [facility]    Travel Screening: Not Applicable
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
## 2024-10-23 ENCOUNTER — PRE VISIT (OUTPATIENT)
Dept: SURGERY | Facility: CLINIC | Age: 69
End: 2024-10-23

## 2024-10-23 ENCOUNTER — OFFICE VISIT (OUTPATIENT)
Dept: SURGERY | Facility: CLINIC | Age: 69
End: 2024-10-23
Payer: MEDICARE

## 2024-10-23 ENCOUNTER — ANCILLARY PROCEDURE (OUTPATIENT)
Dept: MRI IMAGING | Facility: CLINIC | Age: 69
End: 2024-10-23
Attending: COLON & RECTAL SURGERY
Payer: MEDICARE

## 2024-10-23 VITALS — SYSTOLIC BLOOD PRESSURE: 111 MMHG | OXYGEN SATURATION: 98 % | HEART RATE: 109 BPM | DIASTOLIC BLOOD PRESSURE: 68 MMHG

## 2024-10-23 DIAGNOSIS — C19 MALIGNANT NEOPLASM OF RECTOSIGMOID JUNCTION (H): ICD-10-CM

## 2024-10-23 DIAGNOSIS — C19 MALIGNANT NEOPLASM OF RECTOSIGMOID JUNCTION (H): Primary | ICD-10-CM

## 2024-10-23 DIAGNOSIS — C20 RECTAL CANCER (H): ICD-10-CM

## 2024-10-23 PROCEDURE — A9585 GADOBUTROL INJECTION: HCPCS | Performed by: RADIOLOGY

## 2024-10-23 PROCEDURE — 99205 OFFICE O/P NEW HI 60 MIN: CPT | Performed by: COLON & RECTAL SURGERY

## 2024-10-23 PROCEDURE — G1010 CDSM STANSON: HCPCS | Performed by: RADIOLOGY

## 2024-10-23 PROCEDURE — 74183 MRI ABD W/O CNTR FLWD CNTR: CPT | Mod: MG | Performed by: RADIOLOGY

## 2024-10-23 RX ORDER — GADOBUTROL 604.72 MG/ML
15 INJECTION INTRAVENOUS ONCE
Status: COMPLETED | OUTPATIENT
Start: 2024-10-23 | End: 2024-10-23

## 2024-10-23 RX ADMIN — GADOBUTROL 12 ML: 604.72 INJECTION INTRAVENOUS at 09:39

## 2024-10-23 NOTE — PATIENT INSTRUCTIONS
I did change your abdominal MRI to 10/31 @ 7:15am to get you in sooner!  This is at [address] ([facility])  Referral placed to medical oncology  They will reach out to you to schedule a consultation appointment and talk about next steps

## 2024-10-23 NOTE — DISCHARGE INSTRUCTIONS
MRI Contrast Discharge Instructions    The IV contrast you received today will pass out of your body in your  urine. This will happen in the next 24 hours. You will not feel this process.  Your urine will not change color.    Drink at least 4 extra glasses of water or juice today (unless your doctor  has restricted your fluids). This reduces the stress on your kidneys.  You may take your regular medicines.    If you are on dialysis: It is best to have dialysis today.    If you have a reaction: Most reactions happen right away. If you have  any new symptoms after leaving the hospital (such as hives or swelling),  call your hospital at the correct number below. Or call your family doctor.  If you have breathing distress or wheezing, call 911.    Special instructions: ***    I have read and understand the above information.    Signature:______________________________________ Date:___________    Staff:__________________________________________ Date:___________     Time:__________    [facility] Departments:    ___[place]: [phone]  ___[facility]: [phone]  ___[place]: [phone] ___[facility]: [phone]  ___[facility]: [phone]  ___[facility]: [phone]  ___[phone]  ___[facility]: [phone]  ___[phone]
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
## 2024-10-23 NOTE — NURSING NOTE
Chief Complaint   Patient presents with    Consult       Vitals:    10/23/24 1545   BP: 111/68   BP Location: Left arm   Patient Position: Sitting   Cuff Size: Adult Regular   Pulse: 109   SpO2: 98%       There is no height or weight on file to calculate BMI.    Nacho Reed EMT-P

## 2024-10-24 NOTE — TELEPHONE ENCOUNTER
RECORDS STATUS - ALL OTHER DIAGNOSIS      RECORDS RECEIVED FROM: [facility]   NOTES STATUS DETAILS   OFFICE NOTE from referring provider Epic Dr. Cj Tee   DISCHARGE SUMMARY from hospital [facility] 10/10/2024 - [facility]   OPERATIVE REPORT [facility] 10/10/2024 - COLONOSCOPY, FLEXIBLE, WITH LESION REMOVAL USING SNARE (Rectum)   TATTOOING, WITH COLONOSCOPY     4/9/2018 - colonoscopy    MEDICATION LIST [facility]    LABS     PATHOLOGY REPORTS [facility] 10/10/2024 - [id number]   4/9/2018 - [id number]    ANYTHING RELATED TO DIAGNOSIS Epic 10/10/2024   IMAGING (NEED IMAGES & REPORT)     CT SCANS PACS CT CAP: 10/10/2024   MRI PACS MR Rectum: 10/23/2024   ULTRASOUND PACS US Abdomen: 10/13/2023

## 2024-10-25 ENCOUNTER — ONCOLOGY VISIT (OUTPATIENT)
Dept: ONCOLOGY | Facility: CLINIC | Age: 69
End: 2024-10-25
Attending: COLON & RECTAL SURGERY
Payer: MEDICARE

## 2024-10-25 ENCOUNTER — PRE VISIT (OUTPATIENT)
Dept: ONCOLOGY | Facility: CLINIC | Age: 69
End: 2024-10-25
Payer: MEDICARE

## 2024-10-25 VITALS
RESPIRATION RATE: 10 BRPM | SYSTOLIC BLOOD PRESSURE: 107 MMHG | TEMPERATURE: 97.8 F | WEIGHT: 256.7 LBS | HEART RATE: 94 BPM | HEIGHT: 72 IN | BODY MASS INDEX: 34.77 KG/M2 | OXYGEN SATURATION: 95 % | DIASTOLIC BLOOD PRESSURE: 69 MMHG

## 2024-10-25 DIAGNOSIS — C19 MALIGNANT NEOPLASM OF RECTOSIGMOID JUNCTION (H): ICD-10-CM

## 2024-10-25 DIAGNOSIS — E04.1 THYROID NODULE: Primary | ICD-10-CM

## 2024-10-25 PROCEDURE — 99205 OFFICE O/P NEW HI 60 MIN: CPT | Performed by: INTERNAL MEDICINE

## 2024-10-25 PROCEDURE — G2211 COMPLEX E/M VISIT ADD ON: HCPCS | Performed by: INTERNAL MEDICINE

## 2024-10-25 PROCEDURE — G0463 HOSPITAL OUTPT CLINIC VISIT: HCPCS | Performed by: INTERNAL MEDICINE

## 2024-10-25 ASSESSMENT — PAIN SCALES - GENERAL: PAINLEVEL_OUTOF10: NO PAIN (0)

## 2024-10-25 NOTE — LETTER
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 Rectal mass invading into the mesorectal fat with prominent mesorectal lymph nodes concerning for metastatic disease consistent with T3cN2 disease. T Staging on MRI Tx Primary tumor cannot be assessed T0 No evidence of primary tumor T1 Tumor invades submucosa T2 Tumor invades but does not penetrate muscularis propria T3 Tumor invades subserosa through muscularis propria: broad-based bulge or nodular projection (not fine spiculation) of intermediate signal intensity projecting beyond outer muscle coat T3a Tumor extends < 1 mm beyond muscularis propria T3b Tumor extends 1 - 5 mm beyond muscularis propria T3c Tumor extends > 5 - 15 mm beyond muscularis propria T3d Tumor extends > 15 mm beyond muscularis propria T4 Tumor invades other organs: extension of abnormal signal into adjacent organ, extension of tumor signal through peritoneal reflection I  have personally reviewed the examination and initial interpretation and I agree with the findings. FREDERICK FIELD MD   SYSTEM ID:  [id number]    CT Chest/Abdomen/Pelvis w Contrast    Result Date: 10/10/2024  CT CHEST/ABDOMEN/PELVIS WITH CONTRAST October 10, 2024 9:21 AM CLINICAL HISTORY: Colonic mass. TECHNIQUE: CT scan of the chest, abdomen, and pelvis was performed following injection of IV contrast. Multiplanar reformats were obtained. Dose reduction techniques were used. CONTRAST: 129 mL Isovue-370. COMPARISON: None. FINDINGS: LUNGS AND PLEURA: Mild atelectasis and/or scarring in the posteromedial right lung. No airspace consolidation or pleural fluid. Large airways are patent. No suspicious appearing pulmonary nodules or masses. Benign densely calcified pulmonary granuloma in the anterior right upper lobe. Large airways are patent. MEDIASTINUM/AXILLAE: A subtle round heterogenous enhancing 18 mm right thyroid nodule is noted. Consider further evaluation with thyroid ultrasound. Heart size is normal. No pericardial fluid. Thoracic aorta is normal in course and caliber. No enlarged thoracic lymph nodes. No coronary artery atherosclerosis. HEPATOBILIARY: There are a couple subtle small round low density hepatic observations present. One example is seen in the left hepatic lobe, segment 2, measuring 9 mm (3-112). Another example is seen in the anterior right hepatic lobe, (segment 5/8) measuring up to 15 mm (3-133). The gallbladder is absent. No bile duct dilation. PANCREAS: There is a tiny 11 x 10 mm low density cystic lesion in the tail of the pancreas (3-148). No inflammatory changes of the pancreas. No pancreatic duct dilation. SPLEEN: Normal. ADRENAL GLANDS: Normal. KIDNEYS/BLADDER: Several simple appearing bilateral renal cysts are present, which do not require follow-up. No solid enhancing renal mass. A nonobstructing left upper pole intrarenal calculus is present measuring 1.2 x 1.1 cm with an average density  of 1347 Hounsfield units. No hydronephrosis. No ureteral or urinary bladder calculi. No urinary bladder wall thickening. BOWEL: There is a long segment of abnormal circumferential masslike wall thickening of the rectum, most notable in the mid to high rectum. Associated irregularity of the of the rectum is suggestive of extra colonic extension. Several prominent and enlarged mesorectal lymph nodes are also noted. Largest examples measured 10 mm in short axis (3-263). No signs of bowel obstruction. Colonic diverticulosis is present without evidence of diverticulitis. Small bowel loops are nonobstructed and noninflamed. Small hiatal hernia. PELVIC ORGANS: Normal. ADDITIONAL FINDINGS: Minimal atherosclerosis abdominal aorta and iliac arteries. No aneurysmal dilation. MUSCULOSKELETAL: A right hip arthroplasty is present. Streak artifact from orthopedic hardware limits evaluation of adjacent structures, including deep pelvic structures.     IMPRESSION: 1.  Masslike wall thickening of the rectum, consistent with malignancy. 2.  Multiple prominent mildly enlarged mesorectal lymph nodes, consistent with local metastatic disease. 3.  Small low density hepatic observations, incompletely characterized but suspicious for possible metastatic disease in light of the rectal mass. Consider further evaluation with contrast enhanced liver MRI. 4.  Tiny low density cystic lesion in the tail of the pancreas, favoring an IPMN. Recommend attention on follow-up. CAPRI REYES MD   SYSTEM ID:  [id number]       I spent 70 minutes on the patient's visit today.  This included preparation for the visit, face-to-face time with the patient and documentation following the visit.  It did not include teaching or procedure time.    Signed by: Peter E. Friedell, MD          Again, thank you for allowing me to participate in the care of your patient.        Sincerely,        Peter E. Friedell, MD

## 2024-10-25 NOTE — PROGRESS NOTES
"Oncology Rooming Note    October 25, 2024 11:15 AM   Celestine Simon is a 69 year old male who presents for:    Chief Complaint   Patient presents with    Oncology Clinic Visit     Malignant neoplasm of rectosigmoid junction - provider visit only     Initial Vitals: /69 (BP Location: Right arm, Patient Position: Sitting, Cuff Size: Adult Regular)   Pulse 94   Temp 97.8  F (36.6  C) (Tympanic)   Resp 10   Ht 1.816 m (5' 11.5\")   Wt 116.4 kg (256 lb 11.2 oz)   SpO2 95%   BMI 35.30 kg/m   Estimated body mass index is 35.3 kg/m  as calculated from the following:    Height as of this encounter: 1.816 m (5' 11.5\").    Weight as of this encounter: 116.4 kg (256 lb 11.2 oz). Body surface area is 2.42 meters squared.  No Pain (0) Comment: Data Unavailable   No LMP for male patient.  Allergies reviewed: Yes  Medications reviewed: Yes    Medications: Medication refills not needed today.  Pharmacy name entered into [facility]:    [id number] - [place], [place] - [address] [facility] - [place], [place] - [address] [facility]  [facility] - [place], [place] - [address]    Frailty Screening:   Is the patient here for a new oncology consult visit in cancer care? 1. Yes. Over the past month, have you experienced difficulty or required a caregiver to assist with:   1. Balance, walking or general mobility (including any falls)? NO  2. Completion of self-care tasks such as bathing, dressing, toileting, grooming/hygiene?  NO  3. Concentration or memory that affects your daily life?  NO       Clinical concerns: New consult.        Kristi Cr MA            "

## 2024-10-25 NOTE — PROGRESS NOTES
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 ultrasound. Heart size is normal. No pericardial fluid. Thoracic aorta is normal in course and caliber. No enlarged thoracic lymph nodes. No coronary artery atherosclerosis. HEPATOBILIARY: There are a couple subtle small round low density hepatic observations present. One example is seen in the left hepatic lobe, segment 2, measuring 9 mm (3-112). Another example is seen in the anterior right hepatic lobe, (segment 5/8) measuring up to 15 mm (3-133). The gallbladder is absent. No bile duct dilation. PANCREAS: There is a tiny 11 x 10 mm low density cystic lesion in the tail of the pancreas (3-148). No inflammatory changes of the pancreas. No pancreatic duct dilation. SPLEEN: Normal. ADRENAL GLANDS: Normal. KIDNEYS/BLADDER: Several simple appearing bilateral renal cysts are present, which do not require follow-up. No solid enhancing renal mass. A nonobstructing left upper pole intrarenal calculus is present measuring 1.2 x 1.1 cm with an average density  of 1347 Hounsfield units. No hydronephrosis. No ureteral or urinary bladder calculi. No urinary bladder wall thickening. BOWEL: There is a long segment of abnormal circumferential masslike wall thickening of the rectum, most notable in the mid to high rectum. Associated irregularity of the of the rectum is suggestive of extra colonic extension. Several prominent and enlarged mesorectal lymph nodes are also noted. Largest examples measured 10 mm in short axis (3-263). No signs of bowel obstruction. Colonic diverticulosis is present without evidence of diverticulitis. Small bowel loops are nonobstructed and noninflamed. Small hiatal hernia. PELVIC ORGANS: Normal. ADDITIONAL FINDINGS: Minimal atherosclerosis abdominal aorta and iliac arteries. No aneurysmal dilation. MUSCULOSKELETAL: A right hip arthroplasty is present. Streak artifact from orthopedic hardware limits evaluation of adjacent structures, including deep pelvic structures.     IMPRESSION: 1.  Masslike wall thickening of the rectum, consistent with malignancy. 2.  Multiple prominent mildly enlarged mesorectal lymph nodes, consistent with local metastatic disease. 3.  Small low density hepatic observations, incompletely characterized but suspicious for possible metastatic disease in light of the rectal mass. Consider further evaluation with contrast enhanced liver MRI. 4.  Tiny low density cystic lesion in the tail of the pancreas, favoring an IPMN. Recommend attention on follow-up. CAPRI REYES MD   SYSTEM ID:  [id number]       I spent 70 minutes on the patient's visit today.  This included preparation for the visit, face-to-face time with the patient and documentation following the visit.  It did not include teaching or procedure time.    Signed by: Peter E. Friedell, MD

## 2024-10-31 ENCOUNTER — HOSPITAL ENCOUNTER (OUTPATIENT)
Dept: ULTRASOUND IMAGING | Facility: CLINIC | Age: 69
Discharge: HOME | End: 2024-10-31
Attending: INTERNAL MEDICINE
Payer: MEDICARE

## 2024-10-31 ENCOUNTER — HOSPITAL ENCOUNTER (OUTPATIENT)
Dept: MRI IMAGING | Facility: CLINIC | Age: 69
Discharge: HOME | End: 2024-10-31
Attending: COLON & RECTAL SURGERY
Payer: MEDICARE

## 2024-10-31 DIAGNOSIS — C19 MALIGNANT NEOPLASM OF RECTOSIGMOID JUNCTION (H): ICD-10-CM

## 2024-10-31 DIAGNOSIS — E04.1 THYROID NODULE: ICD-10-CM

## 2024-10-31 DIAGNOSIS — K76.9 LIVER LESION: ICD-10-CM

## 2024-10-31 PROCEDURE — 74183 MRI ABD W/O CNTR FLWD CNTR: CPT | Mod: 26 | Performed by: RADIOLOGY

## 2024-10-31 PROCEDURE — 255N000002 HC RX 255 OP 636: Performed by: COLON & RECTAL SURGERY

## 2024-10-31 PROCEDURE — G1010 CDSM STANSON: HCPCS

## 2024-10-31 PROCEDURE — 76536 US EXAM OF HEAD AND NECK: CPT | Mod: 26 | Performed by: STUDENT IN AN ORGANIZED HEALTH CARE EDUCATION/TRAINING PROGRAM

## 2024-10-31 PROCEDURE — A9581 GADOXETATE DISODIUM INJ: HCPCS | Performed by: COLON & RECTAL SURGERY

## 2024-10-31 PROCEDURE — G1010 CDSM STANSON: HCPCS | Performed by: RADIOLOGY

## 2024-10-31 PROCEDURE — 76536 US EXAM OF HEAD AND NECK: CPT

## 2024-10-31 RX ADMIN — GADOXETATE DISODIUM 10 ML: 181.43 INJECTION, SOLUTION INTRAVENOUS at 07:20

## 2024-11-01 ENCOUNTER — ONCOLOGY VISIT (OUTPATIENT)
Dept: ONCOLOGY | Facility: CLINIC | Age: 69
End: 2024-11-01
Attending: INTERNAL MEDICINE
Payer: MEDICARE

## 2024-11-01 ENCOUNTER — OFFICE VISIT (OUTPATIENT)
Dept: SURGERY | Facility: CLINIC | Age: 69
End: 2024-11-01
Attending: INTERNAL MEDICINE
Payer: MEDICARE

## 2024-11-01 ENCOUNTER — TELEPHONE (OUTPATIENT)
Dept: ONCOLOGY | Facility: CLINIC | Age: 69
End: 2024-11-01

## 2024-11-01 VITALS
OXYGEN SATURATION: 95 % | HEIGHT: 72 IN | WEIGHT: 259 LBS | SYSTOLIC BLOOD PRESSURE: 119 MMHG | DIASTOLIC BLOOD PRESSURE: 69 MMHG | HEART RATE: 102 BPM | BODY MASS INDEX: 35.08 KG/M2

## 2024-11-01 VITALS
SYSTOLIC BLOOD PRESSURE: 124 MMHG | OXYGEN SATURATION: 93 % | WEIGHT: 260.6 LBS | TEMPERATURE: 98.1 F | HEIGHT: 72 IN | BODY MASS INDEX: 35.3 KG/M2 | RESPIRATION RATE: 10 BRPM | DIASTOLIC BLOOD PRESSURE: 69 MMHG | HEART RATE: 81 BPM

## 2024-11-01 DIAGNOSIS — E11.29 TYPE 2 DIABETES MELLITUS WITH MICROALBUMINURIA, WITHOUT LONG-TERM CURRENT USE OF INSULIN (H): ICD-10-CM

## 2024-11-01 DIAGNOSIS — R80.9 TYPE 2 DIABETES MELLITUS WITH MICROALBUMINURIA, WITHOUT LONG-TERM CURRENT USE OF INSULIN (H): ICD-10-CM

## 2024-11-01 DIAGNOSIS — E66.812 CLASS 2 SEVERE OBESITY DUE TO EXCESS CALORIES WITH SERIOUS COMORBIDITY AND BODY MASS INDEX (BMI) OF 35.0 TO 35.9 IN ADULT (H): ICD-10-CM

## 2024-11-01 DIAGNOSIS — E66.01 CLASS 2 SEVERE OBESITY DUE TO EXCESS CALORIES WITH SERIOUS COMORBIDITY AND BODY MASS INDEX (BMI) OF 35.0 TO 35.9 IN ADULT (H): ICD-10-CM

## 2024-11-01 DIAGNOSIS — C19 MALIGNANT NEOPLASM OF RECTOSIGMOID JUNCTION (H): Primary | ICD-10-CM

## 2024-11-01 DIAGNOSIS — C19 MALIGNANT NEOPLASM OF RECTOSIGMOID JUNCTION (H): ICD-10-CM

## 2024-11-01 DIAGNOSIS — I10 ESSENTIAL HYPERTENSION: ICD-10-CM

## 2024-11-01 DIAGNOSIS — E04.1 THYROID NODULE: Primary | ICD-10-CM

## 2024-11-01 PROCEDURE — 99213 OFFICE O/P EST LOW 20 MIN: CPT | Performed by: STUDENT IN AN ORGANIZED HEALTH CARE EDUCATION/TRAINING PROGRAM

## 2024-11-01 PROCEDURE — G2211 COMPLEX E/M VISIT ADD ON: HCPCS | Performed by: INTERNAL MEDICINE

## 2024-11-01 PROCEDURE — G0463 HOSPITAL OUTPT CLINIC VISIT: HCPCS | Performed by: INTERNAL MEDICINE

## 2024-11-01 PROCEDURE — 99214 OFFICE O/P EST MOD 30 MIN: CPT | Performed by: INTERNAL MEDICINE

## 2024-11-01 RX ORDER — DEXAMETHASONE 4 MG/1
8 TABLET ORAL DAILY
Qty: 4 TABLET | Refills: 2 | Status: SHIPPED | OUTPATIENT
Start: 2024-11-01

## 2024-11-01 RX ORDER — PROCHLORPERAZINE MALEATE 10 MG
10 TABLET ORAL EVERY 6 HOURS PRN
Qty: 30 TABLET | Refills: 2 | Status: SHIPPED | OUTPATIENT
Start: 2024-11-01

## 2024-11-01 RX ORDER — ONDANSETRON 8 MG/1
8 TABLET, FILM COATED ORAL EVERY 8 HOURS PRN
Qty: 30 TABLET | Refills: 2 | Status: SHIPPED | OUTPATIENT
Start: 2024-11-01

## 2024-11-01 ASSESSMENT — PAIN SCALES - GENERAL: PAINLEVEL_OUTOF10: NO PAIN (0)

## 2024-11-01 NOTE — PATIENT INSTRUCTIONS
Plan for placement of implanted port for chemotherapy  Will need pre-op  H&P with PCP prior to procedure

## 2024-11-01 NOTE — TELEPHONE ENCOUNTER
I called Paulo and advised him of the thyroid ultrasound.  1 nodule met the size criteria and ultrasound to warrant biopsy.  I advised I would place a referral for a thyroid biopsy.
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
## 2024-11-01 NOTE — PROGRESS NOTES
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 minute, 3 minutes and delayed.  Contrast dose: 11ml Gadavist HISTORY: Malignant neoplasm of rectosigmoid junction. Rectosigmoid cancer. FINDINGS: LOCATION/SIZE: In the upper rectum there is a lobulated rectal cancer that is approximately 5.5 cm in length Series 5, image 28 and series 7, image 18 EXTENT: The tumor does not involve the anal sphincters or levator ani muscle, and, is located 12 cm from the anal verge. The tumor does extend through the muscularis propria to invade the perirectal fat series 6, image 25. The maximum extramural spread beyond the muscularis propria is 13 mm. STAGE: T3c CIRCUMFERENTIAL RESECTION MARGIN (CRM): In terms of the resection margin, there is no involvement of the mesorectal fascia, with the nearest potential distance to the circumferential margin being approximately 9 mm (> 1 mm indicates CRM is clear). LYMPH NODES: There are adjacent mesorectal and superior rectal nodes lymph nodes, the largest measuring 9 mm (24/18). The following nodes are worrisome for shorty metastases possessing irregular borders,  heterogeneous signal intensity or size >10 mm in short axis. 7.6 mm mesorectal fat (6/11), 6.7 mm node in the mesorectal fat (6/10), 9.6 mm in the mesorectal fat (6/5), 9.0 mm in the mesorectal fat (6/2) STAGE: N2 VEINS: There is no evidence of extramural venous extension. MISCELLANEOUS FINDINGS: Small amount of fluid in the pelvis. Image artifact from right hip prosthesis. No suspicious bony lesion     IMPRESSION: Rectal mass invading into the mesorectal fat with prominent mesorectal lymph nodes concerning for metastatic disease consistent with T3cN2 disease. T Staging on MRI Tx Primary tumor cannot be assessed T0 No evidence of primary tumor T1 Tumor invades submucosa T2 Tumor invades but does not penetrate muscularis propria T3 Tumor invades subserosa through muscularis propria: broad-based bulge or nodular projection (not fine spiculation) of intermediate signal intensity projecting beyond outer muscle coat T3a Tumor extends < 1 mm beyond muscularis propria T3b Tumor extends 1 - 5 mm beyond muscularis propria T3c Tumor extends > 5 - 15 mm beyond muscularis propria T3d Tumor extends > 15 mm beyond muscularis propria T4 Tumor invades other organs: extension of abnormal signal into adjacent organ, extension of tumor signal through peritoneal reflection I have personally reviewed the examination and initial interpretation and I agree with the findings. FREDERICK FIELD MD   SYSTEM ID:  [id number]    CT Chest/Abdomen/Pelvis w Contrast    Result Date: 10/10/2024  CT CHEST/ABDOMEN/PELVIS WITH CONTRAST October 10, 2024 9:21 AM CLINICAL HISTORY: Colonic mass. TECHNIQUE: CT scan of the chest, abdomen, and pelvis was performed following injection of IV contrast. Multiplanar reformats were obtained. Dose reduction techniques were used. CONTRAST: 129 mL [id number]. COMPARISON: None. FINDINGS: LUNGS AND PLEURA: Mild atelectasis and/or scarring in the posteromedial right lung. No airspace consolidation or pleural fluid. Large  airways are patent. No suspicious appearing pulmonary nodules or masses. Benign densely calcified pulmonary granuloma in the anterior right upper lobe. Large airways are patent. MEDIASTINUM/AXILLAE: A subtle round heterogenous enhancing 18 mm right thyroid nodule is noted. Consider further evaluation with thyroid ultrasound. Heart size is normal. No pericardial fluid. Thoracic aorta is normal in course and caliber. No enlarged thoracic lymph nodes. No coronary artery atherosclerosis. HEPATOBILIARY: There are a couple subtle small round low density hepatic observations present. One example is seen in the left hepatic lobe, segment 2, measuring 9 mm (3-112). Another example is seen in the anterior right hepatic lobe, (segment 5/8) measuring up to 15 mm (3-133). The gallbladder is absent. No bile duct dilation. PANCREAS: There is a tiny 11 x 10 mm low density cystic lesion in the tail of the pancreas (3-148). No inflammatory changes of the pancreas. No pancreatic duct dilation. SPLEEN: Normal. ADRENAL GLANDS: Normal. KIDNEYS/BLADDER: Several simple appearing bilateral renal cysts are present, which do not require follow-up. No solid enhancing renal mass. A nonobstructing left upper pole intrarenal calculus is present measuring 1.2 x 1.1 cm with an average density of 1347 Hounsfield units. No hydronephrosis. No ureteral or urinary bladder calculi. No urinary bladder wall thickening. BOWEL: There is a long segment of abnormal circumferential masslike wall thickening of the rectum, most notable in the mid to high rectum. Associated irregularity of the of the rectum is suggestive of extra colonic extension. Several prominent and enlarged mesorectal lymph nodes are also noted. Largest examples measured 10 mm in short axis (3-263). No signs of bowel obstruction. Colonic diverticulosis is present without evidence of diverticulitis. Small bowel loops are nonobstructed and noninflamed. Small hiatal hernia. PELVIC ORGANS:  Normal. ADDITIONAL FINDINGS: Minimal atherosclerosis abdominal aorta and iliac arteries. No aneurysmal dilation. MUSCULOSKELETAL: A right hip arthroplasty is present. Streak artifact from orthopedic hardware limits evaluation of adjacent structures, including deep pelvic structures.     IMPRESSION: 1.  Masslike wall thickening of the rectum, consistent with malignancy. 2.  Multiple prominent mildly enlarged mesorectal lymph nodes, consistent with local metastatic disease. 3.  Small low density hepatic observations, incompletely characterized but suspicious for possible metastatic disease in light of the rectal mass. Consider further evaluation with contrast enhanced liver MRI. 4.  Tiny low density cystic lesion in the tail of the pancreas, favoring an IPMN. Recommend attention on follow-up. CAPRI REYES MD   SYSTEM ID:  [id number]       I spent 25 minutes on the patient's visit today.  This included preparation for the visit, face-to-face time with the patient and documentation following the visit.  It did not include teaching or procedure time.    Signed by: Peter E. Friedell, MD

## 2024-11-01 NOTE — LETTER
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 Grossly clear. Bones and soft tissues: Triangular area of enhancement in the right hemisacrum (series 19 image 57), without underlying osseous lesion or correlate on prior CT, likely artifactual. No aggressive appearing osseous lesion. Mesentery and abdominal wall: Within normal limits. Ascites: None.     IMPRESSION: 1. Examination is limited by artifact. There are 2 focal hepatic lesions which correspond to findings on prior CT, both demonstrating enhancement pattern suggestive of benign hepatic hemangiomas. Follow-up MRI or CT is recommended to ensure stability given the limitations of current examination. 2. Multiple pancreatic cysts without suspicious features, the largest of which measures up to 11 mm in the tail of the pancreas. 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 Consider surveillance with MRI and/or attention on follow-up imaging. I have personally reviewed the examination and initial  interpretation and I agree with the findings. FREDERICK FIELD MD   SYSTEM ID:  [id number]    MR Rectum w/o & w Contrast    Result Date: 10/24/2024  EXAMINATION: MR RECTUM W/O & W CONTRAST, 10/23/2024 10:06 AM COMPARISON: 10/10/2024 TECHNIQUE:  Images were acquired without and with intravenous contrast through the pelvis. The following MR images were acquired without contrast: multiplanar T1, multiplanar T2, axial diffusion-weighted and axial apparent diffusion coefficient. T1-weighted images with fat saturation were acquired at the following intervals relative to intravenous contrast administration: pre-contrast, immediate post contrast, 1 minute, 3 minutes and delayed.  Contrast dose: 11ml Gadavist HISTORY: Malignant neoplasm of rectosigmoid junction. Rectosigmoid cancer. FINDINGS: LOCATION/SIZE: In the upper rectum there is a lobulated rectal cancer that is approximately 5.5 cm in length Series 5, image 28 and series 7, image 18 EXTENT: The tumor does not involve the anal sphincters or levator ani muscle, and, is located 12 cm from the anal verge. The tumor does extend through the muscularis propria to invade the perirectal fat series 6, image 25. The maximum extramural spread beyond the muscularis propria is 13 mm. STAGE: T3c CIRCUMFERENTIAL RESECTION MARGIN (CRM): In terms of the resection margin, there is no involvement of the mesorectal fascia, with the nearest potential distance to the circumferential margin being approximately 9 mm (> 1 mm indicates CRM is clear). LYMPH NODES: There are adjacent mesorectal and superior rectal nodes lymph nodes, the largest measuring 9 mm (24/18). The following nodes are worrisome for shorty metastases possessing irregular borders, heterogeneous signal intensity or size >10 mm in short axis. 7.6 mm mesorectal fat (6/11), 6.7 mm node in the mesorectal fat (6/10), 9.6 mm in the mesorectal fat (6/5), 9.0 mm in the mesorectal fat (6/2) STAGE: N2 VEINS: There is no evidence of  extramural venous extension. MISCELLANEOUS FINDINGS: Small amount of fluid in the pelvis. Image artifact from right hip prosthesis. No suspicious bony lesion     IMPRESSION: Rectal mass invading into the mesorectal fat with prominent mesorectal lymph nodes concerning for metastatic disease consistent with T3cN2 disease. T Staging on MRI Tx Primary tumor cannot be assessed T0 No evidence of primary tumor T1 Tumor invades submucosa T2 Tumor invades but does not penetrate muscularis propria T3 Tumor invades subserosa through muscularis propria: broad-based bulge or nodular projection (not fine spiculation) of intermediate signal intensity projecting beyond outer muscle coat T3a Tumor extends < 1 mm beyond muscularis propria T3b Tumor extends 1 - 5 mm beyond muscularis propria T3c Tumor extends > 5 - 15 mm beyond muscularis propria T3d Tumor extends > 15 mm beyond muscularis propria T4 Tumor invades other organs: extension of abnormal signal into adjacent organ, extension of tumor signal through peritoneal reflection I have personally reviewed the examination and initial interpretation and I agree with the findings. FREDERICK FIELD MD   SYSTEM ID:  [id number]    CT Chest/Abdomen/Pelvis w Contrast    Result Date: 10/10/2024  CT CHEST/ABDOMEN/PELVIS WITH CONTRAST October 10, 2024 9:21 AM CLINICAL HISTORY: Colonic mass. TECHNIQUE: CT scan of the chest, abdomen, and pelvis was performed following injection of IV contrast. Multiplanar reformats were obtained. Dose reduction techniques were used. CONTRAST: 129 mL Isovue-[id number]. COMPARISON: None. FINDINGS: LUNGS AND PLEURA: Mild atelectasis and/or scarring in the posteromedial right lung. No airspace consolidation or pleural fluid. Large airways are patent. No suspicious appearing pulmonary nodules or masses. Benign densely calcified pulmonary granuloma in the anterior right upper lobe. Large airways are patent. MEDIASTINUM/AXILLAE: A subtle round heterogenous enhancing 18 mm right  thyroid nodule is noted. Consider further evaluation with thyroid ultrasound. Heart size is normal. No pericardial fluid. Thoracic aorta is normal in course and caliber. No enlarged thoracic lymph nodes. No coronary artery atherosclerosis. HEPATOBILIARY: There are a couple subtle small round low density hepatic observations present. One example is seen in the left hepatic lobe, segment 2, measuring 9 mm (3-112). Another example is seen in the anterior right hepatic lobe, (segment 5/8) measuring up to 15 mm (3-133). The gallbladder is absent. No bile duct dilation. PANCREAS: There is a tiny 11 x 10 mm low density cystic lesion in the tail of the pancreas (3-148). No inflammatory changes of the pancreas. No pancreatic duct dilation. SPLEEN: Normal. ADRENAL GLANDS: Normal. KIDNEYS/BLADDER: Several simple appearing bilateral renal cysts are present, which do not require follow-up. No solid enhancing renal mass. A nonobstructing left upper pole intrarenal calculus is present measuring 1.2 x 1.1 cm with an average density of 1347 Hounsfield units. No hydronephrosis. No ureteral or urinary bladder calculi. No urinary bladder wall thickening. BOWEL: There is a long segment of abnormal circumferential masslike wall thickening of the rectum, most notable in the mid to high rectum. Associated irregularity of the of the rectum is suggestive of extra colonic extension. Several prominent and enlarged mesorectal lymph nodes are also noted. Largest examples measured 10 mm in short axis (3-263). No signs of bowel obstruction. Colonic diverticulosis is present without evidence of diverticulitis. Small bowel loops are nonobstructed and noninflamed. Small hiatal hernia. PELVIC ORGANS: Normal. ADDITIONAL FINDINGS: Minimal atherosclerosis abdominal aorta and iliac arteries. No aneurysmal dilation. MUSCULOSKELETAL: A right hip arthroplasty is present. Streak artifact from orthopedic hardware limits evaluation of adjacent structures,  including deep pelvic structures.     IMPRESSION: 1.  Masslike wall thickening of the rectum, consistent with malignancy. 2.  Multiple prominent mildly enlarged mesorectal lymph nodes, consistent with local metastatic disease. 3.  Small low density hepatic observations, incompletely characterized but suspicious for possible metastatic disease in light of the rectal mass. Consider further evaluation with contrast enhanced liver MRI. 4.  Tiny low density cystic lesion in the tail of the pancreas, favoring an IPMN. Recommend attention on follow-up. CAPRI REYES MD   SYSTEM ID:  [id number]       I spent 25 minutes on the patient's visit today.  This included preparation for the visit, face-to-face time with the patient and documentation following the visit.  It did not include teaching or procedure time.    Signed by: Peter E. Friedell, MD          Again, thank you for allowing me to participate in the care of your patient.        Sincerely,        Peter E. Friedell, MD
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
## 2024-11-01 NOTE — NURSING NOTE
"Chief Complaint   Patient presents with    Consult     [facility]       Vitals:    11/01/24 1316   Pulse: 102   SpO2: 95%   Weight: 117.5 kg (259 lb)   Height: 1.816 m (5' 11.5\")     Wt Readings from Last 1 Encounters:   11/01/24 117.5 kg (259 lb)   Vashti Medeiros MA      "

## 2024-11-01 NOTE — PROGRESS NOTES
"Oncology Rooming Note    November 1, 2024 10:20 AM   Celestine Simon is a 69 year old male who presents for:    Chief Complaint   Patient presents with    Oncology Clinic Visit     Malignant neoplasm of rectosigmoid junction - provider visit only     Initial Vitals: /69 (BP Location: Right arm, Patient Position: Sitting, Cuff Size: Adult Regular)   Pulse 81   Temp 98.1  F (36.7  C) (Tympanic)   Resp 10   Ht 1.816 m (5' 11.5\")   Wt 118.2 kg (260 lb 9.6 oz)   SpO2 93%   BMI 35.84 kg/m   Estimated body mass index is 35.84 kg/m  as calculated from the following:    Height as of this encounter: 1.816 m (5' 11.5\").    Weight as of this encounter: 118.2 kg (260 lb 9.6 oz). Body surface area is 2.44 meters squared.  No Pain (0) Comment: Data Unavailable   No LMP for male patient.  Allergies reviewed: Yes  Medications reviewed: Yes    Medications: Medication refills not needed today.  Pharmacy name entered into Test[employer]:    [id number] - [place], [place] - [address] - [place], [place] - [address] [facility]  [facility] - [place], [place] - [address]    Frailty Screening:   Is the patient here for a new oncology consult visit in cancer care? 2. No      Clinical concerns: Discuss treatment plan.        Kristi Cr MA            "
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
## 2024-11-06 ENCOUNTER — ENROLLMENT (OUTPATIENT)
Dept: HOME HEALTH SERVICES | Facility: HOME HEALTH | Age: 69
End: 2024-11-06
Payer: MEDICARE

## 2024-11-06 ENCOUNTER — OFFICE VISIT (OUTPATIENT)
Dept: FAMILY MEDICINE | Facility: CLINIC | Age: 69
End: 2024-11-06
Payer: MEDICARE

## 2024-11-06 ENCOUNTER — HOME INFUSION (OUTPATIENT)
Dept: HOME HEALTH SERVICES | Facility: HOME HEALTH | Age: 69
End: 2024-11-06
Payer: MEDICARE

## 2024-11-06 VITALS
WEIGHT: 265 LBS | DIASTOLIC BLOOD PRESSURE: 88 MMHG | SYSTOLIC BLOOD PRESSURE: 130 MMHG | RESPIRATION RATE: 16 BRPM | HEART RATE: 86 BPM | TEMPERATURE: 98.6 F | HEIGHT: 72 IN | BODY MASS INDEX: 35.89 KG/M2 | OXYGEN SATURATION: 98 %

## 2024-11-06 DIAGNOSIS — E11.29 TYPE 2 DIABETES MELLITUS WITH MICROALBUMINURIA, WITHOUT LONG-TERM CURRENT USE OF INSULIN (H): ICD-10-CM

## 2024-11-06 DIAGNOSIS — R80.9 TYPE 2 DIABETES MELLITUS WITH MICROALBUMINURIA, WITHOUT LONG-TERM CURRENT USE OF INSULIN (H): ICD-10-CM

## 2024-11-06 DIAGNOSIS — Z01.818 PRE-OP EXAM: Primary | ICD-10-CM

## 2024-11-06 DIAGNOSIS — C19 MALIGNANT NEOPLASM OF RECTOSIGMOID JUNCTION (H): ICD-10-CM

## 2024-11-06 DIAGNOSIS — I10 ESSENTIAL HYPERTENSION: ICD-10-CM

## 2024-11-06 DIAGNOSIS — G47.33 OSA (OBSTRUCTIVE SLEEP APNEA): ICD-10-CM

## 2024-11-06 DIAGNOSIS — C19 MALIGNANT NEOPLASM OF RECTOSIGMOID JUNCTION (H): Primary | ICD-10-CM

## 2024-11-06 PROCEDURE — 99214 OFFICE O/P EST MOD 30 MIN: CPT | Performed by: FAMILY MEDICINE

## 2024-11-06 ASSESSMENT — PAIN SCALES - GENERAL: PAINLEVEL_OUTOF10: NO PAIN (0)
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
## 2024-11-07 NOTE — TELEPHONE ENCOUNTER
MEDICAL RECORDS REQUEST   Radiation Oncology  [address] [place] [address]  Fax: [phone]          FUTURE VISIT INFORMATION                                                   Celestine Simon, : 1955 scheduled for future visit at [facility]    RECORDS REQUESTED FOR VISIT                                                     GI/ESOPHAGEAL/COLON + RECTAL     OFFICE NOTE from PCP Epic 10/14/2024 - Deborah Cabrera PA-C   OFFICE NOTE from medical oncologist Epic 10/25/2024 - Dr. Peter Friedell   OFFICE NOTE from Colon & Rectal surgeon [facility] Dr. Shahida Sheets   OPERATIVE REPORTS (include Colonoscopy & EUS) [facility] 10/10/2024 - COLONOSCOPY, FLEXIBLE, WITH LESION REMOVAL USING SNARE (Rectum)   TATTOOING, WITH COLONOSCOPY      2018 - colonoscopy    MEDICATION LIST [facility]    LABS     PATHOLOGY REPORTS [facility] 10/10/2024 - [id number]   2018 - [id number]    ANYTHING RELATED TO DIAGNOSIS Epic 10/10/2024   IMAGING (NEED IMAGES & REPORT)     CT SCANS PACS CT CAP: 10/10/2024    MRI PACS MR Abdomen: 10/31/2024  MR Rectum: 10/23/2024    ULTRASOUND PACS US Thyroid: 10/31/2024  US Abdomen: 10/13/2023

## 2024-11-11 ENCOUNTER — OFFICE VISIT (OUTPATIENT)
Dept: RADIATION THERAPY | Facility: OUTPATIENT CENTER | Age: 69
End: 2024-11-11
Payer: MEDICARE

## 2024-11-11 ENCOUNTER — PRE VISIT (OUTPATIENT)
Dept: RADIATION THERAPY | Facility: OUTPATIENT CENTER | Age: 69
End: 2024-11-11

## 2024-11-11 VITALS — HEART RATE: 77 BPM | SYSTOLIC BLOOD PRESSURE: 147 MMHG | OXYGEN SATURATION: 96 % | DIASTOLIC BLOOD PRESSURE: 85 MMHG

## 2024-11-11 DIAGNOSIS — C19 MALIGNANT NEOPLASM OF RECTOSIGMOID JUNCTION (H): ICD-10-CM

## 2024-11-11 ASSESSMENT — PAIN SCALES - GENERAL: PAINLEVEL_OUTOF10: NO PAIN (0)

## 2024-11-11 NOTE — NURSING NOTE
"REASON FOR APPOINTMENT   Type of Cancer: rectal cancer  Date of Symptom Onset: PCP did cologaurd screening - flag came back on test 5/2024, colonoscopy 10/2024 found mass, + bx for adenocarcinoma of rectum    TREATMENT TO-DATE FOR THIS CANCER  Surgery ? Has met with Dr. Stahl, chemo, then radiation, then surgery being discussed   Chemotherapy ? Dr. Friedell planning 6 cycles of FOLFOX   Other Treatments for this Cancer ? Discussion and education for radiation treatment    PERSONAL HISTORY OF CANCER   Previous Cancer ? no   Prior Radiation ? no   Prior Chemotherapy ? no   Prior Hormonal Therapy ? no     RECENT IMAGING STUDIES  CT abd/pelvis  MRI of abdomen    REFERRALS NEEDED  None at this time    VITALS  BP (!) 147/85   Pulse 77   SpO2 96%     PACEMAKER/IMPLANTED CARDIAC DEVICE no    PAIN  Denies    PSYCHOSOCIAL  Marital Status:   Patient lives in [place] with wife Ina.  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 Yes    REVIEW OF SYSTEMS  Skin: negative  Eyes: glasses  Ears/Nose/Throat: negative  Respiratory: No shortness of breath, dyspnea on exertion, cough, or hemoptysis  Cardiovascular: negative  Gastrointestinal: no complaints/concerns today  Genitourinary: negative  Musculoskeletal: hx of hip replacement, no current pain or problems  Neurologic: negative  Psychiatric: negative  Hematologic/Lymphatic/Immunologic: negative  Endocrine: diabetes      Radiation Oncology Patient Teaching    Current Concern: \" from what I understand I need chemo first, then radiation, then surgery\" \"I'm interested to learn why those steps\"  Patient and wife ready to learn - very pleasant and focused on conversation    Person involved with teaching: Patient and Wife  Ina  Patient asked Questions: Yes  Patient was cooperative: Yes  Patient was receptive " (willing to accept information given): Yes    Education Assessment  Comprehension ability: Medium  Knowledge level: Medium  Factors affecting teaching: None    Education Materials Given  Radiation Therapy and You  Radiation to the pelvis  Caring for Your Skin During Radiation ...    Educational Topics Discussed  Side effects, Medications, Activity, Nutrition, Adjustment to illness, and When to call MD/RN    Response To Teaching  More review necessary    Do you have an advanced directive or living will? Yes  Are you DNR/DNI? No
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
## 2024-11-12 ENCOUNTER — HOSPITAL ENCOUNTER (OUTPATIENT)
Dept: ULTRASOUND IMAGING | Facility: CLINIC | Age: 69
Discharge: HOME | End: 2024-11-12
Attending: INTERNAL MEDICINE | Admitting: INTERNAL MEDICINE
Payer: MEDICARE

## 2024-11-12 DIAGNOSIS — E04.1 THYROID NODULE: ICD-10-CM

## 2024-11-12 PROCEDURE — 272N000431 US BIOPSY THYROID FINE NEEDLE ASPIRATION

## 2024-11-12 PROCEDURE — 250N000009 HC RX 250: Performed by: RADIOLOGY

## 2024-11-12 RX ADMIN — LIDOCAINE HYDROCHLORIDE 6 ML: 10 INJECTION, SOLUTION EPIDURAL; INFILTRATION; INTRACAUDAL; PERINEURAL at 08:36

## 2024-11-12 NOTE — DISCHARGE INSTRUCTIONS
Fine-Needle Thyroid Biopsy: What to Expect at Home  Your Recovery     During your biopsy, your doctor placed a thin needle through your skin and into your thyroid gland to take a sample of tissue.  This may have been done to find what is causing a lump or growth in your thyroid.  You may find it uncomfortable to lie still with your head tipped backward. The biopsy site may be sore and tender for 1 to 2 days.  This care sheet gives you a general idea about how long it will take for you to recover. But each person recovers at a different pace. Follow the steps below to feel better as quickly as possible.  How can you care for yourself at home?  Activity    Rest when you feel tired. Getting enough sleep will help you recover.   Diet    You can eat your normal diet. If your stomach is upset, try bland, low-fat foods like plain rice, broiled chicken, toast, and yogurt.   Medicines    Your doctor will tell you if and when you can restart your medicines. He or she will also give you instructions about taking any new medicines.     If you stopped taking aspirin or some other blood thinner, your doctor will tell you when to start taking it again.     Take pain medicines exactly as directed.  If the doctor gave you a prescription medicine for pain, take it as prescribed.  If you are not taking a prescription pain medicine, ask your doctor if you can take an over-the-counter medicine.     If you think your pain medicine is making you sick to your stomach:  Take your medicine after meals (unless your doctor has told you not to).  Ask your doctor for a different pain medicine.   Incision care  Keep the biopsy site covered and dry for 48 hours. A small amount of bleeding from the biopsy site can be expected. Ask your doctor how much drainage to expect.  Follow-up care is a key part of your treatment and safety. Be sure to make and go to all appointments, and call your doctor if you are having problems. It's also a good idea to  "know your test results and keep a list of the medicines you take.  When should you call for help?   Call 911  anytime you think you may need emergency care. For example, call if:    You have severe trouble breathing.   Call your doctor now or seek immediate medical care if:    You have a lot of bleeding through the bandage.     You have a hard time swallowing.     You have new or worsening pain.     You have symptoms of infection, such as:  Increased pain, swelling, warmth, or redness.  Red streaks leading from the biopsy site.  Pus draining from the biopsy site.  A fever.   Watch closely for any changes in your health, and be sure to contact your doctor if:    You're not getting better as expected.     You notice a change in your voice.   Where can you learn more?  Go to https://www.60mo.net/patiented  Enter O887 in the search box to learn more about \"Fine-Needle Thyroid Biopsy: What to Expect at Home.\"  Current as of: September 27, 2023  Content Version: 14.2 2024 [facility]   Care instructions adapted under license by your healthcare professional. If you have questions about a medical condition or this instruction, always ask your healthcare professional. [employer] disclaims any warranty or liability for your use of this information.    "

## 2024-11-14 ENCOUNTER — ANESTHESIA EVENT (OUTPATIENT)
Dept: SURGERY | Facility: CLINIC | Age: 69
End: 2024-11-14
Payer: MEDICARE

## 2024-11-15 ENCOUNTER — HOME INFUSION BILLING (OUTPATIENT)
Dept: HOME HEALTH SERVICES | Facility: HOME HEALTH | Age: 69
End: 2024-11-15
Payer: MEDICARE

## 2024-11-15 PROCEDURE — A4452 WATERPROOF TAPE: HCPCS

## 2024-11-15 PROCEDURE — E1399 DURABLE MEDICAL EQUIPMENT MI: HCPCS

## 2024-11-15 PROCEDURE — A4245 ALCOHOL WIPES PER BOX: HCPCS

## 2024-11-18 ENCOUNTER — ONCOLOGY VISIT (OUTPATIENT)
Dept: ONCOLOGY | Facility: CLINIC | Age: 69
End: 2024-11-18
Attending: INTERNAL MEDICINE
Payer: MEDICARE

## 2024-11-18 VITALS
HEIGHT: 72 IN | TEMPERATURE: 98.5 F | HEART RATE: 90 BPM | RESPIRATION RATE: 13 BRPM | OXYGEN SATURATION: 94 % | BODY MASS INDEX: 35.93 KG/M2 | DIASTOLIC BLOOD PRESSURE: 75 MMHG | WEIGHT: 265.3 LBS | SYSTOLIC BLOOD PRESSURE: 125 MMHG

## 2024-11-18 DIAGNOSIS — C19 MALIGNANT NEOPLASM OF RECTOSIGMOID JUNCTION (H): Primary | ICD-10-CM

## 2024-11-18 PROCEDURE — G2211 COMPLEX E/M VISIT ADD ON: HCPCS | Performed by: INTERNAL MEDICINE

## 2024-11-18 PROCEDURE — 99215 OFFICE O/P EST HI 40 MIN: CPT | Performed by: INTERNAL MEDICINE

## 2024-11-18 PROCEDURE — G0463 HOSPITAL OUTPT CLINIC VISIT: HCPCS | Performed by: INTERNAL MEDICINE

## 2024-11-18 ASSESSMENT — PAIN SCALES - GENERAL: PAINLEVEL_OUTOF10: NO PAIN (1)

## 2024-11-18 ASSESSMENT — LIFESTYLE VARIABLES: TOBACCO_USE: 1
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
## 2024-11-18 NOTE — PROGRESS NOTES
"Oncology Rooming Note    November 18, 2024 2:28 PM   Celestine Simon is a 69 year old male who presents for:    Chief Complaint   Patient presents with    Oncology Clinic Visit     Malignant neoplasm of rectosigmoid junction - provider visit only     Initial Vitals: /75 (BP Location: Right arm, Patient Position: Sitting, Cuff Size: Adult Regular)   Pulse 90   Temp 98.5  F (36.9  C) (Tympanic)   Resp 13   Ht 1.816 m (5' 11.5\")   Wt 120.3 kg (265 lb 4.8 oz)   SpO2 94%   BMI 36.49 kg/m   Estimated body mass index is 36.49 kg/m  as calculated from the following:    Height as of this encounter: 1.816 m (5' 11.5\").    Weight as of this encounter: 120.3 kg (265 lb 4.8 oz). Body surface area is 2.46 meters squared.  No Pain (1) Comment: Data Unavailable   No LMP for male patient.  Allergies reviewed: Yes  Medications reviewed: Yes    Medications: Medication refills not needed today.  Pharmacy name entered into [employer]:    [id number] - [place], [place] - [address] [facility] [facility] - [place], [place] - [address] [facility] - [place], [place] - [address]    Frailty Screening:   Is the patient here for a new oncology consult visit in cancer care? 2. No      Clinical concerns: Tentative timeline for treatment and when will be done.        Kristi Cr MA            "
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
## 2024-11-19 LAB
PATH REPORT.COMMENTS IMP SPEC: NORMAL
PATH REPORT.COMMENTS IMP SPEC: NORMAL
PATH REPORT.FINAL DX SPEC: NORMAL
PATH REPORT.GROSS SPEC: NORMAL
PATH REPORT.MICROSCOPIC SPEC OTHER STN: NORMAL
PATH REPORT.RELEVANT HX SPEC: NORMAL

## 2024-11-19 RX ORDER — EPINEPHRINE 1 MG/ML
0.3 INJECTION, SOLUTION INTRAMUSCULAR; SUBCUTANEOUS EVERY 5 MIN PRN
OUTPATIENT
Start: 2024-11-22

## 2024-11-19 RX ORDER — METHYLPREDNISOLONE SODIUM SUCCINATE 40 MG/ML
40 INJECTION INTRAMUSCULAR; INTRAVENOUS
Start: 2024-11-22

## 2024-11-19 RX ORDER — ONDANSETRON 2 MG/ML
8 INJECTION INTRAMUSCULAR; INTRAVENOUS ONCE
OUTPATIENT
Start: 2024-11-22

## 2024-11-19 RX ORDER — FLUOROURACIL 50 MG/ML
400 INJECTION, SOLUTION INTRAVENOUS ONCE
OUTPATIENT
Start: 2024-11-22

## 2024-11-19 RX ORDER — HEPARIN SODIUM,PORCINE 10 UNIT/ML
5-20 VIAL (ML) INTRAVENOUS DAILY PRN
OUTPATIENT
Start: 2024-11-22

## 2024-11-19 RX ORDER — MEPERIDINE HYDROCHLORIDE 25 MG/ML
25 INJECTION INTRAMUSCULAR; INTRAVENOUS; SUBCUTANEOUS
OUTPATIENT
Start: 2024-11-22

## 2024-11-19 RX ORDER — HEPARIN SODIUM (PORCINE) LOCK FLUSH IV SOLN 100 UNIT/ML 100 UNIT/ML
5 SOLUTION INTRAVENOUS
OUTPATIENT
Start: 2024-11-22

## 2024-11-19 RX ORDER — ALBUTEROL SULFATE 90 UG/1
1-2 INHALANT RESPIRATORY (INHALATION)
Start: 2024-11-22

## 2024-11-19 RX ORDER — DIPHENHYDRAMINE HYDROCHLORIDE 50 MG/ML
50 INJECTION INTRAMUSCULAR; INTRAVENOUS
Start: 2024-11-22

## 2024-11-19 RX ORDER — HEPARIN SODIUM,PORCINE 10 UNIT/ML
5-20 VIAL (ML) INTRAVENOUS DAILY PRN
OUTPATIENT
Start: 2024-11-24

## 2024-11-19 RX ORDER — ALBUTEROL SULFATE 0.83 MG/ML
2.5 SOLUTION RESPIRATORY (INHALATION)
OUTPATIENT
Start: 2024-11-22

## 2024-11-19 RX ORDER — HEPARIN SODIUM (PORCINE) LOCK FLUSH IV SOLN 100 UNIT/ML 100 UNIT/ML
5 SOLUTION INTRAVENOUS
OUTPATIENT
Start: 2024-11-24

## 2024-11-19 RX ORDER — DIPHENHYDRAMINE HYDROCHLORIDE 50 MG/ML
25 INJECTION INTRAMUSCULAR; INTRAVENOUS
Start: 2024-11-22

## 2024-11-19 RX ORDER — LORAZEPAM 2 MG/ML
0.5 INJECTION INTRAMUSCULAR EVERY 4 HOURS PRN
OUTPATIENT
Start: 2024-11-22

## 2024-11-20 ENCOUNTER — ANESTHESIA (OUTPATIENT)
Dept: SURGERY | Facility: CLINIC | Age: 69
End: 2024-11-20
Payer: MEDICARE

## 2024-11-20 ENCOUNTER — HOSPITAL ENCOUNTER (OUTPATIENT)
Facility: CLINIC | Age: 69
Discharge: HOME | End: 2024-11-20
Attending: STUDENT IN AN ORGANIZED HEALTH CARE EDUCATION/TRAINING PROGRAM | Admitting: STUDENT IN AN ORGANIZED HEALTH CARE EDUCATION/TRAINING PROGRAM
Payer: MEDICARE

## 2024-11-20 ENCOUNTER — PATIENT OUTREACH (OUTPATIENT)
Dept: ONCOLOGY | Facility: CLINIC | Age: 69
End: 2024-11-20

## 2024-11-20 VITALS
BODY MASS INDEX: 37.1 KG/M2 | HEIGHT: 71 IN | HEART RATE: 79 BPM | RESPIRATION RATE: 16 BRPM | DIASTOLIC BLOOD PRESSURE: 61 MMHG | TEMPERATURE: 98.1 F | OXYGEN SATURATION: 93 % | SYSTOLIC BLOOD PRESSURE: 98 MMHG | WEIGHT: 265 LBS

## 2024-11-20 DIAGNOSIS — C19 MALIGNANT NEOPLASM OF RECTOSIGMOID JUNCTION (H): Primary | ICD-10-CM

## 2024-11-20 LAB
GLUCOSE BLDC GLUCOMTR-MCNC: 126 MG/DL (ref 70–99)
GLUCOSE BLDC GLUCOMTR-MCNC: 157 MG/DL (ref 70–99)

## 2024-11-20 PROCEDURE — 370N000017 HC ANESTHESIA TECHNICAL FEE, PER MIN: Performed by: STUDENT IN AN ORGANIZED HEALTH CARE EDUCATION/TRAINING PROGRAM

## 2024-11-20 PROCEDURE — 82962 GLUCOSE BLOOD TEST: CPT

## 2024-11-20 PROCEDURE — 271N000001 HC OR GENERAL SUPPLY NON-STERILE: Performed by: STUDENT IN AN ORGANIZED HEALTH CARE EDUCATION/TRAINING PROGRAM

## 2024-11-20 PROCEDURE — 250N000009 HC RX 250

## 2024-11-20 PROCEDURE — 250N000011 HC RX IP 250 OP 636: Performed by: STUDENT IN AN ORGANIZED HEALTH CARE EDUCATION/TRAINING PROGRAM

## 2024-11-20 PROCEDURE — 272N000001 HC OR GENERAL SUPPLY STERILE: Performed by: STUDENT IN AN ORGANIZED HEALTH CARE EDUCATION/TRAINING PROGRAM

## 2024-11-20 PROCEDURE — 360N000082 HC SURGERY LEVEL 2 W/ FLUORO, PER MIN: Performed by: STUDENT IN AN ORGANIZED HEALTH CARE EDUCATION/TRAINING PROGRAM

## 2024-11-20 PROCEDURE — 258N000003 HC RX IP 258 OP 636

## 2024-11-20 PROCEDURE — 258N000003 HC RX IP 258 OP 636: Performed by: NURSE ANESTHETIST, CERTIFIED REGISTERED

## 2024-11-20 PROCEDURE — 250N000011 HC RX IP 250 OP 636: Performed by: NURSE ANESTHETIST, CERTIFIED REGISTERED

## 2024-11-20 PROCEDURE — 250N000013 HC RX MED GY IP 250 OP 250 PS 637

## 2024-11-20 PROCEDURE — 250N000009 HC RX 250: Performed by: NURSE ANESTHETIST, CERTIFIED REGISTERED

## 2024-11-20 PROCEDURE — 999N000141 HC STATISTIC PRE-PROCEDURE NURSING ASSESSMENT: Performed by: STUDENT IN AN ORGANIZED HEALTH CARE EDUCATION/TRAINING PROGRAM

## 2024-11-20 PROCEDURE — 710N000009 HC RECOVERY PHASE 1, LEVEL 1, PER MIN: Performed by: STUDENT IN AN ORGANIZED HEALTH CARE EDUCATION/TRAINING PROGRAM

## 2024-11-20 PROCEDURE — 710N000012 HC RECOVERY PHASE 2, PER MINUTE: Performed by: STUDENT IN AN ORGANIZED HEALTH CARE EDUCATION/TRAINING PROGRAM

## 2024-11-20 PROCEDURE — C1788 PORT, INDWELLING, IMP: HCPCS | Performed by: STUDENT IN AN ORGANIZED HEALTH CARE EDUCATION/TRAINING PROGRAM

## 2024-11-20 DEVICE — CATH PORT SMART VORTEX LOW PROFILE 8FR CT80LPPDVI: Type: IMPLANTABLE DEVICE | Site: NECK | Status: FUNCTIONAL

## 2024-11-20 RX ORDER — PROPOFOL 10 MG/ML
INJECTION, EMULSION INTRAVENOUS PRN
Status: DISCONTINUED | OUTPATIENT
Start: 2024-11-20 | End: 2024-11-20

## 2024-11-20 RX ORDER — GABAPENTIN 100 MG/1
100 CAPSULE ORAL
Status: COMPLETED | OUTPATIENT
Start: 2024-11-20 | End: 2024-11-20

## 2024-11-20 RX ORDER — FENTANYL CITRATE 50 UG/ML
50 INJECTION, SOLUTION INTRAMUSCULAR; INTRAVENOUS EVERY 5 MIN PRN
Status: DISCONTINUED | OUTPATIENT
Start: 2024-11-20 | End: 2024-11-20 | Stop reason: HOSPADM

## 2024-11-20 RX ORDER — FENTANYL CITRATE 50 UG/ML
INJECTION, SOLUTION INTRAMUSCULAR; INTRAVENOUS PRN
Status: DISCONTINUED | OUTPATIENT
Start: 2024-11-20 | End: 2024-11-20

## 2024-11-20 RX ORDER — PROPOFOL 10 MG/ML
INJECTION, EMULSION INTRAVENOUS CONTINUOUS PRN
Status: DISCONTINUED | OUTPATIENT
Start: 2024-11-20 | End: 2024-11-20

## 2024-11-20 RX ORDER — CEFAZOLIN SODIUM/WATER 3 G/30 ML
3 SYRINGE (ML) INTRAVENOUS SEE ADMIN INSTRUCTIONS
Status: DISCONTINUED | OUTPATIENT
Start: 2024-11-20 | End: 2024-11-20 | Stop reason: HOSPADM

## 2024-11-20 RX ORDER — ACETAMINOPHEN 325 MG/1
650 TABLET ORAL
Status: DISCONTINUED | OUTPATIENT
Start: 2024-11-20 | End: 2024-11-20 | Stop reason: HOSPADM

## 2024-11-20 RX ORDER — LIDOCAINE HYDROCHLORIDE AND EPINEPHRINE 10; 10 MG/ML; UG/ML
INJECTION, SOLUTION INFILTRATION; PERINEURAL PRN
Status: DISCONTINUED | OUTPATIENT
Start: 2024-11-20 | End: 2024-11-20 | Stop reason: HOSPADM

## 2024-11-20 RX ORDER — ONDANSETRON 2 MG/ML
INJECTION INTRAMUSCULAR; INTRAVENOUS PRN
Status: DISCONTINUED | OUTPATIENT
Start: 2024-11-20 | End: 2024-11-20

## 2024-11-20 RX ORDER — DIPHENHYDRAMINE HYDROCHLORIDE 50 MG/ML
12.5 INJECTION INTRAMUSCULAR; INTRAVENOUS EVERY 6 HOURS PRN
Status: DISCONTINUED | OUTPATIENT
Start: 2024-11-20 | End: 2024-11-20 | Stop reason: HOSPADM

## 2024-11-20 RX ORDER — LIDOCAINE 40 MG/G
CREAM TOPICAL
Status: DISCONTINUED | OUTPATIENT
Start: 2024-11-20 | End: 2024-11-20 | Stop reason: HOSPADM

## 2024-11-20 RX ORDER — SODIUM CHLORIDE, SODIUM LACTATE, POTASSIUM CHLORIDE, CALCIUM CHLORIDE 600; 310; 30; 20 MG/100ML; MG/100ML; MG/100ML; MG/100ML
INJECTION, SOLUTION INTRAVENOUS CONTINUOUS
Status: DISCONTINUED | OUTPATIENT
Start: 2024-11-20 | End: 2024-11-20 | Stop reason: HOSPADM

## 2024-11-20 RX ORDER — DEXAMETHASONE SODIUM PHOSPHATE 4 MG/ML
INJECTION, SOLUTION INTRA-ARTICULAR; INTRALESIONAL; INTRAMUSCULAR; INTRAVENOUS; SOFT TISSUE PRN
Status: DISCONTINUED | OUTPATIENT
Start: 2024-11-20 | End: 2024-11-20

## 2024-11-20 RX ORDER — MEPERIDINE HYDROCHLORIDE 25 MG/ML
12.5 INJECTION INTRAMUSCULAR; INTRAVENOUS; SUBCUTANEOUS EVERY 5 MIN PRN
Status: DISCONTINUED | OUTPATIENT
Start: 2024-11-20 | End: 2024-11-20 | Stop reason: HOSPADM

## 2024-11-20 RX ORDER — ONDANSETRON 4 MG/1
4 TABLET, ORALLY DISINTEGRATING ORAL EVERY 30 MIN PRN
Status: DISCONTINUED | OUTPATIENT
Start: 2024-11-20 | End: 2024-11-20 | Stop reason: HOSPADM

## 2024-11-20 RX ORDER — ACETAMINOPHEN 325 MG/1
975 TABLET ORAL ONCE
Status: COMPLETED | OUTPATIENT
Start: 2024-11-20 | End: 2024-11-20

## 2024-11-20 RX ORDER — ACETAMINOPHEN 325 MG/1
650 TABLET ORAL EVERY 4 HOURS PRN
Qty: 50 TABLET | Refills: 0 | Status: SHIPPED | OUTPATIENT
Start: 2024-11-20

## 2024-11-20 RX ORDER — ALBUTEROL SULFATE 0.83 MG/ML
2.5 SOLUTION RESPIRATORY (INHALATION) EVERY 4 HOURS PRN
Status: DISCONTINUED | OUTPATIENT
Start: 2024-11-20 | End: 2024-11-20 | Stop reason: HOSPADM

## 2024-11-20 RX ORDER — OXYCODONE HYDROCHLORIDE 5 MG/1
5 TABLET ORAL
Status: DISCONTINUED | OUTPATIENT
Start: 2024-11-20 | End: 2024-11-20 | Stop reason: HOSPADM

## 2024-11-20 RX ORDER — ONDANSETRON 2 MG/ML
4 INJECTION INTRAMUSCULAR; INTRAVENOUS EVERY 30 MIN PRN
Status: DISCONTINUED | OUTPATIENT
Start: 2024-11-20 | End: 2024-11-20 | Stop reason: HOSPADM

## 2024-11-20 RX ORDER — NALOXONE HYDROCHLORIDE 0.4 MG/ML
0.1 INJECTION, SOLUTION INTRAMUSCULAR; INTRAVENOUS; SUBCUTANEOUS
Status: DISCONTINUED | OUTPATIENT
Start: 2024-11-20 | End: 2024-11-20 | Stop reason: HOSPADM

## 2024-11-20 RX ORDER — HYDROMORPHONE HCL IN WATER/PF 6 MG/30 ML
0.4 PATIENT CONTROLLED ANALGESIA SYRINGE INTRAVENOUS EVERY 5 MIN PRN
Status: DISCONTINUED | OUTPATIENT
Start: 2024-11-20 | End: 2024-11-20 | Stop reason: HOSPADM

## 2024-11-20 RX ORDER — VASOPRESSIN IN 0.9 % NACL 2 UNIT/2ML
SYRINGE (ML) INTRAVENOUS PRN
Status: DISCONTINUED | OUTPATIENT
Start: 2024-11-20 | End: 2024-11-20

## 2024-11-20 RX ORDER — BUPIVACAINE HYDROCHLORIDE 5 MG/ML
INJECTION, SOLUTION PERINEURAL PRN
Status: DISCONTINUED | OUTPATIENT
Start: 2024-11-20 | End: 2024-11-20 | Stop reason: HOSPADM

## 2024-11-20 RX ORDER — EPHEDRINE SULFATE 50 MG/ML
INJECTION, SOLUTION INTRAVENOUS PRN
Status: DISCONTINUED | OUTPATIENT
Start: 2024-11-20 | End: 2024-11-20

## 2024-11-20 RX ORDER — FENTANYL CITRATE 50 UG/ML
25 INJECTION, SOLUTION INTRAMUSCULAR; INTRAVENOUS EVERY 5 MIN PRN
Status: DISCONTINUED | OUTPATIENT
Start: 2024-11-20 | End: 2024-11-20 | Stop reason: HOSPADM

## 2024-11-20 RX ORDER — ACETAMINOPHEN 325 MG/1
975 TABLET ORAL ONCE
Status: DISCONTINUED | OUTPATIENT
Start: 2024-11-20 | End: 2024-11-20

## 2024-11-20 RX ORDER — DIPHENHYDRAMINE HCL 12.5 MG/5ML
12.5 SOLUTION ORAL EVERY 6 HOURS PRN
Status: DISCONTINUED | OUTPATIENT
Start: 2024-11-20 | End: 2024-11-20 | Stop reason: HOSPADM

## 2024-11-20 RX ORDER — HYDROMORPHONE HCL IN WATER/PF 6 MG/30 ML
0.2 PATIENT CONTROLLED ANALGESIA SYRINGE INTRAVENOUS EVERY 5 MIN PRN
Status: DISCONTINUED | OUTPATIENT
Start: 2024-11-20 | End: 2024-11-20 | Stop reason: HOSPADM

## 2024-11-20 RX ORDER — CEFAZOLIN SODIUM/WATER 3 G/30 ML
3 SYRINGE (ML) INTRAVENOUS
Status: DISCONTINUED | OUTPATIENT
Start: 2024-11-20 | End: 2024-11-20 | Stop reason: HOSPADM

## 2024-11-20 RX ORDER — EPHEDRINE SULFATE 50 MG/ML
INJECTION, SOLUTION INTRAMUSCULAR; INTRAVENOUS; SUBCUTANEOUS PRN
Status: DISCONTINUED | OUTPATIENT
Start: 2024-11-20 | End: 2024-11-20

## 2024-11-20 RX ORDER — HEPARIN SODIUM 1000 [USP'U]/ML
INJECTION, SOLUTION INTRAVENOUS; SUBCUTANEOUS PRN
Status: DISCONTINUED | OUTPATIENT
Start: 2024-11-20 | End: 2024-11-20 | Stop reason: HOSPADM

## 2024-11-20 RX ORDER — DEXAMETHASONE SODIUM PHOSPHATE 4 MG/ML
4 INJECTION, SOLUTION INTRA-ARTICULAR; INTRALESIONAL; INTRAMUSCULAR; INTRAVENOUS; SOFT TISSUE
Status: DISCONTINUED | OUTPATIENT
Start: 2024-11-20 | End: 2024-11-20 | Stop reason: HOSPADM

## 2024-11-20 RX ADMIN — Medication 120 MG: at 09:44

## 2024-11-20 RX ADMIN — PHENYLEPHRINE HYDROCHLORIDE 100 MCG: 10 INJECTION INTRAVENOUS at 09:58

## 2024-11-20 RX ADMIN — PROPOFOL 200 MG: 10 INJECTION, EMULSION INTRAVENOUS at 09:44

## 2024-11-20 RX ADMIN — Medication 10 MG: at 10:04

## 2024-11-20 RX ADMIN — GABAPENTIN 100 MG: 100 CAPSULE ORAL at 09:03

## 2024-11-20 RX ADMIN — Medication 240 MG: at 10:34

## 2024-11-20 RX ADMIN — ONDANSETRON 4 MG: 2 INJECTION INTRAMUSCULAR; INTRAVENOUS at 10:12

## 2024-11-20 RX ADMIN — Medication 1 UNITS: at 10:08

## 2024-11-20 RX ADMIN — FENTANYL CITRATE 50 MCG: 50 INJECTION INTRAMUSCULAR; INTRAVENOUS at 09:44

## 2024-11-20 RX ADMIN — Medication 3 G: at 09:21

## 2024-11-20 RX ADMIN — MIDAZOLAM 2 MG: 1 INJECTION INTRAMUSCULAR; INTRAVENOUS at 09:37

## 2024-11-20 RX ADMIN — PHENYLEPHRINE HYDROCHLORIDE 200 MCG: 10 INJECTION INTRAVENOUS at 10:00

## 2024-11-20 RX ADMIN — Medication 5 MG: at 10:01

## 2024-11-20 RX ADMIN — EPHEDRINE SULFATE 25 MG: 50 INJECTION INTRAVENOUS at 12:12

## 2024-11-20 RX ADMIN — DEXAMETHASONE SODIUM PHOSPHATE 4 MG: 4 INJECTION, SOLUTION INTRA-ARTICULAR; INTRALESIONAL; INTRAMUSCULAR; INTRAVENOUS; SOFT TISSUE at 10:12

## 2024-11-20 RX ADMIN — PHENYLEPHRINE HYDROCHLORIDE 200 MCG: 10 INJECTION INTRAVENOUS at 09:57

## 2024-11-20 RX ADMIN — PROPOFOL 100 MCG/KG/MIN: 10 INJECTION, EMULSION INTRAVENOUS at 09:44

## 2024-11-20 RX ADMIN — Medication 0.5 UNITS: at 10:23

## 2024-11-20 RX ADMIN — Medication 0.5 UNITS: at 10:31

## 2024-11-20 RX ADMIN — ACETAMINOPHEN 975 MG: 325 TABLET ORAL at 09:02

## 2024-11-20 RX ADMIN — SODIUM CHLORIDE, POTASSIUM CHLORIDE, SODIUM LACTATE AND CALCIUM CHLORIDE: 600; 310; 30; 20 INJECTION, SOLUTION INTRAVENOUS at 09:15

## 2024-11-20 RX ADMIN — LIDOCAINE HYDROCHLORIDE 0.1 ML: 10 INJECTION, SOLUTION EPIDURAL; INFILTRATION; INTRACAUDAL; PERINEURAL at 09:16

## 2024-11-20 RX ADMIN — PROPOFOL 40 MG: 10 INJECTION, EMULSION INTRAVENOUS at 10:36

## 2024-11-20 RX ADMIN — PHENYLEPHRINE HYDROCHLORIDE 0.6 MCG/KG/MIN: 10 INJECTION INTRAVENOUS at 10:00

## 2024-11-20 RX ADMIN — Medication 10 MG: at 09:58

## 2024-11-20 RX ADMIN — ROCURONIUM BROMIDE 50 MG: 50 INJECTION, SOLUTION INTRAVENOUS at 09:48

## 2024-11-20 RX ADMIN — Medication 1 UNITS: at 11:00

## 2024-11-20 ASSESSMENT — ACTIVITIES OF DAILY LIVING (ADL)
ADLS_ACUITY_SCORE: 0

## 2024-11-20 NOTE — DISCHARGE INSTRUCTIONS
Same Day Surgery Discharge Instructions  Special Precautions After Surgery - Adult    It is not unusual to feel lightheaded or faint, up to 24 hours after surgery or while taking pain medication.  If you have these symptoms; sit for a few minutes before standing and have someone assist you when getting up.  You should rest and relax for the next 24 hours and must have someone stay with you for at least 24 hours after your discharge.  DO NOT DRIVE any vehicle or operate mechanical equipment for 24 hours following the end of your surgery.  DO NOT DRIVE while taking narcotic pain medications that have been prescribed by your physician.  If you had a limb operated on, you must be able to use it fully to drive.  DO NOT drink alcoholic beverages for 24 hours following surgery or while taking prescription pain medication.  Drink clear liquids (apple juice, ginger ale, broth, 7-Up, etc.).  Progress to your regular diet as you feel able.  Any questions call your physician and do not make important decisions for 24 hours.    Nausea and Vomiting: Nausea and vomiting can occur any time after receiving anesthesia. If you experience nausea and vomiting we encourage you to move to a clear liquid diet and advance your diet as tolerated. If nausea and vomiting do not improve within 12 hours please call the surgeon or present to the Emergency department.     Break-through Bleeding: If your experience bleeding from your surgical site apply pressure and additional dressing per nurse instruction. For simple problems such as a saturated dressing, you may need to reinforce the dressing with more gauze and tape and put slight pressure on the site. If bleeding does not subside contact the surgeon or present to the Emergency Department.    Post-op Infection: If you develop a fever of 100.4 or greater, have pus like drainage, redness, swelling or severe pain at the surgical site not alleviated with pain medications; please  contact the surgeon or present to the Emergency Department.     Medications:  Acetaminophen (Tylenol):  Next dose: 3:00pm.  Ibuprofen (Motrin, Advil):  Next dose: anytime.  Follow the instructions on the bottle.  __________________________________________________________________________________________________________________________________  IMPORTANT NUMBERS:    [facility] [place]  [phone], [phone]  Pharmacy:  [phone]  Same Day Surgery:  [phone], for general post-op questions call Monday - Thursday until 8:30 p.m., Fridays until 6:00 p.m.   Mental Health Mobile Crisis line: [phone]                                                                      General Surgery:  [phone]

## 2024-11-20 NOTE — ANESTHESIA POSTPROCEDURE EVALUATION
Patient: Celestine Simon    Procedure: Procedure(s):  INSERTION, VASCULAR ACCESS PORT       Anesthesia Type:  General    Note:  Disposition: Outpatient   Postop Pain Control: Uneventful            Sign Out: Well controlled pain   PONV: No   Neuro/Psych: Uneventful            Sign Out: Acceptable/Baseline neuro status   Airway/Respiratory: Uneventful            Sign Out: Acceptable/Baseline resp. status   CV/Hemodynamics: Uneventful            Sign Out: Acceptable CV status; No obvious hypovolemia; No obvious fluid overload   Other NRE: NONE   DID A NON-ROUTINE EVENT OCCUR? No           Last vitals:  Vitals Value Taken Time   BP 93/51 11/20/24 1200   Temp 36.7  C (98  F) 11/20/24 1056   Pulse 79 11/20/24 1200   Resp 16 11/20/24 1158   SpO2 90 % 11/20/24 1158   Vitals shown include unfiled device data.    Electronically Signed By: ANNE-MARIE Landeros CRNA  November 20, 2024  12:16 PM

## 2024-11-20 NOTE — ANESTHESIA CARE TRANSFER NOTE
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    Anesthesia Type:   General     Note:  Anesthesia Care Transfer Notewriter  Vitals:  Vitals Value Taken Time   BP 93/51 11/20/24 1200   Temp 36.7  C (98  F) 11/20/24 1056   Pulse 79 11/20/24 1200   Resp 16 11/20/24 1158   SpO2 90 % 11/20/24 1158   Vitals shown include unfiled device data.    Electronically Signed By: ANNE-MARIE Landeros CRNA  November 20, 2024  12:16 PM

## 2024-11-20 NOTE — OP NOTE
Procedure Date: November 20, 2024    PREOPERATIVE DIAGNOSIS: rectosigmoid colon cancer  POSTOPERATIVE DIAGNOSIS: Same  PROCEDURE:  1. Placement of right Internal Jugular Infusion port (8 [place] SmartPort)  2. Ultrasound and Fluoroscopic guidance and confirmation of port placement.    ATTENDING SURGEON and Assistants:   Surgeon(s):  Cj Tee MD  Assistant  Jessie Celis PA-C - expert assistance was necessary for exposure and retraction during the procedure    ESTIMATED BLOOD LOSS: 5 mL    INDICATIONS:Mr. Celestine Simon is a 69 year old male who presents with a history of rectosigmoid colon cancer. The patient is in need of an infusion port for chemotherapy access, and a discussion was held with the patient regarding indications, risks, benefits, and alternatives (including, but not limited to, risks of bleeding, infection, pneumothorax, need for revision, thrombosis, stenosis, port malposition/malfunction, cardiac arrhythmia, and the rare risk of gas embolism). He understood the information given, questions were addressed, and he wishes to proceed.     DESCRIPTION OF PROCEDURE: The patient was brought to the operating room and placed supine on the operating room table. Sedative agents were administered by the anesthesia service. The bilateral chest and neck were then prepped and draped in the usual sterile fashion. The patient was placed in Trendelenburg position.  The ultrasound probe was outfitted with a sterile sheath, and the right internal jugular vein accessed after a single pass of the introducer needle. A guidewire was advanced under direct fluoroscopic guidance, and the trajectory was confirmed toward the superior vena cava/heart. Additional local anesthesia was used, and a cutdown was created two finger breadths below the clavicle at the ipsilateral chest wall. The incision was deepened to create an appropriate pocket to allow the port reservoir to comfortably reside. The catheter was tunneled  from the pocket to the neck incision using the tunneling device. An introducer sheath was then passed over the guidewire without difficulty, under direct fluoroscopic guidance, and the dilator and guidewire removed. The infusion port catheter was then passed until the tip the catheter was in the region of the cavo-atrial junction via fluoroscopy. The sheath was torn away in the standard fashion. The catheter was then manicured to an appropriate length, secured to the infusion port and the locking collar affixed. The port was easily flushed with a heparin solution, after assuring ease of aspiration. The infusion port itself was then transfixed to the tissue in the base of the pocket using interrupted 2-0 prolene suture. The final catheter position was again assessed under fluoroscopy and appeared satisfactory, with no abnormal trajectory or kinks noted, and with the tip of the catheter again noted at the cavo-atrial junction. The port was aspirated with easy return of venous blood and flushed with heparinzed saline. Hemostasis was assured. An instrument count was conducted, and included laparotomy pads, needles and sponges, and was found to be correct. The subcutaneous tissue on the chest wound was closed with interrupted 3-0 Vicryl. Skin edges were re-approximated using 4-0 Monocryl. Both wounds were dressed with dermabond.    The patient tolerated the procedure well, was allowed to recover and was then transferred to the recovery room in stable condition. He will require a post-op CXR to confirm port position and evaluate for any pneumothorax.     Cj Tee MD on 11/20/2024 at 10:39 AM

## 2024-11-20 NOTE — INTERVAL H&P NOTE
"I have reviewed the surgical (or preoperative) H&P that is linked to this encounter, and examined the patient. There are no significant changes    Clinical Conditions Present on Arrival:  Clinically Significant Risk Factors Present on Admission                  # Drug Induced Platelet Defect: home medication list includes an antiplatelet medication      # DMII: A1C = 7.6 % (Ref range: <5.7 %) within past 6 months  # Obesity: Estimated body mass index is 36.45 kg/m  as calculated from the following:    Height as of this encounter: 1.816 m (5' 11.5\").    Weight as of this encounter: 120.2 kg (265 lb).       "

## 2024-11-20 NOTE — ANESTHESIA PROCEDURE NOTES
Airway       Patient location during procedure: OR       Procedure Start/Stop Times: 11/20/2024 9:45 AM  Staff -        CRNA: Matt Clement APRN CRNA       Other Anesthesia Staff: Kimberly Dejesus       Performed By: CRNA and SRNA  Consent for Airway        Urgency: elective  Indications and Patient Condition       Indications for airway management: joy-procedural and airway protection       Induction type:RSI       Mask difficulty assessment: 0 - not attempted    Final Airway Details       Final airway type: endotracheal airway       Successful airway: ETT - single and Oral  Endotracheal Airway Details        ETT size (mm): 7.5       Cuffed: yes       Cuff volume (mL): 8       Successful intubation technique: video laryngoscopy       VL Blade Size: Charles 4       Grade View of Cords: 1       Adjucts: stylet       Position: Right       Measured from: lips       Secured at (cm): 21       Bite block used: None    Post intubation assessment        Placement verified by: capnometry, equal breath sounds and chest rise        Number of attempts at approach: 1       Number of other approaches attempted: 0       Secured with: tape       Ease of procedure: easy       Dentition: Intact    Medication(s) Administered   Medication Administration Time: 11/20/2024 9:45 AM

## 2024-11-21 ENCOUNTER — PATIENT OUTREACH (OUTPATIENT)
Dept: ONCOLOGY | Facility: CLINIC | Age: 69
End: 2024-11-21
Payer: MEDICARE

## 2024-11-21 DIAGNOSIS — C19 MALIGNANT NEOPLASM OF RECTOSIGMOID JUNCTION (H): Primary | ICD-10-CM

## 2024-11-21 RX ORDER — LIDOCAINE/PRILOCAINE 2.5 %-2.5%
CREAM (GRAM) TOPICAL DAILY PRN
Qty: 30 G | Refills: 2 | Status: SHIPPED | OUTPATIENT
Start: 2024-11-21

## 2024-11-21 NOTE — CONFIDENTIAL NOTE
[facility]: Cancer Care Initial Note                                    Discussion with Patient:                                                      Chemo teach done today over the phone.     Antiemetics: zofran and compazine, instructions reviewed with patient  Steroid use/side effect: Dexamethasone, instructions reviewed with patient          No assessment indicated          Assessment:                                                      Assessment completed with:: Patient    Plan of Care Education   Yearly learning assessment completed?: Yes (see Education tab)  Diagnosis:: colon cancer  Tx plan/regimen:: FOLFOX  Does patient understand treatment plan/regimen?: Yes  Preparing for treatment:: Reviewed treatment preparation information with patient (vascular access, day of chemo, visitor policy, what to bring, etc.)  Vascular access education provided for:: Port  Side effect education:: Diarrhea/Constipation;Nausea/Vomiting;Lab value monitoring (anemia, neutropenia, thrombocytopenia);Neuropathy;Fatigue;Infection;Mouth sores;Mylosuppression;Skin changes  Supportive services education provided for:: Home infusion  Safety/self care at home reviewed with patient:: Yes  Coping - concerns/fears reviewed with patient:: Yes  Plan of Care:: [facility]I follow-up appointment;Lab appointment;Imaging;MD follow-up appointment;Treatment schedule  When to call provider:: Increased shortness of breath;Bleeding;Temperature >100.4F;Uncontrolled diarrhea/constipation;Uncontrolled nausea/vomiting;Shaking chills;New/worsening pain  Reasons for deferring treatment reviewed with patient:: Yes  Additional education provided for: : Steroid therapy    Evaluation of Learning  Patient Education Provided: Yes  Readiness:: Acceptance  Method:: Booklet/Handout;Explanation  Response:: Verbalizes understanding    No assessment indicated    Intervention/Education provided during outreach:                                                       All  side effects reviewed with patient and what to expect with starting chemo tomorrow and when to call the doctor. All questions answered. No concerns noted at this time.     Patient to follow up as scheduled at next appt    Signature:  Jessica Major RN  [facility]: Cancer [facility]

## 2024-11-22 PROCEDURE — 80053 COMPREHEN METABOLIC PANEL: CPT | Performed by: INTERNAL MEDICINE

## 2024-11-22 PROCEDURE — E0781 EXTERNAL AMBULATORY INFUS PU: HCPCS | Mod: RR

## 2024-11-22 PROCEDURE — 85049 AUTOMATED PLATELET COUNT: CPT | Performed by: INTERNAL MEDICINE

## 2024-11-22 PROCEDURE — 85004 AUTOMATED DIFF WBC COUNT: CPT | Performed by: INTERNAL MEDICINE

## 2024-11-22 PROCEDURE — 82435 ASSAY OF BLOOD CHLORIDE: CPT | Performed by: INTERNAL MEDICINE

## 2024-11-22 PROCEDURE — 85018 HEMOGLOBIN: CPT | Performed by: INTERNAL MEDICINE

## 2024-11-22 PROCEDURE — 82565 ASSAY OF CREATININE: CPT | Performed by: INTERNAL MEDICINE

## 2024-11-24 ENCOUNTER — HOME CARE VISIT (OUTPATIENT)
Dept: HOME HEALTH SERVICES | Facility: HOME HEALTH | Age: 69
End: 2024-11-24
Payer: MEDICARE

## 2024-11-24 VITALS
BODY MASS INDEX: 35.62 KG/M2 | OXYGEN SATURATION: 96 % | HEART RATE: 76 BPM | SYSTOLIC BLOOD PRESSURE: 111 MMHG | RESPIRATION RATE: 18 BRPM | TEMPERATURE: 98 F | DIASTOLIC BLOOD PRESSURE: 64 MMHG | WEIGHT: 259 LBS

## 2024-11-24 NOTE — PROGRESS NOTES
Nursing Visit Note:  Nurse visit today for [facility] SOC for Antineoplastic DC, PDAC, and GA. Port flushed well with brisk blood return. PDAC per [facility] protocol. Chemo bin sent to [facility] via [employer] [facility] . for Celestine Simon.     present during visit today: Not Applicable.     Education Provided:     Patient Education focused on [facility] contact info and hours, chemo spill kit, CADD pump use (changing batteries, managing alarms, when to call [facility]), port care, dressing reinforcement, s/s of infection, storage of extra supplies, Pt bill of rights, chemo bin  by , visit schedule).     Saline administered (in mLs): 10 mls    Next visit plan Per chemo cycle/ oncology clinic.       Syed Rico RN 11/24/24         Syed Rico RN 11/24/2024

## 2024-11-27 ENCOUNTER — ONCOLOGY VISIT (OUTPATIENT)
Dept: ONCOLOGY | Facility: CLINIC | Age: 69
End: 2024-11-27
Attending: NURSE PRACTITIONER
Payer: MEDICARE

## 2024-11-27 ENCOUNTER — INFUSION THERAPY VISIT (OUTPATIENT)
Dept: INFUSION THERAPY | Facility: CLINIC | Age: 69
End: 2024-11-27
Attending: NURSE PRACTITIONER
Payer: MEDICARE

## 2024-11-27 ENCOUNTER — HOSPITAL ENCOUNTER (EMERGENCY)
Facility: CLINIC | Age: 69
Discharge: HOME | End: 2024-11-27
Attending: FAMILY MEDICINE
Payer: MEDICARE

## 2024-11-27 VITALS
SYSTOLIC BLOOD PRESSURE: 117 MMHG | DIASTOLIC BLOOD PRESSURE: 72 MMHG | HEART RATE: 72 BPM | OXYGEN SATURATION: 96 % | TEMPERATURE: 98.3 F | RESPIRATION RATE: 26 BRPM

## 2024-11-27 VITALS
RESPIRATION RATE: 16 BRPM | SYSTOLIC BLOOD PRESSURE: 65 MMHG | HEART RATE: 89 BPM | HEIGHT: 72 IN | WEIGHT: 248 LBS | TEMPERATURE: 99 F | DIASTOLIC BLOOD PRESSURE: 37 MMHG | BODY MASS INDEX: 33.59 KG/M2 | OXYGEN SATURATION: 97 %

## 2024-11-27 DIAGNOSIS — N28.9 ACUTE ON CHRONIC RENAL INSUFFICIENCY: ICD-10-CM

## 2024-11-27 DIAGNOSIS — E87.1 HYPONATREMIA: ICD-10-CM

## 2024-11-27 DIAGNOSIS — N18.9 ACUTE ON CHRONIC RENAL INSUFFICIENCY: ICD-10-CM

## 2024-11-27 DIAGNOSIS — K52.1 CHEMOTHERAPY INDUCED DIARRHEA: ICD-10-CM

## 2024-11-27 DIAGNOSIS — D50.9 MICROCYTIC ANEMIA: ICD-10-CM

## 2024-11-27 DIAGNOSIS — T45.1X5A CHEMOTHERAPY INDUCED DIARRHEA: ICD-10-CM

## 2024-11-27 DIAGNOSIS — I95.9 TRANSIENT HYPOTENSION: ICD-10-CM

## 2024-11-27 DIAGNOSIS — E86.0 DEHYDRATION: ICD-10-CM

## 2024-11-27 DIAGNOSIS — E86.1 HYPOTENSION DUE TO HYPOVOLEMIA: ICD-10-CM

## 2024-11-27 DIAGNOSIS — R11.2 CHEMOTHERAPY INDUCED NAUSEA AND VOMITING: ICD-10-CM

## 2024-11-27 DIAGNOSIS — C19 MALIGNANT NEOPLASM OF RECTOSIGMOID JUNCTION (H): Primary | ICD-10-CM

## 2024-11-27 DIAGNOSIS — T45.1X5A CHEMOTHERAPY INDUCED NAUSEA AND VOMITING: ICD-10-CM

## 2024-11-27 DIAGNOSIS — C19 MALIGNANT NEOPLASM OF RECTOSIGMOID JUNCTION (H): ICD-10-CM

## 2024-11-27 LAB
ALBUMIN SERPL BCG-MCNC: 3.3 G/DL (ref 3.5–5.2)
ALBUMIN SERPL BCG-MCNC: 3.3 G/DL (ref 3.5–5.2)
ALBUMIN SERPL BCG-MCNC: 3.7 G/DL (ref 3.5–5.2)
ALP SERPL-CCNC: 43 U/L (ref 40–150)
ALP SERPL-CCNC: 43 U/L (ref 40–150)
ALP SERPL-CCNC: 49 U/L (ref 40–150)
ALT SERPL W P-5'-P-CCNC: 13 U/L (ref 0–70)
ALT SERPL W P-5'-P-CCNC: 13 U/L (ref 0–70)
ALT SERPL W P-5'-P-CCNC: 15 U/L (ref 0–70)
ANION GAP SERPL CALCULATED.3IONS-SCNC: 12 MMOL/L (ref 7–15)
AST SERPL W P-5'-P-CCNC: 10 U/L (ref 0–45)
AST SERPL W P-5'-P-CCNC: 12 U/L (ref 0–45)
AST SERPL W P-5'-P-CCNC: 14 U/L (ref 0–45)
BASE EXCESS BLDV CALC-SCNC: -0.8 MMOL/L (ref -3–3)
BASOPHILS # BLD AUTO: 0 10E3/UL (ref 0–0.2)
BASOPHILS # BLD AUTO: 0 10E3/UL (ref 0–0.2)
BASOPHILS NFR BLD AUTO: 0 %
BASOPHILS NFR BLD AUTO: 0 %
BILIRUB SERPL-MCNC: 0.5 MG/DL
BILIRUB SERPL-MCNC: 0.5 MG/DL
BILIRUB SERPL-MCNC: 0.6 MG/DL
BUN SERPL-MCNC: 33 MG/DL (ref 8–23)
BUN SERPL-MCNC: 36.4 MG/DL (ref 8–23)
BUN SERPL-MCNC: 36.7 MG/DL (ref 8–23)
CALCIUM SERPL-MCNC: 8.4 MG/DL (ref 8.8–10.4)
CALCIUM SERPL-MCNC: 8.5 MG/DL (ref 8.8–10.4)
CALCIUM SERPL-MCNC: 9.1 MG/DL (ref 8.8–10.4)
CHLORIDE SERPL-SCNC: 94 MMOL/L (ref 98–107)
CHLORIDE SERPL-SCNC: 95 MMOL/L (ref 98–107)
CHLORIDE SERPL-SCNC: 95 MMOL/L (ref 98–107)
CREAT SERPL-MCNC: 1.59 MG/DL (ref 0.67–1.17)
CREAT SERPL-MCNC: 1.79 MG/DL (ref 0.67–1.17)
CREAT SERPL-MCNC: 1.82 MG/DL (ref 0.67–1.17)
EGFRCR SERPLBLD CKD-EPI 2021: 40 ML/MIN/1.73M2
EGFRCR SERPLBLD CKD-EPI 2021: 41 ML/MIN/1.73M2
EGFRCR SERPLBLD CKD-EPI 2021: 47 ML/MIN/1.73M2
EOSINOPHIL # BLD AUTO: 0 10E3/UL (ref 0–0.7)
EOSINOPHIL # BLD AUTO: 0 10E3/UL (ref 0–0.7)
EOSINOPHIL NFR BLD AUTO: 1 %
EOSINOPHIL NFR BLD AUTO: 1 %
ERYTHROCYTE [DISTWIDTH] IN BLOOD BY AUTOMATED COUNT: 17.7 % (ref 10–15)
ERYTHROCYTE [DISTWIDTH] IN BLOOD BY AUTOMATED COUNT: 17.8 % (ref 10–15)
FERRITIN SERPL-MCNC: 183 NG/ML (ref 31–409)
GLUCOSE SERPL-MCNC: 372 MG/DL (ref 70–99)
GLUCOSE SERPL-MCNC: 375 MG/DL (ref 70–99)
GLUCOSE SERPL-MCNC: 375 MG/DL (ref 70–99)
HCO3 BLDV-SCNC: 25 MMOL/L (ref 21–28)
HCO3 SERPL-SCNC: 22 MMOL/L (ref 22–29)
HCO3 SERPL-SCNC: 23 MMOL/L (ref 22–29)
HCO3 SERPL-SCNC: 23 MMOL/L (ref 22–29)
HCT VFR BLD AUTO: 34.7 % (ref 40–53)
HCT VFR BLD AUTO: 36.8 % (ref 40–53)
HGB BLD-MCNC: 11 G/DL (ref 13.3–17.7)
HGB BLD-MCNC: 11.9 G/DL (ref 13.3–17.7)
HOLD SPECIMEN: NORMAL
IMM GRANULOCYTES # BLD: 0 10E3/UL
IMM GRANULOCYTES # BLD: 0.1 10E3/UL
IMM GRANULOCYTES NFR BLD: 1 %
IMM GRANULOCYTES NFR BLD: 1 %
IRON BINDING CAPACITY (ROCHE): 328 UG/DL (ref 240–430)
IRON SATN MFR SERPL: 34 % (ref 15–46)
IRON SERPL-MCNC: 113 UG/DL (ref 61–157)
LACTATE SERPL-SCNC: 1.7 MMOL/L (ref 0.7–2)
LYMPHOCYTES # BLD AUTO: 0.7 10E3/UL (ref 0.8–5.3)
LYMPHOCYTES # BLD AUTO: 0.7 10E3/UL (ref 0.8–5.3)
LYMPHOCYTES NFR BLD AUTO: 16 %
LYMPHOCYTES NFR BLD AUTO: 17 %
MAGNESIUM SERPL-MCNC: 1.4 MG/DL (ref 1.7–2.3)
MCH RBC QN AUTO: 25 PG (ref 26.5–33)
MCH RBC QN AUTO: 25.1 PG (ref 26.5–33)
MCHC RBC AUTO-ENTMCNC: 31.7 G/DL (ref 31.5–36.5)
MCHC RBC AUTO-ENTMCNC: 32.3 G/DL (ref 31.5–36.5)
MCV RBC AUTO: 77 FL (ref 78–100)
MCV RBC AUTO: 79 FL (ref 78–100)
MONOCYTES # BLD AUTO: 0.1 10E3/UL (ref 0–1.3)
MONOCYTES # BLD AUTO: 0.1 10E3/UL (ref 0–1.3)
MONOCYTES NFR BLD AUTO: 1 %
MONOCYTES NFR BLD AUTO: 1 %
NEUTROPHILS # BLD AUTO: 3.5 10E3/UL (ref 1.6–8.3)
NEUTROPHILS # BLD AUTO: 3.8 10E3/UL (ref 1.6–8.3)
NEUTROPHILS NFR BLD AUTO: 81 %
NEUTROPHILS NFR BLD AUTO: 81 %
NRBC # BLD AUTO: 0 10E3/UL
NRBC # BLD AUTO: 0 10E3/UL
NRBC BLD AUTO-RTO: 0 /100
NRBC BLD AUTO-RTO: 0 /100
O2/TOTAL GAS SETTING VFR VENT: 21 %
OXYHGB MFR BLDV: 52 % (ref 70–75)
PCO2 BLDV: 42 MM HG (ref 40–50)
PH BLDV: 7.37 [PH] (ref 7.32–7.43)
PLATELET # BLD AUTO: 208 10E3/UL (ref 150–450)
PLATELET # BLD AUTO: 217 10E3/UL (ref 150–450)
PO2 BLDV: 32 MM HG (ref 25–47)
POTASSIUM SERPL-SCNC: 4.1 MMOL/L (ref 3.4–5.3)
POTASSIUM SERPL-SCNC: 4.2 MMOL/L (ref 3.4–5.3)
POTASSIUM SERPL-SCNC: 4.2 MMOL/L (ref 3.4–5.3)
PROT SERPL-MCNC: 5.6 G/DL (ref 6.4–8.3)
PROT SERPL-MCNC: 5.6 G/DL (ref 6.4–8.3)
PROT SERPL-MCNC: 6 G/DL (ref 6.4–8.3)
RBC # BLD AUTO: 4.39 10E6/UL (ref 4.4–5.9)
RBC # BLD AUTO: 4.76 10E6/UL (ref 4.4–5.9)
SAO2 % BLDV: 53.5 % (ref 70–75)
SODIUM SERPL-SCNC: 129 MMOL/L (ref 135–145)
SODIUM SERPL-SCNC: 129 MMOL/L (ref 135–145)
SODIUM SERPL-SCNC: 130 MMOL/L (ref 135–145)
WBC # BLD AUTO: 4.3 10E3/UL (ref 4–11)
WBC # BLD AUTO: 4.6 10E3/UL (ref 4–11)

## 2024-11-27 PROCEDURE — 84155 ASSAY OF PROTEIN SERUM: CPT | Performed by: FAMILY MEDICINE

## 2024-11-27 PROCEDURE — 83540 ASSAY OF IRON: CPT | Performed by: INTERNAL MEDICINE

## 2024-11-27 PROCEDURE — 250N000011 HC RX IP 250 OP 636: Performed by: FAMILY MEDICINE

## 2024-11-27 PROCEDURE — 82728 ASSAY OF FERRITIN: CPT | Performed by: INTERNAL MEDICINE

## 2024-11-27 PROCEDURE — 99284 EMERGENCY DEPT VISIT MOD MDM: CPT | Performed by: FAMILY MEDICINE

## 2024-11-27 PROCEDURE — 83735 ASSAY OF MAGNESIUM: CPT | Performed by: INTERNAL MEDICINE

## 2024-11-27 PROCEDURE — 87040 BLOOD CULTURE FOR BACTERIA: CPT | Performed by: FAMILY MEDICINE

## 2024-11-27 PROCEDURE — 80053 COMPREHEN METABOLIC PANEL: CPT | Performed by: INTERNAL MEDICINE

## 2024-11-27 PROCEDURE — 99291 CRITICAL CARE FIRST HOUR: CPT

## 2024-11-27 PROCEDURE — 82805 BLOOD GASES W/O2 SATURATION: CPT | Performed by: FAMILY MEDICINE

## 2024-11-27 PROCEDURE — 258N000003 HC RX IP 258 OP 636: Performed by: FAMILY MEDICINE

## 2024-11-27 PROCEDURE — 36415 COLL VENOUS BLD VENIPUNCTURE: CPT

## 2024-11-27 PROCEDURE — 36415 COLL VENOUS BLD VENIPUNCTURE: CPT | Performed by: FAMILY MEDICINE

## 2024-11-27 PROCEDURE — 96361 HYDRATE IV INFUSION ADD-ON: CPT

## 2024-11-27 PROCEDURE — 96374 THER/PROPH/DIAG INJ IV PUSH: CPT

## 2024-11-27 PROCEDURE — 84075 ASSAY ALKALINE PHOSPHATASE: CPT | Performed by: FAMILY MEDICINE

## 2024-11-27 PROCEDURE — 258N000003 HC RX IP 258 OP 636: Performed by: NURSE PRACTITIONER

## 2024-11-27 PROCEDURE — 85025 COMPLETE CBC W/AUTO DIFF WBC: CPT | Performed by: INTERNAL MEDICINE

## 2024-11-27 PROCEDURE — 83605 ASSAY OF LACTIC ACID: CPT | Performed by: FAMILY MEDICINE

## 2024-11-27 PROCEDURE — 85025 COMPLETE CBC W/AUTO DIFF WBC: CPT | Performed by: FAMILY MEDICINE

## 2024-11-27 PROCEDURE — 83550 IRON BINDING TEST: CPT | Performed by: INTERNAL MEDICINE

## 2024-11-27 PROCEDURE — G0463 HOSPITAL OUTPT CLINIC VISIT: HCPCS | Performed by: NURSE PRACTITIONER

## 2024-11-27 PROCEDURE — 82040 ASSAY OF SERUM ALBUMIN: CPT | Performed by: INTERNAL MEDICINE

## 2024-11-27 RX ORDER — HEPARIN SODIUM,PORCINE 10 UNIT/ML
5-10 VIAL (ML) INTRAVENOUS
Status: DISCONTINUED | OUTPATIENT
Start: 2024-11-27 | End: 2024-11-27 | Stop reason: HOSPADM

## 2024-11-27 RX ORDER — HEPARIN SODIUM (PORCINE) LOCK FLUSH IV SOLN 100 UNIT/ML 100 UNIT/ML
5 SOLUTION INTRAVENOUS ONCE
Status: DISCONTINUED | OUTPATIENT
Start: 2024-11-27 | End: 2024-11-27 | Stop reason: HOSPADM

## 2024-11-27 RX ORDER — HEPARIN SODIUM,PORCINE 10 UNIT/ML
5-10 VIAL (ML) INTRAVENOUS EVERY 24 HOURS
Status: DISCONTINUED | OUTPATIENT
Start: 2024-11-27 | End: 2024-11-27 | Stop reason: HOSPADM

## 2024-11-27 RX ORDER — HEPARIN SODIUM (PORCINE) LOCK FLUSH IV SOLN 100 UNIT/ML 100 UNIT/ML
5-10 SOLUTION INTRAVENOUS
Status: DISCONTINUED | OUTPATIENT
Start: 2024-11-27 | End: 2024-11-27 | Stop reason: HOSPADM

## 2024-11-27 RX ORDER — HEPARIN SODIUM (PORCINE) LOCK FLUSH IV SOLN 100 UNIT/ML 100 UNIT/ML
SOLUTION INTRAVENOUS
Status: COMPLETED
Start: 2024-11-27 | End: 2024-11-27

## 2024-11-27 RX ADMIN — SODIUM CHLORIDE, POTASSIUM CHLORIDE, SODIUM LACTATE AND CALCIUM CHLORIDE 3375 ML: 600; 310; 30; 20 INJECTION, SOLUTION INTRAVENOUS at 10:33

## 2024-11-27 RX ADMIN — HEPARIN SODIUM (PORCINE) LOCK FLUSH IV SOLN 100 UNIT/ML 5 ML: 100 SOLUTION at 13:20

## 2024-11-27 RX ADMIN — Medication 5 ML: at 13:20

## 2024-11-27 RX ADMIN — SODIUM CHLORIDE 1000 ML: 9 INJECTION, SOLUTION INTRAVENOUS at 09:35

## 2024-11-27 ASSESSMENT — COLUMBIA-SUICIDE SEVERITY RATING SCALE - C-SSRS
1. IN THE PAST MONTH, HAVE YOU WISHED YOU WERE DEAD OR WISHED YOU COULD GO TO SLEEP AND NOT WAKE UP?: NO
2. HAVE YOU ACTUALLY HAD ANY THOUGHTS OF KILLING YOURSELF IN THE PAST MONTH?: NO
6. HAVE YOU EVER DONE ANYTHING, STARTED TO DO ANYTHING, OR PREPARED TO DO ANYTHING TO END YOUR LIFE?: NO

## 2024-11-27 ASSESSMENT — ACTIVITIES OF DAILY LIVING (ADL)
ADLS_ACUITY_SCORE: 43

## 2024-11-27 ASSESSMENT — PAIN SCALES - GENERAL: PAINLEVEL_OUTOF10: NO PAIN (0)

## 2024-11-27 NOTE — PROGRESS NOTES
Infusion Nursing Note:  Celestine Bircheman presents today for IV fluids.    Patient seen by provider today: Yes: Shireen Villarreal NP   present during visit today: Not Applicable.    Note: N/A.      Intravenous Access:  Implanted Port.    Treatment Conditions:  Lab Results   Component Value Date    HGB 11.0 (L) 11/27/2024    WBC 4.6 11/27/2024    ANEUTAUTO 3.8 11/27/2024     11/27/2024        Lab Results   Component Value Date     (L) 11/27/2024     (L) 11/27/2024    POTASSIUM 4.1 11/27/2024    POTASSIUM 4.2 11/27/2024    MAG 1.4 (L) 11/27/2024    CR 1.82 (H) 11/27/2024    CR 1.79 (H) 11/27/2024    ALIZE 8.5 (L) 11/27/2024    ALIZE 8.4 (L) 11/27/2024    BILITOTAL 0.5 11/27/2024    BILITOTAL 0.5 11/27/2024    ALBUMIN 3.3 (L) 11/27/2024    ALBUMIN 3.3 (L) 11/27/2024    ALT 13 11/27/2024    ALT 13 11/27/2024    AST 10 11/27/2024    AST 12 11/27/2024       Rapid response called on pt when he was in clinic appointment.  Pt very hypotensive, sent to ER.   See clinic note for further information.         Chantell Velazco RN

## 2024-11-27 NOTE — ED TRIAGE NOTES
Pt arrives via stretcher from oncology after rapid response was called d/t hypotension.   Initila BP was 57/41. Pt is being seen by oncology for signmoid/rectal cancer. Has had some chronic GI bleeding. Pt had two loose stools this morning, one episode of emesis yesterday and took his three antihypertensive medications this morning. Pt denies dizziness, chest pain, or SOB. Endorses some weakness this AM.  Rapid response started a bolus of NS, BP then 65/37.

## 2024-11-27 NOTE — ED PROVIDER NOTES
History     Chief Complaint   Patient presents with    Hypotension     HPI  Celestine Simon is a 69 year old male, past medical history significant for rectal neoplasm, RISSA, class II obesity, asplenia, type 2 diabetes, dyslipidemia, hypertension, presents to the emergency department via rapid response from oncology clinic with concerns of hypotension.  History is obtained from the patient who presents with his wife from oncology clinic.  The patient states that he was recently diagnosed with rectosigmoid cancer and chemotherapy was initiated this past Friday with his presentation here today the following Wednesday.  He felt that he had done well with his first chemo until yesterday morning when he took his Rybelsus as he normally does and had an episode of vomiting immediately after, anorexic for the remainder of the day, loose but not frankly diarrheal stools.  Denies fever chills or sweats.  His wife states he really did not eat or drink much of anything yesterday.  While the patient denies nausea for most of yesterday there was some yesterday evening, he was definitely anorexic for fluids and did not drink enough fluids by his own account as well as his wife's yesterday.  This morning, after taking all of his morning medications including his blood pressure medication, he went for chemotherapy and was found to be quite hypotensive and a rapid response was called.  The patient did not fall or feel lightheaded or pass out at all.  He denies any chest pain or shortness of breath abdominal pain.  He had 10 stools again this morning.  He feels weak all over.  Blood pressure in oncology was reportedly his systolic blood pressure in the 60 range.  Manage does some nausea yesterday morning with Zofran.  Very fatigued, slept most of the day, 10 pound weight loss since last week per oncology notes, initial BP clinic this morning was 55 on 34 tachycardic in the 100s, lightheaded at that time.  Patient denies any symptoms  and he feels much better after liter of fluids.    Allergies:  No Known Allergies    Problem List:    Patient Active Problem List    Diagnosis Date Noted    Microcytic anemia 11/27/2024     Priority: Medium    Malignant neoplasm of rectosigmoid junction (H) 10/25/2024     Priority: Medium    Thyroid nodule 10/25/2024     Priority: Medium    RISSA (obstructive sleep apnea) 10/14/2024     Priority: Medium     Diagnosed 20-30 yrs ago.  Had CPAP for awhile.  Not using now.        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  Await upcoming MRI for rectal cancer to see if has spleen. Hold on vaccines for now.    2023 - Plan 2 vaccine dates, 8 wk or more apart.  Date 1 - hib, menquadfi, bexsero  Date 2 - menquadfi, bexsero    Haemophilus influenzae type b vaccine  Hib 1 dose  Not applicable   Meningococcal serotype ACWY vaccine  MenACWY (Menactra, Menveo, or MenQuadfi) 2 doses at least 8 weeks apart? Every 5 years   Meningococcal serotype B  MenB-FHbp (Trumenba) or MenB-4C (Bexsero) 2 doses of MenB-4C at least 1 month apart or 3 doses of MenB-FHbp at 0, 1 to 2, and 6 months? 1 year after completing the primary series and every 2 to 3 ye         Type 2 diabetes mellitus with microalbuminuria, without long-term current use of insulin (H) 10/31/2010     Priority: Medium     Background retinopathy 5/24/23  Intermittent mild, 25-50 mg/g Cr.      Dyslipidemia 10/31/2010     Priority: Medium    Essential hypertension 05/30/2006     Priority: Medium        Past Medical History:    Past Medical History:   Diagnosis Date    Degenerative joint disease (DJD) of hip 06/24/2022    Diabetes (H)     Hypertension     MEDICAL HISTORY OF -     MEDICAL HISTORY OF -      RISSA (obstructive sleep apnea) 10/14/2024    Thyroid nodule 10/25/2024       Past Surgical History:    Past Surgical History:   Procedure Laterality Date    APPENDECTOMY      age 20    ARTHROPLASTY REVISION HIP Right 2022    Procedure: RIGHT TOTAL HIP ARTHROPLASTY REVISION;  Surgeon: Riccardo Dietz MD;  Location: [facility] Main OR    COLONOSCOPY N/A 10/10/2024    Procedure: COLONOSCOPY, FLEXIBLE, WITH LESION REMOVAL USING SNARE;  Surgeon: Cj Tee MD;  Location: [facility]    FL GASTROSCOPY      HC REMOVAL GALLBLADDER      age 20    INSERT PORT VASCULAR ACCESS Right 2024    Procedure: INSERTION, VASCULAR ACCESS PORT right;  Surgeon: Cj Tee MD;  Location: [facility]    SURGICAL HISTORY OF -       hip repair due to tractor accident age 19    SURGICAL HISTORY OF -       tonsilectomy ? age    SURGICAL HISTORY OF -       flexiblesigmoidoscopy       Family History:    Family History   Problem Relation Age of Onset    Lipids Mother     Parkinsonism Mother     Colon Cancer Mother         unsure?    Hyperlipidemia Mother     Genetic Disorder Mother         Parkinsons    Cerebrovascular Disease Father         53    Cardiovascular Father         53    Coronary Artery Disease Father 53        MI    Crohn's Disease Father     No Known Problems Brother     Respiratory Maternal Grandfather         Emphsemia smoking    Cardiovascular Paternal Grandfather     No Known Problems Daughter     No Known Problems Son        Social History:  Marital Status:   [2]  Social History     Tobacco Use    Smoking status: Former     Current packs/day: 0.00     Average packs/day: 2.0 packs/day for 4.0 years (8.0 ttl pk-yrs)     Types: Cigarettes, Cigars     Start date: 1975     Quit date: 1979     Years since quittin.4    Smokeless tobacco: Never    Tobacco comments:     50 years ago   Vaping Use    Vaping status: Former   Substance Use Topics    Alcohol use: Yes     Comment: Light    Drug use: No         Medications:    doxazosin (CARDURA) 4 MG tablet  hydroCHLOROthiazide 12.5 MG tablet  lisinopril (ZESTRIL) 40 MG tablet  acetaminophen (TYLENOL) 325 MG tablet  aspirin 81 MG EC tablet  Blood Glucose Monitoring Suppl (IN TOUCH) MISC  Chemo Kit  Continuous Glucose Sensor (FREESTYLE SCOTT 14 DAY SENSOR) [facility]  dexAMETHasone (DECADRON) 4 MG tablet  Emergency Supply Kit, Central,  [START ON 12/6/2024] Fluorouracil (ADRUCIL) 5,810 mg in sodium chloride 0.9 % 230 mL via CADD pump  GLUCOSAMINE-CHONDROITIN PO  HERBALS  LANCETS REGULAR MISC  lidocaine-prilocaine (EMLA) 2.5-2.5 % external cream  metFORMIN (GLUCOPHAGE XR) 500 MG 24 hr tablet  MULTI-VITAMIN OR TABS  ondansetron (ZOFRAN) 8 MG tablet  pioglitazone (ACTOS) 30 MG tablet  Port Access Kit  prochlorperazine (COMPAZINE) 10 MG tablet  rosuvastatin (CRESTOR) 10 MG tablet  Semaglutide (RYBELSUS) 14 MG tablet  sodium chloride, PF, 0.9% PF flush          Review of Systems   All other systems reviewed and are negative.      Physical Exam   BP: (!) 79/54  Pulse: 78  Temp: 98.3  F (36.8  C)  Resp: 18  SpO2: 93 %      Physical Exam  Vitals and nursing note reviewed.   Constitutional:       General: He is not in acute distress.     Appearance: Normal appearance. He is normal weight. He is not ill-appearing.   HENT:      Head: Normocephalic and atraumatic.      Right Ear: Tympanic membrane, ear canal and external ear normal.      Left Ear: Tympanic membrane, ear canal and external ear normal.      Nose: Nose normal.      Mouth/Throat:      Mouth: Mucous membranes are dry.      Pharynx: Oropharynx is clear.   Eyes:      Extraocular Movements: Extraocular movements intact.      Conjunctiva/sclera: Conjunctivae normal.      Pupils: Pupils are equal, round, and reactive to light.   Cardiovascular:      Rate and Rhythm: Normal rate and regular rhythm.      Pulses: Normal pulses.      Heart sounds: Normal heart sounds.   Pulmonary:      Effort: Pulmonary effort is normal.       Breath sounds: Normal breath sounds.   Abdominal:      General: Bowel sounds are normal.      Palpations: Abdomen is soft.   Musculoskeletal:         General: Normal range of motion.      Cervical back: Normal range of motion and neck supple.   Skin:     General: Skin is warm and dry.      Capillary Refill: Capillary refill takes less than 2 seconds.   Neurological:      General: No focal deficit present.      Mental Status: He is alert and oriented to person, place, and time.   Psychiatric:         Mood and Affect: Mood normal.         Behavior: Behavior normal.         ED Course        Procedures         Results for orders placed or performed during the hospital encounter of 11/27/24 (from the past 24 hours)   CBC with platelets, differential    Narrative    The following orders were created for panel order CBC with platelets, differential.  Procedure                               Abnormality         Status                     ---------                               -----------         ------                     CBC with platelets and d...[[id number]]  Abnormal            Final result                 Please view results for these tests on the individual orders.   Comprehensive metabolic panel   Result Value Ref Range    Sodium 130 (L) 135 - 145 mmol/L    Potassium 4.1 3.4 - 5.3 mmol/L    Carbon Dioxide (CO2) 23 22 - 29 mmol/L    Anion Gap 12 7 - 15 mmol/L    Urea Nitrogen 36.7 (H) 8.0 - 23.0 mg/dL    Creatinine 1.82 (H) 0.67 - 1.17 mg/dL    GFR Estimate 40 (L) >60 mL/min/1.73m2    Calcium 8.5 (L) 8.8 - 10.4 mg/dL    Chloride 95 (L) 98 - 107 mmol/L    Glucose 375 (H) 70 - 99 mg/dL    Alkaline Phosphatase 43 40 - 150 U/L    AST 10 0 - 45 U/L    ALT 13 0 - 70 U/L    Protein Total 5.6 (L) 6.4 - 8.3 g/dL    Albumin 3.3 (L) 3.5 - 5.2 g/dL    Bilirubin Total 0.5 <=1.2 mg/dL   [place] Draw    Narrative    The following orders were created for panel order [place] Draw.  Procedure                               Abnormality          Status                     ---------                               -----------         ------                     Extra Blue Top Tube[[id number]]                              Final result               Extra Blood Bank Purple ...[[id number]]                      In process                 Extra Heparinized Syringe[[id number]]                        Final result               Extra Green Top (Lithium...[[id number]]                      Final result                 Please view results for these tests on the individual orders.   CBC with platelets and differential   Result Value Ref Range    WBC Count 4.6 4.0 - 11.0 10e3/uL    RBC Count 4.39 (L) 4.40 - 5.90 10e6/uL    Hemoglobin 11.0 (L) 13.3 - 17.7 g/dL    Hematocrit 34.7 (L) 40.0 - 53.0 %    MCV 79 78 - 100 fL    MCH 25.1 (L) 26.5 - 33.0 pg    MCHC 31.7 31.5 - 36.5 g/dL    RDW 17.7 (H) 10.0 - 15.0 %    Platelet Count 208 150 - 450 10e3/uL    % Neutrophils 81 %    % Lymphocytes 16 %    % Monocytes 1 %    % Eosinophils 1 %    % Basophils 0 %    % Immature Granulocytes 1 %    NRBCs per 100 WBC 0 <1 /100    Absolute Neutrophils 3.8 1.6 - 8.3 10e3/uL    Absolute Lymphocytes 0.7 (L) 0.8 - 5.3 10e3/uL    Absolute Monocytes 0.1 0.0 - 1.3 10e3/uL    Absolute Eosinophils 0.0 0.0 - 0.7 10e3/uL    Absolute Basophils 0.0 0.0 - 0.2 10e3/uL    Absolute Immature Granulocytes 0.1 <=0.4 10e3/uL    Absolute NRBCs 0.0 10e3/uL   Extra Blue Top Tube   Result Value Ref Range    Hold Specimen [facility]    Extra Heparinized Syringe   Result Value Ref Range    Hold Specimen [facility]    Extra Green Top (Lithium Heparin) ON ICE   Result Value Ref Range    Hold Specimen [facility]    Comprehensive metabolic panel   Result Value Ref Range    Sodium 129 (L) 135 - 145 mmol/L    Potassium 4.2 3.4 - 5.3 mmol/L    Carbon Dioxide (CO2) 22 22 - 29 mmol/L    Anion Gap 12 7 - 15 mmol/L    Urea Nitrogen 36.4 (H) 8.0 - 23.0 mg/dL    Creatinine 1.79 (H) 0.67 - 1.17 mg/dL    GFR Estimate 41 (L) >60 mL/min/1.73m2    Calcium  8.4 (L) 8.8 - 10.4 mg/dL    Chloride 95 (L) 98 - 107 mmol/L    Glucose 375 (H) 70 - 99 mg/dL    Alkaline Phosphatase 43 40 - 150 U/L    AST 12 0 - 45 U/L    ALT 13 0 - 70 U/L    Protein Total 5.6 (L) 6.4 - 8.3 g/dL    Albumin 3.3 (L) 3.5 - 5.2 g/dL    Bilirubin Total 0.5 <=1.2 mg/dL   Lactic Acid Whole Blood with 1X Repeat in 2 HR when >2   Result Value Ref Range    Lactic Acid, Initial 1.7 0.7 - 2.0 mmol/L   Blood gas venous   Result Value Ref Range    pH Venous 7.37 7.32 - 7.43    pCO2 Venous 42 40 - 50 mm Hg    pO2 Venous 32 25 - 47 mm Hg    Bicarbonate Venous 25 21 - 28 mmol/L    Base Excess/Deficit Venous -0.8 -3.0 - 3.0 mmol/L    FIO2 21     Oxyhemoglobin Venous 52 (L) 70 - 75 %    O2 Sat, Venous 53.5 (L) 70.0 - 75.0 %    Narrative    In healthy individuals, oxyhemoglobin (O2Hb) and oxygen saturation (SO2) are approximately equal. In the presence of dyshemoglobins, oxyhemoglobin can be considerably lower than oxygen saturation.     1:09 PM  Patient was able to ambulate in the emergency department without difficulty.  No lightheadedness no nausea or vomiting.  He would like to go.  His blood pressure is improved significantly he informs me his usual baseline is around 110 systolic.  We discussed lab diagnostics with his renal function being comparable to his previous baseline.  Mild hyponatremia at baseline as well.  Suspect that all of his presentation is related to chemotherapy.  Return criteria for the emergency department after disposition review.      Medications   sodium chloride (PF) 0.9% PF flush 3 mL (has no administration in time range)   sodium chloride (PF) 0.9% PF flush 3 mL (3 mLs Intracatheter Not Given 11/27/24 1149)   lactated ringers BOLUS 3,375 mL (3,375 mLs Intravenous $New Bag 11/27/24 1033)       Assessments & Plan (with Medical Decision Making)   Assessment and plan with medical decision making at the time stamp above.      Disclaimer: This note consists of symbols derived from  keyboarding, dictation and/or voice recognition software. As a result, there may be errors in the script that have gone undetected. Please consider this when interpreting information found in this chart.      I have reviewed the nursing notes.    I have reviewed the findings, diagnosis, plan and need for follow up with the patient.        New Prescriptions    No medications on file       Final diagnoses:   Transient hypotension   Hyponatremia   Acute on chronic renal insufficiency   Chemotherapy induced nausea and vomiting   Chemotherapy induced diarrhea       11/27/2024   [facility] EMERGENCY DEPT       Zack Morris MD  11/27/24 6685

## 2024-11-27 NOTE — PROGRESS NOTES
"Oncology Rooming Note    November 27, 2024 9:07 AM   Celestine Simon is a 69 year old male who presents for:    Chief Complaint   Patient presents with    Oncology Clinic Visit     Malignant neoplasm of rectosigmoid junction - Labs and provider visits     Initial Vitals: BP (!) 66/41 (BP Location: Right arm, Patient Position: Sitting, Cuff Size: Adult Large)   Pulse 89   Temp 99  F (37.2  C) (Tympanic)   Resp 16   Ht 1.816 m (5' 11.5\")   Wt 112.5 kg (248 lb)   SpO2 97%   BMI 34.11 kg/m   Estimated body mass index is 34.11 kg/m  as calculated from the following:    Height as of this encounter: 1.816 m (5' 11.5\").    Weight as of this encounter: 112.5 kg (248 lb). Body surface area is 2.38 meters squared.  No Pain (0) Comment: Data Unavailable   No LMP for male patient.  Allergies reviewed: Yes  Medications reviewed: Yes    Medications: Medication refills not needed today.  Pharmacy name entered into [employer]:    [id number] - [place], [place] - [address] [facility] - [place], [place] - [address] [facility]  [facility] - [place], [place] - [address]    Frailty Screening:   Is the patient here for a new oncology consult visit in cancer care? 2. No      Clinical concerns: Patient isn't feeling well today following chemotherapy.       Lu Zabala, [facility]              "

## 2024-11-27 NOTE — LETTER
11/27/2024      Celestine Simon  [address]  [facility] [address]      Dear Colleague,    Thank you for referring your patient, Celestine Simon, to the [facility]. Please see a copy of my visit note below.    [facility] Outpatient Progress Note    Patient: Celestine Simon  MRN: [id number]  Date of Service: Nov 27, 2024          Reason for Visit    Locally adv rectosigmmoid cancer    Primary Oncologist: Dr. Friedell      Assessment/Plan  Stage IIIB ([id number]) rectosigmoid adenocarcinoma  Undergoing total neoadjuvant treatment  Severe dehydration (severe hypotension, elevated creatinine)  Hyponatremia, hypomagnesemia  Chemo-induced nausea (gr 2)  Chemo-induced diarrhea, mild (gr 1)  On neoadjuvant chemo with mFOLFOX.   He is on day 5 of cycle 1. He felt well until yesterday morning, he's been feeling very poorly since then.     He's having nausea with once emesis yesterday morning, moderately managed with Zofran. He was extremely fatigued yesterday and slept all day. He has had little caloric and fluid intake x 24 hrs. He had 2 loose stools this AM.     10 lb weight loss since last week. Has continued taking his antihypertensive medications (doxazosin, hydrochlorothiazide, lisinopril) with his dose this AM around 7:00.    BP today at clinic visit as low as 55/34, tachycardic HR 100s. Very dizzy. Afebrile, no signs of infection/sepsis. No recent evident GI bleeding.     Likely dehydrated, in combination with antihypertensives on board.     Labs: Hgb 11.9 (stable), MCV declined 77, WBC and plat WNL. CMP: Na 129, Creatinine 1.59, mag 1.4.    Plan:  -Called Rapid Response to exam room for severe hypotension. IVF bolus started. Transported to ED.   -If he stabilizes with fluid resuscitation and electrolyte replacement, as needed, will need to hold  doxazosin, lisinopril, hydrochlorothiazide now through weekend. Monitor BP daily at home and follow-up  with PCP early next week on readings with mgmt on which/what dosing to reintroduce at. Given the plan to be on chemo over next 4 mths, he will likely need adjustments on treatment  -Will need aggressive antiemetic schedule - Zofran TID with compazine between Zofran as needed. Imodium as needed for diarrhea. Work on calories and fluid intake.  -Return early next week with lab, Shireen, IVF if needed  -Return 12/6 for lab, provider visit ahead of C2.   -Is scheduled every 2 weeks thereafter through C4  -Typically, proceed with 12-16 weeks (6-8 cycles) then restage. Plan will be to proceed with RT (likely with concurrent chemo) next, followed by restaging to decide on surgery.    Microcytic anemia  Chronic GI bleeding (rectal tumor)  He's had rectal bleeding x 4-6 mths related to tumor. No recent bleeding evident.  His hgb has been 10-11 g/dL/stable (11.9 today).   He's getting more microcytic, likely due to iron deficiency.     He was prescribed oral iron but has never started them.    Plan:  -Check ferritin and iron today  -Start oral iron daily   -Will likely need IV iron as well, pending assessment of pending labs      ______________________________________________________________________________    History of Present Illness/ Interval History    Mr. Celestine Simon  is a 69 year old with locally advanced rectosigmoid cancer.   Started neoadjuvant chemo with mFOLFOX last week. Returns for mid-cycle toxicity eval (day 5).     He felt well without side effects until yesterday, he started feeling very poorly with nausea with 1 emesis and fatigue. Slept all day yesterday and very tired today. Using Zofran with moderate benefit. No further emesis.   Has not eaten much and minimal fluids x 24 hrs. Urinating well. Feeling very dizzy with vision changes this morning. Two loose stools this morning. intermittent blood in stools x months, none recently. No fevers       ECOG Performance    2      Oncology  History/Treatment  Diagnosis/Stage:   10/2024: IIIB ([id number]) rectosigmoid junction adenocarcinoma  -5/2024: Cologuard screening positive. Noted fatigue x 1 yr and 20 lb weight loss x 6 mths following this. Occasional blood in stool.  -10/10/2024 colonoscopy: rectosigmoid mass. Biopsy + adenocarcinoma, MSI low  -Staging MRI: 5.5 cm rectal carcinoma with invasion into perirectal fat; 4 LN pathologically enlarged. Circumferential mass clear.   -CT cap: possible liver mets, but MRI liver favored benign hemangioma  -Baseline CEA: 5.4    Treatment:  Met with multidisciplinary team: Dr. Tracy (Rad Onc) and Dr. Sheets (CRS). Recommendations for DIPTI approach; with neoadjuvant/induction chemo, followed by concurrent chemoRT; then reassess role for surgery    11/22/2024 - present: neoadjuvant mFOLFOX6      Physical Exam    GENERAL: Alert and oriented to time place and person. Ambulatory to clinic, but progressively dizzy and hypotensive during visit. Wife, Ina, accompanied.   HEAD: Atraumatic and normocephalic. No alopecia.  EYES: DURAN, EOMI. No erythema. No icterus.  ORAL CAVITY: Dry.  No mucosal lesion or tonsillar enlargement.  CHEST: clear to auscultation bilaterally. Resonant to percussion throughout bilaterally. Symmetrical breath movements bilaterally.  CVS: RRR, mildly tachycardic 100s  ABDOMEN: Soft. Not tender. Not distended. No palpable hepatomegaly or splenomegaly. No other mass palpable. Bowel sounds present.  EXTREMITIES: Warm. Trace peripheral edema.  SKIN: no rash, or bruising or purpura.   NEURO: No gross deficit noted. Very weak ambulating, needed assistance.       Lab Results    Recent Results (from the past week)   Glucose by meter   Result Value Ref Range    GLUCOSE BY METER POCT 157 (H) 70 - 99 mg/dL   Comprehensive metabolic panel   Result Value Ref Range    Sodium 135 135 - 145 mmol/L    Potassium 4.3 3.4 - 5.3 mmol/L    Carbon Dioxide (CO2) 26 22 - 29 mmol/L    Anion Gap 10 7 - 15 mmol/L     Urea Nitrogen 22.5 8.0 - 23.0 mg/dL    Creatinine 0.95 0.67 - 1.17 mg/dL    GFR Estimate 88 >60 mL/min/1.73m2    Calcium 9.8 8.8 - 10.4 mg/dL    Chloride 99 98 - 107 mmol/L    Glucose 159 (H) 70 - 99 mg/dL    Alkaline Phosphatase 56 40 - 150 U/L    AST 16 0 - 45 U/L    ALT 15 0 - 70 U/L    Protein Total 6.4 6.4 - 8.3 g/dL    Albumin 3.9 3.5 - 5.2 g/dL    Bilirubin Total 0.4 <=1.2 mg/dL   CBC with platelets and differential   Result Value Ref Range    WBC Count 8.3 4.0 - 11.0 10e3/uL    RBC Count 4.20 (L) 4.40 - 5.90 10e6/uL    Hemoglobin 10.6 (L) 13.3 - 17.7 g/dL    Hematocrit 33.2 (L) 40.0 - 53.0 %    MCV 79 78 - 100 fL    MCH 25.2 (L) 26.5 - 33.0 pg    MCHC 31.9 31.5 - 36.5 g/dL    RDW 18.5 (H) 10.0 - 15.0 %    Platelet Count 297 150 - 450 10e3/uL    % Neutrophils 68 %    % Lymphocytes 19 %    % Monocytes 11 %    % Eosinophils 2 %    % Basophils 0 %    % Immature Granulocytes 0 %    NRBCs per 100 WBC 0 <1 /100    Absolute Neutrophils 5.6 1.6 - 8.3 10e3/uL    Absolute Lymphocytes 1.5 0.8 - 5.3 10e3/uL    Absolute Monocytes 0.9 0.0 - 1.3 10e3/uL    Absolute Eosinophils 0.2 0.0 - 0.7 10e3/uL    Absolute Basophils 0.0 0.0 - 0.2 10e3/uL    Absolute Immature Granulocytes 0.0 <=0.4 10e3/uL    Absolute NRBCs 0.0 10e3/uL   Comprehensive metabolic panel   Result Value Ref Range    Sodium 129 (L) 135 - 145 mmol/L    Potassium 4.2 3.4 - 5.3 mmol/L    Carbon Dioxide (CO2) 23 22 - 29 mmol/L    Anion Gap 12 7 - 15 mmol/L    Urea Nitrogen 33.0 (H) 8.0 - 23.0 mg/dL    Creatinine 1.59 (H) 0.67 - 1.17 mg/dL    GFR Estimate 47 (L) >60 mL/min/1.73m2    Calcium 9.1 8.8 - 10.4 mg/dL    Chloride 94 (L) 98 - 107 mmol/L    Glucose 372 (H) 70 - 99 mg/dL    Alkaline Phosphatase 49 40 - 150 U/L    AST 14 0 - 45 U/L    ALT 15 0 - 70 U/L    Protein Total 6.0 (L) 6.4 - 8.3 g/dL    Albumin 3.7 3.5 - 5.2 g/dL    Bilirubin Total 0.6 <=1.2 mg/dL   CBC with platelets and differential   Result Value Ref Range    WBC Count 4.3 4.0 - 11.0 10e3/uL     RBC Count 4.76 4.40 - 5.90 10e6/uL    Hemoglobin 11.9 (L) 13.3 - 17.7 g/dL    Hematocrit 36.8 (L) 40.0 - 53.0 %    MCV 77 (L) 78 - 100 fL    MCH 25.0 (L) 26.5 - 33.0 pg    MCHC 32.3 31.5 - 36.5 g/dL    RDW 17.8 (H) 10.0 - 15.0 %    Platelet Count 217 150 - 450 10e3/uL    % Neutrophils 81 %    % Lymphocytes 17 %    % Monocytes 1 %    % Eosinophils 1 %    % Basophils 0 %    % Immature Granulocytes 1 %    NRBCs per 100 WBC 0 <1 /100    Absolute Neutrophils 3.5 1.6 - 8.3 10e3/uL    Absolute Lymphocytes 0.7 (L) 0.8 - 5.3 10e3/uL    Absolute Monocytes 0.1 0.0 - 1.3 10e3/uL    Absolute Eosinophils 0.0 0.0 - 0.7 10e3/uL    Absolute Basophils 0.0 0.0 - 0.2 10e3/uL    Absolute Immature Granulocytes 0.0 <=0.4 10e3/uL    Absolute NRBCs 0.0 10e3/uL   Magnesium   Result Value Ref Range    Magnesium 1.4 (L) 1.7 - 2.3 mg/dL   Ferritin   Result Value Ref Range    Ferritin 183 31 - 409 ng/mL   Iron & Iron Binding Capacity   Result Value Ref Range    Iron 113 61 - 157 ug/dL    Iron Binding Capacity 328 240 - 430 ug/dL    Iron Sat Index 34 15 - 46 %       Imaging    XR Surgery GRACE L/T 5 Min Fluoro w Stills    Result Date: 11/20/2024  This exam was marked as non-reportable because it will not be read by a radiologist or a [place] non-radiologist provider.     XR Chest Port 1 View    Result Date: 11/20/2024  XR CHEST PORT 1 VIEW 11/20/2024 11:27 AM HISTORY: Assess position of port and evaluate for pneumothorax. COMPARISON: 10/10/2024. FINDINGS/    IMPRESSION: A right chest wall Port-A-Cath is present with the tip in the region of the SVC. There is mild tortuosity of the catheter in the subclavian region. No signs of a postprocedure pneumothorax. Lungs are clear. Heart size and pulmonary vasculature are normal appearing. No acute osseous abnormality. CAPRI REYES MD   SYSTEM ID:  [id number]    US Biopsy Thyroid Fine Needle Aspiration    Result Date: 11/12/2024  ULTRASOUND-GUIDED THYROID FINE NEEDLE ASPIRATION BIOPSY  11/12/2024 9:00  AM HISTORY: Thyroid nodule COMPARISON: Thyroid ultrasound 10/31/2024 NODULE: 2.4 cm right superior thyroid nodule TECHNIQUE:  Informed consent was obtained for the procedure with discussion including possible risks of bleeding, infection, and nondiagnostic result. Using sterile technique, 5 mL 1% lidocaine for local anesthesia, and sonographic guidance, fine-needle aspiration of the thyroid nodule was performed using 25-gauge needles. 6 passes were obtained. Permanent image documentation was performed. The material obtained was submitted on slides for cytologic evaluation. There were no immediate complications of the procedure.     IMPRESSION: Status post ultrasound-guided FNA of right superior 2.4 cm nodule. DANAE CABRAL MD   SYSTEM ID:  [id number]    US Thyroid    Result Date: 11/1/2024  US THYROID 10/31/2024 9:27 AM COMPARISON: CT chest abdomen and pelvis 10/10/2024 HISTORY: Thyroid nodule on Chest ct scan; Thyroid nodule FINDINGS: Thyroid parenchyma: heterogenous. Mild thyromegaly. The right lobe of the thyroid measures: 4.5 x 2.5 x 2.7 cm The left lobe of the thyroid measures: 4.9 x 2.2 x 2.1 cm The thyroid isthmus measures: 0.3 cm Nodule 1: Lobe: Right Location: Superior Size: 2.4 x 1.8 x 1.3 cm Composition: Solid or almost completely solid (2 points) Echogenicity: Hypoechoic (2 points) Shape: Wider than tall (0 points) Margin: Smooth (0 points) Echogenic Foci: None or large comet tail artifact (0 points) Stability: Not previously imaged TIRADS: TR4 (4-6 points) Nodule 2: Lobe: Left Location: Superior Size: 0.6 x 0.6 x 0.4 cm Composition: Solid or almost completely solid (2 points) Echogenicity: Hypoechoic (2 points) Shape: Wider than tall (0 points) Margin: Smooth (0 points) Echogenic Foci: None or large comet tail artifact (0 points) Stability: Not previously imaged TIRADS: TR4 (4-6 points) Nodule 3: Lobe: Left Location: Superior Size: 0.3 x 0.4 x 0.2 cm Composition: Solid or almost completely solid (2  points) Echogenicity: Hypoechoic (2 points) Shape: Wider than tall (0 points) Margin: Smooth (0 points) Echogenic Foci: None or large comet tail artifact (0 points) Stability: Not previously imaged TIRADS: TR4 (4-6 points) Nodule 4: Lobe: Left Location: Mid lobe Size: 0.7 x 0.8 x 1.1 cm Composition: Solid or almost completely solid (2 points) Echogenicity: Hypoechoic (2 points) Shape: Taller than wide (3 points) Margin: Ill-defined (0 points) Echogenic Foci: None or large comet tail artifact (0 points) Stability: Not previously imaged TIRADS: TR5 (>/= 7 points) Nodule 5: Lobe: Left Location: Inferior Size: 0.9 x 0.9 x 0.6 cm Composition: Solid or almost completely solid (2 points) Echogenicity: Hypoechoic (2 points) Shape: Wider than tall (0 points) Margin: Smooth (0 points) Echogenic Foci: None or large comet tail artifact (0 points) Stability: Not previously imaged TIRADS: TR4 (4-6 points)     Impression: Multiple thyroid nodules as described, including nodule#1, TIRADS TR4 measuring up to 2.4 cm and nodule #4, TIRADS TR5 measuring up to 1.1 cm ; consider management (fine-needle aspiration biopsy) as per ACR TIRADS recommendations below ACR TI-RADS recommendations TR2 (2 points) & TR1 (0 points) -No FNA or follow-up TR3 (3 points) - FNA if ? 2.5cm, follow-up if 1.5 -2.4 cm in 1, 3 and 5 years TR4 (4-6 points) - FNA if ? 1.5cm, follow-up if 1 -1.4 cm in 1, 2, 3 and 5 years TR5 (?7 points) - FNA if ? 1cm, follow-up if 0.5 -0.9 cm every year for 5 years I have personally reviewed the examination and initial interpretation and I agree with the findings. GAVINO NICOLE MD   SYSTEM ID:  [id number]    MR Abdomen w/o & w Contrast    Result Date: 10/31/2024  MRI ABDOMEN CLINICAL HISTORY:     Liver lesion; Malignant neoplasm of rectosigmoid junction (H); liver lesions in setting of new rectal cancer TECHNIQUE:  Images were acquired with and without intravenous contrast through the abdomen. The following MR images were  acquired: TrueFISP, multiplanar T2 weighted, axial T1 in/out of phase, axial fat-saturated T1, diffusion-weighted. Multiplanar T1-weighted images with fat saturation were before contrast administration and at multiple time points following the administration of intravenous contrast. Contrast dose: 10 mL EOVIST FINDINGS: Comparison study: CT 10/10/2024 Examinations partially limited due to metallic and respiratory motion artifact. Liver: Noncirrhotic morphology of the liver. No significant hepatic steatosis or iron deposition. Focal wedge-shaped arterial hyperenhancement in hepatic segment 6 and more subtle area at the right hepatic dome without underlying mass or evident portal vein filling defect, likely perfusional. 11 mm T2 hyperintense lesion in hepatic segment 5/8 which demonstrates progressive peripheral nodular enhancement on portal venous and three-minute delayed images, and absent uptake on the hepatobiliary phase. No restricted diffusion. Additional 6 mm lesion in hepatic segment 2 demonstrates similar signal characteristics and enhancement pattern (series 6, image 15) is. Biliary system: Status post cholecystectomy. Mild prominence of the common bile duct, likely secondary to reservoir effect. Spleen: Not enlarged. Kidneys: T2 hyperintense nonenhancing cysts. 11 mm calyceal stone in the upper pole of the left kidney. No hydronephrosis. Adrenal glands: Within normal limits. Pancreas: Diffuse parenchymal atrophy with loss of normal intrinsic T1 hyperintense signal. No pancreatic ductal dilation. Multiple T2 hyperintensities throughout the pancreas, the largest which measures 11 mm in the tail of the pancreas (6/5/2015), with apparent communication with the main pancreatic duct. No clear soft tissue component or enhancing mural nodularity. Bowel: The visualized small and large bowel is nondistended. Colonic diverticulosis. Small hiatal hernia. Lymph nodes: No suspicious lymphadenopathy. Blood vessels: The  "major abdominal arteries and portal vessels are patent. Lung bases: Grossly clear. Bones and soft tissues: Triangular area of enhancement in the right hemisacrum (series 19 image 57), without underlying osseous lesion or correlate on prior CT, likely artifactual. No aggressive appearing osseous lesion. Mesentery and abdominal wall: Within normal limits. Ascites: None.     IMPRESSION: 1. Examination is limited by artifact. There are 2 focal hepatic lesions which correspond to findings on prior CT, both demonstrating enhancement pattern suggestive of benign hepatic hemangiomas. Follow-up MRI or CT is recommended to ensure stability given the limitations of current examination. 2. Multiple pancreatic cysts without suspicious features, the largest of which measures up to 11 mm in the tail of the pancreas. 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 Consider surveillance with MRI and/or attention on follow-up imaging. I have personally reviewed the examination and initial interpretation and I agree with the findings. FREDERICK FIELD MD   SYSTEM ID:  [id number]     Billing  Total time 50 minutes, to include face to face visit, review of EMR, ordering, documentation and coordination of care on date of service   complexity modifier for longitudinal care.       Signed by: Shireen Villarreal NP      Oncology Rooming Note    November 27, 2024 9:07 AM   Celestine Siomn is a 69 year old male who presents for:    Chief Complaint   Patient presents with     Oncology Clinic Visit     Malignant neoplasm of rectosigmoid junction - Labs and provider visits     Initial Vitals: BP (!) 66/41 (BP Location: Right arm, Patient Position: Sitting, Cuff Size: Adult Large)   Pulse 89   Temp 99  F (37.2  C) (Tympanic)   Resp 16   Ht 1.816 m (5' 11.5\")   Wt 112.5 kg (248 lb)   SpO2 97%   BMI 34.11 kg/m   Estimated body mass index is 34.11 kg/m  as calculated from the following:    Height as of this encounter: 1.816 m (5' 11.5\").    Weight as of " this encounter: 112.5 kg (248 lb). Body surface area is 2.38 meters squared.  No Pain (0) Comment: Data Unavailable   No LMP for male patient.  Allergies reviewed: Yes  Medications reviewed: Yes    Medications: Medication refills not needed today.  Pharmacy name entered into [email or web address]:    [id number] - [place], [place] - [address]  [facility] - [place], [place] - [address] [email or web address] - [place], [place] - [address]    Frailty Screening:   Is the patient here for a new oncology consult visit in cancer care? 2. No      Clinical concerns: Patient isn't feeling well today following chemotherapy.       Lu Zabala CMA                Again, thank you for allowing me to participate in the care of your patient.        Sincerely,        Shireen Villarreal NP

## 2024-11-27 NOTE — PROGRESS NOTES
[facility] Hematology and Oncology Outpatient Progress Note    Patient: Celestine Simon  MRN: [id number]  Date of Service: Nov 27, 2024          Reason for Visit    Locally adv rectosigmmoid cancer    Primary Oncologist: Dr. Friedell      Assessment/Plan  Stage IIIB (dU0-[id number]) rectosigmoid adenocarcinoma  Undergoing total neoadjuvant treatment  Severe dehydration (severe hypotension, elevated creatinine)  Hyponatremia, hypomagnesemia  Chemo-induced nausea (gr 2)  Chemo-induced diarrhea, mild (gr 1)  On neoadjuvant chemo with mFOLFOX.   He is on day 5 of cycle 1. He felt well until yesterday morning, he's been feeling very poorly since then.     He's having nausea with once emesis yesterday morning, moderately managed with Zofran. He was extremely fatigued yesterday and slept all day. He has had little caloric and fluid intake x 24 hrs. He had 2 loose stools this AM.     10 lb weight loss since last week. Has continued taking his antihypertensive medications (doxazosin, hydrochlorothiazide, lisinopril) with his dose this AM around 7:00.    BP today at clinic visit as low as 55/34, tachycardic HR 100s. Very dizzy. Afebrile, no signs of infection/sepsis. No recent evident GI bleeding.     Likely dehydrated, in combination with antihypertensives on board.     Labs: Hgb 11.9 (stable), MCV declined 77, WBC and plat WNL. CMP: Na 129, Creatinine 1.59, mag 1.4.    Plan:  -Called Rapid Response to exam room for severe hypotension. IVF bolus started. Transported to ED.   -If he stabilizes with fluid resuscitation and electrolyte replacement, as needed, will need to hold  doxazosin, lisinopril, hydrochlorothiazide now through weekend. Monitor BP daily at home and follow-up with PCP early next week on readings with mgmt on which/what dosing to reintroduce at. Given the plan to be on chemo over next 4 mths, he will likely need adjustments on treatment  -Will need aggressive antiemetic schedule - Zofran TID with  compazine between Zofran as needed. Imodium as needed for diarrhea. Work on calories and fluid intake.  -Return early next week with lab, Shireen, IVF if needed  -Return 12/6 for lab, provider visit ahead of C2.   -Is scheduled every 2 weeks thereafter through C4  -Typically, proceed with 12-16 weeks (6-8 cycles) then restage. Plan will be to proceed with RT (likely with concurrent chemo) next, followed by restaging to decide on surgery.    Microcytic anemia  Chronic GI bleeding (rectal tumor)  He's had rectal bleeding x 4-6 mths related to tumor. No recent bleeding evident.  His hgb has been 10-11 g/dL/stable (11.9 today).   He's getting more microcytic, likely due to iron deficiency.     He was prescribed oral iron but has never started them.    Plan:  -Check ferritin and iron today  -Start oral iron daily   -Will likely need IV iron as well, pending assessment of pending labs      ______________________________________________________________________________    History of Present Illness/ Interval History    Mr. Celestine Simon  is a 69 year old with locally advanced rectosigmoid cancer.   Started neoadjuvant chemo with mFOLFOX last week. Returns for mid-cycle toxicity eval (day 5).     He felt well without side effects until yesterday, he started feeling very poorly with nausea with 1 emesis and fatigue. Slept all day yesterday and very tired today. Using Zofran with moderate benefit. No further emesis.   Has not eaten much and minimal fluids x 24 hrs. Urinating well. Feeling very dizzy with vision changes this morning. Two loose stools this morning. intermittent blood in stools x months, none recently. No fevers       ECOG Performance    2      Oncology History/Treatment  Diagnosis/Stage:   10/2024: IIIB ([id number]-[id number]) rectosigmoid junction adenocarcinoma  -5/2024: Cologuard screening positive. Noted fatigue x 1 yr and 20 lb weight loss x 6 mths following this. Occasional blood in stool.  -10/10/2024 colonoscopy:  rectosigmoid mass. Biopsy + adenocarcinoma, MSI low  -Staging MRI: 5.5 cm rectal carcinoma with invasion into perirectal fat; 4 LN pathologically enlarged. Circumferential mass clear.   -CT cap: possible liver mets, but MRI liver favored benign hemangioma  -Baseline CEA: 5.4    Treatment:  Met with multidisciplinary team: Dr. Tracy (Rad Onc) and Dr. Sheets (CRS). Recommendations for DIPTI approach; with neoadjuvant/induction chemo, followed by concurrent chemoRT; then reassess role for surgery    11/22/2024 - present: neoadjuvant [id number]      Physical Exam    GENERAL: Alert and oriented to time place and person. Ambulatory to clinic, but progressively dizzy and hypotensive during visit. Wife, Ina, accompanied.   HEAD: Atraumatic and normocephalic. No alopecia.  EYES: DURAN, EOMI. No erythema. No icterus.  ORAL CAVITY: Dry.  No mucosal lesion or tonsillar enlargement.  CHEST: clear to auscultation bilaterally. Resonant to percussion throughout bilaterally. Symmetrical breath movements bilaterally.  CVS: RRR, mildly tachycardic 100s  ABDOMEN: Soft. Not tender. Not distended. No palpable hepatomegaly or splenomegaly. No other mass palpable. Bowel sounds present.  EXTREMITIES: Warm. Trace peripheral edema.  SKIN: no rash, or bruising or purpura.   NEURO: No gross deficit noted. Very weak ambulating, needed assistance.       Lab Results    Recent Results (from the past week)   Glucose by meter   Result Value Ref Range    GLUCOSE BY METER POCT 157 (H) 70 - 99 mg/dL   Comprehensive metabolic panel   Result Value Ref Range    Sodium 135 135 - 145 mmol/L    Potassium 4.3 3.4 - 5.3 mmol/L    Carbon Dioxide (CO2) 26 22 - 29 mmol/L    Anion Gap 10 7 - 15 mmol/L    Urea Nitrogen 22.5 8.0 - 23.0 mg/dL    Creatinine 0.95 0.67 - 1.17 mg/dL    GFR Estimate 88 >60 mL/min/1.73m2    Calcium 9.8 8.8 - 10.4 mg/dL    Chloride 99 98 - 107 mmol/L    Glucose 159 (H) 70 - 99 mg/dL    Alkaline Phosphatase 56 40 - 150 U/L    AST 16 0 - 45  U/L    ALT 15 0 - 70 U/L    Protein Total 6.4 6.4 - 8.3 g/dL    Albumin 3.9 3.5 - 5.2 g/dL    Bilirubin Total 0.4 <=1.2 mg/dL   CBC with platelets and differential   Result Value Ref Range    WBC Count 8.3 4.0 - 11.0 10e3/uL    RBC Count 4.20 (L) 4.40 - 5.90 10e6/uL    Hemoglobin 10.6 (L) 13.3 - 17.7 g/dL    Hematocrit 33.2 (L) 40.0 - 53.0 %    MCV 79 78 - 100 fL    MCH 25.2 (L) 26.5 - 33.0 pg    MCHC 31.9 31.5 - 36.5 g/dL    RDW 18.5 (H) 10.0 - 15.0 %    Platelet Count 297 150 - 450 10e3/uL    % Neutrophils 68 %    % Lymphocytes 19 %    % Monocytes 11 %    % Eosinophils 2 %    % Basophils 0 %    % Immature Granulocytes 0 %    NRBCs per 100 WBC 0 <1 /100    Absolute Neutrophils 5.6 1.6 - 8.3 10e3/uL    Absolute Lymphocytes 1.5 0.8 - 5.3 10e3/uL    Absolute Monocytes 0.9 0.0 - 1.3 10e3/uL    Absolute Eosinophils 0.2 0.0 - 0.7 10e3/uL    Absolute Basophils 0.0 0.0 - 0.2 10e3/uL    Absolute Immature Granulocytes 0.0 <=0.4 10e3/uL    Absolute NRBCs 0.0 10e3/uL   Comprehensive metabolic panel   Result Value Ref Range    Sodium 129 (L) 135 - 145 mmol/L    Potassium 4.2 3.4 - 5.3 mmol/L    Carbon Dioxide (CO2) 23 22 - 29 mmol/L    Anion Gap 12 7 - 15 mmol/L    Urea Nitrogen 33.0 (H) 8.0 - 23.0 mg/dL    Creatinine 1.59 (H) 0.67 - 1.17 mg/dL    GFR Estimate 47 (L) >60 mL/min/1.73m2    Calcium 9.1 8.8 - 10.4 mg/dL    Chloride 94 (L) 98 - 107 mmol/L    Glucose 372 (H) 70 - 99 mg/dL    Alkaline Phosphatase 49 40 - 150 U/L    AST 14 0 - 45 U/L    ALT 15 0 - 70 U/L    Protein Total 6.0 (L) 6.4 - 8.3 g/dL    Albumin 3.7 3.5 - 5.2 g/dL    Bilirubin Total 0.6 <=1.2 mg/dL   CBC with platelets and differential   Result Value Ref Range    WBC Count 4.3 4.0 - 11.0 10e3/uL    RBC Count 4.76 4.40 - 5.90 10e6/uL    Hemoglobin 11.9 (L) 13.3 - 17.7 g/dL    Hematocrit 36.8 (L) 40.0 - 53.0 %    MCV 77 (L) 78 - 100 fL    MCH 25.0 (L) 26.5 - 33.0 pg    MCHC 32.3 31.5 - 36.5 g/dL    RDW 17.8 (H) 10.0 - 15.0 %    Platelet Count 217 150 - 450  10e3/uL    % Neutrophils 81 %    % Lymphocytes 17 %    % Monocytes 1 %    % Eosinophils 1 %    % Basophils 0 %    % Immature Granulocytes 1 %    NRBCs per 100 WBC 0 <1 /100    Absolute Neutrophils 3.5 1.6 - 8.3 10e3/uL    Absolute Lymphocytes 0.7 (L) 0.8 - 5.3 10e3/uL    Absolute Monocytes 0.1 0.0 - 1.3 10e3/uL    Absolute Eosinophils 0.0 0.0 - 0.7 10e3/uL    Absolute Basophils 0.0 0.0 - 0.2 10e3/uL    Absolute Immature Granulocytes 0.0 <=0.4 10e3/uL    Absolute NRBCs 0.0 10e3/uL   Magnesium   Result Value Ref Range    Magnesium 1.4 (L) 1.7 - 2.3 mg/dL   Ferritin   Result Value Ref Range    Ferritin 183 31 - 409 ng/mL   Iron & Iron Binding Capacity   Result Value Ref Range    Iron 113 61 - 157 ug/dL    Iron Binding Capacity 328 240 - 430 ug/dL    Iron Sat Index 34 15 - 46 %       Imaging    XR Surgery GRACE L/T 5 Min Fluoro w Stills    Result Date: 11/20/2024  This exam was marked as non-reportable because it will not be read by a radiologist or a [facility] non-radiologist provider.     XR Chest Port 1 View    Result Date: 11/20/2024  XR CHEST PORT 1 VIEW 11/20/2024 11:27 AM HISTORY: Assess position of port and evaluate for pneumothorax. COMPARISON: 10/10/2024. FINDINGS/    IMPRESSION: A right chest wall Port-A-Cath is present with the tip in the region of the SVC. There is mild tortuosity of the catheter in the subclavian region. No signs of a postprocedure pneumothorax. Lungs are clear. Heart size and pulmonary vasculature are normal appearing. No acute osseous abnormality. CAPRI REYES MD   SYSTEM ID:  [id number]    US Biopsy Thyroid Fine Needle Aspiration    Result Date: 11/12/2024  ULTRASOUND-GUIDED THYROID FINE NEEDLE ASPIRATION BIOPSY  11/12/2024 9:00 AM HISTORY: Thyroid nodule COMPARISON: Thyroid ultrasound 10/31/2024 NODULE: 2.4 cm right superior thyroid nodule TECHNIQUE:  Informed consent was obtained for the procedure with discussion including possible risks of bleeding, infection, and nondiagnostic  result. Using sterile technique, 5 mL 1% lidocaine for local anesthesia, and sonographic guidance, fine-needle aspiration of the thyroid nodule was performed using 25-gauge needles. 6 passes were obtained. Permanent image documentation was performed. The material obtained was submitted on slides for cytologic evaluation. There were no immediate complications of the procedure.     IMPRESSION: Status post ultrasound-guided FNA of right superior 2.4 cm nodule. DANAE CABRAL MD   SYSTEM ID:  [id number]    US Thyroid    Result Date: 11/1/2024  US THYROID 10/31/2024 9:27 AM COMPARISON: CT chest abdomen and pelvis 10/10/2024 HISTORY: Thyroid nodule on Chest ct scan; Thyroid nodule FINDINGS: Thyroid parenchyma: heterogenous. Mild thyromegaly. The right lobe of the thyroid measures: 4.5 x 2.5 x 2.7 cm The left lobe of the thyroid measures: 4.9 x 2.2 x 2.1 cm The thyroid isthmus measures: 0.3 cm Nodule 1: Lobe: Right Location: Superior Size: 2.4 x 1.8 x 1.3 cm Composition: Solid or almost completely solid (2 points) Echogenicity: Hypoechoic (2 points) Shape: Wider than tall (0 points) Margin: Smooth (0 points) Echogenic Foci: None or large comet tail artifact (0 points) Stability: Not previously imaged TIRADS: TR4 (4-6 points) Nodule 2: Lobe: Left Location: Superior Size: 0.6 x 0.6 x 0.4 cm Composition: Solid or almost completely solid (2 points) Echogenicity: Hypoechoic (2 points) Shape: Wider than tall (0 points) Margin: Smooth (0 points) Echogenic Foci: None or large comet tail artifact (0 points) Stability: Not previously imaged TIRADS: TR4 (4-6 points) Nodule 3: Lobe: Left Location: Superior Size: 0.3 x 0.4 x 0.2 cm Composition: Solid or almost completely solid (2 points) Echogenicity: Hypoechoic (2 points) Shape: Wider than tall (0 points) Margin: Smooth (0 points) Echogenic Foci: None or large comet tail artifact (0 points) Stability: Not previously imaged TIRADS: TR4 (4-6 points) Nodule 4: Lobe: Left Location: Mid  lobe Size: 0.7 x 0.8 x 1.1 cm Composition: Solid or almost completely solid (2 points) Echogenicity: Hypoechoic (2 points) Shape: Taller than wide (3 points) Margin: Ill-defined (0 points) Echogenic Foci: None or large comet tail artifact (0 points) Stability: Not previously imaged TIRADS: TR5 (>/= 7 points) Nodule 5: Lobe: Left Location: Inferior Size: 0.9 x 0.9 x 0.6 cm Composition: Solid or almost completely solid (2 points) Echogenicity: Hypoechoic (2 points) Shape: Wider than tall (0 points) Margin: Smooth (0 points) Echogenic Foci: None or large comet tail artifact (0 points) Stability: Not previously imaged TIRADS: TR4 (4-6 points)     Impression: Multiple thyroid nodules as described, including nodule#1, TIRADS TR4 measuring up to 2.4 cm and nodule #4, TIRADS TR5 measuring up to 1.1 cm ; consider management (fine-needle aspiration biopsy) as per ACR TIRADS recommendations below ACR TI-RADS recommendations TR2 (2 points) & TR1 (0 points) -No FNA or follow-up TR3 (3 points) - FNA if ? 2.5cm, follow-up if 1.5 -2.4 cm in 1, 3 and 5 years TR4 (4-6 points) - FNA if ? 1.5cm, follow-up if 1 -1.4 cm in 1, 2, 3 and 5 years TR5 (?7 points) - FNA if ? 1cm, follow-up if 0.5 -0.9 cm every year for 5 years I have personally reviewed the examination and initial interpretation and I agree with the findings. GAVINO NICOLE MD   SYSTEM ID:  [id number]    MR Abdomen w/o & w Contrast    Result Date: 10/31/2024  MRI ABDOMEN CLINICAL HISTORY:     Liver lesion; Malignant neoplasm of rectosigmoid junction (H); liver lesions in setting of new rectal cancer TECHNIQUE:  Images were acquired with and without intravenous contrast through the abdomen. The following MR images were acquired: TrueFISP, multiplanar T2 weighted, axial T1 in/out of phase, axial fat-saturated T1, diffusion-weighted. Multiplanar T1-weighted images with fat saturation were before contrast administration and at multiple time points following the administration of  intravenous contrast. Contrast dose: 10 mL EOVIST FINDINGS: Comparison study: CT 10/10/2024 Examinations partially limited due to metallic and respiratory motion artifact. Liver: Noncirrhotic morphology of the liver. No significant hepatic steatosis or iron deposition. Focal wedge-shaped arterial hyperenhancement in hepatic segment 6 and more subtle area at the right hepatic dome without underlying mass or evident portal vein filling defect, likely perfusional. 11 mm T2 hyperintense lesion in hepatic segment 5/8 which demonstrates progressive peripheral nodular enhancement on portal venous and three-minute delayed images, and absent uptake on the hepatobiliary phase. No restricted diffusion. Additional 6 mm lesion in hepatic segment 2 demonstrates similar signal characteristics and enhancement pattern (series 6, image 15) is. Biliary system: Status post cholecystectomy. Mild prominence of the common bile duct, likely secondary to reservoir effect. Spleen: Not enlarged. Kidneys: T2 hyperintense nonenhancing cysts. 11 mm calyceal stone in the upper pole of the left kidney. No hydronephrosis. Adrenal glands: Within normal limits. Pancreas: Diffuse parenchymal atrophy with loss of normal intrinsic T1 hyperintense signal. No pancreatic ductal dilation. Multiple T2 hyperintensities throughout the pancreas, the largest which measures 11 mm in the tail of the pancreas (6/5/2015), with apparent communication with the main pancreatic duct. No clear soft tissue component or enhancing mural nodularity. Bowel: The visualized small and large bowel is nondistended. Colonic diverticulosis. Small hiatal hernia. Lymph nodes: No suspicious lymphadenopathy. Blood vessels: The major abdominal arteries and portal vessels are patent. Lung bases: Grossly clear. Bones and soft tissues: Triangular area of enhancement in the right hemisacrum (series 19 image 57), without underlying osseous lesion or correlate on prior CT, likely artifactual.  No aggressive appearing osseous lesion. Mesentery and abdominal wall: Within normal limits. Ascites: None.     IMPRESSION: 1. Examination is limited by artifact. There are 2 focal hepatic lesions which correspond to findings on prior CT, both demonstrating enhancement pattern suggestive of benign hepatic hemangiomas. Follow-up MRI or CT is recommended to ensure stability given the limitations of current examination. 2. Multiple pancreatic cysts without suspicious features, the largest of which measures up to 11 mm in the tail of the pancreas. 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 Consider surveillance with MRI and/or attention on follow-up imaging. I have personally reviewed the examination and initial interpretation and I agree with the findings. FREDERICK FIELD MD   SYSTEM ID:  [id number]     Billing  Total time 50 minutes, to include face to face visit, review of EMR, ordering, documentation and coordination of care on date of service   complexity modifier for longitudinal care.       Signed by: Shireen Villarreal NP

## 2024-11-28 LAB — BACTERIA BLD CULT: NORMAL

## 2024-11-29 ENCOUNTER — HOME INFUSION BILLING (OUTPATIENT)
Dept: HOME HEALTH SERVICES | Facility: HOME HEALTH | Age: 69
End: 2024-11-29
Payer: MEDICARE

## 2024-11-29 PROCEDURE — A4222 INFUSION SUPPLIES WITH PUMP: HCPCS

## 2024-11-29 PROCEDURE — A4221 SUPP NON-INSULIN INF CATH/WK: HCPCS

## 2024-11-30 PROCEDURE — A4222 INFUSION SUPPLIES WITH PUMP: HCPCS

## 2024-12-02 LAB — BACTERIA BLD CULT: NO GROWTH

## 2024-12-03 ENCOUNTER — TELEPHONE (OUTPATIENT)
Dept: FAMILY MEDICINE | Facility: CLINIC | Age: 69
End: 2024-12-03

## 2024-12-03 ENCOUNTER — EPISODE UPDATE (OUTPATIENT)
Dept: HOME HEALTH SERVICES | Facility: HOME HEALTH | Age: 69
End: 2024-12-03
Payer: MEDICARE

## 2024-12-03 ENCOUNTER — INFUSION THERAPY VISIT (OUTPATIENT)
Dept: INFUSION THERAPY | Facility: CLINIC | Age: 69
End: 2024-12-03
Attending: NURSE PRACTITIONER
Payer: MEDICARE

## 2024-12-03 ENCOUNTER — ONCOLOGY VISIT (OUTPATIENT)
Dept: ONCOLOGY | Facility: CLINIC | Age: 69
End: 2024-12-03
Attending: NURSE PRACTITIONER
Payer: MEDICARE

## 2024-12-03 ENCOUNTER — INFUSION THERAPY VISIT (OUTPATIENT)
Dept: INFUSION THERAPY | Facility: CLINIC | Age: 69
End: 2024-12-03
Attending: INTERNAL MEDICINE
Payer: MEDICARE

## 2024-12-03 VITALS
HEIGHT: 72 IN | RESPIRATION RATE: 13 BRPM | BODY MASS INDEX: 34.32 KG/M2 | HEART RATE: 84 BPM | TEMPERATURE: 98.1 F | OXYGEN SATURATION: 96 % | WEIGHT: 253.4 LBS | SYSTOLIC BLOOD PRESSURE: 150 MMHG | DIASTOLIC BLOOD PRESSURE: 80 MMHG

## 2024-12-03 DIAGNOSIS — E86.0 DEHYDRATION: ICD-10-CM

## 2024-12-03 DIAGNOSIS — D50.9 MICROCYTIC ANEMIA: ICD-10-CM

## 2024-12-03 DIAGNOSIS — C19 MALIGNANT NEOPLASM OF RECTOSIGMOID JUNCTION (H): Primary | ICD-10-CM

## 2024-12-03 DIAGNOSIS — C19 MALIGNANT NEOPLASM OF RECTOSIGMOID JUNCTION (H): ICD-10-CM

## 2024-12-03 LAB
ANION GAP SERPL CALCULATED.3IONS-SCNC: 13 MMOL/L (ref 7–15)
BASOPHILS # BLD AUTO: 0 10E3/UL (ref 0–0.2)
BASOPHILS NFR BLD AUTO: 0 %
BUN SERPL-MCNC: 17.7 MG/DL (ref 8–23)
CALCIUM SERPL-MCNC: 9.4 MG/DL (ref 8.8–10.4)
CHLORIDE SERPL-SCNC: 100 MMOL/L (ref 98–107)
CREAT SERPL-MCNC: 0.83 MG/DL (ref 0.67–1.17)
EGFRCR SERPLBLD CKD-EPI 2021: >90 ML/MIN/1.73M2
EOSINOPHIL # BLD AUTO: 0.2 10E3/UL (ref 0–0.7)
EOSINOPHIL NFR BLD AUTO: 5 %
ERYTHROCYTE [DISTWIDTH] IN BLOOD BY AUTOMATED COUNT: 17.7 % (ref 10–15)
GLUCOSE SERPL-MCNC: 197 MG/DL (ref 70–99)
HCO3 SERPL-SCNC: 25 MMOL/L (ref 22–29)
HCT VFR BLD AUTO: 32.1 % (ref 40–53)
HGB BLD-MCNC: 10.1 G/DL (ref 13.3–17.7)
IMM GRANULOCYTES # BLD: 0 10E3/UL
IMM GRANULOCYTES NFR BLD: 0 %
LYMPHOCYTES # BLD AUTO: 1.4 10E3/UL (ref 0.8–5.3)
LYMPHOCYTES NFR BLD AUTO: 32 %
MAGNESIUM SERPL-MCNC: 1.7 MG/DL (ref 1.7–2.3)
MCH RBC QN AUTO: 25 PG (ref 26.5–33)
MCHC RBC AUTO-ENTMCNC: 31.5 G/DL (ref 31.5–36.5)
MCV RBC AUTO: 80 FL (ref 78–100)
MONOCYTES # BLD AUTO: 0.1 10E3/UL (ref 0–1.3)
MONOCYTES NFR BLD AUTO: 3 %
NEUTROPHILS # BLD AUTO: 2.6 10E3/UL (ref 1.6–8.3)
NEUTROPHILS NFR BLD AUTO: 59 %
NRBC # BLD AUTO: 0 10E3/UL
NRBC BLD AUTO-RTO: 0 /100
PLATELET # BLD AUTO: 159 10E3/UL (ref 150–450)
POTASSIUM SERPL-SCNC: 4 MMOL/L (ref 3.4–5.3)
RBC # BLD AUTO: 4.04 10E6/UL (ref 4.4–5.9)
SODIUM SERPL-SCNC: 138 MMOL/L (ref 135–145)
WBC # BLD AUTO: 4.5 10E3/UL (ref 4–11)

## 2024-12-03 PROCEDURE — 85025 COMPLETE CBC W/AUTO DIFF WBC: CPT | Performed by: NURSE PRACTITIONER

## 2024-12-03 PROCEDURE — 250N000011 HC RX IP 250 OP 636: Performed by: NURSE PRACTITIONER

## 2024-12-03 PROCEDURE — G2211 COMPLEX E/M VISIT ADD ON: HCPCS | Performed by: NURSE PRACTITIONER

## 2024-12-03 PROCEDURE — 99215 OFFICE O/P EST HI 40 MIN: CPT | Performed by: NURSE PRACTITIONER

## 2024-12-03 PROCEDURE — 80048 BASIC METABOLIC PNL TOTAL CA: CPT | Performed by: NURSE PRACTITIONER

## 2024-12-03 PROCEDURE — 83735 ASSAY OF MAGNESIUM: CPT | Performed by: NURSE PRACTITIONER

## 2024-12-03 PROCEDURE — 99417 PROLNG OP E/M EACH 15 MIN: CPT | Performed by: NURSE PRACTITIONER

## 2024-12-03 PROCEDURE — 82374 ASSAY BLOOD CARBON DIOXIDE: CPT | Performed by: NURSE PRACTITIONER

## 2024-12-03 PROCEDURE — 36591 DRAW BLOOD OFF VENOUS DEVICE: CPT

## 2024-12-03 PROCEDURE — G0463 HOSPITAL OUTPT CLINIC VISIT: HCPCS | Performed by: NURSE PRACTITIONER

## 2024-12-03 RX ORDER — HEPARIN SODIUM,PORCINE 10 UNIT/ML
5-20 VIAL (ML) INTRAVENOUS DAILY PRN
Status: CANCELLED | OUTPATIENT
Start: 2024-12-03

## 2024-12-03 RX ORDER — HEPARIN SODIUM (PORCINE) LOCK FLUSH IV SOLN 100 UNIT/ML 100 UNIT/ML
5 SOLUTION INTRAVENOUS
Status: CANCELLED | OUTPATIENT
Start: 2024-12-03

## 2024-12-03 RX ORDER — HEPARIN SODIUM,PORCINE 10 UNIT/ML
5-20 VIAL (ML) INTRAVENOUS DAILY PRN
OUTPATIENT
Start: 2024-12-03

## 2024-12-03 RX ORDER — ONDANSETRON 2 MG/ML
8 INJECTION INTRAMUSCULAR; INTRAVENOUS ONCE
Status: CANCELLED | OUTPATIENT
Start: 2024-12-03 | End: 2024-12-03

## 2024-12-03 RX ORDER — ONDANSETRON 2 MG/ML
8 INJECTION INTRAMUSCULAR; INTRAVENOUS ONCE
Status: DISCONTINUED | OUTPATIENT
Start: 2024-12-03 | End: 2024-12-03 | Stop reason: HOSPADM

## 2024-12-03 RX ORDER — HEPARIN SODIUM (PORCINE) LOCK FLUSH IV SOLN 100 UNIT/ML 100 UNIT/ML
5 SOLUTION INTRAVENOUS
Status: DISCONTINUED | OUTPATIENT
Start: 2024-12-03 | End: 2024-12-03 | Stop reason: HOSPADM

## 2024-12-03 RX ADMIN — HEPARIN 5 ML: 100 SYRINGE at 09:50

## 2024-12-03 ASSESSMENT — PAIN SCALES - GENERAL: PAINLEVEL_OUTOF10: NO PAIN (0)

## 2024-12-03 NOTE — PROGRESS NOTES
[facility] called to report pt does not qualify for IVF at home or on pump disconnect days.     Angelica FRANCE

## 2024-12-03 NOTE — PROGRESS NOTES
[facility]ematology [facility] Oncology Outpatient Progress Note    Patient: Celestine Simon  MRN: [id number]  Date of Service: Dec 3, 2024          Reason for Visit    Locally adv rectosigmmoid cancer    Primary Oncologist: Dr. Friedell      Assessment/Plan  Stage IIIB ([id number]) rectosigmoid adenocarcinoma  Undergoing total neoadjuvant treatment  Follow-up severe dehydration (severe hypotension, elevated creatinine) post-chemo  Hyponatremia, hypomagnesemia - resolved  Chemo-induced nausea (gr 2), resolved  Chemo-induced diarrhea, mild (gr 1), resolved  Chemo-induced stomatitis (gr 2), resolved  On neoadjuvant chemo with mFOLFOX.   He is on day 11 of cycle 1.     Last week, after his first cycle on day 4-5, he had nausea, diarrhea, anorexia, severe hypotension, stomatitis and ARF due to dehydration and in setting of antihypertensive use. Required IV with response. Started holding his antihypertensives (doxazosin, hydrochlorothiazide, lisinopril) after this, but recently started reintroducing on his own at half-doses over last few days as his BP has starting rising again.    Since last week, he's been doing much better.   Eating/drinking well, regaining some of the weight loss. No further nausea, diarrhea x 3-4 days. Stomatitis onset late last week, improved with baking soda water rinses and Magic Mouthwash as needed.     Labs: Hgb 10.1 (stable), MCV 80, WBC and plat WNL. BMP: creatinine 0.83 (back to baseline), mag 1.7.    Plan:  -Did not require IVF today  -Return later in week for reassessment and proceed with C2 if doing well. Will plan to continue same doses, but add IVF on day 3 with pump disconnect and ensure pt has a good symptom mgmt plan + BP mgmt plan in place through period of acute post-chemo toxicity (usually this will be days 3-7 at peak).  -Will need aggressive antiemetic schedule - Zofran TID with compazine between Zofran as needed. Imodium as needed for diarrhea. Work on calories and fluid  intake.  -Is scheduled every 2 weeks thereafter through C4  -Typically, proceed with 12-16 weeks (6-8 cycles) then restage. Plan will be to proceed with RT (likely with concurrent chemo) next, followed by restaging to decide on surgery.    Hx HTN  Pre-chemo has been on lisinopril 40 mg, doxazosin 4 mg and hydrochlorothiazide 12.5 mg daily with good control. With post-chemo toxicity leading to dehydration and weight loss, he became severely hypotensive last week and was stabilized with IVF.     He held his BP meds x 4 days, then the last 3 days BP is rising (150-160/80-90s). He resumed lisinopril 20 mg (1/2 dose) and doxazosin 2 mg (1/2 dose).     Plan:  -Continue holding hydrochlorothiazide. Continue 1/2 doses of lisinopril + doxazosine for now.   -Check BP in AM before taking meds and hold BP meds if BP <120/70 for now  -Continue close mgmt and additional parameter/mgmt with PCP through chemo. I've messaged his PCP to follow-up with pt on recommendations.     Microcytic anemia  Chronic GI bleeding (rectal tumor)  He's had rectal bleeding x 4-6 mths related to tumor. No recent bleeding evident.  His hgb has been 10-11 g/dL/stable (10.1 today).   He's getting more microcytic, likely due to iron deficiency.   However, iron/iron sat and ferritin remain preserved.     He was prescribed oral iron but has never started them.    Plan:  -Start oral iron daily   -Monitor and may need IV iron as needed, in addition.     ______________________________________________________________________________    History of Present Illness/ Interval History    Mr. Celestine Simon  is a 69 year old with locally advanced rectosigmoid cancer.   Started neoadjuvant chemo with mFOLFOX 1.5 weeks ago Returns for mid-cycle toxicity eval and follow-up from last week's ED visit.     Starting day 4, he developed nausea with emesis x 1, moderate diarrhea, severe anorexia and fatigue with little/no oral intake x 24 hrs through day 5. Zofran helpful for  the nausea. He continued his antihypertensives and became severely dehyrated and hypotensive (BP as low as 50-60s/30-40s) and required ED eval. Recovered with IVF and dismissed the same day. Since then, he has been doing much better. He did have stomatitis late last week, improved with baking soda salt water rinses and Magic Mouthwash. He's holding his 3 antihypertensives and BP at home has been rising over last 2-3 days; so he reintroduced the lisinopril + doxazosin at 1/2 typical dose (still holding hydrochlorothiazide). No recurrent dizziness. Intermittent blood in stools x months, none recently. No fevers.    He's quite frustrated and concerned with how ill he got last week. He felt ill-prepared for the risk of hypotension and was not monitoring his BP at home on his usual meds. There also was scheduling issues around his home infusion pump disconnect on day 3. He wants to ensure the next cycle goes smoother.       ECOG Performance    0      Oncology History/Treatment  Diagnosis/Stage:   10/2024: IIIB ([id number]-[id number]-sX1) rectosigmoid junction adenocarcinoma  -5/2024: Cologuard screening positive. Noted fatigue x 1 yr and 20 lb weight loss x 6 mths following this. Occasional blood in stool.  -10/10/2024 colonoscopy: rectosigmoid mass. Biopsy + adenocarcinoma, MSI low  -Staging MRI: 5.5 cm rectal carcinoma with invasion into perirectal fat; 4 LN pathologically enlarged. Circumferential mass clear.   -CT cap: possible liver mets, but MRI liver favored benign hemangioma  -Baseline CEA: 5.4    Treatment:  Met with multidisciplinary team: Dr. Tracy (Rad Onc) and Dr. Sheets (CRS). Recommendations for DIPTI approach; with neoadjuvant/induction chemo, followed by concurrent chemoRT; then reassess role for surgery    11/22/2024 - present: neoadjuvant [id number]      Physical Exam    GENERAL: Alert and oriented to time place and person. Well-appearing. Wife, Ina, accompanied.   HEAD: Atraumatic and normocephalic. No  alopecia.  EYES: DURAN, EOMI. No erythema. No icterus.  ORAL CAVITY: Dry.  No mucosal lesion or tonsillar enlargement.  CHEST: regular respiratory effort.  CVS: RRR  ABDOMEN:  Not distended.   EXTREMITIES: Warm. Trace peripheral edema.  SKIN: no rash, or bruising or purpura.   NEURO: No gross deficit noted.      Lab Results    Recent Results (from the past week)   Comprehensive metabolic panel   Result Value Ref Range    Sodium 129 (L) 135 - 145 mmol/L    Potassium 4.2 3.4 - 5.3 mmol/L    Carbon Dioxide (CO2) 23 22 - 29 mmol/L    Anion Gap 12 7 - 15 mmol/L    Urea Nitrogen 33.0 (H) 8.0 - 23.0 mg/dL    Creatinine 1.59 (H) 0.67 - 1.17 mg/dL    GFR Estimate 47 (L) >60 mL/min/1.73m2    Calcium 9.1 8.8 - 10.4 mg/dL    Chloride 94 (L) 98 - 107 mmol/L    Glucose 372 (H) 70 - 99 mg/dL    Alkaline Phosphatase 49 40 - 150 U/L    AST 14 0 - 45 U/L    ALT 15 0 - 70 U/L    Protein Total 6.0 (L) 6.4 - 8.3 g/dL    Albumin 3.7 3.5 - 5.2 g/dL    Bilirubin Total 0.6 <=1.2 mg/dL   CBC with platelets and differential   Result Value Ref Range    WBC Count 4.3 4.0 - 11.0 10e3/uL    RBC Count 4.76 4.40 - 5.90 10e6/uL    Hemoglobin 11.9 (L) 13.3 - 17.7 g/dL    Hematocrit 36.8 (L) 40.0 - 53.0 %    MCV 77 (L) 78 - 100 fL    MCH 25.0 (L) 26.5 - 33.0 pg    MCHC 32.3 31.5 - 36.5 g/dL    RDW 17.8 (H) 10.0 - 15.0 %    Platelet Count 217 150 - 450 10e3/uL    % Neutrophils 81 %    % Lymphocytes 17 %    % Monocytes 1 %    % Eosinophils 1 %    % Basophils 0 %    % Immature Granulocytes 1 %    NRBCs per 100 WBC 0 <1 /100    Absolute Neutrophils 3.5 1.6 - 8.3 10e3/uL    Absolute Lymphocytes 0.7 (L) 0.8 - 5.3 10e3/uL    Absolute Monocytes 0.1 0.0 - 1.3 10e3/uL    Absolute Eosinophils 0.0 0.0 - 0.7 10e3/uL    Absolute Basophils 0.0 0.0 - 0.2 10e3/uL    Absolute Immature Granulocytes 0.0 <=0.4 10e3/uL    Absolute NRBCs 0.0 10e3/uL   Magnesium   Result Value Ref Range    Magnesium 1.4 (L) 1.7 - 2.3 mg/dL   Ferritin   Result Value Ref Range    Ferritin 183  31 - 409 ng/mL   Iron & Iron Binding Capacity   Result Value Ref Range    Iron 113 61 - 157 ug/dL    Iron Binding Capacity 328 240 - 430 ug/dL    Iron Sat Index 34 15 - 46 %   Comprehensive metabolic panel   Result Value Ref Range    Sodium 130 (L) 135 - 145 mmol/L    Potassium 4.1 3.4 - 5.3 mmol/L    Carbon Dioxide (CO2) 23 22 - 29 mmol/L    Anion Gap 12 7 - 15 mmol/L    Urea Nitrogen 36.7 (H) 8.0 - 23.0 mg/dL    Creatinine 1.82 (H) 0.67 - 1.17 mg/dL    GFR Estimate 40 (L) >60 mL/min/1.73m2    Calcium 8.5 (L) 8.8 - 10.4 mg/dL    Chloride 95 (L) 98 - 107 mmol/L    Glucose 375 (H) 70 - 99 mg/dL    Alkaline Phosphatase 43 40 - 150 U/L    AST 10 0 - 45 U/L    ALT 13 0 - 70 U/L    Protein Total 5.6 (L) 6.4 - 8.3 g/dL    Albumin 3.3 (L) 3.5 - 5.2 g/dL    Bilirubin Total 0.5 <=1.2 mg/dL   CBC with platelets and differential   Result Value Ref Range    WBC Count 4.6 4.0 - 11.0 10e3/uL    RBC Count 4.39 (L) 4.40 - 5.90 10e6/uL    Hemoglobin 11.0 (L) 13.3 - 17.7 g/dL    Hematocrit 34.7 (L) 40.0 - 53.0 %    MCV 79 78 - 100 fL    MCH 25.1 (L) 26.5 - 33.0 pg    MCHC 31.7 31.5 - 36.5 g/dL    RDW 17.7 (H) 10.0 - 15.0 %    Platelet Count 208 150 - 450 10e3/uL    % Neutrophils 81 %    % Lymphocytes 16 %    % Monocytes 1 %    % Eosinophils 1 %    % Basophils 0 %    % Immature Granulocytes 1 %    NRBCs per 100 WBC 0 <1 /100    Absolute Neutrophils 3.8 1.6 - 8.3 10e3/uL    Absolute Lymphocytes 0.7 (L) 0.8 - 5.3 10e3/uL    Absolute Monocytes 0.1 0.0 - 1.3 10e3/uL    Absolute Eosinophils 0.0 0.0 - 0.7 10e3/uL    Absolute Basophils 0.0 0.0 - 0.2 10e3/uL    Absolute Immature Granulocytes 0.1 <=0.4 10e3/uL    Absolute NRBCs 0.0 10e3/uL   Extra Blue Top Tube   Result Value Ref Range    Hold Specimen JI    Extra Blood Bank Purple Top Tube   Result Value Ref Range    Hold Specimen JI    Extra Heparinized Syringe   Result Value Ref Range    Hold Specimen JIC    Extra Green Top (Lithium Heparin) ON ICE   Result Value Ref Range    Hold  Specimen [facility]    Comprehensive metabolic panel   Result Value Ref Range    Sodium 129 (L) 135 - 145 mmol/L    Potassium 4.2 3.4 - 5.3 mmol/L    Carbon Dioxide (CO2) 22 22 - 29 mmol/L    Anion Gap 12 7 - 15 mmol/L    Urea Nitrogen 36.4 (H) 8.0 - 23.0 mg/dL    Creatinine 1.79 (H) 0.67 - 1.17 mg/dL    GFR Estimate 41 (L) >60 mL/min/1.73m2    Calcium 8.4 (L) 8.8 - 10.4 mg/dL    Chloride 95 (L) 98 - 107 mmol/L    Glucose 375 (H) 70 - 99 mg/dL    Alkaline Phosphatase 43 40 - 150 U/L    AST 12 0 - 45 U/L    ALT 13 0 - 70 U/L    Protein Total 5.6 (L) 6.4 - 8.3 g/dL    Albumin 3.3 (L) 3.5 - 5.2 g/dL    Bilirubin Total 0.5 <=1.2 mg/dL   Lactic Acid Whole Blood with 1X Repeat in 2 HR when >2   Result Value Ref Range    Lactic Acid, Initial 1.7 0.7 - 2.0 mmol/L   Blood gas venous   Result Value Ref Range    pH Venous 7.37 7.32 - 7.43    pCO2 Venous 42 40 - 50 mm Hg    pO2 Venous 32 25 - 47 mm Hg    Bicarbonate Venous 25 21 - 28 mmol/L    Base Excess/Deficit Venous -0.8 -3.0 - 3.0 mmol/L    FIO2 21     Oxyhemoglobin Venous 52 (L) 70 - 75 %    O2 Sat, Venous 53.5 (L) 70.0 - 75.0 %   Blood Culture Peripheral Blood    Specimen: Peripheral Blood   Result Value Ref Range    Culture No Growth        Imaging    XR Surgery GRACE L/T 5 Min Fluoro w Stills    Result Date: 11/20/2024  This exam was marked as non-reportable because it will not be read by a radiologist or a [facility] non-radiologist provider.     XR Chest Port 1 View    Result Date: 11/20/2024  XR CHEST PORT 1 VIEW 11/20/2024 11:27 AM HISTORY: Assess position of port and evaluate for pneumothorax. COMPARISON: 10/10/2024. FINDINGS/    IMPRESSION: A right chest wall Port-A-Cath is present with the tip in the region of the SVC. There is mild tortuosity of the catheter in the subclavian region. No signs of a postprocedure pneumothorax. Lungs are clear. Heart size and pulmonary vasculature are normal appearing. No acute osseous abnormality. CAPRI REYES MD   SYSTEM ID:   [id number]    US Biopsy Thyroid Fine Needle Aspiration    Result Date: 11/12/2024  ULTRASOUND-GUIDED THYROID FINE NEEDLE ASPIRATION BIOPSY  11/12/2024 9:00 AM HISTORY: Thyroid nodule COMPARISON: Thyroid ultrasound 10/31/2024 NODULE: 2.4 cm right superior thyroid nodule TECHNIQUE:  Informed consent was obtained for the procedure with discussion including possible risks of bleeding, infection, and nondiagnostic result. Using sterile technique, 5 mL 1% lidocaine for local anesthesia, and sonographic guidance, fine-needle aspiration of the thyroid nodule was performed using 25-gauge needles. 6 passes were obtained. Permanent image documentation was performed. The material obtained was submitted on slides for cytologic evaluation. There were no immediate complications of the procedure.     IMPRESSION: Status post ultrasound-guided FNA of right superior 2.4 cm nodule. DANAE CABRAL MD   SYSTEM ID:  [id number]     Billing  Total time 60 minutes, to include face to face visit, review of EMR, ordering, documentation and coordination of care on date of service   complexity modifier for longitudinal care.       Signed by: Shireen Villarreal NP

## 2024-12-03 NOTE — TELEPHONE ENCOUNTER
Patient scheduled for Thursday. Transferred to nurse line per patients request regarding water pill.     SWETA Churchill

## 2024-12-03 NOTE — TELEPHONE ENCOUNTER
Noted. RN spoke with patient. Patient stated his BP was 150/80 at his visit and this was roughly 2 hours after taking the 20mg of lisinopril and 2mg of doxazosin. He has an appointment scheduled for Thursday with Deborah Delgado PA-C but would like to hopefully have his BP down even more when he comes into clinic.  He is wondering if it would be recommended to add in half of the hydrochlorothiazide for the next couple of days until his follow up.     RN will defer this to provider.    He is okay with just receiving a Mychart message from provider instead of a call back.   Jessie Melendez RN on 12/3/2024 at 2:19 PM

## 2024-12-03 NOTE — TELEPHONE ENCOUNTER
Careteam please reach out to Celestine and work him into my schedule for this week - possibly the same day slot tomorrow, or double book something at your discretion.  Video or in person.  (Tele if can't do video is fine.)    Shireen Villarreal NP Gutierrez, Marcella E, HAMIDA  Cc: Jessica Major, RN  Deborah    Celestine recently started chemo for his rectal cancer 1.5 weeks ago. He will get anywhere from 6-12 cycles of this (3-6 mths), in additional to likely radiation +/- surgery.    Cycle 1 was complicated by dehydration and weight loss and by day 5, he was extremely hypotensive (50-60/30-40s) and had ARF. He was stabilized in ED with IVF and had done well since.  He was taking his 3 cardiac meds: Lisinopril 40 mg, Cardura 4 mg and hydrochlorothiazide 12.5 mg during his period of dehydration with contributed to the degree of his symptoms.    He has been holding the 3 meds since last week, but over last few days BP is rising again as he's feeling better. For example, he reported Sat 11/30 it was 164/82 so he took 1/2 lisinopril (20 mg) and held other meds. Then, today, BP was 159/90 so he took 20 mg lisinopril and 1/2 Cardura (2 mg).    Can you follow-up with him this week for advisement on mgmt of his HTN during his chemo treatment. I anticipate he will need less meds/doses with anticipated weight loss and cyclical risk for dehydration every few weeks following his cycles.    Thank you  Shireen Villarreal NP (Onc).

## 2024-12-03 NOTE — PROGRESS NOTES
"Oncology Rooming Note    December 3, 2024 8:43 AM   Celestine Simon is a 69 year old male who presents for:    Chief Complaint   Patient presents with    Oncology Clinic Visit     Initial Vitals: BP (!) 150/80 (BP Location: Right arm, Patient Position: Sitting, Cuff Size: Adult Large)   Pulse 84   Temp 98.1  F (36.7  C) (Tympanic)   Resp 13   Ht 1.816 m (5' 11.5\")   Wt 114.9 kg (253 lb 6.4 oz)   SpO2 96%   BMI 34.85 kg/m   Estimated body mass index is 34.85 kg/m  as calculated from the following:    Height as of this encounter: 1.816 m (5' 11.5\").    Weight as of this encounter: 114.9 kg (253 lb 6.4 oz). Body surface area is 2.41 meters squared.  No Pain (0) Comment: Data Unavailable   No LMP for male patient.  Allergies reviewed: Yes  Medications reviewed: Yes    Medications: Medication refills not needed today.  Pharmacy name entered into [facility]:    [id number] - [place], [place] - [address] [facility] [place], [place] - [address] [facility]  [facility] [place] - [address]  [facility] [place], [place] - [address] [place] [address] PHARMACY [place], [place] - [address] [id number] - [place], [place] - [address]    Frailty Screening:   Is the patient here for a new oncology consult visit in cancer care? 2. No      Clinical concerns: Has some questions today.       Kristi Cr MA              "

## 2024-12-03 NOTE — LETTER
12/3/2024      Celestine Simon  [address]  [facility] [address]      Dear Colleague,    Thank you for referring your patient, Celestine Simon, to the [facility] [place]. Please see a copy of my visit note below.    [facility] Hematology and Oncology Outpatient Progress Note    Patient: Celestine Simon  MRN: [id number]  Date of Service: Dec 3, 2024          Reason for Visit    Locally adv rectosigmmoid cancer    Primary Oncologist: Dr. Friedell      Assessment/Plan  Stage IIIB ([id number]) rectosigmoid adenocarcinoma  Undergoing total neoadjuvant treatment  Follow-up severe dehydration (severe hypotension, elevated creatinine) post-chemo  Hyponatremia, hypomagnesemia - resolved  Chemo-induced nausea (gr 2), resolved  Chemo-induced diarrhea, mild (gr 1), resolved  Chemo-induced stomatitis (gr 2), resolved  On neoadjuvant chemo with mFOLFOX.   He is on day 11 of cycle 1.     Last week, after his first cycle on day 4-5, he had nausea, diarrhea, anorexia, severe hypotension, stomatitis and ARF due to dehydration and in setting of antihypertensive use. Required IV with response. Started holding his antihypertensives (doxazosin, hydrochlorothiazide, lisinopril) after this, but recently started reintroducing on his own at half-doses over last few days as his BP has starting rising again.    Since last week, he's been doing much better.   Eating/drinking well, regaining some of the weight loss. No further nausea, diarrhea x 3-4 days. Stomatitis onset late last week, improved with baking soda water rinses and Magic Mouthwash as needed.     Labs: Hgb 10.1 (stable), MCV 80, WBC and plat WNL. BMP: creatinine 0.83 (back to baseline), mag 1.7.    Plan:  -Did not require IVF today  -Return later in week for reassessment and proceed with C2 if doing well. Will plan to continue same doses, but add IVF on day 3 with pump disconnect and ensure pt has a good symptom mgmt plan + BP mgmt plan in  place through period of acute post-chemo toxicity (usually this will be days 3-7 at peak).  -Will need aggressive antiemetic schedule - Zofran TID with compazine between Zofran as needed. Imodium as needed for diarrhea. Work on calories and fluid intake.  -Is scheduled every 2 weeks thereafter through C4  -Typically, proceed with 12-16 weeks (6-8 cycles) then restage. Plan will be to proceed with RT (likely with concurrent chemo) next, followed by restaging to decide on surgery.    Hx HTN  Pre-chemo has been on lisinopril 40 mg, doxazosin 4 mg and hydrochlorothiazide 12.5 mg daily with good control. With post-chemo toxicity leading to dehydration and weight loss, he became severely hypotensive last week and was stabilized with IVF.     He held his BP meds x 4 days, then the last 3 days BP is rising (150-160/80-90s). He resumed lisinopril 20 mg (1/2 dose) and doxazosin 2 mg (1/2 dose).     Plan:  -Continue holding hydrochlorothiazide. Continue 1/2 doses of lisinopril + doxazosine for now.   -Check BP in AM before taking meds and hold BP meds if BP <120/70 for now  -Continue close mgmt and additional parameter/mgmt with PCP through chemo. I've messaged his PCP to follow-up with pt on recommendations.     Microcytic anemia  Chronic GI bleeding (rectal tumor)  He's had rectal bleeding x 4-6 mths related to tumor. No recent bleeding evident.  His hgb has been 10-11 g/dL/stable (10.1 today).   He's getting more microcytic, likely due to iron deficiency.   However, iron/iron sat and ferritin remain preserved.     He was prescribed oral iron but has never started them.    Plan:  -Start oral iron daily   -Monitor and may need IV iron as needed, in addition.     ______________________________________________________________________________    History of Present Illness/ Interval History    Mr. Celestine Simon  is a 69 year old with locally advanced rectosigmoid cancer.   Started neoadjuvant chemo with mFOLFOX 1.5 weeks ago  Returns for mid-cycle toxicity eval and follow-up from last week's ED visit.     Starting day 4, he developed nausea with emesis x 1, moderate diarrhea, severe anorexia and fatigue with little/no oral intake x 24 hrs through day 5. Zofran helpful for the nausea. He continued his antihypertensives and became severely dehyrated and hypotensive (BP as low as 50-60s/30-40s) and required ED eval. Recovered with IVF and dismissed the same day. Since then, he has been doing much better. He did have stomatitis late last week, improved with baking soda salt water rinses and Magic Mouthwash. He's holding his 3 antihypertensives and BP at home has been rising over last 2-3 days; so he reintroduced the lisinopril + doxazosin at 1/2 typical dose (still holding hydrochlorothiazide). No recurrent dizziness. Intermittent blood in stools x months, none recently. No fevers.    He's quite frustrated and concerned with how ill he got last week. He felt ill-prepared for the risk of hypotension and was not monitoring his BP at home on his usual meds. There also was scheduling issues around his home infusion pump disconnect on day 3. He wants to ensure the next cycle goes smoother.       ECOG Performance    0      Oncology History/Treatment  Diagnosis/Stage:   10/2024: IIIB ([id number]) rectosigmoid junction adenocarcinoma  -5/2024: Cologuard screening positive. Noted fatigue x 1 yr and 20 lb weight loss x 6 mths following this. Occasional blood in stool.  -10/10/2024 colonoscopy: rectosigmoid mass. Biopsy + adenocarcinoma, MSI low  -Staging MRI: 5.5 cm rectal carcinoma with invasion into perirectal fat; 4 LN pathologically enlarged. Circumferential mass clear.   -CT cap: possible liver mets, but MRI liver favored benign hemangioma  -Baseline CEA: 5.4    Treatment:  Met with multidisciplinary team: Dr. Tracy (Rad Onc) and Dr. Sheets (CRS). Recommendations for DIPTI approach; with neoadjuvant/induction chemo, followed by concurrent  chemoRT; then reassess role for surgery    11/22/2024 - present: neoadjuvant [id number]      Physical Exam    GENERAL: Alert and oriented to time place and person. Well-appearing. Wife, Ina, accompanied.   HEAD: Atraumatic and normocephalic. No alopecia.  EYES: DURAN, EOMI. No erythema. No icterus.  ORAL CAVITY: Dry.  No mucosal lesion or tonsillar enlargement.  CHEST: regular respiratory effort.  CVS: RRR  ABDOMEN:  Not distended.   EXTREMITIES: Warm. Trace peripheral edema.  SKIN: no rash, or bruising or purpura.   NEURO: No gross deficit noted.      Lab Results    Recent Results (from the past week)   Comprehensive metabolic panel   Result Value Ref Range    Sodium 129 (L) 135 - 145 mmol/L    Potassium 4.2 3.4 - 5.3 mmol/L    Carbon Dioxide (CO2) 23 22 - 29 mmol/L    Anion Gap 12 7 - 15 mmol/L    Urea Nitrogen 33.0 (H) 8.0 - 23.0 mg/dL    Creatinine 1.59 (H) 0.67 - 1.17 mg/dL    GFR Estimate 47 (L) >60 mL/min/1.73m2    Calcium 9.1 8.8 - 10.4 mg/dL    Chloride 94 (L) 98 - 107 mmol/L    Glucose 372 (H) 70 - 99 mg/dL    Alkaline Phosphatase 49 40 - 150 U/L    AST 14 0 - 45 U/L    ALT 15 0 - 70 U/L    Protein Total 6.0 (L) 6.4 - 8.3 g/dL    Albumin 3.7 3.5 - 5.2 g/dL    Bilirubin Total 0.6 <=1.2 mg/dL   CBC with platelets and differential   Result Value Ref Range    WBC Count 4.3 4.0 - 11.0 10e3/uL    RBC Count 4.76 4.40 - 5.90 10e6/uL    Hemoglobin 11.9 (L) 13.3 - 17.7 g/dL    Hematocrit 36.8 (L) 40.0 - 53.0 %    MCV 77 (L) 78 - 100 fL    MCH 25.0 (L) 26.5 - 33.0 pg    MCHC 32.3 31.5 - 36.5 g/dL    RDW 17.8 (H) 10.0 - 15.0 %    Platelet Count 217 150 - 450 10e3/uL    % Neutrophils 81 %    % Lymphocytes 17 %    % Monocytes 1 %    % Eosinophils 1 %    % Basophils 0 %    % Immature Granulocytes 1 %    NRBCs per 100 WBC 0 <1 /100    Absolute Neutrophils 3.5 1.6 - 8.3 10e3/uL    Absolute Lymphocytes 0.7 (L) 0.8 - 5.3 10e3/uL    Absolute Monocytes 0.1 0.0 - 1.3 10e3/uL    Absolute Eosinophils 0.0 0.0 - 0.7 10e3/uL     Absolute Basophils 0.0 0.0 - 0.2 10e3/uL    Absolute Immature Granulocytes 0.0 <=0.4 10e3/uL    Absolute NRBCs 0.0 10e3/uL   Magnesium   Result Value Ref Range    Magnesium 1.4 (L) 1.7 - 2.3 mg/dL   Ferritin   Result Value Ref Range    Ferritin 183 31 - 409 ng/mL   Iron & Iron Binding Capacity   Result Value Ref Range    Iron 113 61 - 157 ug/dL    Iron Binding Capacity 328 240 - 430 ug/dL    Iron Sat Index 34 15 - 46 %   Comprehensive metabolic panel   Result Value Ref Range    Sodium 130 (L) 135 - 145 mmol/L    Potassium 4.1 3.4 - 5.3 mmol/L    Carbon Dioxide (CO2) 23 22 - 29 mmol/L    Anion Gap 12 7 - 15 mmol/L    Urea Nitrogen 36.7 (H) 8.0 - 23.0 mg/dL    Creatinine 1.82 (H) 0.67 - 1.17 mg/dL    GFR Estimate 40 (L) >60 mL/min/1.73m2    Calcium 8.5 (L) 8.8 - 10.4 mg/dL    Chloride 95 (L) 98 - 107 mmol/L    Glucose 375 (H) 70 - 99 mg/dL    Alkaline Phosphatase 43 40 - 150 U/L    AST 10 0 - 45 U/L    ALT 13 0 - 70 U/L    Protein Total 5.6 (L) 6.4 - 8.3 g/dL    Albumin 3.3 (L) 3.5 - 5.2 g/dL    Bilirubin Total 0.5 <=1.2 mg/dL   CBC with platelets and differential   Result Value Ref Range    WBC Count 4.6 4.0 - 11.0 10e3/uL    RBC Count 4.39 (L) 4.40 - 5.90 10e6/uL    Hemoglobin 11.0 (L) 13.3 - 17.7 g/dL    Hematocrit 34.7 (L) 40.0 - 53.0 %    MCV 79 78 - 100 fL    MCH 25.1 (L) 26.5 - 33.0 pg    MCHC 31.7 31.5 - 36.5 g/dL    RDW 17.7 (H) 10.0 - 15.0 %    Platelet Count 208 150 - 450 10e3/uL    % Neutrophils 81 %    % Lymphocytes 16 %    % Monocytes 1 %    % Eosinophils 1 %    % Basophils 0 %    % Immature Granulocytes 1 %    NRBCs per 100 WBC 0 <1 /100    Absolute Neutrophils 3.8 1.6 - 8.3 10e3/uL    Absolute Lymphocytes 0.7 (L) 0.8 - 5.3 10e3/uL    Absolute Monocytes 0.1 0.0 - 1.3 10e3/uL    Absolute Eosinophils 0.0 0.0 - 0.7 10e3/uL    Absolute Basophils 0.0 0.0 - 0.2 10e3/uL    Absolute Immature Granulocytes 0.1 <=0.4 10e3/uL    Absolute NRBCs 0.0 10e3/uL   Extra Blue Top Tube   Result Value Ref Range    Hold  Specimen [facility]    Extra Blood Bank Purple Top Tube   Result Value Ref Range    Hold Specimen [facility]    Extra Heparinized Syringe   Result Value Ref Range    Hold Specimen [facility]    Extra Green Top (Lithium Heparin) ON ICE   Result Value Ref Range    Hold Specimen [facility]    Comprehensive metabolic panel   Result Value Ref Range    Sodium 129 (L) 135 - 145 mmol/L    Potassium 4.2 3.4 - 5.3 mmol/L    Carbon Dioxide (CO2) 22 22 - 29 mmol/L    Anion Gap 12 7 - 15 mmol/L    Urea Nitrogen 36.4 (H) 8.0 - 23.0 mg/dL    Creatinine 1.79 (H) 0.67 - 1.17 mg/dL    GFR Estimate 41 (L) >60 mL/min/1.73m2    Calcium 8.4 (L) 8.8 - 10.4 mg/dL    Chloride 95 (L) 98 - 107 mmol/L    Glucose 375 (H) 70 - 99 mg/dL    Alkaline Phosphatase 43 40 - 150 U/L    AST 12 0 - 45 U/L    ALT 13 0 - 70 U/L    Protein Total 5.6 (L) 6.4 - 8.3 g/dL    Albumin 3.3 (L) 3.5 - 5.2 g/dL    Bilirubin Total 0.5 <=1.2 mg/dL   Lactic Acid Whole Blood with 1X Repeat in 2 HR when >2   Result Value Ref Range    Lactic Acid, Initial 1.7 0.7 - 2.0 mmol/L   Blood gas venous   Result Value Ref Range    pH Venous 7.37 7.32 - 7.43    pCO2 Venous 42 40 - 50 mm Hg    pO2 Venous 32 25 - 47 mm Hg    Bicarbonate Venous 25 21 - 28 mmol/L    Base Excess/Deficit Venous -0.8 -3.0 - 3.0 mmol/L    FIO2 21     Oxyhemoglobin Venous 52 (L) 70 - 75 %    O2 Sat, Venous 53.5 (L) 70.0 - 75.0 %   Blood Culture Peripheral Blood    Specimen: Peripheral Blood   Result Value Ref Range    Culture No Growth        Imaging    XR Surgery GRACE L/T 5 Min Fluoro w Stills    Result Date: 11/20/2024  This exam was marked as non-reportable because it will not be read by a radiologist or a [place] non-radiologist provider.     XR Chest Port 1 View    Result Date: 11/20/2024  XR CHEST PORT 1 VIEW 11/20/2024 11:27 AM HISTORY: Assess position of port and evaluate for pneumothorax. COMPARISON: 10/10/2024. FINDINGS/    IMPRESSION: A right chest wall Port-A-Cath is present with the tip in the region of the SVC. There is  "mild tortuosity of the catheter in the subclavian region. No signs of a postprocedure pneumothorax. Lungs are clear. Heart size and pulmonary vasculature are normal appearing. No acute osseous abnormality. CAPRI REYES MD   SYSTEM ID:  [id number]    US Biopsy Thyroid Fine Needle Aspiration    Result Date: 11/12/2024  ULTRASOUND-GUIDED THYROID FINE NEEDLE ASPIRATION BIOPSY  11/12/2024 9:00 AM HISTORY: Thyroid nodule COMPARISON: Thyroid ultrasound 10/31/2024 NODULE: 2.4 cm right superior thyroid nodule TECHNIQUE:  Informed consent was obtained for the procedure with discussion including possible risks of bleeding, infection, and nondiagnostic result. Using sterile technique, 5 mL 1% lidocaine for local anesthesia, and sonographic guidance, fine-needle aspiration of the thyroid nodule was performed using 25-gauge needles. 6 passes were obtained. Permanent image documentation was performed. The material obtained was submitted on slides for cytologic evaluation. There were no immediate complications of the procedure.     IMPRESSION: Status post ultrasound-guided FNA of right superior 2.4 cm nodule. DANAE CABRAL MD   SYSTEM ID:  [id number]     Billing  Total time 60 minutes, to include face to face visit, review of EMR, ordering, documentation and coordination of care on date of service   complexity modifier for longitudinal care.       Signed by: Shireen Villarreal NP      Oncology Rooming Note    December 3, 2024 8:43 AM   Celestine Simon is a 69 year old male who presents for:    Chief Complaint   Patient presents with     Oncology Clinic Visit     Initial Vitals: BP (!) 150/80 (BP Location: Right arm, Patient Position: Sitting, Cuff Size: Adult Large)   Pulse 84   Temp 98.1  F (36.7  C) (Tympanic)   Resp 13   Ht 1.816 m (5' 11.5\")   Wt 114.9 kg (253 lb 6.4 oz)   SpO2 96%   BMI 34.85 kg/m   Estimated body mass index is 34.85 kg/m  as calculated from the following:    Height as of this encounter: 1.816 m (5' " "11.5\").    Weight as of this encounter: 114.9 kg (253 lb 6.4 oz). Body surface area is 2.41 meters squared.  No Pain (0) Comment: Data Unavailable   No LMP for male patient.  Allergies reviewed: Yes  Medications reviewed: Yes    Medications: Medication refills not needed today.  Pharmacy name entered into [employer]:    [id number] - [place], [place] - [address] [facility] - [place] - [address] [facility]  [employer] [employer] [facility] - [place] - [address]  [place] [facility], [place] - [address] [place] [address] PHARMACY [place], [place] - [address] [facility] - [place], [place] - [address] [facility]    Frailty Screening:   Is the patient here for a new oncology consult visit in cancer care? 2. No      Clinical concerns: Has some questions today.       Kristi Cr MA                Again, thank you for allowing me to participate in the care of your patient.        Sincerely,        Shireen Villarreal NP  "

## 2024-12-03 NOTE — PROGRESS NOTES
Infusion Nursing Note:  Celestine Simon presents today for possible IVF.    Patient seen by provider today: Yes: AY[place]; therefore, assessment deferred.   present during visit today: Not Applicable.    Note: Patient declined IVF. Per Shireen Villarreal NP okay to hold off on IVF.    Intravenous Access:  Implanted Port.    Treatment Conditions:  Not Applicable.    Post Infusion Assessment:  Blood return noted.  Site patent and intact, free from redness, edema or discomfort.  No evidence of extravasations.  Access discontinued per protocol.     Discharge Plan:   Discharge instructions reviewed with: Patient.  Patient and/or family verbalized understanding of discharge instructions and all questions answered.  AVS to patient via [employer].  Patient will return 12/6/204 for next appointment.   Patient discharged in stable condition accompanied by: self and wife.  Departure Mode: Ambulatory.    Monica Kemp RN

## 2024-12-04 ENCOUNTER — TELEPHONE (OUTPATIENT)
Dept: HOME HEALTH SERVICES | Facility: HOME HEALTH | Age: 69
End: 2024-12-04
Payer: MEDICARE

## 2024-12-05 ENCOUNTER — OFFICE VISIT (OUTPATIENT)
Dept: FAMILY MEDICINE | Facility: CLINIC | Age: 69
End: 2024-12-05
Payer: MEDICARE

## 2024-12-05 ENCOUNTER — TELEPHONE (OUTPATIENT)
Dept: FAMILY MEDICINE | Facility: CLINIC | Age: 69
End: 2024-12-05

## 2024-12-05 VITALS
RESPIRATION RATE: 20 BRPM | DIASTOLIC BLOOD PRESSURE: 80 MMHG | HEIGHT: 72 IN | BODY MASS INDEX: 34.81 KG/M2 | SYSTOLIC BLOOD PRESSURE: 155 MMHG | WEIGHT: 257 LBS | HEART RATE: 52 BPM | TEMPERATURE: 98.1 F | OXYGEN SATURATION: 97 %

## 2024-12-05 DIAGNOSIS — C19 MALIGNANT NEOPLASM OF RECTOSIGMOID JUNCTION (H): Primary | ICD-10-CM

## 2024-12-05 DIAGNOSIS — I10 ESSENTIAL HYPERTENSION: ICD-10-CM

## 2024-12-05 ASSESSMENT — PAIN SCALES - GENERAL: PAINLEVEL_OUTOF10: NO PAIN (0)

## 2024-12-05 NOTE — PROGRESS NOTES
Assessment & Plan     Malignant neoplasm of rectosigmoid junction (H)  Essential hypertension  Patient was diagnosed with malignant neoplasm of rectosigmoid junction in October this year, underwent first chemo on November 22 and hospitalized on November 27 with acute on chronic renal impairment, hypotension, chemotherapy-induced nausea/vomiting.  Labs, imaging results reviewed.  Has been feeling better since hospital discharge, appetite improved and drinking fluids regularly.  Blood pressure above target goal of less than 140/90.  Management options discussed.  Recommended to restart hydrochlorothiazide and continue lisinopril 20 mg for now.  Instructed to hold blood pressure meds if blood pressure below 100 systolic.  Patient should also hold his diabetic meds if feeling sick or having poor appetite due to chemo.  Follow-up in 1 week or earlier if needed.  Patient, spouse understood and in agreement with the plan.  All questions answered.      I spent 30 minutes during this encounter, greater than 50% of the time was spent on education, counseling, reviewing the plan of care, and coordination in regards to her specific conditions.      BMI  Estimated body mass index is 34.86 kg/m  as calculated from the following:    Height as of this encounter: 1.829 m (6').    Weight as of this encounter: 116.6 kg (257 lb).   Weight management plan: Discussed healthy diet and exercise guidelines      Dayo Sprague is a 69 year old, presenting for the following health issues:  Chemotherapy Follow-Up (Low blood pressure with last dose)        12/5/2024     2:46 PM   Additional Questions   Roomed by Pati   Accompanied by wife - Ina     History of Present Illness       Reason for visit:  Blood pressure medication adjustment.   He is taking medications regularly.     Chemo is tomorrow and last time blood pressure was very low last time he had it.       Review of Systems  Constitutional, HEENT, cardiovascular, pulmonary, gi  and gu systems are negative, except as otherwise noted.      Objective    BP (!) 155/80   Pulse 52   Temp 98.1  F (36.7  C) (Tympanic)   Resp 20   Ht 1.829 m (6')   Wt 116.6 kg (257 lb)   SpO2 97%   BMI 34.86 kg/m    Body mass index is 34.86 kg/m .  Wt Readings from Last 10 Encounters:   12/05/24 116.6 kg (257 lb)   12/03/24 114.9 kg (253 lb 6.4 oz)   11/27/24 112.5 kg (248 lb)   11/24/24 117.5 kg (259 lb)   11/22/24 119.7 kg (264 lb)   11/20/24 120.2 kg (265 lb)   11/18/24 120.3 kg (265 lb 4.8 oz)   11/06/24 120.2 kg (265 lb)   11/01/24 117.5 kg (259 lb)   11/01/24 118.2 kg (260 lb 9.6 oz)      Physical Exam   GENERAL: alert and no distress  EYES: Eyes grossly normal to inspection, PERRL and conjunctivae and sclerae normal  HENT: normal cephalic/atraumatic, nose and mouth without ulcers or lesions, oropharynx clear, and oral mucous membranes moist  NECK: no adenopathy, no asymmetry, masses, or scars  RESP: lungs clear to auscultation - no rales, rhonchi or wheezes  CV: regular rates and rhythm, normal S1 S2, no S3 or S4, and no murmur, click or rub  ABDOMEN: soft, nontender  MS: no gross musculoskeletal defects noted, 2+ pedal edema bilaterally  NEURO: Normal strength and tone, mentation intact and speech normal  PSYCH: mentation appears normal, affect normal/bright      Signed Electronically by: Beka Martin MD

## 2024-12-06 PROBLEM — D70.1 CHEMOTHERAPY-INDUCED NEUTROPENIA (H): Status: ACTIVE | Noted: 2024-12-06

## 2024-12-06 PROBLEM — D70.2 DRUG-INDUCED NEUTROPENIA (H): Status: ACTIVE | Noted: 2024-12-06

## 2024-12-06 PROBLEM — T45.1X5A CHEMOTHERAPY-INDUCED NEUTROPENIA (H): Status: ACTIVE | Noted: 2024-12-06

## 2024-12-06 PROBLEM — D70.9 NEUTROPENIA (H): Status: ACTIVE | Noted: 2024-12-06

## 2024-12-06 PROCEDURE — 85004 AUTOMATED DIFF WBC COUNT: CPT | Performed by: INTERNAL MEDICINE

## 2024-12-06 PROCEDURE — 85048 AUTOMATED LEUKOCYTE COUNT: CPT | Performed by: INTERNAL MEDICINE

## 2024-12-06 PROCEDURE — 80053 COMPREHEN METABOLIC PANEL: CPT | Performed by: INTERNAL MEDICINE

## 2024-12-08 DIAGNOSIS — D70.2 DRUG-INDUCED NEUTROPENIA (H): Primary | ICD-10-CM

## 2024-12-08 NOTE — CONFIDENTIAL NOTE
Patient with profound neutropenia following 5-fluorouracil infusion  suspect DPD mutation.  Lab test ordered

## 2024-12-09 ENCOUNTER — LAB (OUTPATIENT)
Dept: LAB | Facility: CLINIC | Age: 69
End: 2024-12-09
Payer: MEDICARE

## 2024-12-09 DIAGNOSIS — D70.2 DRUG-INDUCED NEUTROPENIA (H): Primary | ICD-10-CM

## 2024-12-09 DIAGNOSIS — D70.2 DRUG-INDUCED NEUTROPENIA (H): ICD-10-CM

## 2024-12-09 LAB
ACANTHOCYTES BLD QL SMEAR: SLIGHT
BASOPHILS # BLD AUTO: 0.1 10E3/UL (ref 0–0.2)
BASOPHILS NFR BLD AUTO: 2 %
EOSINOPHIL # BLD AUTO: 0.3 10E3/UL (ref 0–0.7)
EOSINOPHIL NFR BLD AUTO: 8 %
ERYTHROCYTE [DISTWIDTH] IN BLOOD BY AUTOMATED COUNT: 18.1 % (ref 10–15)
HCT VFR BLD AUTO: 36.8 % (ref 40–53)
HGB BLD-MCNC: 11.4 G/DL (ref 13.3–17.7)
IMM GRANULOCYTES # BLD: 0.2 10E3/UL
IMM GRANULOCYTES NFR BLD: 5 %
LYMPHOCYTES # BLD AUTO: 1.6 10E3/UL (ref 0.8–5.3)
LYMPHOCYTES NFR BLD AUTO: 49 %
MCH RBC QN AUTO: 25.3 PG (ref 26.5–33)
MCHC RBC AUTO-ENTMCNC: 31 G/DL (ref 31.5–36.5)
MCV RBC AUTO: 82 FL (ref 78–100)
MONOCYTES # BLD AUTO: 1 10E3/UL (ref 0–1.3)
MONOCYTES NFR BLD AUTO: 28 %
NEUTROPHILS # BLD AUTO: 0.3 10E3/UL (ref 1.6–8.3)
NEUTROPHILS NFR BLD AUTO: 8 %
NRBC # BLD AUTO: 0 10E3/UL
NRBC BLD AUTO-RTO: 0 /100
PLAT MORPH BLD: ABNORMAL
PLATELET # BLD AUTO: 347 10E3/UL (ref 150–450)
RBC # BLD AUTO: 4.51 10E6/UL (ref 4.4–5.9)
RBC MORPH BLD: ABNORMAL
WBC # BLD AUTO: 3.4 10E3/UL (ref 4–11)

## 2024-12-09 PROCEDURE — 85025 COMPLETE CBC W/AUTO DIFF WBC: CPT

## 2024-12-09 PROCEDURE — 36415 COLL VENOUS BLD VENIPUNCTURE: CPT

## 2024-12-13 PROCEDURE — 84450 TRANSFERASE (AST) (SGOT): CPT | Performed by: INTERNAL MEDICINE

## 2024-12-13 PROCEDURE — 82247 BILIRUBIN TOTAL: CPT | Performed by: INTERNAL MEDICINE

## 2024-12-13 PROCEDURE — 85007 BL SMEAR W/DIFF WBC COUNT: CPT | Performed by: INTERNAL MEDICINE

## 2024-12-13 PROCEDURE — 82947 ASSAY GLUCOSE BLOOD QUANT: CPT | Performed by: INTERNAL MEDICINE

## 2024-12-13 PROCEDURE — 85027 COMPLETE CBC AUTOMATED: CPT | Performed by: INTERNAL MEDICINE

## 2024-12-19 ENCOUNTER — HOME INFUSION BILLING (OUTPATIENT)
Dept: HOME HEALTH SERVICES | Facility: HOME HEALTH | Age: 69
End: 2024-12-19
Payer: MEDICARE

## 2024-12-19 LAB — MAYO MISC RESULT: ABNORMAL

## 2024-12-19 PROCEDURE — E1399 DURABLE MEDICAL EQUIPMENT MI: HCPCS

## 2024-12-19 RX ORDER — MEPERIDINE HYDROCHLORIDE 25 MG/ML
25 INJECTION INTRAMUSCULAR; INTRAVENOUS; SUBCUTANEOUS
OUTPATIENT
Start: 2024-12-20

## 2024-12-19 RX ORDER — EPINEPHRINE 1 MG/ML
0.3 INJECTION, SOLUTION, CONCENTRATE INTRAVENOUS EVERY 5 MIN PRN
OUTPATIENT
Start: 2024-12-20

## 2024-12-19 RX ORDER — ALBUTEROL SULFATE 0.83 MG/ML
2.5 SOLUTION RESPIRATORY (INHALATION)
OUTPATIENT
Start: 2024-12-20

## 2024-12-19 RX ORDER — HEPARIN SODIUM,PORCINE 10 UNIT/ML
5-20 VIAL (ML) INTRAVENOUS DAILY PRN
OUTPATIENT
Start: 2024-12-20

## 2024-12-19 RX ORDER — METHYLPREDNISOLONE SODIUM SUCCINATE 40 MG/ML
40 INJECTION INTRAMUSCULAR; INTRAVENOUS
Start: 2024-12-20

## 2024-12-19 RX ORDER — DIPHENHYDRAMINE HYDROCHLORIDE 50 MG/ML
50 INJECTION INTRAMUSCULAR; INTRAVENOUS
Start: 2024-12-20

## 2024-12-19 RX ORDER — ONDANSETRON 2 MG/ML
8 INJECTION INTRAMUSCULAR; INTRAVENOUS ONCE
OUTPATIENT
Start: 2024-12-20

## 2024-12-19 RX ORDER — HEPARIN SODIUM (PORCINE) LOCK FLUSH IV SOLN 100 UNIT/ML 100 UNIT/ML
5 SOLUTION INTRAVENOUS
OUTPATIENT
Start: 2024-12-20

## 2024-12-19 RX ORDER — LORAZEPAM 2 MG/ML
0.5 INJECTION INTRAMUSCULAR EVERY 4 HOURS PRN
OUTPATIENT
Start: 2024-12-20

## 2024-12-19 RX ORDER — DIPHENHYDRAMINE HYDROCHLORIDE 50 MG/ML
25 INJECTION INTRAMUSCULAR; INTRAVENOUS
Start: 2024-12-20

## 2024-12-19 RX ORDER — ALBUTEROL SULFATE 90 UG/1
1-2 INHALANT RESPIRATORY (INHALATION)
Start: 2024-12-20

## 2024-12-20 PROCEDURE — 80053 COMPREHEN METABOLIC PANEL: CPT | Performed by: INTERNAL MEDICINE

## 2024-12-20 PROCEDURE — 85048 AUTOMATED LEUKOCYTE COUNT: CPT | Performed by: INTERNAL MEDICINE

## 2024-12-20 PROCEDURE — 82040 ASSAY OF SERUM ALBUMIN: CPT | Performed by: INTERNAL MEDICINE

## 2024-12-20 PROCEDURE — 85004 AUTOMATED DIFF WBC COUNT: CPT | Performed by: INTERNAL MEDICINE

## 2024-12-21 PROCEDURE — A4221 SUPP NON-INSULIN INF CATH/WK: HCPCS

## 2024-12-21 PROCEDURE — A4222 INFUSION SUPPLIES WITH PUMP: HCPCS

## 2024-12-22 ENCOUNTER — HOME CARE VISIT (OUTPATIENT)
Dept: HOME HEALTH SERVICES | Facility: HOME HEALTH | Age: 69
End: 2024-12-22
Payer: MEDICARE

## 2024-12-22 VITALS
RESPIRATION RATE: 16 BRPM | HEART RATE: 81 BPM | OXYGEN SATURATION: 92 % | TEMPERATURE: 98.2 F | SYSTOLIC BLOOD PRESSURE: 128 MMHG | DIASTOLIC BLOOD PRESSURE: 62 MMHG

## 2024-12-22 PROCEDURE — A4222 INFUSION SUPPLIES WITH PUMP: HCPCS

## 2024-12-22 PROCEDURE — 99601 HOME NFS VISIT <2 HRS: CPT

## 2024-12-22 NOTE — PROGRESS NOTES
Nursing Visit Note:  Nurse visit today for chemo dc, port flush and deaccess per hospitals policy and procedure. on-time delivery called for chemo bin and CADD pump . Patient denies any questions or concerns.  for Celestine Simon.     present during visit today: Not Applicable.    RN revisit for chemo dc, port flush and deaccess per chemo cycle       Elena Le RN 12/22/2024

## 2024-12-23 ENCOUNTER — RESULT FOLLOW UP (OUTPATIENT)
Dept: ONCOLOGY | Facility: CLINIC | Age: 69
End: 2024-12-23
Payer: MEDICARE

## 2024-12-23 ENCOUNTER — INFUSION THERAPY VISIT (OUTPATIENT)
Dept: INFUSION THERAPY | Facility: CLINIC | Age: 69
End: 2024-12-23
Attending: NURSE PRACTITIONER
Payer: MEDICARE

## 2024-12-23 VITALS — DIASTOLIC BLOOD PRESSURE: 68 MMHG | TEMPERATURE: 98 F | SYSTOLIC BLOOD PRESSURE: 107 MMHG | HEART RATE: 99 BPM

## 2024-12-23 DIAGNOSIS — E86.0 DEHYDRATION: ICD-10-CM

## 2024-12-23 DIAGNOSIS — C19 MALIGNANT NEOPLASM OF RECTOSIGMOID JUNCTION (H): Primary | ICD-10-CM

## 2024-12-23 PROCEDURE — 258N000003 HC RX IP 258 OP 636: Performed by: NURSE PRACTITIONER

## 2024-12-23 PROCEDURE — 96360 HYDRATION IV INFUSION INIT: CPT

## 2024-12-23 PROCEDURE — 250N000011 HC RX IP 250 OP 636: Performed by: NURSE PRACTITIONER

## 2024-12-23 RX ORDER — HEPARIN SODIUM (PORCINE) LOCK FLUSH IV SOLN 100 UNIT/ML 100 UNIT/ML
5 SOLUTION INTRAVENOUS
Status: CANCELLED | OUTPATIENT
Start: 2024-12-23

## 2024-12-23 RX ORDER — HEPARIN SODIUM,PORCINE 10 UNIT/ML
5-20 VIAL (ML) INTRAVENOUS DAILY PRN
Status: CANCELLED | OUTPATIENT
Start: 2024-12-23

## 2024-12-23 RX ORDER — HEPARIN SODIUM (PORCINE) LOCK FLUSH IV SOLN 100 UNIT/ML 100 UNIT/ML
500 SOLUTION INTRAVENOUS ONCE
Status: COMPLETED | OUTPATIENT
Start: 2024-12-23 | End: 2024-12-23

## 2024-12-23 RX ORDER — HEPARIN SODIUM,PORCINE 10 UNIT/ML
5-20 VIAL (ML) INTRAVENOUS DAILY PRN
OUTPATIENT
Start: 2024-12-23

## 2024-12-23 RX ADMIN — HEPARIN 500 UNITS: 100 SYRINGE at 14:05

## 2024-12-23 RX ADMIN — SODIUM CHLORIDE 1000 ML: 9 INJECTION, SOLUTION INTRAVENOUS at 13:05

## 2024-12-23 NOTE — PROGRESS NOTES
DPD deficiency intermediate; autosomal recessive.     Will continue dose-modification of 5FU moving forward with omission of bolus and 50% reduction on infusional 5FU.     Will offer Genetics consult if patient interested.

## 2024-12-27 ENCOUNTER — HOME INFUSION BILLING (OUTPATIENT)
Dept: HOME HEALTH SERVICES | Facility: HOME HEALTH | Age: 69
End: 2024-12-27
Payer: MEDICARE

## 2024-12-27 PROCEDURE — A9270 NON-COVERED ITEM OR SERVICE: HCPCS

## 2024-12-27 PROCEDURE — A4221 SUPP NON-INSULIN INF CATH/WK: HCPCS

## 2024-12-27 PROCEDURE — 85004 AUTOMATED DIFF WBC COUNT: CPT | Performed by: INTERNAL MEDICINE

## 2024-12-27 PROCEDURE — 85048 AUTOMATED LEUKOCYTE COUNT: CPT | Performed by: INTERNAL MEDICINE

## 2024-12-27 PROCEDURE — E1399 DURABLE MEDICAL EQUIPMENT MI: HCPCS

## 2024-12-27 PROCEDURE — A4222 INFUSION SUPPLIES WITH PUMP: HCPCS

## 2025-01-02 ENCOUNTER — INFUSION THERAPY VISIT (OUTPATIENT)
Dept: INFUSION THERAPY | Facility: CLINIC | Age: 70
End: 2025-01-02
Attending: INTERNAL MEDICINE
Payer: MEDICARE

## 2025-01-02 DIAGNOSIS — D70.1 CHEMOTHERAPY-INDUCED NEUTROPENIA (H): ICD-10-CM

## 2025-01-02 DIAGNOSIS — T45.1X5A CHEMOTHERAPY-INDUCED NEUTROPENIA (H): ICD-10-CM

## 2025-01-02 DIAGNOSIS — E86.0 DEHYDRATION: ICD-10-CM

## 2025-01-02 DIAGNOSIS — C19 MALIGNANT NEOPLASM OF RECTOSIGMOID JUNCTION (H): Primary | ICD-10-CM

## 2025-01-02 LAB
ACANTHOCYTES BLD QL SMEAR: ABNORMAL
ALBUMIN SERPL BCG-MCNC: 3.7 G/DL (ref 3.5–5.2)
ALP SERPL-CCNC: 87 U/L (ref 40–150)
ALT SERPL W P-5'-P-CCNC: 25 U/L (ref 0–70)
ANION GAP SERPL CALCULATED.3IONS-SCNC: 13 MMOL/L (ref 7–15)
AST SERPL W P-5'-P-CCNC: 23 U/L (ref 0–45)
BASOPHILS # BLD MANUAL: 0.2 10E3/UL (ref 0–0.2)
BASOPHILS NFR BLD MANUAL: 2 %
BILIRUB SERPL-MCNC: 0.2 MG/DL
BUN SERPL-MCNC: 13.2 MG/DL (ref 8–23)
CALCIUM SERPL-MCNC: 9.7 MG/DL (ref 8.8–10.4)
CHLORIDE SERPL-SCNC: 98 MMOL/L (ref 98–107)
CREAT SERPL-MCNC: 0.89 MG/DL (ref 0.67–1.17)
EGFRCR SERPLBLD CKD-EPI 2021: >90 ML/MIN/1.73M2
ELLIPTOCYTES BLD QL SMEAR: SLIGHT
EOSINOPHIL # BLD MANUAL: 0.4 10E3/UL (ref 0–0.7)
EOSINOPHIL NFR BLD MANUAL: 4 %
ERYTHROCYTE [DISTWIDTH] IN BLOOD BY AUTOMATED COUNT: 19.5 % (ref 10–15)
GLUCOSE SERPL-MCNC: 172 MG/DL (ref 70–99)
HCO3 SERPL-SCNC: 22 MMOL/L (ref 22–29)
HCT VFR BLD AUTO: 36.3 % (ref 40–53)
HGB BLD-MCNC: 11.5 G/DL (ref 13.3–17.7)
LYMPHOCYTES # BLD MANUAL: 3.6 10E3/UL (ref 0.8–5.3)
LYMPHOCYTES NFR BLD MANUAL: 32 %
MCH RBC QN AUTO: 25.5 PG (ref 26.5–33)
MCHC RBC AUTO-ENTMCNC: 31.7 G/DL (ref 31.5–36.5)
MCV RBC AUTO: 81 FL (ref 78–100)
METAMYELOCYTES # BLD MANUAL: 0.3 10E3/UL
METAMYELOCYTES NFR BLD MANUAL: 3 %
MONOCYTES # BLD MANUAL: 1.3 10E3/UL (ref 0–1.3)
MONOCYTES NFR BLD MANUAL: 12 %
MYELOCYTES # BLD MANUAL: 0.6 10E3/UL
MYELOCYTES NFR BLD MANUAL: 5 %
NEUTROPHILS # BLD MANUAL: 4.7 10E3/UL (ref 1.6–8.3)
NEUTROPHILS NFR BLD MANUAL: 42 %
PLAT MORPH BLD: ABNORMAL
PLATELET # BLD AUTO: 277 10E3/UL (ref 150–450)
POLYCHROMASIA BLD QL SMEAR: SLIGHT
POTASSIUM SERPL-SCNC: 4.3 MMOL/L (ref 3.4–5.3)
PROT SERPL-MCNC: 6.1 G/DL (ref 6.4–8.3)
RBC # BLD AUTO: 4.51 10E6/UL (ref 4.4–5.9)
RBC MORPH BLD: ABNORMAL
SODIUM SERPL-SCNC: 133 MMOL/L (ref 135–145)
WBC # BLD AUTO: 11.1 10E3/UL (ref 4–11)

## 2025-01-02 PROCEDURE — 84155 ASSAY OF PROTEIN SERUM: CPT | Performed by: INTERNAL MEDICINE

## 2025-01-02 PROCEDURE — 36415 COLL VENOUS BLD VENIPUNCTURE: CPT | Performed by: INTERNAL MEDICINE

## 2025-01-02 PROCEDURE — 85007 BL SMEAR W/DIFF WBC COUNT: CPT | Performed by: INTERNAL MEDICINE

## 2025-01-02 PROCEDURE — 85027 COMPLETE CBC AUTOMATED: CPT | Performed by: INTERNAL MEDICINE

## 2025-01-02 PROCEDURE — 84132 ASSAY OF SERUM POTASSIUM: CPT | Performed by: INTERNAL MEDICINE

## 2025-01-02 PROCEDURE — 250N000011 HC RX IP 250 OP 636: Performed by: INTERNAL MEDICINE

## 2025-01-02 RX ORDER — HEPARIN SODIUM (PORCINE) LOCK FLUSH IV SOLN 100 UNIT/ML 100 UNIT/ML
5 SOLUTION INTRAVENOUS
Status: DISCONTINUED | OUTPATIENT
Start: 2025-01-02 | End: 2025-01-02 | Stop reason: HOSPADM

## 2025-01-02 RX ORDER — HEPARIN SODIUM (PORCINE) LOCK FLUSH IV SOLN 100 UNIT/ML 100 UNIT/ML
5 SOLUTION INTRAVENOUS
OUTPATIENT
Start: 2025-01-02

## 2025-01-02 RX ADMIN — ALTEPLASE 2 MG: 2.2 INJECTION, POWDER, LYOPHILIZED, FOR SOLUTION INTRAVENOUS at 08:14

## 2025-01-02 RX ADMIN — Medication 5 ML: at 09:01

## 2025-01-02 NOTE — PROGRESS NOTES
PAC accessed per protocol, no blood return on first attempt. TPA instilled per protocol. Dwell time was 40 minutes. Good blood return noted after that. Labs drawn as ordered for tx tomorrow. Kat Glynn RN

## 2025-01-03 PROBLEM — K52.1 CHEMOTHERAPY INDUCED DIARRHEA: Status: ACTIVE | Noted: 2025-01-03

## 2025-01-03 PROBLEM — T45.1X5A CHEMOTHERAPY INDUCED DIARRHEA: Status: ACTIVE | Noted: 2025-01-03

## 2025-01-03 PROBLEM — E88.89: Status: ACTIVE | Noted: 2025-01-03

## 2025-01-03 PROCEDURE — E0781 EXTERNAL AMBULATORY INFUS PU: HCPCS | Mod: RR

## 2025-01-04 ENCOUNTER — MYC REFILL (OUTPATIENT)
Dept: FAMILY MEDICINE | Facility: CLINIC | Age: 70
End: 2025-01-04
Payer: MEDICARE

## 2025-01-04 DIAGNOSIS — R80.9 TYPE 2 DIABETES MELLITUS WITH MICROALBUMINURIA, WITHOUT LONG-TERM CURRENT USE OF INSULIN (H): ICD-10-CM

## 2025-01-04 DIAGNOSIS — E11.29 TYPE 2 DIABETES MELLITUS WITH MICROALBUMINURIA, WITHOUT LONG-TERM CURRENT USE OF INSULIN (H): ICD-10-CM

## 2025-01-05 ENCOUNTER — HOME CARE VISIT (OUTPATIENT)
Dept: HOME HEALTH SERVICES | Facility: HOME HEALTH | Age: 70
End: 2025-01-05
Payer: MEDICARE

## 2025-01-05 VITALS
RESPIRATION RATE: 18 BRPM | TEMPERATURE: 97.5 F | SYSTOLIC BLOOD PRESSURE: 136 MMHG | OXYGEN SATURATION: 93 % | HEART RATE: 83 BPM | DIASTOLIC BLOOD PRESSURE: 80 MMHG

## 2025-01-05 PROCEDURE — G0070 ADM OF CHEMO DRUG IN HOME: HCPCS

## 2025-01-05 NOTE — PROGRESS NOTES
Nursing Visit Note:  Nurse visit today for chemo dc, port flush and deaccess for Celestine Simon.     present during visit today: Not Applicable.    RN revisit for chemo dc, port flush and deaccess, patient tolerated well. Patient reported feeling less fatigued this time but still having cold sensitivity in his hands and fingers. Ontime delivery called for chemo bin and CADD pump , Jamee with ontime reported she would schedule a  to pick it up today. Patient denies any questions or concerns.       Elena Le, EDILMA 1/5/2025

## 2025-01-06 ENCOUNTER — TELEPHONE (OUTPATIENT)
Dept: FAMILY MEDICINE | Facility: CLINIC | Age: 70
End: 2025-01-06

## 2025-01-06 DIAGNOSIS — E88.89: Primary | ICD-10-CM

## 2025-01-06 RX ORDER — ORAL SEMAGLUTIDE 14 MG/1
7-14 TABLET ORAL DAILY
Qty: 90 TABLET | Refills: 1 | Status: SHIPPED | OUTPATIENT
Start: 2025-01-06

## 2025-01-06 NOTE — TELEPHONE ENCOUNTER
Prior Authorization Retail Medication Request    Medication/Dose: Rybelsus 14mg tablets  Diagnosis and ICD code (if different than what is on RX):    New/renewal/insurance change PA/secondary ins. PA:  Previously Tried and Failed:    Rationale:      Covermymeds  Key: [id number]    Pharmacy Information (if different than what is on RX)  Name:    Phone:    Fax:    Clinic Information  Preferred routing pool for dept communication: [id number]

## 2025-01-07 ENCOUNTER — TRANSCRIBE ORDERS (OUTPATIENT)
Dept: ONCOLOGY | Facility: CLINIC | Age: 70
End: 2025-01-07
Payer: MEDICARE

## 2025-01-07 ENCOUNTER — PATIENT OUTREACH (OUTPATIENT)
Dept: ONCOLOGY | Facility: CLINIC | Age: 70
End: 2025-01-07
Payer: MEDICARE

## 2025-01-07 DIAGNOSIS — E88.89: Primary | ICD-10-CM

## 2025-01-07 NOTE — TELEPHONE ENCOUNTER
Central Prior Authorization Team   Phone: [phone]    PA Initiation    Medication: Rybelsus 14mg tablets  Insurance Company: [employer] - Phone [phone] Fax [phone]  Pharmacy Filling the Rx: [facility] [address] [place] [address] - [address]  Filling Pharmacy Phone: [phone]  Filling Pharmacy Fax:    Start Date: 1/7/2025

## 2025-01-07 NOTE — PROGRESS NOTES
Writer received referral to Cancer Risk Management/Genetic Counseling.    Referred for:    Dihydropyrimidine dehydrogenase deficiency (H)       Referred By    Provider Department Location Phone   Shireen Villarreal NP [facility] [phone]      Per referring note of Shireen Villarreal NP:    -While he can meet with Genetics regarding the DPD deficiency (autosomal recessive), the main implication is regarding 5FU-based chemo dosing which we have addressed. I am not sure there is other drug implications, but he would like to explore that with Genetics. The implications for his children would likely only be if they ever required 5FU chemo to ensure they were tested up front.   -Typically, proceed with 12-16 weeks (6-8 cycles) then restage - Dr. Friedell will need to clarify number of cycles planned. Plan will be to proceed with RT (likely with concurrent chemo) next, followed by restaging to decide on surgery.       I reviewed this referral with our cancer genetics team as well as Mel Juarez GC.  Recommendation at this time is for patient to meet with Medication Therapy Management for an updated appointment.  If after that visit he has remaining questions from the genetics side of things general genetics could see him for a GC only visit.  Referral re-transcribed for MTM.  Patient previously saw Jade Jasmine, EmilyD, BCACP.    Lorrie Perez, RN, BSN  Oncology New Patient Nurse Navigator   [facility]

## 2025-01-08 ENCOUNTER — TELEPHONE (OUTPATIENT)
Dept: FAMILY MEDICINE | Facility: CLINIC | Age: 70
End: 2025-01-08
Payer: MEDICARE

## 2025-01-08 ENCOUNTER — HOME INFUSION (OUTPATIENT)
Dept: HOME HEALTH SERVICES | Facility: HOME HEALTH | Age: 70
End: 2025-01-08
Payer: MEDICARE

## 2025-01-08 DIAGNOSIS — C19 MALIGNANT NEOPLASM OF RECTOSIGMOID JUNCTION (H): ICD-10-CM

## 2025-01-08 NOTE — TELEPHONE ENCOUNTER
MTM referral from: [facility] visit (referral by provider)    MTM referral outreach attempt #1 on January 8, 2025 at 1:40 PM      Outcome: Spoke with patient doesn't agree that a pharmacist will solve the issue. Decline MTM    Use vbc for the carrier/Plan on the flowsheet          JANICE Ralph   [phone]

## 2025-01-09 NOTE — TELEPHONE ENCOUNTER
Prior Authorization Approval        Authorization Effective Date: 1/1/2025  Authorization Expiration Date: 1/7/2026  Medication: Rybelsus 14mg tablets-PA APPROVED   Approved Dose/Quantity:   Reference #:     Insurance Company: [employer] - Phone [phone] Fax [phone]  Expected CoPay:       CoPay Card Available:      Foundation Assistance Needed:    Which Pharmacy is filling the prescription (Not needed for infusion/clinic administered): [facility] - [place], [place] - [address]  Pharmacy Notified:  Yes- **Instructed pharmacy to notify patient when script is ready to /ship.**  Patient Notified:  Yes

## 2025-01-10 ENCOUNTER — HOME INFUSION BILLING (OUTPATIENT)
Dept: HOME HEALTH SERVICES | Facility: HOME HEALTH | Age: 70
End: 2025-01-10
Payer: MEDICARE

## 2025-01-10 PROCEDURE — A4222 INFUSION SUPPLIES WITH PUMP: HCPCS

## 2025-01-10 PROCEDURE — A4221 SUPP NON-INSULIN INF CATH/WK: HCPCS

## 2025-01-10 PROCEDURE — E1399 DURABLE MEDICAL EQUIPMENT MI: HCPCS

## 2025-01-10 PROCEDURE — A9270 NON-COVERED ITEM OR SERVICE: HCPCS

## 2025-01-11 PROCEDURE — A4222 INFUSION SUPPLIES WITH PUMP: HCPCS

## 2025-01-12 PROCEDURE — A4222 INFUSION SUPPLIES WITH PUMP: HCPCS

## 2025-01-16 ENCOUNTER — VIRTUAL VISIT (OUTPATIENT)
Dept: ONCOLOGY | Facility: CLINIC | Age: 70
End: 2025-01-16
Attending: NURSE PRACTITIONER

## 2025-01-16 VITALS
DIASTOLIC BLOOD PRESSURE: 80 MMHG | WEIGHT: 180 LBS | BODY MASS INDEX: 24.38 KG/M2 | HEIGHT: 72 IN | SYSTOLIC BLOOD PRESSURE: 125 MMHG

## 2025-01-16 DIAGNOSIS — C19 MALIGNANT NEOPLASM OF RECTOSIGMOID JUNCTION (H): Primary | ICD-10-CM

## 2025-01-16 DIAGNOSIS — C19 MALIGNANT NEOPLASM OF RECTOSIGMOID JUNCTION (H): ICD-10-CM

## 2025-01-16 RX ORDER — DEXAMETHASONE 4 MG/1
8 TABLET ORAL DAILY
Qty: 4 TABLET | Refills: 2 | Status: SHIPPED | OUTPATIENT
Start: 2025-01-16

## 2025-01-16 RX ORDER — METHYLPREDNISOLONE SODIUM SUCCINATE 40 MG/ML
40 INJECTION INTRAMUSCULAR; INTRAVENOUS
Start: 2025-01-17

## 2025-01-16 RX ORDER — EPINEPHRINE 1 MG/ML
0.3 INJECTION, SOLUTION, CONCENTRATE INTRAVENOUS EVERY 5 MIN PRN
OUTPATIENT
Start: 2025-01-17

## 2025-01-16 RX ORDER — HEPARIN SODIUM (PORCINE) LOCK FLUSH IV SOLN 100 UNIT/ML 100 UNIT/ML
5 SOLUTION INTRAVENOUS
OUTPATIENT
Start: 2025-01-17

## 2025-01-16 RX ORDER — DIPHENHYDRAMINE HYDROCHLORIDE 50 MG/ML
25 INJECTION INTRAMUSCULAR; INTRAVENOUS
Start: 2025-01-17

## 2025-01-16 RX ORDER — ALBUTEROL SULFATE 90 UG/1
1-2 INHALANT RESPIRATORY (INHALATION)
Start: 2025-01-17

## 2025-01-16 RX ORDER — ALBUTEROL SULFATE 0.83 MG/ML
2.5 SOLUTION RESPIRATORY (INHALATION)
OUTPATIENT
Start: 2025-01-17

## 2025-01-16 RX ORDER — DIPHENHYDRAMINE HYDROCHLORIDE 50 MG/ML
50 INJECTION INTRAMUSCULAR; INTRAVENOUS
Start: 2025-01-17

## 2025-01-16 RX ORDER — MEPERIDINE HYDROCHLORIDE 25 MG/ML
25 INJECTION INTRAMUSCULAR; INTRAVENOUS; SUBCUTANEOUS
OUTPATIENT
Start: 2025-01-17

## 2025-01-16 RX ORDER — HEPARIN SODIUM,PORCINE 10 UNIT/ML
5-20 VIAL (ML) INTRAVENOUS DAILY PRN
OUTPATIENT
Start: 2025-01-19

## 2025-01-16 RX ORDER — LORAZEPAM 2 MG/ML
0.5 INJECTION INTRAMUSCULAR EVERY 4 HOURS PRN
OUTPATIENT
Start: 2025-01-17

## 2025-01-16 RX ORDER — ONDANSETRON 2 MG/ML
8 INJECTION INTRAMUSCULAR; INTRAVENOUS ONCE
OUTPATIENT
Start: 2025-01-17

## 2025-01-16 RX ORDER — HEPARIN SODIUM,PORCINE 10 UNIT/ML
5-20 VIAL (ML) INTRAVENOUS DAILY PRN
OUTPATIENT
Start: 2025-01-17

## 2025-01-16 RX ORDER — HEPARIN SODIUM (PORCINE) LOCK FLUSH IV SOLN 100 UNIT/ML 100 UNIT/ML
5 SOLUTION INTRAVENOUS
OUTPATIENT
Start: 2025-01-19

## 2025-01-16 NOTE — LETTER
1/16/2025      Celestine Simon  [address]  [place] [facility] [address]      Dear Colleague,    Thank you for referring your patient, Celestine Simon, to the [facility] CANCER [facility]. Please see a copy of my visit note below.    Virtual Visit Details    Type of service:  Video Visit   Video Start Time:  1526  Video End Time: 1543    Originating Location (pt. Location): Home    Distant Location (provider location):  On-site  Platform used for Video Visit: [facility] Hematology and Oncology Outpatient Progress Note    Patient: Celestine Simon  MRN: [id number]  Date of Service: Jan 16, 2025          Reason for Visit    Locally adv rectosigmmoid cancer    Primary Oncologist: Dr. Friedell    Virtual visit      Assessment/Plan  Stage IIIB (cQ1-uX5c-uB4) rectosigmoid adenocarcinoma  Undergoing total neoadjuvant treatment  DPD deficiency, intermediate  Chemo-induced nausea (gr 1)  Chemo-induced diarrhea, mild (gr 1)  Dehydration, hypotension, ARF; resolved/not recurrent  Delayed chemo-induced severe neutropenia, resolved  On neoadjuvant chemo with mFOLFOX. With his first cycle, he had severe toxicity (diarrhea, n/v, dehydration with severe hypotension + ARF, stomatitis, rash and delayed severe neutropenia). Therefore, DPD testing was completed and confirmed intermediate deficiency requiring 5FU dose modifications. Cycle 2 was delayed x 2 weeks, then dose-modified with omission of 5FU bolus + 50% reduction in the infusion (no dose-adjustment in the oxaliplatin). He got Neulasta on-pro support with cycle 2, but had significant arthralgias on this, otherwise tolerated the dose-mod much better. For cycle 3, continued same dose-mod but omitted Neulasta.     Tolerated cycle 3 well. Notes cumulative fatigue to be his biggest symptom, but staying pretty active around home/getting out of house. Week 1, he had mild diarrhea and lower appetite but stayed well-hydrated. BP was a bit on low end a few  days that week in which he held his antihypertensives, but then recovered this past week. Cold sensitivity, but no lasting neuropathy.    Labs due tomorrow.    Plan:  -If labs meet parameter, will proceed with C4 tomorrow at same 5FU dose-modification in infusion. Continue same doses of oxaliplatin. Will continue without Neulasta as long as no significant neutropenia (will need to add back in if he has delays in his chemo).   -He was able to maintain good hydration independently this past cycle, so does not require IVF routinely.  -Imodium as needed for BM>3/day.  -Return in 2 weeks for C5   -Typically, proceed with 12-16 weeks (6-8 cycles) then restage - Dr. Friedell will need to clarify number of cycles planned. Plan will be to proceed with RT (likely with concurrent chemo) next, followed by restaging to decide on surgery.  -He is still interested in meeting with Genetics to discuss colon cancer risk/genetic testing due to family hx and implications this may have for his kids; as well as more understanding re: the DPD deficiency. I've reviewed the main implication with the latter is 5FU/Xeloda dosing, but he wants more information.     Hx HTN  Hx diabetes  Pre-chemo has been on lisinopril 40 mg, doxazosin 4 mg and hydrochlorothiazide 12.5 mg daily with good control. With C1 post-chemo toxicity leading to dehydration and weight loss, he became severely hypotensive and was stabilized in ED with IVF. Therefore, he's working closely with his PCP team for BP monitoring and antihypertensive dose adjustments as needed. This was not an issue with C2 chemo dose-modification.     His PCP has also told him to hold his diabetic meds if poor oral intake.     Plan:  -Continue close mgmt and additional parameter/mgmt with PCP through chemo.    Microcytic anemia  Chronic GI bleeding (rectal tumor)  He's had rectal bleeding x 6 mths related to tumor. No recent bleeding evident.  His hgb has been 10-11 g/dL/stable (11.5 today).    MCV is low-end normal however, iron/iron sat and ferritin remain preserved.     He was prescribed oral iron but has never started them.     Plan:  -Advised oral iron daily or every other day  -Monitor and may need IV iron as needed, in addition.     ______________________________________________________________________________    History of Present Illness/ Interval History    Mr. Celestine Simon  is a 69 year old with locally advanced rectosigmoid cancer.   Is receiving neoadjuvant chemo with mFOLFOX. Due to severe toxicity with Cycle 1 (diarrhea, nausea, dehydration, stomatitis and delayed severe neutropenia), DPD deficiency testing was done ahead of C2 and confirmed intermediate deficiency. C2 required delay, then appropriate modifications to 5FU were made (bolus omitted, 50% reduction in infusion) + Neulasta added to C2. He tolerated Neulasta poorly (severe arthralgias, otherwise did well with that cycle. C3 dose-mod remained the same and Neulasta was held.  Returns virtually for 2-week interval visit, ahead of C4 planned tomorrow.    Tolerated last cycle fairly well. Cumulative fatigue is his biggest side effect, but stays pretty active most days. Cold sensitivity was more prominent the first week. No fevers.  No nausea. Mild diarrhea, varying with mild constipation the first week and now regular. No stomatitis, no rash, no peripheral neuropathy. Stayed hydrated. Checking BP daily and BP has been doing well, on his modified antihypertensive doses - he's had to hold his meds a few days the first week.     No rectal pain or bleeding. Bowels are regular color.      ECOG Performance    0-1      Oncology History/Treatment  Diagnosis/Stage:   10/2024: IIIB (xU7-eH2b-hL7) rectosigmoid junction adenocarcinoma  -5/2024: Cologuard screening positive. Noted fatigue x 1 yr and 20 lb weight loss x 6 mths following this. Occasional blood in stool.  -10/10/2024 colonoscopy: rectosigmoid mass. Biopsy + adenocarcinoma, MSI  low  -Staging MRI: 5.5 cm rectal carcinoma with invasion into perirectal fat; 4 LN pathologically enlarged. Circumferential mass clear.   -CT cap: possible liver mets, but MRI liver favored benign hemangioma  -Baseline CEA: 5.4    Treatment:  Met with multidisciplinary team: Dr. Tracy (Rad Onc) and Dr. Sheets (CRS). Recommendations for DIPTI approach; with neoadjuvant/induction chemo, followed by concurrent chemoRT; then reassess role for surgery    11/22/2024 - present: neoadjuvant [id number]  --C1 complicated by n/v, diarrhea and poor oral intake-induced dehydration with severe hypotension. Required IVF and adjustment in antihypertensives. Delayed ANC (0.1) when due for C2, requiring delay.  --C2 - delayed x 2 weeks for severe/delayed neutropenia & DPD testing results.   --DPD deficiency intermediate detected; requiring 5FU gutierrez-modification  --C2 - 5FU bolus omitted. 5FU infusion dose-reduced 50%. Neulasta on-pro added. Tolerated Neulasta poorly (significant arthralgias), otherwise well-tolerated.   --C3 - same doses. Neulasta discontinued      Physical Exam    GENERAL: Alert and oriented to time place and person. Well-appearing. Wife accompanied.  HEAD: Atraumatic and normocephalic. Partial alopecia.  EYES: DURAN, EOMI. No erythema. No icterus.  CHEST: regular respiratory effort.   NEURO: No gross deficit noted.      Lab Results    No results found for this or any previous visit (from the past week).      Imaging    No results found.    Billing  Total time 35 minutes, to include face to face visit, review of EMR, ordering, documentation and coordination of care on date of service   complexity modifier for longitudinal care.       Signed by: Shireen Villarreal NP      Again, thank you for allowing me to participate in the care of your patient.        Sincerely,        Shireen Villarreal NP    Electronically signed

## 2025-01-16 NOTE — LETTER
1/16/2025      Celestine Simon  [address]  [place] [address]      Dear Colleague,    Thank you for referring your patient, Celestine Simon, to the [facility] CANCER [facility]. Please see a copy of my visit note below.    Virtual Visit Details    Type of service:  Video Visit   Video Start Time:  1526  Video End Time: 1543    Originating Location (pt. Location): Home    Distant Location (provider location):  On-site  Platform used for Video Visit: [facility] Hematology and Oncology Outpatient Progress Note    Patient: Celestine Simon  MRN: [id number]  Date of Service: Jan 16, 2025          Reason for Visit    Locally adv rectosigmmoid cancer    Primary Oncologist: Dr. Friedell    Virtual visit      Assessment/Plan  Stage IIIB (tQ4-fQ8c-yP3) rectosigmoid adenocarcinoma  Undergoing total neoadjuvant treatment  DPD deficiency, intermediate  Chemo-induced nausea (gr 1)  Chemo-induced diarrhea, mild (gr 1)  Dehydration, hypotension, ARF; resolved/not recurrent  Delayed chemo-induced severe neutropenia, resolved  On neoadjuvant chemo with mFOLFOX. With his first cycle, he had severe toxicity (diarrhea, n/v, dehydration with severe hypotension + ARF, stomatitis, rash and delayed severe neutropenia). Therefore, DPD testing was completed and confirmed intermediate deficiency requiring 5FU dose modifications. Cycle 2 was delayed x 2 weeks, then dose-modified with omission of 5FU bolus + 50% reduction in the infusion (no dose-adjustment in the oxaliplatin). He got Neulasta on-pro support with cycle 2, but had significant arthralgias on this, otherwise tolerated the dose-mod much better. For cycle 3, continued same dose-mod but omitted Neulasta.     Tolerated cycle 3 well. Notes cumulative fatigue to be his biggest symptom, but staying pretty active around home/getting out of house. Week 1, he had mild diarrhea and lower appetite but stayed well-hydrated. BP was a bit on low end a few  days that week in which he held his antihypertensives, but then recovered this past week. Cold sensitivity, but no lasting neuropathy.    Labs due tomorrow.    Plan:  -If labs meet parameter, will proceed with C4 tomorrow at same 5FU dose-modification in infusion. Continue same doses of oxaliplatin. Will continue without Neulasta as long as no significant neutropenia (will need to add back in if he has delays in his chemo).   -He was able to maintain good hydration independently this past cycle, so does not require IVF routinely.  -Imodium as needed for BM>3/day.  -Return in 2 weeks for C5   -Typically, proceed with 12-16 weeks (6-8 cycles) then restage - Dr. Friedell will need to clarify number of cycles planned. Plan will be to proceed with RT (likely with concurrent chemo) next, followed by restaging to decide on surgery.  -He is still interested in meeting with Genetics to discuss colon cancer risk/genetic testing due to family hx and implications this may have for his kids; as well as more understanding re: the DPD deficiency. I've reviewed the main implication with the latter is 5FU/Xeloda dosing, but he wants more information.     Hx HTN  Hx diabetes  Pre-chemo has been on lisinopril 40 mg, doxazosin 4 mg and hydrochlorothiazide 12.5 mg daily with good control. With C1 post-chemo toxicity leading to dehydration and weight loss, he became severely hypotensive and was stabilized in ED with IVF. Therefore, he's working closely with his PCP team for BP monitoring and antihypertensive dose adjustments as needed. This was not an issue with C2 chemo dose-modification.     His PCP has also told him to hold his diabetic meds if poor oral intake.     Plan:  -Continue close mgmt and additional parameter/mgmt with PCP through chemo.    Microcytic anemia  Chronic GI bleeding (rectal tumor)  He's had rectal bleeding x 6 mths related to tumor. No recent bleeding evident.  His hgb has been 10-11 g/dL/stable (11.5 today).    MCV is low-end normal however, iron/iron sat and ferritin remain preserved.     He was prescribed oral iron but has never started them.     Plan:  -Advised oral iron daily or every other day  -Monitor and may need IV iron as needed, in addition.     ______________________________________________________________________________    History of Present Illness/ Interval History    Mr. Celestine Simon  is a 69 year old with locally advanced rectosigmoid cancer.   Is receiving neoadjuvant chemo with mFOLFOX. Due to severe toxicity with Cycle 1 (diarrhea, nausea, dehydration, stomatitis and delayed severe neutropenia), DPD deficiency testing was done ahead of C2 and confirmed intermediate deficiency. C2 required delay, then appropriate modifications to 5FU were made (bolus omitted, 50% reduction in infusion) + Neulasta added to C2. He tolerated Neulasta poorly (severe arthralgias, otherwise did well with that cycle. C3 dose-mod remained the same and Neulasta was held.  Returns virtually for 2-week interval visit, ahead of C4 planned tomorrow.    Tolerated last cycle fairly well. Cumulative fatigue is his biggest side effect, but stays pretty active most days. Cold sensitivity was more prominent the first week. No fevers.  No nausea. Mild diarrhea, varying with mild constipation the first week and now regular. No stomatitis, no rash, no peripheral neuropathy. Stayed hydrated. Checking BP daily and BP has been doing well, on his modified antihypertensive doses - he's had to hold his meds a few days the first week.     No rectal pain or bleeding. Bowels are regular color.      ECOG Performance    0-1      Oncology History/Treatment  Diagnosis/Stage:   10/2024: IIIB (kT1-mB9s-uY6) rectosigmoid junction adenocarcinoma  -5/2024: Cologuard screening positive. Noted fatigue x 1 yr and 20 lb weight loss x 6 mths following this. Occasional blood in stool.  -10/10/2024 colonoscopy: rectosigmoid mass. Biopsy + adenocarcinoma, MSI  low  -Staging MRI: 5.5 cm rectal carcinoma with invasion into perirectal fat; 4 LN pathologically enlarged. Circumferential mass clear.   -CT cap: possible liver mets, but MRI liver favored benign hemangioma  -Baseline CEA: 5.4    Treatment:  Met with multidisciplinary team: Dr. Tracy (Rad Onc) and Dr. Sheets (CRS). Recommendations for DIPTI approach; with neoadjuvant/induction chemo, followed by concurrent chemoRT; then reassess role for surgery    11/22/2024 - present: neoadjuvant [email or web address]  --C1 complicated by n/v, diarrhea and poor oral intake-induced dehydration with severe hypotension. Required IVF and adjustment in antihypertensives. Delayed ANC (0.1) when due for C2, requiring delay.  --C2 - delayed x 2 weeks for severe/delayed neutropenia & DPD testing results.   --DPD deficiency intermediate detected; requiring 5FU gutierrez-modification  --C2 - 5FU bolus omitted. 5FU infusion dose-reduced 50%. Neulasta on-pro added. Tolerated Neulasta poorly (significant arthralgias), otherwise well-tolerated.   --C3 - same doses. Neulasta discontinued      Physical Exam    GENERAL: Alert and oriented to time place and person. Well-appearing. Wife accompanied.  HEAD: Atraumatic and normocephalic. Partial alopecia.  EYES: DURAN, EOMI. No erythema. No icterus.  CHEST: regular respiratory effort.   NEURO: No gross deficit noted.      Lab Results    No results found for this or any previous visit (from the past week).      Imaging    No results found.    Billing  Total time 35 minutes, to include face to face visit, review of EMR, ordering, documentation and coordination of care on date of service   complexity modifier for longitudinal care.       Signed by: Shireen Villarreal NP      Again, thank you for allowing me to participate in the care of your patient.        Sincerely,        Shireen Villarreal NP    Electronically signed

## 2025-01-16 NOTE — PROGRESS NOTES
Virtual Visit Details    Type of service:  Video Visit   Video Start Time:  1526  Video End Time: 1543    Originating Location (pt. Location): Home    Distant Location (provider location):  On-site  Platform used for Video Visit: [facility] Hematology and Oncology Outpatient Progress Note    Patient: Celestine Simon  MRN: [id number]  Date of Service: Jan 16, 2025          Reason for Visit    Locally adv rectosigmmoid cancer    Primary Oncologist: Dr. Friedell    Virtual visit      Assessment/Plan  Stage IIIB (yV3-eX2a-tV9) rectosigmoid adenocarcinoma  Undergoing total neoadjuvant treatment  DPD deficiency, intermediate  Chemo-induced nausea (gr 1)  Chemo-induced diarrhea, mild (gr 1)  Dehydration, hypotension, ARF; resolved/not recurrent  Delayed chemo-induced severe neutropenia, resolved  On neoadjuvant chemo with mFOLFOX. With his first cycle, he had severe toxicity (diarrhea, n/v, dehydration with severe hypotension + ARF, stomatitis, rash and delayed severe neutropenia). Therefore, DPD testing was completed and confirmed intermediate deficiency requiring 5FU dose modifications. Cycle 2 was delayed x 2 weeks, then dose-modified with omission of 5FU bolus + 50% reduction in the infusion (no dose-adjustment in the oxaliplatin). He got Neulasta on-pro support with cycle 2, but had significant arthralgias on this, otherwise tolerated the dose-mod much better. For cycle 3, continued same dose-mod but omitted Neulasta.     Tolerated cycle 3 well. Notes cumulative fatigue to be his biggest symptom, but staying pretty active around home/getting out of house. Week 1, he had mild diarrhea and lower appetite but stayed well-hydrated. BP was a bit on low end a few days that week in which he held his antihypertensives, but then recovered this past week. Cold sensitivity, but no lasting neuropathy.    Labs due tomorrow.    Plan:  -If labs meet parameter, will proceed with C4 tomorrow at same 5FU  dose-modification in infusion. Continue same doses of oxaliplatin. Will continue without Neulasta as long as no significant neutropenia (will need to add back in if he has delays in his chemo).   -He was able to maintain good hydration independently this past cycle, so does not require IVF routinely.  -Imodium as needed for BM>3/day.  -Return in 2 weeks for C5   -Typically, proceed with 12-16 weeks (6-8 cycles) then restage - Dr. Friedell will need to clarify number of cycles planned. Plan will be to proceed with RT (likely with concurrent chemo) next, followed by restaging to decide on surgery.  -He is still interested in meeting with Genetics to discuss colon cancer risk/genetic testing due to family hx and implications this may have for his kids; as well as more understanding re: the DPD deficiency. I've reviewed the main implication with the latter is 5FU/Xeloda dosing, but he wants more information.     Hx HTN  Hx diabetes  Pre-chemo has been on lisinopril 40 mg, doxazosin 4 mg and hydrochlorothiazide 12.5 mg daily with good control. With C1 post-chemo toxicity leading to dehydration and weight loss, he became severely hypotensive and was stabilized in ED with IVF. Therefore, he's working closely with his PCP team for BP monitoring and antihypertensive dose adjustments as needed. This was not an issue with C2 chemo dose-modification.     His PCP has also told him to hold his diabetic meds if poor oral intake.     Plan:  -Continue close mgmt and additional parameter/mgmt with PCP through chemo.    Microcytic anemia  Chronic GI bleeding (rectal tumor)  He's had rectal bleeding x 6 mths related to tumor. No recent bleeding evident.  His hgb has been 10-11 g/dL/stable (11.5 today).   MCV is low-end normal however, iron/iron sat and ferritin remain preserved.     He was prescribed oral iron but has never started them.     Plan:  -Advised oral iron daily or every other day  -Monitor and may need IV iron as needed, in  addition.     ______________________________________________________________________________    History of Present Illness/ Interval History    Mr. Celestine Simon  is a 69 year old with locally advanced rectosigmoid cancer.   Is receiving neoadjuvant chemo with mFOLFOX. Due to severe toxicity with Cycle 1 (diarrhea, nausea, dehydration, stomatitis and delayed severe neutropenia), DPD deficiency testing was done ahead of C2 and confirmed intermediate deficiency. C2 required delay, then appropriate modifications to 5FU were made (bolus omitted, 50% reduction in infusion) + Neulasta added to C2. He tolerated Neulasta poorly (severe arthralgias, otherwise did well with that cycle. C3 dose-mod remained the same and Neulasta was held.  Returns virtually for 2-week interval visit, ahead of C4 planned tomorrow.    Tolerated last cycle fairly well. Cumulative fatigue is his biggest side effect, but stays pretty active most days. Cold sensitivity was more prominent the first week. No fevers.  No nausea. Mild diarrhea, varying with mild constipation the first week and now regular. No stomatitis, no rash, no peripheral neuropathy. Stayed hydrated. Checking BP daily and BP has been doing well, on his modified antihypertensive doses - he's had to hold his meds a few days the first week.     No rectal pain or bleeding. Bowels are regular color.      ECOG Performance    0-1      Oncology History/Treatment  Diagnosis/Stage:   10/2024: IIIB (vQ9-mM6g-rY0) rectosigmoid junction adenocarcinoma  -5/2024: Cologuard screening positive. Noted fatigue x 1 yr and 20 lb weight loss x 6 mths following this. Occasional blood in stool.  -10/10/2024 colonoscopy: rectosigmoid mass. Biopsy + adenocarcinoma, MSI low  -Staging MRI: 5.5 cm rectal carcinoma with invasion into perirectal fat; 4 LN pathologically enlarged. Circumferential mass clear.   -CT cap: possible liver mets, but MRI liver favored benign hemangioma  -Baseline CEA:  5.4    Treatment:  Met with multidisciplinary team: Dr. Tracy (Rad Onc) and Dr. Sheets (CRS). Recommendations for DIPTI approach; with neoadjuvant/induction chemo, followed by concurrent chemoRT; then reassess role for surgery    11/22/2024 - present: neoadjuvant [id number]  --C1 complicated by n/v, diarrhea and poor oral intake-induced dehydration with severe hypotension. Required IVF and adjustment in antihypertensives. Delayed ANC (0.1) when due for C2, requiring delay.  --C2 - delayed x 2 weeks for severe/delayed neutropenia & DPD testing results.   --DPD deficiency intermediate detected; requiring 5FU gutierrez-modification  --C2 - 5FU bolus omitted. 5FU infusion dose-reduced 50%. Neulasta on-pro added. Tolerated Neulasta poorly (significant arthralgias), otherwise well-tolerated.   --C3 - same doses. Neulasta discontinued      Physical Exam    GENERAL: Alert and oriented to time place and person. Well-appearing. Wife accompanied.  HEAD: Atraumatic and normocephalic. Partial alopecia.  EYES: DURAN, EOMI. No erythema. No icterus.  CHEST: regular respiratory effort.   NEURO: No gross deficit noted.      Lab Results    No results found for this or any previous visit (from the past week).      Imaging    No results found.    Billing  Total time 35 minutes, to include face to face visit, review of EMR, ordering, documentation and coordination of care on date of service   complexity modifier for longitudinal care.       Signed by: Shireen Villarreal NP

## 2025-01-17 PROCEDURE — 85025 COMPLETE CBC W/AUTO DIFF WBC: CPT | Performed by: NURSE PRACTITIONER

## 2025-01-17 PROCEDURE — 82310 ASSAY OF CALCIUM: CPT | Performed by: NURSE PRACTITIONER

## 2025-01-17 PROCEDURE — 82378 CARCINOEMBRYONIC ANTIGEN: CPT | Performed by: NURSE PRACTITIONER

## 2025-01-17 PROCEDURE — A4221 SUPP NON-INSULIN INF CATH/WK: HCPCS

## 2025-01-19 ENCOUNTER — HOME CARE VISIT (OUTPATIENT)
Dept: HOME HEALTH SERVICES | Facility: HOME HEALTH | Age: 70
End: 2025-01-19
Payer: MEDICARE

## 2025-01-19 VITALS
DIASTOLIC BLOOD PRESSURE: 68 MMHG | RESPIRATION RATE: 18 BRPM | SYSTOLIC BLOOD PRESSURE: 126 MMHG | TEMPERATURE: 97.3 F | OXYGEN SATURATION: 93 % | HEART RATE: 90 BPM

## 2025-01-19 PROCEDURE — G0070 ADM OF CHEMO DRUG IN HOME: HCPCS

## 2025-01-19 NOTE — PROGRESS NOTES
Nursing Visit Note:  Nurse visit today for Chemo dc, port flush and deaccess, GA for Celestine Simon.     present during visit today: Not Applicable.    Patient is A/O x4, reported being tired and just not feeling well. Patient has fair appetite and good fluid intake. Patient denies and bowel and bladder concerns. Port flush and deaccess per [facility] policy and procedure, patient tolerated well. Ontime delivery for chemo bin and CADD pump , spoke with Jamee.       Elena Le RN 1/19/2025

## 2025-01-24 ENCOUNTER — HOME INFUSION BILLING (OUTPATIENT)
Dept: HOME HEALTH SERVICES | Facility: HOME HEALTH | Age: 70
End: 2025-01-24
Payer: MEDICARE

## 2025-01-24 PROCEDURE — A9270 NON-COVERED ITEM OR SERVICE: HCPCS

## 2025-01-24 PROCEDURE — A4221 SUPP NON-INSULIN INF CATH/WK: HCPCS

## 2025-01-24 PROCEDURE — E1399 DURABLE MEDICAL EQUIPMENT MI: HCPCS

## 2025-01-24 PROCEDURE — A4222 INFUSION SUPPLIES WITH PUMP: HCPCS

## 2025-01-31 PROCEDURE — 85004 AUTOMATED DIFF WBC COUNT: CPT | Performed by: INTERNAL MEDICINE

## 2025-01-31 PROCEDURE — 80053 COMPREHEN METABOLIC PANEL: CPT | Performed by: INTERNAL MEDICINE

## 2025-02-02 ENCOUNTER — HOME CARE VISIT (OUTPATIENT)
Dept: HOME HEALTH SERVICES | Facility: HOME HEALTH | Age: 70
End: 2025-02-02
Payer: MEDICARE

## 2025-02-02 ENCOUNTER — HEALTH MAINTENANCE LETTER (OUTPATIENT)
Age: 70
End: 2025-02-02

## 2025-02-02 VITALS
HEART RATE: 74 BPM | SYSTOLIC BLOOD PRESSURE: 126 MMHG | RESPIRATION RATE: 16 BRPM | DIASTOLIC BLOOD PRESSURE: 72 MMHG | TEMPERATURE: 97.3 F | OXYGEN SATURATION: 96 %

## 2025-02-02 PROCEDURE — G0070 ADM OF CHEMO DRUG IN HOME: HCPCS

## 2025-02-02 NOTE — PROGRESS NOTES
Nursing Visit Note:  Nurse visit today for chemo dc, port flush and deaccess  for Celestine Simon.     present during visit today: Not Applicable.    Intravenous Access:  Implanted Port.    Patient seen today by [facility] for chemotherapy disconnect of  Fluorouracil.    IV Fluids administered: No    Neulasta OnPro/injection administered: No    Note: Patient is A/O x4, pleasant and cooperative during visit. Patient reported fatigue and some nausea but no vomiting. Using antiemetic which has been effective. Patient has fair appetite and fluid intake. Denies and bowel or bladder concerns. Chemo dc, port flush and deaccess per [facility] policy and procedure, patient tolerated well. Ontime delivery called for chemo bin and CADD pump . Patient denies any questions or concerns.     Saline administered: 20 (ml)    Supply Check:   Does the patient have all the supplies they need for the next visit?  Yes    Next visit plan: RN revisit for chemo dc, port flush and deaccess per chemo cycle     Elena Le RN 2/2/2025

## 2025-02-03 ENCOUNTER — DOCUMENTATION ONLY (OUTPATIENT)
Dept: INTERVENTIONAL RADIOLOGY/VASCULAR | Facility: CLINIC | Age: 70
End: 2025-02-03

## 2025-02-03 ENCOUNTER — APPOINTMENT (OUTPATIENT)
Dept: CT IMAGING | Facility: CLINIC | Age: 70
End: 2025-02-03
Attending: PHYSICIAN ASSISTANT
Payer: MEDICARE

## 2025-02-03 ENCOUNTER — APPOINTMENT (OUTPATIENT)
Dept: MRI IMAGING | Facility: CLINIC | Age: 70
End: 2025-02-03
Attending: PHYSICIAN ASSISTANT
Payer: MEDICARE

## 2025-02-03 ENCOUNTER — HOSPITAL ENCOUNTER (EMERGENCY)
Facility: CLINIC | Age: 70
Discharge: HOME | End: 2025-02-03
Attending: EMERGENCY MEDICINE | Admitting: EMERGENCY MEDICINE
Payer: MEDICARE

## 2025-02-03 VITALS
RESPIRATION RATE: 16 BRPM | DIASTOLIC BLOOD PRESSURE: 85 MMHG | OXYGEN SATURATION: 95 % | HEART RATE: 81 BPM | TEMPERATURE: 97.8 F | SYSTOLIC BLOOD PRESSURE: 118 MMHG

## 2025-02-03 DIAGNOSIS — R55 SYNCOPE AND COLLAPSE: ICD-10-CM

## 2025-02-03 DIAGNOSIS — I95.9 TRANSIENT HYPOTENSION: ICD-10-CM

## 2025-02-03 DIAGNOSIS — J34.89 LESION OR MASS OF PARANASAL SINUSES: ICD-10-CM

## 2025-02-03 LAB
ALBUMIN SERPL BCG-MCNC: 3.6 G/DL (ref 3.5–5.2)
ALP SERPL-CCNC: 51 U/L (ref 40–150)
ALT SERPL W P-5'-P-CCNC: 58 U/L (ref 0–70)
ANION GAP SERPL CALCULATED.3IONS-SCNC: 17 MMOL/L (ref 7–15)
AST SERPL W P-5'-P-CCNC: 56 U/L (ref 0–45)
BASE EXCESS BLDV CALC-SCNC: 2.5 MMOL/L (ref -3–3)
BASOPHILS # BLD AUTO: 0 10E3/UL (ref 0–0.2)
BASOPHILS NFR BLD AUTO: 0 %
BILIRUB SERPL-MCNC: 0.6 MG/DL
BUN SERPL-MCNC: 25.8 MG/DL (ref 8–23)
CALCIUM SERPL-MCNC: 9.3 MG/DL (ref 8.8–10.4)
CHLORIDE SERPL-SCNC: 98 MMOL/L (ref 98–107)
CREAT SERPL-MCNC: 1.02 MG/DL (ref 0.67–1.17)
EGFRCR SERPLBLD CKD-EPI 2021: 81 ML/MIN/1.73M2
EOSINOPHIL # BLD AUTO: 0 10E3/UL (ref 0–0.7)
EOSINOPHIL NFR BLD AUTO: 0 %
ERYTHROCYTE [DISTWIDTH] IN BLOOD BY AUTOMATED COUNT: 18.9 % (ref 10–15)
GLUCOSE BLDC GLUCOMTR-MCNC: 248 MG/DL (ref 70–99)
GLUCOSE SERPL-MCNC: 283 MG/DL (ref 70–99)
HCO3 BLDV-SCNC: 28 MMOL/L (ref 21–28)
HCO3 SERPL-SCNC: 18 MMOL/L (ref 22–29)
HCT VFR BLD AUTO: 35.1 % (ref 40–53)
HGB BLD-MCNC: 11 G/DL (ref 13.3–17.7)
HOLD SPECIMEN: NORMAL
IMM GRANULOCYTES # BLD: 0 10E3/UL
IMM GRANULOCYTES NFR BLD: 0 %
LACTATE SERPL-SCNC: 1.7 MMOL/L (ref 0.7–2)
LYMPHOCYTES # BLD AUTO: 1.3 10E3/UL (ref 0.8–5.3)
LYMPHOCYTES NFR BLD AUTO: 24 %
MCH RBC QN AUTO: 25.2 PG (ref 26.5–33)
MCHC RBC AUTO-ENTMCNC: 31.3 G/DL (ref 31.5–36.5)
MCV RBC AUTO: 80 FL (ref 78–100)
MONOCYTES # BLD AUTO: 0.2 10E3/UL (ref 0–1.3)
MONOCYTES NFR BLD AUTO: 4 %
NEUTROPHILS # BLD AUTO: 3.8 10E3/UL (ref 1.6–8.3)
NEUTROPHILS NFR BLD AUTO: 71 %
NRBC # BLD AUTO: 0 10E3/UL
NRBC BLD AUTO-RTO: 0 /100
NT-PROBNP SERPL-MCNC: 107 PG/ML (ref 0–900)
O2/TOTAL GAS SETTING VFR VENT: 21 %
OXYHGB MFR BLDV: 43 % (ref 70–75)
PCO2 BLDV: 46 MM HG (ref 40–50)
PH BLDV: 7.39 [PH] (ref 7.32–7.43)
PLATELET # BLD AUTO: 178 10E3/UL (ref 150–450)
PO2 BLDV: 27 MM HG (ref 25–47)
POTASSIUM SERPL-SCNC: 3.9 MMOL/L (ref 3.4–5.3)
PROT SERPL-MCNC: 5.9 G/DL (ref 6.4–8.3)
RBC # BLD AUTO: 4.37 10E6/UL (ref 4.4–5.9)
SAO2 % BLDV: 44.2 % (ref 70–75)
SODIUM SERPL-SCNC: 133 MMOL/L (ref 135–145)
TROPONIN T SERPL HS-MCNC: 13 NG/L
TROPONIN T SERPL HS-MCNC: 13 NG/L
WBC # BLD AUTO: 5.3 10E3/UL (ref 4–11)

## 2025-02-03 PROCEDURE — 70450 CT HEAD/BRAIN W/O DYE: CPT

## 2025-02-03 PROCEDURE — 70553 MRI BRAIN STEM W/O & W/DYE: CPT

## 2025-02-03 PROCEDURE — E0781 EXTERNAL AMBULATORY INFUS PU: HCPCS | Mod: RR

## 2025-02-03 PROCEDURE — 99285 EMERGENCY DEPT VISIT HI MDM: CPT | Mod: 25 | Performed by: EMERGENCY MEDICINE

## 2025-02-03 PROCEDURE — 70553 MRI BRAIN STEM W/O & W/DYE: CPT | Mod: 26 | Performed by: RADIOLOGY

## 2025-02-03 PROCEDURE — 83605 ASSAY OF LACTIC ACID: CPT | Performed by: PHYSICIAN ASSISTANT

## 2025-02-03 PROCEDURE — 99284 EMERGENCY DEPT VISIT MOD MDM: CPT | Mod: GC | Performed by: NEUROLOGICAL SURGERY

## 2025-02-03 PROCEDURE — 93005 ELECTROCARDIOGRAM TRACING: CPT | Performed by: EMERGENCY MEDICINE

## 2025-02-03 PROCEDURE — 83880 ASSAY OF NATRIURETIC PEPTIDE: CPT | Performed by: PHYSICIAN ASSISTANT

## 2025-02-03 PROCEDURE — 84484 ASSAY OF TROPONIN QUANT: CPT | Performed by: PHYSICIAN ASSISTANT

## 2025-02-03 PROCEDURE — 99284 EMERGENCY DEPT VISIT MOD MDM: CPT | Mod: FS | Performed by: EMERGENCY MEDICINE

## 2025-02-03 PROCEDURE — 36415 COLL VENOUS BLD VENIPUNCTURE: CPT | Performed by: PHYSICIAN ASSISTANT

## 2025-02-03 PROCEDURE — 96360 HYDRATION IV INFUSION INIT: CPT | Mod: 59 | Performed by: EMERGENCY MEDICINE

## 2025-02-03 PROCEDURE — 82805 BLOOD GASES W/O2 SATURATION: CPT | Performed by: PHYSICIAN ASSISTANT

## 2025-02-03 PROCEDURE — A9585 GADOBUTROL INJECTION: HCPCS | Performed by: PHYSICIAN ASSISTANT

## 2025-02-03 PROCEDURE — 70450 CT HEAD/BRAIN W/O DYE: CPT | Mod: 26 | Performed by: RADIOLOGY

## 2025-02-03 PROCEDURE — 84155 ASSAY OF PROTEIN SERUM: CPT | Performed by: PHYSICIAN ASSISTANT

## 2025-02-03 PROCEDURE — 258N000003 HC RX IP 258 OP 636: Performed by: PHYSICIAN ASSISTANT

## 2025-02-03 PROCEDURE — 82962 GLUCOSE BLOOD TEST: CPT

## 2025-02-03 PROCEDURE — 87040 BLOOD CULTURE FOR BACTERIA: CPT | Performed by: PHYSICIAN ASSISTANT

## 2025-02-03 PROCEDURE — 36415 COLL VENOUS BLD VENIPUNCTURE: CPT | Performed by: STUDENT IN AN ORGANIZED HEALTH CARE EDUCATION/TRAINING PROGRAM

## 2025-02-03 PROCEDURE — 93010 ELECTROCARDIOGRAM REPORT: CPT | Performed by: EMERGENCY MEDICINE

## 2025-02-03 PROCEDURE — 255N000002 HC RX 255 OP 636: Performed by: PHYSICIAN ASSISTANT

## 2025-02-03 PROCEDURE — 80053 COMPREHEN METABOLIC PANEL: CPT | Performed by: PHYSICIAN ASSISTANT

## 2025-02-03 PROCEDURE — 85004 AUTOMATED DIFF WBC COUNT: CPT | Performed by: PHYSICIAN ASSISTANT

## 2025-02-03 RX ORDER — GADOBUTROL 604.72 MG/ML
10 INJECTION INTRAVENOUS ONCE
Status: COMPLETED | OUTPATIENT
Start: 2025-02-03 | End: 2025-02-03

## 2025-02-03 RX ADMIN — GADOBUTROL 10 ML: 604.72 INJECTION INTRAVENOUS at 20:46

## 2025-02-03 RX ADMIN — SODIUM CHLORIDE 1000 ML: 9 INJECTION, SOLUTION INTRAVENOUS at 18:39

## 2025-02-03 ASSESSMENT — ACTIVITIES OF DAILY LIVING (ADL)
ADLS_ACUITY_SCORE: 43
ADLS_ACUITY_SCORE: 45

## 2025-02-03 NOTE — ED TRIAGE NOTES
BIBA for syncopal x 2 at the clinic, BP dropped into the 50s with vomiting and defecation  No fall  BP when pt laying 111/61    Rectal ca  Chemo on sunday

## 2025-02-03 NOTE — ED PROVIDER NOTES
[place] EMERGENCY DEPARTMENT ([facility])    2/03/25       ED PROVIDER NOTE     History     Chief Complaint   Patient presents with    Syncope     The history is provided by the patient and medical records.     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  Rapid response was called, he was found sitting on the floor by staff, incontinent of stool, with diaphoresis, unresponsiveness and agonal breathing.  Was helped to the floor by clinical staff, did not hit his head.  Regained consciousness and was able to get into a wheelchair with assistance of a gait belt.  Initial blood pressure was found to be in the 50s systolic.  Repeat vital signs notable for blood pressure of 69/49 blood pressure 111/61 when lying flat.  He had increasing diaphoresis, reported feeling unwell and then had projectile vomiting of large amount of yellow liquid.  He was transported here EMS for further evaluation vomited during transport.He was started on IV fluids en route.   He has not been eating over the past 2 days in anticipation of chemotherapy treatment has been drinking some fluids..  He has had episodes like this in the past in clinic, remembers a syncopal episode on previous occasion.  On review of the chart, was evaluated at [facility] emergency department on November 27, 2024 due to concerns for hypotension.  States that on previous occasions, his symptoms have improved with IV fluid administration.  Brought by EMS today, has already had almost 1 L of IV fluids, had some Zofran which helped alleviate his vomiting.  Currently on metformin, semaglutide and pioglitazone for control of diabetes.  Rectal cancer has been treated with several medications including fluorouracil.  Due to oncology note, indicates profound  toxicity on the first cycle of fluorouracil, had a  50% dose reduction which she tolerated the medication difficulty.  Per oncology note, the patient is lost 20 pounds over the past 6 months, has had increased fatigue and occasional blood in the stools.  Denies any bleeding in the stools today.      A medically appropriate review of systems was performed with pertinent positives and negatives noted in the HPI, and all other systems negative.    Physical Exam   BP: 92/51  Pulse: 64  Temp: 97.6  F (36.4  C)  Resp: 16  SpO2: 97 %  Physical Exam  Constitutional:       General: He is not in acute distress.     Appearance: Normal appearance. He is not ill-appearing, toxic-appearing or diaphoretic.   HENT:      Head: Normocephalic and atraumatic.      Nose: Nose normal. No congestion or rhinorrhea.      Mouth/Throat:      Mouth: Mucous membranes are dry.      Pharynx: Oropharynx is clear. No oropharyngeal exudate or posterior oropharyngeal erythema.   Eyes:      General: No scleral icterus.     Extraocular Movements: Extraocular movements intact.      Pupils: Pupils are equal, round, and reactive to light.   Cardiovascular:      Rate and Rhythm: Normal rate and regular rhythm.      Pulses: Normal pulses.      Heart sounds: Normal heart sounds. No murmur heard.  Pulmonary:      Effort: Pulmonary effort is normal. No respiratory distress.      Breath sounds: Normal breath sounds. No stridor. No wheezing, rhonchi or rales.   Chest:      Chest wall: No tenderness.   Abdominal:      General: Abdomen is flat. Bowel sounds are normal. There is no distension.      Palpations: Abdomen is soft. There is no mass.      Tenderness: There is no abdominal tenderness. There is no guarding or rebound.   Musculoskeletal:         General: Normal range of motion.      Cervical back: Normal range of motion and neck supple. No rigidity. No muscular tenderness.   Lymphadenopathy:      Cervical: No cervical adenopathy.   Skin:     General: Skin  is warm and dry.   Neurological:      Mental Status: He is alert and oriented to person, place, and time.           ED Course, Procedures, & Data      Procedures                  EKG Interpretation:        Interpreted by Paulo Morataya PA-C  Symptoms at time of EKG: syncope   Rhythm: Normal sinus   Rate: Normal, 72 bpm  Axis: Normal  Ectopy: None  Conduction: Normal  ST Segments/ T Waves: No ST-T wave changes and No acute ischemic changes  Q Waves: None  Comparison to prior: Unchanged from 11/16/21    Clinical Impression: normal EKG      Results for orders placed or performed during the hospital encounter of 02/03/25   CT Head w/o Contrast     Status: None    Narrative    EXAM: CT HEAD W/O CONTRAST  2/3/2025 6:01 PM     HISTORY:  syncope with incontinence  History of rectosigmoid  malignancy, on chemotherapy.      COMPARISON:  None    TECHNIQUE: Using multidetector thin collimation helical acquisition  technique, axial, coronal and sagittal CT images from the skull base  to the vertex were obtained without intravenous contrast.   (topogram) image(s) also obtained and reviewed.    FINDINGS:  No acute intracranial hemorrhage, mass effect, or midline shift. No  acute loss of gray-white matter differentiation in the cerebral  hemispheres. Ventricles are proportionate to the cerebral sulci. Clear  basal cisterns.  Central groundglass density with peripheral thick sclerotic change of  the sphenoid sinus.   The other paranasal sinuses are clear. The mastoid air cells are  clear. Grossly normal orbits.       Impression    IMPRESSION:   1. No acute intracranial pathology.   2. Mixed density changes of the sphenoid sinus. Differential includes  arrested pneumatization (a benign developmental -no touch lesion),  chronic sinusitis, metabolic disorders with osteoclast function  disorders, fibrous dysplasia, and least likely metastatic process.  This has no relation with the current clinical scenario. On a  nonemergent  basis, as an out patient, an MRI can be obtained to  further evaluate.     I have personally reviewed the examination and initial interpretation  and I agree with the findings.    JANA COLVIN MD         SYSTEM ID:  [id number]   MR Brain w/o & w Contrast     Status: None    Narrative    EXAM: MR BRAIN W/O and W CONTRAST  LOCATION: [facility]  DATE: 2/3/2025    INDICATION: Indeterminate lesion on CT head study, MRI recommended. History of rectosigmoid malignancy, on chemotherapy.  COMPARISON: CT head.  CONTRAST: Gadobutrol (GADAVIST) injection 10 mL  TECHNIQUE: Routine multiplanar multisequence head MRI without and with intravenous contrast.    FINDINGS:  INTRACRANIAL CONTENTS: No acute or subacute infarct. No mass, acute hemorrhage, or extra-axial fluid collections. Normal brain parenchymal signal. Normal ventricles and sulci. Normal position of the cerebellar tonsils. No pathologic contrast enhancement.    SELLA: No abnormality accounting for technique.    OSSEOUS STRUCTURES/SOFT TISSUES: Normal marrow signal. The major intracranial vascular flow voids are maintained.     ORBITS: No abnormality accounting for technique.     SINUSES/MASTOIDS: There is an enhancing soft tissue lesion within the sphenoid sinus with lobulated borders, measures 3.6 x 1.7 x 2.7 cm, demonstrates low signal on T1, markedly hyperintense on T2, low signal on diffusion. No middle ear or mastoid   effusion.       Impression    IMPRESSION:  1.  No acute intracranial abnormality.  2.  Incidental enhancing lesion with lobulated borders located within the sphenoid sinus, (with ground glass appearance along its margins and subtle amorphous calcification centrally on the same day CT). The appearance favors fibrous dysplasia. The   appearance is very atypical for metastatic disease. Also in the differential is chronic skull base osteomyelitis in the appropriate clinical setting (although extreme suspicion).  Consider follow-up CT in 6-8 weeks to assess for stability/interval   progression.   [place] Draw     Status: None    Narrative    The following orders were created for panel order [place] Draw.  Procedure                               Abnormality         Status                     ---------                               -----------         ------                     Extra Blue Top Tube[[id number]]                              Final result               Extra Red Top Tube[[id number]]                               Final result               Extra Green Top (Lithium...[[id number]]                      Final result               Extra Purple Top Tube[[id number]]                            Final result                 Please view results for these tests on the individual orders.   Extra Blue Top Tube     Status: None   Result Value Ref Range    Hold Specimen JIC    Extra Red Top Tube     Status: None   Result Value Ref Range    Hold Specimen JIC    Extra Green Top (Lithium Heparin) Tube     Status: None   Result Value Ref Range    Hold Specimen JIC    Extra Purple Top Tube     Status: None   Result Value Ref Range    Hold Specimen JIC    Comprehensive metabolic panel     Status: Abnormal   Result Value Ref Range    Sodium 133 (L) 135 - 145 mmol/L    Potassium 3.9 3.4 - 5.3 mmol/L    Carbon Dioxide (CO2) 18 (L) 22 - 29 mmol/L    Anion Gap 17 (H) 7 - 15 mmol/L    Urea Nitrogen 25.8 (H) 8.0 - 23.0 mg/dL    Creatinine 1.02 0.67 - 1.17 mg/dL    GFR Estimate 81 >60 mL/min/1.73m2    Calcium 9.3 8.8 - 10.4 mg/dL    Chloride 98 98 - 107 mmol/L    Glucose 283 (H) 70 - 99 mg/dL    Alkaline Phosphatase 51 40 - 150 U/L    AST 56 (H) 0 - 45 U/L    ALT 58 0 - 70 U/L    Protein Total 5.9 (L) 6.4 - 8.3 g/dL    Albumin 3.6 3.5 - 5.2 g/dL    Bilirubin Total 0.6 <=1.2 mg/dL   Troponin T, High Sensitivity     Status: Normal   Result Value Ref Range    Troponin T, High Sensitivity 13 <=22 ng/L   Nt probnp inpatient (BNP)     Status: Normal    Result Value Ref Range    N terminal Pro BNP Inpatient 107 0 - 900 pg/mL   CBC with platelets and differential     Status: Abnormal   Result Value Ref Range    WBC Count 5.3 4.0 - 11.0 10e3/uL    RBC Count 4.37 (L) 4.40 - 5.90 10e6/uL    Hemoglobin 11.0 (L) 13.3 - 17.7 g/dL    Hematocrit 35.1 (L) 40.0 - 53.0 %    MCV 80 78 - 100 fL    MCH 25.2 (L) 26.5 - 33.0 pg    MCHC 31.3 (L) 31.5 - 36.5 g/dL    RDW 18.9 (H) 10.0 - 15.0 %    Platelet Count 178 150 - 450 10e3/uL    % Neutrophils 71 %    % Lymphocytes 24 %    % Monocytes 4 %    % Eosinophils 0 %    % Basophils 0 %    % Immature Granulocytes 0 %    NRBCs per 100 WBC 0 <1 /100    Absolute Neutrophils 3.8 1.6 - 8.3 10e3/uL    Absolute Lymphocytes 1.3 0.8 - 5.3 10e3/uL    Absolute Monocytes 0.2 0.0 - 1.3 10e3/uL    Absolute Eosinophils 0.0 0.0 - 0.7 10e3/uL    Absolute Basophils 0.0 0.0 - 0.2 10e3/uL    Absolute Immature Granulocytes 0.0 <=0.4 10e3/uL    Absolute NRBCs 0.0 10e3/uL   Lactic acid whole blood with 1x repeat in 2 hr when >2     Status: Normal   Result Value Ref Range    Lactic Acid, Initial 1.7 0.7 - 2.0 mmol/L   Blood gas venous     Status: Abnormal   Result Value Ref Range    pH Venous 7.39 7.32 - 7.43    pCO2 Venous 46 40 - 50 mm Hg    pO2 Venous 27 25 - 47 mm Hg    Bicarbonate Venous 28 21 - 28 mmol/L    Base Excess/Deficit Venous 2.5 -3.0 - 3.0 mmol/L    FIO2 21     Oxyhemoglobin Venous 43 (L) 70 - 75 %    O2 Sat, Venous 44.2 (L) 70.0 - 75.0 %    Narrative    In healthy individuals, oxyhemoglobin (O2Hb) and oxygen saturation (SO2) are approximately equal. In the presence of dyshemoglobins, oxyhemoglobin can be considerably lower than oxygen saturation.   Troponin T, High Sensitivity     Status: Normal   Result Value Ref Range    Troponin T, High Sensitivity 13 <=22 ng/L   Glucose by meter     Status: Abnormal   Result Value Ref Range    GLUCOSE BY METER POCT 248 (H) 70 - 99 mg/dL   EKG 12-lead, tracing only     Status: None (Preliminary result)    Result Value Ref Range    Systolic Blood Pressure  mmHg    Diastolic Blood Pressure  mmHg    Ventricular Rate 72 BPM    Atrial Rate 72 BPM    WI Interval 186 ms    QRS Duration 92 ms     ms    QTc 422 ms    P Axis 57 degrees    R AXIS -27 degrees    T Axis 28 degrees    Interpretation ECG Sinus rhythm  Normal ECG      CBC with platelets differential     Status: Abnormal    Narrative    The following orders were created for panel order CBC with platelets differential.  Procedure                               Abnormality         Status                     ---------                               -----------         ------                     CBC with platelets and d...[[id number]]  Abnormal            Final result               RBC and Platelet Morphology[[id number]]                                                   Please view results for these tests on the individual orders.   Results for orders placed or performed in visit on 02/03/25   Glucose by meter     Status: Abnormal   Result Value Ref Range    GLUCOSE BY METER POCT 228 (H) 70 - 99 mg/dL     Medications   sodium chloride 0.9% BOLUS 1,000 mL (0 mLs Intravenous Stopped 2/3/25 1933)   gadobutrol (GADAVIST) injection 10 mL (10 mLs Intravenous $Given 2/3/25 2046)     Labs Ordered and Resulted from Time of ED Arrival to Time of ED Departure   COMPREHENSIVE METABOLIC PANEL - Abnormal       Result Value    Sodium 133 (*)     Potassium 3.9      Carbon Dioxide (CO2) 18 (*)     Anion Gap 17 (*)     Urea Nitrogen 25.8 (*)     Creatinine 1.02      GFR Estimate 81      Calcium 9.3      Chloride 98      Glucose 283 (*)     Alkaline Phosphatase 51      AST 56 (*)     ALT 58      Protein Total 5.9 (*)     Albumin 3.6      Bilirubin Total 0.6     CBC WITH PLATELETS AND DIFFERENTIAL - Abnormal    WBC Count 5.3      RBC Count 4.37 (*)     Hemoglobin 11.0 (*)     Hematocrit 35.1 (*)     MCV 80      MCH 25.2 (*)     MCHC 31.3 (*)     RDW 18.9 (*)     Platelet Count 178       % Neutrophils 71      % Lymphocytes 24      % Monocytes 4      % Eosinophils 0      % Basophils 0      % Immature Granulocytes 0      NRBCs per 100 WBC 0      Absolute Neutrophils 3.8      Absolute Lymphocytes 1.3      Absolute Monocytes 0.2      Absolute Eosinophils 0.0      Absolute Basophils 0.0      Absolute Immature Granulocytes 0.0      Absolute NRBCs 0.0     BLOOD GAS VENOUS - Abnormal    pH Venous 7.39      pCO2 Venous 46      pO2 Venous 27      Bicarbonate Venous 28      Base Excess/Deficit Venous 2.5      FIO2 21      Oxyhemoglobin Venous 43 (*)     O2 Sat, Venous 44.2 (*)    GLUCOSE BY METER - Abnormal    GLUCOSE BY METER POCT 248 (*)    TROPONIN T, HIGH SENSITIVITY - Normal    Troponin T, High Sensitivity 13     NT PROBNP INPATIENT - Normal    N terminal Pro BNP Inpatient 107     LACTIC ACID WHOLE BLOOD WITH 1X REPEAT IN 2 HR WHEN >2 - Normal    Lactic Acid, Initial 1.7     TROPONIN T, HIGH SENSITIVITY - Normal    Troponin T, High Sensitivity 13     GLUCOSE BY METER POCT   BLOOD CULTURE   BLOOD CULTURE     MR Brain w/o & w Contrast   Final Result   Addendum (preliminary) 1 of 1   CLINICAL ADDENDUM:    Clinical information in this report has been modified from the previous    version as follows: Line in Impression no 2 is modified as follows: Also    in the differential is chronic skull base osteomyelitis in the appropriate    clinical setting (although low    suspicion). Consider follow-up CT in 6-8 weeks to assess for    stability/interval progression.       END ADDENDUM      Final   IMPRESSION:   1.  No acute intracranial abnormality.   2.  Incidental enhancing lesion with lobulated borders located within the sphenoid sinus, (with ground glass appearance along its margins and subtle amorphous calcification centrally on the same day CT). The appearance favors fibrous dysplasia. The    appearance is very atypical for metastatic disease. Also in the differential is chronic skull base osteomyelitis in the  appropriate clinical setting (although extreme suspicion). Consider follow-up CT in 6-8 weeks to assess for stability/interval    progression.      CT Head w/o Contrast   Final Result   IMPRESSION:    1. No acute intracranial pathology.    2. Mixed density changes of the sphenoid sinus. Differential includes   arrested pneumatization (a benign developmental -no touch lesion),   chronic sinusitis, metabolic disorders with osteoclast function   disorders, fibrous dysplasia, and least likely metastatic process.   This has no relation with the current clinical scenario. On a   nonemergent basis, as an out patient, an MRI can be obtained to   further evaluate.       I have personally reviewed the examination and initial interpretation   and I agree with the findings.      JANA COLVIN MD            SYSTEM ID:  [id number]             2009: ankle pain, did not want an X-ray    Critical care was not performed.     Medical Decision Making  The patient's presentation was of high complexity (an acute health issue posing potential threat to life or bodily function).    The patient's evaluation involved:  review of external note(s) from 1 sources (see separate area of note for details)  review of 3+ test result(s) ordered prior to this encounter (see separate area of note for details)  ordering and/or review of 3+ test(s) in this encounter (see separate area of note for details)    The patient's management necessitated high risk (a decision regarding hospitalization).    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      The patient felt that his syncopal episode was due to poor food and fluid intake over the past 2 days in preparation for chemotherapy treatment.  Has had similar symptoms in the past which resolved  after administration of IV fluids.  However, I was concerned about the possibility of adding acute intracranial process given he has projectile vomiting and incontinence at the time of his syncopal episode today.  Completed a sepsis workup and cardiovascular tests as well given findings of hypotension today.  Lactic acid was within normal limits, troponin within normal limits.  pCO2 levels, bicarbonate levels and venous pO2 levels within normal limits.  Normal venous pH today, reassuring that the patient does not have sepsis.  EKG showed no findings consistent with ACS, normal troponin today.  Blood glucose slightly elevated at 283, patient total of 2 L of normal saline at the time of discharge.  No symptoms consistent with diabetic emergency.    I completed a CT study of the head given the patient's projectile vomiting and stool incontinence at the time of syncope, showed mixed density changes in the sphenoid sinus, lesion noted, imaging recommended.  No acute intracranial abnormality noted on MRI imaging.  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  Recommend follow-up CT scan in 6 to 8 weeks to reassess.  ENT referral provided for further evaluation management.  Primary care referral provided as well for follow-up and management given syncopal episodes and hypotension.    The patient appeared well, was stable at the time of discharge and wanted to be discharged.  Vital signs within normal limits at the time of discharge.    Return precautions discussed including worrisome signs and symptoms that warrant urgent medical evaluation.    --    ED Attending Physician Attestation    I Kojo Lane DO, cared for this patient with the Advanced Practice Provider ([facility]). I personally provided a substantive portion of the care for this  patient, including approving the care plan for the number and complexity of problems addressed and taking responsibility related to the risk of complications and/or morbidity or mortality of patient management. Please see the [facility]'s documentation for full details.          Kojo Lane,   Emergency Medicine        I have reviewed the nursing notes. I have reviewed the findings, diagnosis, plan and need for follow up with the patient.    New Prescriptions    No medications on file       Final diagnoses:   Syncope and collapse   Transient hypotension   Lesion or mass of paranasal sinuses     Paulo Morataya PA-C     [facility] EMERGENCY DEPARTMENT  2/3/2025        Paulo Morataya PA-C  02/03/25 2227       Kojo Lane DO  02/03/25 2237

## 2025-02-03 NOTE — ED TRIAGE NOTES
Triage Assessment (Adult)       Row Name 02/03/25 1621          Triage Assessment    Airway WDL WDL        Respiratory WDL    Respiratory WDL WDL

## 2025-02-03 NOTE — PROGRESS NOTES
"Contacted by MR to come to imaging area for fall. RRT initiated.    Arrived to find patient being held up in sitting position on floor by staff, incontinent of stool, diaphoretic, unresponsive, agonal breathing. Placed in recover position. Breathing immediately returned to eupneic and patient became alert, oriented x3. \"I had chemo on Friday which means today is bad day, I probably should have stayed home. Remained in recovery position for 1-2 minutes, slowly sat up with staff assistance, rolled onto knees and judy up into wheelchair with assistance of a  gait belt and x3 stand by assist.    Taken to nursing recovery bay. BP 69/49. IV started, fluid bolus hung per RR protocol. States has not eaten today and is diabetic. POC gluc 228. Stretcher requested. EMS notified after discussing status with patient and wife. Patient requiring transfer to ED for treatment and evaluation.      Diaphoresis begins to increase, states feels unwell and is going to vomit. Projectile vomits large amount of yellow liquid and has another syncopal episode BP 59/39, pulse 60. Immediate begins to come around. No change in respiratory status with this episode. Cart arrives and patient pivot transfers onto cart, and is then laid flat. Immediately states he feels better. Remains flat until EMS arrives.     BP upon departure 80's/40's. Report given to EMS and care safely transferred to them. Wife and daughter present. All personal belongings, including those in locked MRI changing room were received by daughter and wife.  "

## 2025-02-04 LAB
ATRIAL RATE - MUSE: 72 BPM
DIASTOLIC BLOOD PRESSURE - MUSE: NORMAL MMHG
INTERPRETATION ECG - MUSE: NORMAL
P AXIS - MUSE: 57 DEGREES
PR INTERVAL - MUSE: 186 MS
QRS DURATION - MUSE: 92 MS
QT - MUSE: 386 MS
QTC - MUSE: 422 MS
R AXIS - MUSE: -27 DEGREES
SYSTOLIC BLOOD PRESSURE - MUSE: NORMAL MMHG
T AXIS - MUSE: 28 DEGREES
VENTRICULAR RATE- MUSE: 72 BPM

## 2025-02-04 NOTE — DISCHARGE INSTRUCTIONS
You were seen today for evaluation following a syncopal episode in clinic.  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  Recommend follow-up with ENT for further evaluation and management, referral provided.  Recommend repeat CT imaging in 6 to 8 weeks to monitor.    Recommend oral hydration at home.  Seek urgent medical evaluation if you develop worsening lightheadedness or dizziness, fevers, chest pain or shortness of breath, palpitations or abnormal heart beats, worsening abdominal pain, worsening nausea/vomiting, extreme weakness, worsening swelling or pain in the extremities.

## 2025-02-04 NOTE — CONSULTS
[facility]     NEUROSURGERY CONSULT    This consultation was requested by Dr. Lane from the Emergency Department service.    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 He was found to be diaphoretic, unresponsive, and with agonal breathing. Subsequently he regained consciousness and was able to get into a wheelchair. He was found to have profound hypotension (BP 69/49). Transported to the ED for further evaluation. CT head at presentation demonstrated a large lytic lesion in the sphenoid bone. Neurosurgery consulted for further recommendations.     ROS: 10 point ROS in other systems negative other than noted in HPI.    Physical exam:   Blood pressure 108/66, pulse 68, temperature 97.8  F (36.6  C), temperature source Oral, resp. rate 16, SpO2 96%.  General: Generally fatigued, NAD  HEENT: atraumatic, normocephalic  PULM: Unlabored respirations  MSK: Appropriate range of motion  NEUROLOGIC:  -- Awake; Alert; oriented x 3  -- Follows commands briskly  -- Speech fluent, spontaneous. No aphasia or dysarthria.  -- no gaze preference. No apparent hemineglect.  Cranial Nerves:  -- visual fields full to confrontation, PERRL 3-2mm bilat and brisk, extraocular movements intact  -- face symmetrical, tongue midline  -- sensory V1-V3 intact bilaterally  -- palate elevates symmetrically, uvula midline  -- hearing grossly intact bilat  -- Trapezii 5/5 strength bilat symmetric  -- Cerebellar: Finger nose finger without dysmetria, intact rapid alternating motions bilaterally    Motor:  Normal bulk and tone; no tremor, rigidity, or bradykinesia.  No Pronator Drift   Delt Bi Tri Hand Flexion/  Extension  Iliopsoas Quadriceps Hamstrings Tibialis Anterior Gastroc    C5 C6 C7 C8/T1 L2 L3 L4-S1 L4 S1   R 5 5 5 5 5 5 5 5 5   L 5 5 5 5 5 5 5 5 5     Sensory:  intact to LT x 4 extremities to light touch  Reflexes: no clonus, reflexes 2+/4 in bicep, patella and ankle.  Gait: deferred     Pertinent Imaging and Labs:  Imaging Studies:  CTH without contrast - 3.5 cm expansile lesion in the sphenoid bone causing bony erosion of the sinus. No significant hemorrhage.     Labs:  Recent Results (from the past 24 hours)   Glucose by meter    Collection Time: 02/03/25  3:26 PM   Result Value Ref Range    GLUCOSE BY METER POCT 228 (H) 70 - 99 mg/dL   Extra Blue Top Tube    Collection Time: 02/03/25  4:38 PM   Result Value Ref Range    Hold Specimen JIC    Extra Red Top Tube    Collection Time: 02/03/25  4:38 PM   Result Value Ref Range    Hold Specimen JIC    Extra Green Top (Lithium Heparin) Tube    Collection Time: 02/03/25  4:38 PM   Result Value Ref Range    Hold Specimen JIC    Extra Purple Top Tube    Collection Time: 02/03/25  4:38 PM   Result Value Ref Range    Hold Specimen JIC    Troponin T, High Sensitivity    Collection Time: 02/03/25  4:38 PM   Result Value Ref Range    Troponin T, High Sensitivity 13 <=22 ng/L   Nt probnp inpatient (BNP)    Collection Time: 02/03/25  4:38 PM   Result Value Ref Range    N terminal Pro BNP Inpatient 107 0 - 900 pg/mL   EKG 12-lead, tracing only    Collection Time: 02/03/25  5:19 PM   Result Value Ref Range    Systolic Blood Pressure  mmHg    Diastolic Blood Pressure  mmHg    Ventricular Rate 72 BPM    Atrial Rate 72 BPM    FL Interval 186 ms    QRS Duration 92 ms     ms    QTc 422 ms    P Axis 57 degrees    R AXIS -27 degrees    T Axis 28 degrees    Interpretation ECG Sinus rhythm  Normal ECG      Lactic acid whole blood with 1x repeat in 2 hr when >2    Collection Time: 02/03/25  6:41 PM   Result Value Ref Range    Lactic Acid, Initial 1.7 0.7 - 2.0 mmol/L   Blood gas venous     Collection Time: 02/03/25  6:41 PM   Result Value Ref Range    pH Venous 7.39 7.32 - 7.43    pCO2 Venous 46 40 - 50 mm Hg    pO2 Venous 27 25 - 47 mm Hg    Bicarbonate Venous 28 21 - 28 mmol/L    Base Excess/Deficit Venous 2.5 -3.0 - 3.0 mmol/L    FIO2 21     Oxyhemoglobin Venous 43 (L) 70 - 75 %    O2 Sat, Venous 44.2 (L) 70.0 - 75.0 %     ASSESSMENT:  Celestine Simon is a 69 year old male, with notable history of rectal cancer, presenting for evaluation for syncope, profound hypotension, altered mental status, with diagnostic imaging at presentation demonstrating a sphenoid bone mass of unclear significance.     RECOMMENDATIONS:  No neurosurgical intervention indicated at this time   Can consider MRI brain with and without contrast with tumor protocol, no urgency from neurosurgery standpoint  Can consider ENT involvement if ED thinks this finding is contributing to presentation, at this point, do not think this is related clinically  Work-up per primary regarding other causes for syncope, emesis, respiratory decline, failure to thrive. Unlikely for this mass to be causing this etiology.   Neurosurgery will follow peripherally    The patient's presentation, exam, and imaging and the listed recommendations/plan were discussed with Dr. Lester, neurosurgery chief resident, and Dr. Wiliam Lewis, neurosurgery staff.    Ori Yao MD  Neurosurgery  Pager: [phone]

## 2025-02-05 ENCOUNTER — VIRTUAL VISIT (OUTPATIENT)
Dept: ONCOLOGY | Facility: CLINIC | Age: 70
End: 2025-02-05
Attending: NURSE PRACTITIONER
Payer: MEDICARE

## 2025-02-05 DIAGNOSIS — Z80.8 FAMILY HISTORY OF MALIGNANT NEOPLASM OF OTHER ORGANS OR SYSTEMS: ICD-10-CM

## 2025-02-05 DIAGNOSIS — C19 MALIGNANT NEOPLASM OF RECTOSIGMOID JUNCTION (H): Primary | ICD-10-CM

## 2025-02-05 PROCEDURE — 96041 GENETIC COUNSELING SVC EA 30: CPT | Mod: GT,95 | Performed by: GENETIC COUNSELOR, MS

## 2025-02-05 NOTE — PATIENT INSTRUCTIONS
Assessing Cancer Risk  Only about 5-10% of cancers are thought to be due to an inherited cancer susceptibility gene.    These families often have:  Several people with the same or related types of cancer  Cancers diagnosed at a young age (before age 50)  Individuals with more than one primary cancer  Multiple generations of the family affected with cancer    Comprehensive Colon Cancer Panel  We each inherit two copies of every gene in our bodies: one from our mother, and one from our father. Each gene has a specific job to do. When a gene has a mistake or  mutation  in it, it does not work like it should.      This handout will review common hereditary colon cancer syndromes, and other genes related to an increased risk for colon cancer.  The genes that will be discussed in this handout are: APC, BMPR1A, CDH1, CHEK2, EPCAM, GREM1, MLH1, MSH2, MSH6, MUTYH, PMS2, POLD1, POLE, PTEN, SMAD4, STK11, and TP53.  These genes are clinically actionable, meaning there are published guidelines for cancer screening and management for individuals who are found to carry mutations in these genes. Inheriting a mutation does not mean a person will develop cancer, but it does significantly increase his or her risk above the general population risk.      Familial Adenomatous Polyposis (FAP)  FAP is a hereditary cancer syndrome caused by mutations in the APC gene. The condition is known to cause hundreds to thousands of adenomatous polyps in the colon, creating a  carpet  of polyps. Some individuals have what is called attenuated FAP (AFAP), a milder form of FAP with fewer polyps and typically later onset. Individuals with an APC gene mutation are at an increased risk for colon, thyroid, and duodenal cancers, as well as several other types of cancer1.  Other features of this condition may include: osteomas, dental anomalies, benign skin lesions, CHRPE ( freckle  on the inside of the eye), and desmoid tumors.      Lifetime Cancer Risks     Cancer Type General Population FAP   Colon  4.1% near 100%   Thyroid (papillary) 1.2% Up to 12%   Duodenal <1% Up to 10%   Liver (hepatoblastoma)  <1% Up to 2.5% before age 5   Pancreas 1.7% Up to 2%   Stomach <1% Up to 7.1%       Juvenile Polyposis Syndrome (JPS)  JPS is characterized by hamartomatous polyps, called juvenile polyps, in the gastrointestinal tract.  Juvenile  refers to the type of polyps seen in this hereditary cancer condition, not the age of onset. Currently, mutations in two genes are known to cause JPS: BMPR1A and SMAD4. Of individuals clinically diagnosed with JPS, only 40% have an identifiable mutation in one of these genes, suggesting there are other genes that cause JPS that have not been discovered yet. Individuals with JPS are at an increased risk for colon cancer and stomach cancer 2,3,4. Pancreatic and small bowel cancers have also been reported in JPS, but the actual risks are unknown.         Lifetime Cancer Risks   Cancer Type General Population JPS   Colon 4.1% 40-50%   Gastric/Duodenal <1% Up to 21%     Some individuals with SMAD4 mutations have a condition called JPS/HHT (Juvenile Polyposis/Hereditary Hemorrhagic Telangiectasia) where in addition to JPS, individuals may have nose bleeds and clotting issues.     Hereditary Diffuse Gastric Cancer (HDGC)  Currently, mutations in one gene are known to cause Hereditary Diffuse Gastric Cancer: CDH1.  Individuals with HDGC are at increased risk for diffuse gastric cancer and lobular breast cancer. Of people diagnosed with HDGC, 30-50% have a mutation in the CDH1 gene.  This suggests there are likely mutations in other genes that may cause HDGC that have not been identified yet. Individuals with HDGC may also be at increased risk for colon cancer.      Lifetime Cancer Risks   Cancer Type General Population HDGC   Diffuse Gastric <1% 67-83%   Breast 12% 41-60%     Oconnor syndrome  Mutations in five different genes are known to cause Oconnor  syndrome: MLH1, MSH2, MSH6, PMS2, and EPCAM. Individuals with Oconnor syndrome have an increased risk for colon, uterine, ovarian, small bowel, stomach, urinary tract, and brain cancer, as well as several other types of cancer. The exact lifetime cancer risks are dependent upon the gene in which the mutation was identified.      Lifetime Cancer Risks   Cancer Type General Population Oconnor syndrome   Colon 5% Up to 61%   Uterine 2-3% Up to 57%   Stomach <1% Up to 9%   Ovarian 2% Up to 38%   Urinary tract <1% Up to 28%   Hepatobiliary tract <1% Up to 3.7%   Small bowel <1% Up to 11%   Brain/CNS <1% Up to 7.7%   Pancreas <1% Up to 6.2%       MUTYH-Associated Polyposis (MAP)  MAP is a hereditary cancer syndrome caused by mutations in the MUTYH gene. Unlike the other hereditary cancer genes discussed in this handout, two mutations in the MUTYH gene cause MAP and increase cancer risk. Those affected with MAP typically have between  adenomatous polyps. This syndrome also increases the risk for colon and duodenal cancer. Current research suggests that other cancers may be associated with MUTYH mutations, as well. The table below includes the risk that someone with two MUTYH gene mutations would develop cancer in their lifetime; of note, there is also an increased colon cancer risk for individuals who carry only one MUTYH gene mutation5,6,7.       Lifetime Cancer Risks   Cancer Type General Population MAP   Colon 5% 70-90%   Duodenal  <1% 4%       Cowden syndrome  Cowden syndrome is a hereditary condition that increases the risk for breast, thyroid, endometrial, colon, and kidney cancer.  A single mutation in the PTEN gene causes Cowden syndrome and increases cancer risk.  The table below shows the chance that someone with a PTEN mutation would develop cancer in their lifetime8,9.  Other benign features seen in some individuals with Cowden syndrome include benign skin lesions (facial papules, keratoses, lipomas),  learning disabilities, autism, thyroid nodules, hamartomatous colon polyps, and larger head size.      Lifetime Cancer Risks   Cancer Type General Population Cowden Syndrome   Breast 12% 40-60%*   Thyroid 1% 35%   Renal 1-2% 34%   Endometrial 2-3% 28%   Colon 4.1% 9%   Melanoma 2-3% Up to 6%     *One recent study found breast cancer risk to be increased to 85%    Peutz-Jeghers syndrome (PJS)  PJS is a hereditary cancer syndrome caused by mutations in the STK11 gene. This condition can be distinguished from other hereditary syndromes by the presence of hamartomatous polyps in the gastrointestinal tract and freckles present in unusual places such as the hands, feet, neck, and lips. Individuals with Peutz-Jeghers syndrome have an increased risk for colon, breast, pancreatic, and other cancers3.  Men are at risk for testicular tumors which can affect hormones in the body. Women are at risk for sex cord tumors of the ovaries and a rare aggressive type of cervical cancer.     Lifetime Cancer Risks   Cancer Type General Population PJS   Breast 12% 32-54%   Colon 4.1% 39%   Stomach <1% 29%   Pancreas 1.5% 11-36%   Small Intestine <1% 13%   Ovarian  <2%  >20%   Lung 6-7% 7-17%     Additional Genes Associated with Increased Colon Cancer Risk  CHEK2  CHEK2 is a moderate-risk breast cancer gene.  Women who have a mutation in CHEK2 have around a 2-fold increased risk for breast cancer compared to the general population, and this risk may be higher depending upon family history.12,13,14. Mutations in CHEK2 have also been shown to increase the risk of a number of other cancers, including colon and prostate, however these cancer risks are currently not well understood.     GREM1  GREM1 is a moderate-risk colon polyposis gene. Duplications of this gene are more commonly found in individuals with [employer] [id number]. Mutations in GREM1 are associated with colon polyps and therefore an increased risk of colon cancer; however  the estimated cancer risk is not well [id number].     POLD1 and POLE  POLD1 and POLE are moderate-risk colon cancer genes. Carriers of a mutation in one of these genes increases the lifetime risk of colorectal [id number],18,19,20. Mutations in these genes may also be associated with increased risk for other cancers including: endometrial cancer, duodenal adenomas and carcinomas, and brain tumors.    TP53  Li Fraumeni syndrome (LFS) is a hereditary cancer predisposition syndrome. LFS is caused by a mutation in the TP53 gene. A single mutation in one of the copies of TP53 increases the risk for multiple cancers. Individuals with LFS are at an increased risk for developing cancer at a young age. The general lifetime risk for development of cancer is 50% by age 30 and 90% by age 60.      Core Cancers: Sarcomas, Breast, Brain, Lung, Leukemias/Lymphomas, Adrenocortical carcinomas  Other Cancers: Gastrointestinal, Thyroid, Skin, Genitourinary    Genetic Testing  Genetic testing involves a simple blood test and will look at the genetic information in genes associated with an increased risk of colon cancer. The tests look for any harmful mutations that are associated with increased cancer risk.  If possible, it is recommended that the person(s) who has had cancer be tested before other family members.  That person will give us the most useful information about whether or not a specific gene mutation is associated with the cancer in the family.     Results  There are three possible results from genetic testing:  Positive--a harmful mutation was identified  Negative--no mutation was identified  Variant of unknown significance--a variation in one of the genes was identified, but it is unclear how this impacts cancer risk in the family  Advantages and Disadvantages  There are advantages and disadvantages to genetic testing of these genes.    Advantages  May clarify your cancer risk  Can help you make medical decisions  May  explain the cancers in your family  May give useful information to your family members (if you share your results)    Disadvantages  Possible negative emotional impact of learning about inherited cancer risk  Uncertainty in interpreting a negative test result in some situations  Possible genetic discrimination concerns (see below)    Inheritance   Most mutations in the genes outlined above are inherited in an autosomal dominant pattern.  This means that if a parent has a mutation, each of their children will have a 50% chance of inheriting that same mutation.  Therefore, each child--male or female--would have a 50% chance of being at increased risk for developing cancer.                                              Image obtained from "[employer] Reference, 2013     In the case of MUTYH-Associated Polyposis (MAP) this hereditary cancer syndrome is inherited in an autosomal recessive pattern. This means that each parent of an individual with MAP is a carrier of MAP, meaning that they have only one mutation in MUTYH. They still have one functioning copy of their gene.  Carriers are at a slightly higher risk for colon cancer than the general population. If each parent is a carrier for MAP, they have a 25% of having a child who is affected with MAP, meaning the child inherited both gene mutations - one from each parent.       Image obtained from "[employer] Reference, 2016    Genetic Information Nondiscrimination Act (CLAUDIO)  The Genetic Information Nondiscrimination Act of 2008 (CLAUDIO) is a federal law that protects individuals from health insurance or employment discrimination based on a genetic test result alone (with some exceptions, including employers with fewer than 15 employees, and ).  Although rare, CLAUDIO  does not cover discrimination protections in terms of life insurance, long term care, or disability insurances.  Visit the [employer] Research [place] website to learn more. Visit the  National Human Genome Research [place] at Genome.gov/32683710 to learn more.    Reducing Cancer Risk  Each of the genes listed within this handout have nationally recognized cancer screening guidelines that would be recommended for individuals who test positive.  In addition to increased cancer screening, surgeries may be offered or recommended to reduce cancer risk in certain cases.  Recommendations are based upon an individual s genetic test result as well as their personal and family history of cancer.    Questions to Think About Regarding Genetic Testing  What effect will the test result have on me and my relationship with my family members if I have an inherited gene mutation?  If I don t have a gene mutation?  Should I share my test results, and how will my family react to this news, which may also affect them?  Are my children ready to learn new information that may one day affect their own health?    Resources    PTEN World [email or web address]   No Stomach for Cancer, Inc. nostomachforcancer.org   Stomach Cancer Relief Network scrnet.org   Collaborative Group of the Americas on Inherited Colorectal Cancer (CGA) cgaicc.com   Cancer Care cancercare.org   American Cancer Society (ACS) cancer.org   National Cancer [place] (NCI) cancer.org   Oconnor Syndrome International [email or web address]       Please call us if you have any questions or concerns.     Cancer Risk Management Program [phone] ([phone])  Kevin Chaidez, MS [facility] [phone]  Agnieszka Saab, MS, [facility] [phone]  Latrice Ortega, MS, [facility] [place]  [email or web address]  Melia Christianson, MS, [facility]  [phone]  Aidee Horn, MS, [facility]  [phone]  Garima Javier, MS, [facility] [phone]  Elizabeth Gustafson, MS, [facility] [phone]    References    Susan FLOR, Matt J, Je G, Mathieu E, Katty J, et al. The Prevalence of thyroid cancer and benign thyroid disease in patients with familial adenomatous polyposis may be higher than previously recognized. Clin  Colorectal Cancer. 2012;11:304-308.  Anup L, Ramesh Acevedo A, Daron L, Octavia MANN, Gloria K, et al. Risk of colorectal cancer in juvenile polyposis. Gut. 2007;56:965-967.  Bala FG, Dariel MN, Jeffrey CA. Colorectal cancer risk in hamartomatous polyposis syndromes. World Journal of Gastrointestinal Surgery. 2015;27:25-32  Cristina MAGANA. Guidance on gastrointestinal surveillance for hereditary non-polyposis colorectal cancer, familial adenomatous polypolis, juvenile polyposis, and Peutz-Jeghers syndrome. Gut. 2002;51:21-27.  Hernandez AK, Mir BRIDGETTE, Sonya JG et al. Risk of extracolonic cancers for people with biallelic and monoallelic mutations in MUTYH. Int J of Cancer. 2016;139:4992-4030.  Alphonso S, Rachel S, Betty H, [place]mira K, Cevallos M, et al. MUTYH-associated polyposis: 70 of 71 patients with biallelic mutations present with an attenuated or atypical phenotype. Int J of Cancer. 2006;119:807-814.  Franky G, Pérez F, Ngo I, Pinchev M, [place] H, et al. MUTYH mutation carriers have increased breast cancer risk. Cancer. 2012;8325-3322.  Ruddy MH, Lotus J, Gee J, Manolo LA, Orjer MS, Eng C. Lifetime cancer risks in individuals with germline PTEN mutations. Clin Cancer Res. 2012;18:400-7.  Ernestine R. Cowden Syndrome: A Critical Review of the Clinical Literature. J Mine . 2009:18:13-27.  National Comprehensive Cancer Network. Clinical practice guidelines in oncology, colorectal cancer screening. Available online (registration required). 2013.  National Cancer Mobile. SEER Cancer Stat Fact Sheets.  December 2013.  CHEK2 Breast Cancer Case-Control Consortium. CHEK2*1100delC and susceptibility to breast cancer: A collaborative analysis involving 10,860 breast cancer cases and 9,065 controls from 10 studies. Am J Hum Mine, 74 (2004), pp. 0773-8150  Ingrid T, Dinaa S, Orion K, et al. Spectrum of Mutations in BRCA1, BRCA2, CHEK2, and TP53 in Families at High Risk of Breast Cancer. ILA.  2006;295(12):3910-9861.  Rashmi MANN, Stanley D, Padmini GOMES, et al. Risk of breast cancer in women with a CHEK2 mutation with and without a family history of breast cancer. J Clin Oncol. 2011;29:1665-4629.  Belia ALSTON, Anahy E, Mohit J, Hosea N, Nataliya M et al. Defining the polyposis/colorectal cancer phenotype associated with the GREM1 duplication: counseling and management guidelines. Mine .Res. 2016;98:1-5.  Hilary E, Heriberto S, Zachariah GOMES, Lyric Robledo, et al. Hereditary mixed polyposis syndrome is caused by a 40kb upstream duplication that leads to increased and ectopic expression of the BMP antagonist GREM1. [place] Mine. 2015;44:699-703.  MACKENZIE Rowland. et al. Germline mutations affecting the proofreading domains of POLE and POLD1 predispose to colorectal adenomas and carcinomas. Brielle. Mine. 45, 136-44 (2013).  TREVA Escobar. et al. POLE and POLD1 mutations in 529 emeterio with familial colorectal cancer and/or polyposis: review of reported cases and recommendations for genetic testing and surveillance. Mine. Med. (2015). doi:10.1038/gim.2015.75  VASQUEZ Hinojosa. et al. New insights into POLE and POLD1 germline mutations in familial colorectal cancer and polyposis. Hum. Mol. Mine. 23, 8362-12 (2014).  BOBBY Younger. et al. Frequency and phenotypic spectrum of germline mutations in POLE and seven other polymerase genes in 266 patients with colorectal adenomas and carcinomas. Int. J. Cancer 137, 320-31 (2015).

## 2025-02-05 NOTE — LETTER
"2/5/2025      Celestine Simon  [address]  [facility] [address]      Dear Colleague,    Thank you for referring your patient, Celestine Simon, to the [facility]. Please see a copy of my visit note below.    Cancer Risk Management Program Genetic Counseling Note    2/5/2025    Referring Provider: Shireen Villarreal NP    Presenting Information:   I had a video visit with Celestine Simon today for genetic counseling through the Cancer Risk Management Program, in order to discuss his personal history of colon cancer and his family history of cancer. He presents today to review this history, cancer screening recommendations, and available genetic testing options.    Personal History:  Celestine is a 69 year old male. In May 2024, Celestine underwent colon cancer screening through a Cologuard stool-based test, which was positive. Celestine reports that he did have a previous history of some rectal bleeding and anemia. In October, Celestine underwent a colonoscopy, which noted a rectosigmoid mass. A biopsy of that mass was consistent with a colorectal adenocarcinoma. Normal IHC staining was noted for the MLH1, MSH2, MSH6, and PMS2 proteins. Celestine began his chemotherapy treatments for his colorectal cancer in November. 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 Celestine reports that after his initial chemotherapy, it is his understanding that radiation therapy is planned, and then surgery after that.     In other medical history, it is reported in Celestine's chart that he has a diagnosis of type II diabetes. It is also reported that he has been diagnosed with hypertension. With respect to his previous cancer screening, Celestine underwent a colonoscopy screening in 2008, which noted 1 " hyperplastic polyp. His follow-up colonoscopy in 2018 noted 1 colon polyp; I did not have a pathology report to review today from this, but it was recommended that a follow-up colonoscopy be done in 5 years from then. Celestine has also had a previous fine needle aspiration of nodules in his thyroid; these were not noted to be cancerous. Celestine repots that he has not needed any skin biopsies. Celestine reports that he has not had any abnormal PSA; there were no PSA levels noted in his chart.     Family History: (Please see scanned pedigree for detailed family history information)  As noted above, Celestine was diagnosed with colon cancer at age 69.    Celestine reports that neither one of his children has yet had a colonoscopy; we did talk about that because they are in their early 40s, it would be recommended that they undergo colonoscopy screening at this time given Celestine's colon cancer diagnosis.  Celestine reports that it is suspected that his mother may have had a cancer at the end of her life (in her 90s) but that this was not investigated because of her other health issues.  Celestine reports that both his brother and his mother had a history of atypical medication side effect experiences.  Celestine reports that his paternal grandfather had a history of lung issues, which may have been a cancer, but he was not sure about that history.  Celestine states that one of his maternal first cousins  of cancer in his 30s or 40s, but he wasn't sure about the type of cancer.  Celestine is not aware of other cancers on his mother's side of the family, but he does report limited medical information about several of the family members on that side of the family.  On the other side of the family, Celestine reports that he has a paternal first cousin that had a history of Hodgkin lymphoma at age 21, which was successfully treated but that he had heart damage from the radiation therapy, which caused his death in his 60s.  Celestine reports that his paternal grandmother may have had a  cancer, but he wasn't sure about this history.  In other medical history, Celestine reports that one of his paternal first cousins had a baby that  of complications of Al syndrome. He reports no known family history of intellectual disabilities or obvious skin abnormalities.  Information about genetic ancestry was collected, as specific genetic variants may be more common in certain ethnic backgrounds. Celestine reports that his family is of [place] and Persian ancestry. There is no known Ashkenazi Buddhism ancestry on either side of his family. There is no reported consanguinity.    Discussion:  We reviewed the features of sporadic, familial, and hereditary cancers. We discussed that interpretation of his family history for evidence of a hereditary cancer syndrome is somewhat limited by the fact that he does not know a lot of details about his family history of cancer.    We discussed the natural history and genetics of hereditary cancer syndromes. A detailed handout regarding hereditary cancer syndromes associated with colon cancer and the information we discussed was provided to Celestine after our appointment today and can be found in the after visit summary. Topics included: inheritance pattern, cancer risks, cancer screening recommendations, and also risks, benefits and limitations of testing.    We discussed that there are a wide variety of genes that could cause increased risk for colon (and other) cancer. As many of these genes present with overlapping features in a family and accurate cancer risk cannot always be established based upon the pedigree analysis alone, it would be reasonable for Celestine to consider panel genetic testing to analyze multiple genes at once.    Celestine stated that he was interested in pursuing genetic testing through a panel of genes associated with hereditary cancer syndromes, and so we reviewed the various panel options and their potential benefits and limitations.  After this conversation, Celestine decided  that he was interested in the Common Hereditary Cancers genetic testing panel through [facility].       Medical Management: For Celestine, we reviewed that the information from genetic testing may determine:  surgery to treat Celestine's recent cancer diagnosis (eg. partial versus total colectomy),  additional cancer screening for which Celestine may qualify (eg. upper endoscopies, more frequent dermatologic exams, etc.),  and targeted therapies for Celestine's recent cancer, or if he were to develop certain cancers in the future (eg. immunotherapy for individuals with Oconnor syndrome, PARP inhibitors for HBOC syndrome, etc.).     These recommendations and possible targeted therapies will be discussed in detail once genetic testing is completed.     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  We reviewed the inheritance pattern of mutations in the DPYD gene and that it would be expected that Celestine's children each had a 50% chance of inheriting a copy of this DPYD gene mutation. Similarly, it is likely that Celestine inherited this mutation from one of his parents, and so it's possible that some of Celestine's extended relatives also have this DPYD gene mutation. We talked about that the DPYD enzyme made from the DPYD gene is important for metabolizing certain chemotherapy medications, in particular, fluoropyrimidine drugs such as 5-fluorouracil. Therefore, this is important information for family members to be aware of. We did also briefly talk about that [facility] has a Medication Therapy Management clinic that does have genetic counselors and pharmacists that have specialized in pharmacogenomic-related genetic testing and medication management ([email or web address]).    Plan:  1) Today, Celestine decided to proceed with the Common Hereditary Cancers genetic testing panel through [facility]. Therefore, consent was  reviewed and verbally obtained for this testing.    2) We reviewed the options for sample collection.  Celestine plans to have her blood drawn at his next planned lab appointment. Test results are expected to be available within 4-6 weeks.  3) Celestine will either have a telephone visit or video visit to discuss the results.  Additional recommendations about screening will be made at that time.    Latrice Ortega MS, [facility]  Genetic Counselor  Cancer Risk Management Program      I spent 70 minutes on the date of the encounter doing chart review, history and exam, documentation and further activities as noted above.    Virtual Visit Details    Type of service:  Video Visit   Video Start Time:  8:02AM  Video End Time: 8:56AM    Originating Location (pt. Location): Home  Distant Location (provider location):  Off-site  Platform used for Video Visit: She      Again, thank you for allowing me to participate in the care of your patient.        Sincerely,        Corina Ortega GC    Electronically signed

## 2025-02-05 NOTE — NURSING NOTE
Current patient location: [address]  [facility] [address]      Is the patient currently in the state of [place]? YES    Visit mode:VIDEO    If the visit is dropped, the patient can be reconnected by: VIDEO VISIT: Send to e-mail at: [email or web address]    Will anyone else be joining the visit? NO  (If patient encounters technical issues they should call [phone])    How would you like to obtain your AVS? MyChart    Are changes needed to the allergy or medication list? N/A    Reason for visit: Consult      Mary CARDOSO

## 2025-02-05 NOTE — PROGRESS NOTES
2/5/2025    Referring Provider: ***    Presenting Information:   I met with Celestine Simon today for genetic counseling at the Cancer Risk Management Program at the *** to discuss his personal and family history of *** cancer.  He is here today to review this history, cancer screening recommendations, and available genetic testing options.    Personal History:  Celestine is a 69 year old male. He was diagnosed with ***; treatment included ***  / does not have any personal history of cancer.    ***He had her first menstrual period at age ***, her first child at age ***, and is ***.  ***Celestine has her ovaries, fallopian tubes and uterus in place, and she has had *** ovarian cancer screening to date. She reports that she *** hormone replacement therapy.      She has *** clinical breast exams and mammograms; her most recent mammogram in *** was ***. *** Celestine began having colonoscopies at the age of ***/Celestine has not had a colonoscopy. His most recent colonoscopy in *** was *** and follow-up was recommended in ***. *** He does not regularly do any other cancer screening at this time. Celestine reported *** tobacco use and *** alcohol use.    Family History: (Please see scanned pedigree for detailed family history information)  ***  ***  His maternal ethnicity is ***. His paternal ethnicity is ***.  There is no known Ashkenazi Jainism ancestry on either side of his family. There is no reported consanguinity.    Discussion:  Celestine's personal and family history of *** is suggestive of a hereditary cancer syndrome.  We reviewed the features of sporadic, familial, and hereditary cancers. In looking at Celestine's family history, it is possible that a cancer susceptibility gene is present due to ***.  We discussed the natural history and genetics of ***. A detailed handout regarding *** and the information we discussed was provided to Celestine at the end of our appointment today and can be found in the after visit summary. Topics included: inheritance pattern,  cancer risks, cancer screening recommendations, and also risks, benefits and limitations of testing.  ***  Based on his personal and family history, Celestien meets current National Comprehensive Cancer Network (NCCN) criteria for genetic testing of ***.    We discussed that there are additional genes that could cause increased risk for *** cancer. As many of these genes present with overlapping features in a family and accurate cancer risk cannot always be established based upon the pedigree analysis alone, it would be reasonable for Celestine to consider panel genetic testing to analyze multiple genes at once.  ***  Genetic testing is available for: ***.    Medical Management: For Celestine, we reviewed that the information from genetic testing may determine:  ***surgery to treat Celestine's active cancer diagnosis (i.e. lumpectomy versus bilateral mastectomy, partial versus total colectomy, etc.***),  additional cancer screening for which Celestine may qualify (i.e. mammogram and breast MRI, more frequent colonoscopies, more frequent dermatologic exams, etc.***),  options for risk reducing surgeries Celestine could consider (i.e. bilateral mastectomy, surgery to remove her ovaries and/or uterus, etc.***),    and targeted chemotherapies for Celestine's *** active cancer, or ***if he were to develop certain cancers in the future (i.e. immunotherapy for individuals with Oconnor syndrome, PARP inhibitors, etc.***).   These recommendations and possible targeted chemotherapies will be discussed in detail once genetic testing is completed.     Plan:  1) Today Celestine elected to proceed with ***.  2) This information should be available in 4-6*** weeks.  3) Celestine will return to clinic to discuss the results.    I spent *** minutes on the date of the encounter doing chart review, history and exam, documentation and further activities as noted above.    ***     Virtual Visit Details    Type of service:  Video Visit   Video Start Time: {video visit start/end time for  "provider to select:314497}  Video End Time:{video visit start/end time for provider to select:926756}    Originating Location (pt. Location): {video visit patient location:528991::\"Home\"}  {PROVIDER LOCATION On-site should be selected for visits conducted from your clinic location or adjoining [facility] hospital, academic office, or other nearby [facility] building. Off-site should be selected for all other provider locations, including home:262653}  Distant Location (provider location):  {virtual location provider:[id number]}  Platform used for Video Visit: {Virtual Visit Platforms:462539::\"[employer]"}  " partial versus total colectomy),  additional cancer screening for which Celestine may qualify (eg. upper endoscopies, more frequent dermatologic exams, etc.),  and targeted therapies for Celestine's recent cancer, or if he were to develop certain cancers in the future (eg. immunotherapy for individuals with Oconnor syndrome, PARP inhibitors for HBOC syndrome, etc.).     These recommendations and possible targeted therapies will be discussed in detail once genetic testing is completed.     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  We reviewed the inheritance pattern of mutations in the DPYD gene and that it would be expected that Celestine's children each had a 50% chance of inheriting a copy of this DPYD gene mutation. Similarly, it is likely that Celestine inherited this mutation from one of his parents, and so it's possible that some of Celestine's extended relatives also have this DPYD gene mutation. We talked about that the DPYD enzyme made from the DPYD gene is important for metabolizing certain chemotherapy medications, in particular, fluoropyrimidine drugs such as 5-fluorouracil. Therefore, this is important information for family members to be aware of. We did also briefly talk about that [facility] has a Medication Therapy Management clinic that does have genetic counselors and pharmacists that have specialized in pharmacogenomic-related genetic testing and medication management ([email or web address]).    Plan:  1) Today, Celestine decided to proceed with the Common Hereditary Cancers genetic testing panel through [facility]. Therefore, consent was reviewed and verbally obtained for this testing.    2) We reviewed the options for sample collection.  Celestine plans to have her blood drawn at his next planned lab appointment. Test results are expected to be available within 4-6 weeks.  3) Celestine will either have  a telephone visit or video visit to discuss the results.  Additional recommendations about screening will be made at that time.    Latrice Ortega MS, [facility]  Genetic Counselor  Cancer Risk Management Program      I spent 70 minutes on the date of the encounter doing chart review, history and exam, documentation and further activities as noted above.    Virtual Visit Details    Type of service:  Video Visit   Video Start Time:  8:02AM  Video End Time: 8:56AM    Originating Location (pt. Location): Home  Distant Location (provider location):  Off-site  Platform used for Video Visit: [employer]

## 2025-02-06 LAB
BACTERIA BLD CULT: NORMAL
BACTERIA BLD CULT: NORMAL

## 2025-02-07 ENCOUNTER — HOME INFUSION BILLING (OUTPATIENT)
Dept: HOME HEALTH SERVICES | Facility: HOME HEALTH | Age: 70
End: 2025-02-07
Payer: MEDICARE

## 2025-02-08 LAB
BACTERIA BLD CULT: NO GROWTH
BACTERIA BLD CULT: NO GROWTH

## 2025-02-11 ENCOUNTER — HOSPITAL ENCOUNTER (OUTPATIENT)
Dept: MRI IMAGING | Facility: CLINIC | Age: 70
Discharge: HOME | End: 2025-02-11
Attending: INTERNAL MEDICINE
Payer: MEDICARE

## 2025-02-11 DIAGNOSIS — C19 MALIGNANT NEOPLASM OF RECTOSIGMOID JUNCTION (H): ICD-10-CM

## 2025-02-11 PROCEDURE — 255N000002 HC RX 255 OP 636: Performed by: INTERNAL MEDICINE

## 2025-02-11 PROCEDURE — 72197 MRI PELVIS W/O & W/DYE: CPT

## 2025-02-11 PROCEDURE — 250N000011 HC RX IP 250 OP 636: Performed by: INTERNAL MEDICINE

## 2025-02-11 PROCEDURE — A9585 GADOBUTROL INJECTION: HCPCS | Performed by: INTERNAL MEDICINE

## 2025-02-11 PROCEDURE — 72197 MRI PELVIS W/O & W/DYE: CPT | Mod: 26 | Performed by: RADIOLOGY

## 2025-02-11 RX ORDER — GADOBUTROL 604.72 MG/ML
10 INJECTION INTRAVENOUS ONCE
Status: COMPLETED | OUTPATIENT
Start: 2025-02-11 | End: 2025-02-11

## 2025-02-11 RX ADMIN — GADOBUTROL 10 ML: 604.72 INJECTION INTRAVENOUS at 09:52

## 2025-02-11 RX ADMIN — GLUCAGON 1 MG: KIT at 10:08

## 2025-02-12 ENCOUNTER — HOME INFUSION (OUTPATIENT)
Dept: HOME HEALTH SERVICES | Facility: HOME HEALTH | Age: 70
End: 2025-02-12
Payer: MEDICARE

## 2025-02-12 ENCOUNTER — VIRTUAL VISIT (OUTPATIENT)
Dept: ONCOLOGY | Facility: CLINIC | Age: 70
End: 2025-02-12
Attending: INTERNAL MEDICINE
Payer: MEDICARE

## 2025-02-12 DIAGNOSIS — C19 MALIGNANT NEOPLASM OF RECTOSIGMOID JUNCTION (H): Primary | ICD-10-CM

## 2025-02-12 PROCEDURE — 98007 SYNCH AUDIO-VIDEO EST HI 40: CPT | Performed by: INTERNAL MEDICINE
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
## 2025-02-12 NOTE — PROGRESS NOTES
Virtual Visit Details    Type of service:  Video Visit   Type of service:  Video Visit   Video Start Time:  8:05  am  Video End Time: 8:24 am    Originating Location (pt. Location): Home  Distant Location (provider location):  On-site  Platform used for Video Visit: She Zabala CMA on 2/12/2025 at 7:45 AM

## 2025-02-14 ENCOUNTER — HOME INFUSION BILLING (OUTPATIENT)
Dept: HOME HEALTH SERVICES | Facility: HOME HEALTH | Age: 70
End: 2025-02-14
Payer: MEDICARE

## 2025-02-14 PROCEDURE — A4222 INFUSION SUPPLIES WITH PUMP: HCPCS

## 2025-02-14 PROCEDURE — E1399 DURABLE MEDICAL EQUIPMENT MI: HCPCS

## 2025-02-14 PROCEDURE — A9270 NON-COVERED ITEM OR SERVICE: HCPCS

## 2025-02-14 PROCEDURE — A4221 SUPP NON-INSULIN INF CATH/WK: HCPCS

## 2025-02-16 RX ORDER — HEPARIN SODIUM (PORCINE) LOCK FLUSH IV SOLN 100 UNIT/ML 100 UNIT/ML
5 SOLUTION INTRAVENOUS
Status: CANCELLED | OUTPATIENT
Start: 2025-02-18

## 2025-02-16 RX ORDER — ALBUTEROL SULFATE 0.83 MG/ML
2.5 SOLUTION RESPIRATORY (INHALATION)
Status: CANCELLED | OUTPATIENT
Start: 2025-02-16

## 2025-02-16 RX ORDER — HEPARIN SODIUM (PORCINE) LOCK FLUSH IV SOLN 100 UNIT/ML 100 UNIT/ML
5 SOLUTION INTRAVENOUS
Status: CANCELLED | OUTPATIENT
Start: 2025-02-16

## 2025-02-16 RX ORDER — LORAZEPAM 2 MG/ML
0.5 INJECTION INTRAMUSCULAR EVERY 4 HOURS PRN
Status: CANCELLED | OUTPATIENT
Start: 2025-02-16

## 2025-02-16 RX ORDER — EPINEPHRINE 1 MG/ML
0.3 INJECTION, SOLUTION, CONCENTRATE INTRAVENOUS EVERY 5 MIN PRN
Status: CANCELLED | OUTPATIENT
Start: 2025-02-16

## 2025-02-16 RX ORDER — DIPHENHYDRAMINE HYDROCHLORIDE 50 MG/ML
25 INJECTION INTRAMUSCULAR; INTRAVENOUS
Status: CANCELLED
Start: 2025-02-16

## 2025-02-16 RX ORDER — HEPARIN SODIUM,PORCINE 10 UNIT/ML
5-20 VIAL (ML) INTRAVENOUS DAILY PRN
Status: CANCELLED | OUTPATIENT
Start: 2025-02-16

## 2025-02-16 RX ORDER — HEPARIN SODIUM,PORCINE 10 UNIT/ML
5-20 VIAL (ML) INTRAVENOUS DAILY PRN
Status: CANCELLED | OUTPATIENT
Start: 2025-02-18

## 2025-02-16 RX ORDER — ONDANSETRON 2 MG/ML
8 INJECTION INTRAMUSCULAR; INTRAVENOUS ONCE
Status: CANCELLED | OUTPATIENT
Start: 2025-02-16

## 2025-02-16 RX ORDER — MEPERIDINE HYDROCHLORIDE 25 MG/ML
25 INJECTION INTRAMUSCULAR; INTRAVENOUS; SUBCUTANEOUS
Status: CANCELLED | OUTPATIENT
Start: 2025-02-16

## 2025-02-16 RX ORDER — METHYLPREDNISOLONE SODIUM SUCCINATE 40 MG/ML
40 INJECTION INTRAMUSCULAR; INTRAVENOUS
Status: CANCELLED
Start: 2025-02-16

## 2025-02-16 RX ORDER — DIPHENHYDRAMINE HYDROCHLORIDE 50 MG/ML
50 INJECTION INTRAMUSCULAR; INTRAVENOUS
Status: CANCELLED
Start: 2025-02-16

## 2025-02-16 RX ORDER — ALBUTEROL SULFATE 90 UG/1
1-2 INHALANT RESPIRATORY (INHALATION)
Status: CANCELLED
Start: 2025-02-16

## 2025-02-17 ENCOUNTER — INFUSION THERAPY VISIT (OUTPATIENT)
Dept: INFUSION THERAPY | Facility: CLINIC | Age: 70
End: 2025-02-17
Attending: NURSE PRACTITIONER
Payer: MEDICARE

## 2025-02-17 ENCOUNTER — DOCUMENTATION ONLY (OUTPATIENT)
Dept: HOME HEALTH SERVICES | Facility: HOME HEALTH | Age: 70
End: 2025-02-17
Payer: MEDICARE

## 2025-02-17 ENCOUNTER — INFUSION THERAPY VISIT (OUTPATIENT)
Dept: INFUSION THERAPY | Facility: CLINIC | Age: 70
End: 2025-02-17
Attending: INTERNAL MEDICINE
Payer: MEDICARE

## 2025-02-17 VITALS
HEART RATE: 91 BPM | TEMPERATURE: 98.3 F | BODY MASS INDEX: 34.48 KG/M2 | RESPIRATION RATE: 18 BRPM | DIASTOLIC BLOOD PRESSURE: 74 MMHG | SYSTOLIC BLOOD PRESSURE: 129 MMHG | WEIGHT: 254.2 LBS

## 2025-02-17 VITALS — DIASTOLIC BLOOD PRESSURE: 82 MMHG | HEART RATE: 68 BPM | SYSTOLIC BLOOD PRESSURE: 137 MMHG

## 2025-02-17 DIAGNOSIS — Z80.8 FAMILY HISTORY OF MALIGNANT NEOPLASM OF OTHER ORGANS OR SYSTEMS: ICD-10-CM

## 2025-02-17 DIAGNOSIS — T45.1X5A CHEMOTHERAPY-INDUCED NEUTROPENIA: Primary | ICD-10-CM

## 2025-02-17 DIAGNOSIS — C19 MALIGNANT NEOPLASM OF RECTOSIGMOID JUNCTION (H): ICD-10-CM

## 2025-02-17 DIAGNOSIS — D70.1 CHEMOTHERAPY-INDUCED NEUTROPENIA: ICD-10-CM

## 2025-02-17 DIAGNOSIS — C19 MALIGNANT NEOPLASM OF RECTOSIGMOID JUNCTION (H): Primary | ICD-10-CM

## 2025-02-17 DIAGNOSIS — D70.1 CHEMOTHERAPY-INDUCED NEUTROPENIA: Primary | ICD-10-CM

## 2025-02-17 DIAGNOSIS — T45.1X5A CHEMOTHERAPY-INDUCED NEUTROPENIA: ICD-10-CM

## 2025-02-17 LAB
ALBUMIN SERPL BCG-MCNC: 3.9 G/DL (ref 3.5–5.2)
ALP SERPL-CCNC: 59 U/L (ref 40–150)
ALT SERPL W P-5'-P-CCNC: 19 U/L (ref 0–70)
ANION GAP SERPL CALCULATED.3IONS-SCNC: 8 MMOL/L (ref 7–15)
AST SERPL W P-5'-P-CCNC: 21 U/L (ref 0–45)
BASOPHILS # BLD AUTO: 0 10E3/UL (ref 0–0.2)
BASOPHILS NFR BLD AUTO: 1 %
BILIRUB SERPL-MCNC: 0.4 MG/DL
BUN SERPL-MCNC: 18.9 MG/DL (ref 8–23)
CALCIUM SERPL-MCNC: 10 MG/DL (ref 8.8–10.4)
CHLORIDE SERPL-SCNC: 100 MMOL/L (ref 98–107)
CREAT SERPL-MCNC: 0.91 MG/DL (ref 0.67–1.17)
EGFRCR SERPLBLD CKD-EPI 2021: >90 ML/MIN/1.73M2
EOSINOPHIL # BLD AUTO: 0.2 10E3/UL (ref 0–0.7)
EOSINOPHIL NFR BLD AUTO: 4 %
ERYTHROCYTE [DISTWIDTH] IN BLOOD BY AUTOMATED COUNT: 18.9 % (ref 10–15)
GLUCOSE SERPL-MCNC: 140 MG/DL (ref 70–99)
HCO3 SERPL-SCNC: 28 MMOL/L (ref 22–29)
HCT VFR BLD AUTO: 35.5 % (ref 40–53)
HGB BLD-MCNC: 11.1 G/DL (ref 13.3–17.7)
IMM GRANULOCYTES # BLD: 0 10E3/UL
IMM GRANULOCYTES NFR BLD: 1 %
LYMPHOCYTES # BLD AUTO: 1.1 10E3/UL (ref 0.8–5.3)
LYMPHOCYTES NFR BLD AUTO: 29 %
MCH RBC QN AUTO: 25.7 PG (ref 26.5–33)
MCHC RBC AUTO-ENTMCNC: 31.3 G/DL (ref 31.5–36.5)
MCV RBC AUTO: 82 FL (ref 78–100)
MONOCYTES # BLD AUTO: 0.7 10E3/UL (ref 0–1.3)
MONOCYTES NFR BLD AUTO: 18 %
NEUTROPHILS # BLD AUTO: 1.9 10E3/UL (ref 1.6–8.3)
NEUTROPHILS NFR BLD AUTO: 48 %
NRBC # BLD AUTO: 0 10E3/UL
NRBC BLD AUTO-RTO: 0 /100
PLATELET # BLD AUTO: 244 10E3/UL (ref 150–450)
POTASSIUM SERPL-SCNC: 4.2 MMOL/L (ref 3.4–5.3)
PROT SERPL-MCNC: 6.3 G/DL (ref 6.4–8.3)
RBC # BLD AUTO: 4.32 10E6/UL (ref 4.4–5.9)
SODIUM SERPL-SCNC: 136 MMOL/L (ref 135–145)
WBC # BLD AUTO: 3.9 10E3/UL (ref 4–11)

## 2025-02-17 PROCEDURE — G0498 CHEMO EXTEND IV INFUS W/PUMP: HCPCS

## 2025-02-17 PROCEDURE — 258N000003 HC RX IP 258 OP 636: Performed by: INTERNAL MEDICINE

## 2025-02-17 PROCEDURE — 250N000011 HC RX IP 250 OP 636: Performed by: INTERNAL MEDICINE

## 2025-02-17 PROCEDURE — 85025 COMPLETE CBC W/AUTO DIFF WBC: CPT | Performed by: INTERNAL MEDICINE

## 2025-02-17 PROCEDURE — 96367 TX/PROPH/DG ADDL SEQ IV INF: CPT

## 2025-02-17 PROCEDURE — 250N000011 HC RX IP 250 OP 636: Mod: JZ | Performed by: INTERNAL MEDICINE

## 2025-02-17 PROCEDURE — 96368 THER/DIAG CONCURRENT INF: CPT

## 2025-02-17 PROCEDURE — 96375 TX/PRO/DX INJ NEW DRUG ADDON: CPT

## 2025-02-17 PROCEDURE — 96413 CHEMO IV INFUSION 1 HR: CPT

## 2025-02-17 PROCEDURE — 36415 COLL VENOUS BLD VENIPUNCTURE: CPT | Performed by: INTERNAL MEDICINE

## 2025-02-17 PROCEDURE — 82435 ASSAY OF BLOOD CHLORIDE: CPT | Performed by: INTERNAL MEDICINE

## 2025-02-17 PROCEDURE — 96415 CHEMO IV INFUSION ADDL HR: CPT

## 2025-02-17 PROCEDURE — 36593 DECLOT VASCULAR DEVICE: CPT

## 2025-02-17 RX ORDER — ONDANSETRON 2 MG/ML
8 INJECTION INTRAMUSCULAR; INTRAVENOUS ONCE
Status: COMPLETED | OUTPATIENT
Start: 2025-02-17 | End: 2025-02-17

## 2025-02-17 RX ADMIN — FOSAPREPITANT: 150 INJECTION, POWDER, LYOPHILIZED, FOR SOLUTION INTRAVENOUS at 10:13

## 2025-02-17 RX ADMIN — ONDANSETRON 8 MG: 2 INJECTION, SOLUTION INTRAMUSCULAR; INTRAVENOUS at 10:12

## 2025-02-17 RX ADMIN — ALTEPLASE 2 MG: 2.2 INJECTION, POWDER, LYOPHILIZED, FOR SOLUTION INTRAVENOUS at 09:40

## 2025-02-17 RX ADMIN — LEUCOVORIN CALCIUM 850 MG: 500 INJECTION, POWDER, LYOPHILIZED, FOR SOLUTION INTRAMUSCULAR; INTRAVENOUS at 10:42

## 2025-02-17 RX ADMIN — FAMOTIDINE 20 MG: 10 INJECTION INTRAVENOUS at 10:12

## 2025-02-17 RX ADMIN — OXALIPLATIN 200 MG: 5 INJECTION, SOLUTION INTRAVENOUS at 10:40

## 2025-02-17 RX ADMIN — ALTEPLASE 2 MG: 2.2 INJECTION, POWDER, LYOPHILIZED, FOR SOLUTION INTRAVENOUS at 08:08

## 2025-02-17 NOTE — PROGRESS NOTES
"Infusion Nursing Note:  Celestine Simon presents today for modified FOLFOX C6D1.    Patient seen by provider today: No   present during visit today: Not Applicable.    Note: N/A.    Intravenous Access:  Peripheral IV placed.    Treatment Conditions:  Lab Results   Component Value Date    HGB 11.1 (L) 02/17/2025    WBC 3.9 (L) 02/17/2025    ANEU 4.7 01/02/2025    ANEUTAUTO 1.9 02/17/2025     02/17/2025      Lab Results   Component Value Date     02/17/2025    POTASSIUM 4.2 02/17/2025    MAG 1.7 12/03/2024    CR 0.91 02/17/2025    ALIEZ 10.0 02/17/2025    BILITOTAL 0.4 02/17/2025    ALBUMIN 3.9 02/17/2025    ALT 19 02/17/2025    AST 21 02/17/2025   Results reviewed, labs MET treatment parameters, ok to proceed with treatment.    Post Infusion Assessment:  Patient tolerated infusion without incident.  Blood return noted pre and post infusion.  Site patent and intact, free from redness, edema or discomfort.  No evidence of extravasations.  Prior to discharge: Port is secured in place with tegaderm and flushed with 10cc NS with positive blood return noted.    Continuous home infusion CADD pump connected.    All connectors secured in place and clamps taped open.    Pump started, \"running\" noted on display (CADD): YES.   Capillary element taped to pt's skin per protocol.  Pump Connection double checked with Nidhi ABAD RN.  Patient instructed to call our clinic or [facility] with any questions or concerns at home.  Patient verbalized understanding.    Patient set up for pump disconnect at our clinic on 2/19/2025 at 1045.      Discharge Plan:   Discharge instructions reviewed with: Patient.  Patient and/or family verbalized understanding of discharge instructions and all questions answered.  AVS to patient via [employer].  Patient will return 2/19/2025 for next appointment.   Patient discharged in stable condition accompanied by: self.  Departure Mode: Ambulatory.    Monica Kemp RN  "

## 2025-02-17 NOTE — PROGRESS NOTES
[facility] d/c of Fluorouracil continuous infusion over 46 hours via CADD pump.   Patient scheduled for pump disconnect in clinic as he needs IVFs same day and no home coverage for hydration.  Verified patient scheduled in [facility] for clinic on 2/19/25.

## 2025-02-17 NOTE — PROGRESS NOTES
No blood return noted from port. 2 doses of TPA instilled for a total of 2 hours. Blood return noted, only 2mL.     Labs drawn peripherally by .  Monica Kemp RN

## 2025-02-18 NOTE — PROGRESS NOTES
"GENETIC COUNSELING CONSULTATION NOTE    Date of visit: Feb 20, 2025    Presenting Information:   Celestine Simon is a 69 year old male referred to the [facility] today. Celestine was seen for a genetic counseling appointment to discuss the details of pharmacogenomic testing and to coordinate this testing.     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 He is followed by Dr. Friedell in Oncology. Celestine had a bad reaction to his first FOLFOX 6 chemo treatment for his colorecetal cancer including nausea, diarrhea, severe hypotension, stomatitis, rash, and chemo-induced neutropenia. 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 His chemo dose was reduced due to this.     There is still some question of other medication reactions and Celestine reports that his brother and mother often have to take \"less of a dose\" than normal for medications in order to avoid side affects. He is concerned about other chemotherapy recommendations and different anesthesia since he has more treatment and surgeries coming up.    Current Medications:  Current Outpatient Medications   Medication Sig Dispense Refill    acetaminophen (TYLENOL) 325 MG tablet Take 2 tablets (650 mg) by mouth every 4 hours as needed for mild pain. 50 tablet 0    aspirin 81 MG EC tablet Take 1 tablet (81 mg) by mouth daily      Blood Glucose Monitoring Suppl (IN TOUCH) MISC 1 strip once a week Tests weekly- One Touch 90 each 1    Chemo Kit For RN use only. Do not remove items from bag. Contents: 1  sodium chloride 0.9% flush, 4 medium gloves, 1 chemo gown, 1/4 chemo mat, 1 connector female, 1 chemo bag. [id number] kit 0    Continuous Glucose Sensor (FREESTYLE SCOTT 14 DAY SENSOR) MISC 1 Device every 14 days Change sensor as directed every 14 days 6 each 1    dexAMETHasone (DECADRON) 4 MG tablet Take 2 tablets (8 mg) by mouth daily. Take " for 2 days, starting the day after chemo. Take with food. 4 tablet 2    doxazosin (CARDURA) 4 MG tablet Take 1 tablet (4 mg) by mouth daily. 90 tablet 1    Fluorouracil (ADRUCIL) 2,905 mg in sodium chloride 0.9 % 230 mL via CADD pump Infuse 2,905 mg at 5 mL/hr over 46 hours into the vein once for 1 dose. 746644 mL 0    GLUCOSAMINE-CHONDROITIN PO Take 2 capsules by mouth daily      HERBALS Take 1 each by mouth daily [employer] Blood Sugar Support.      hydroCHLOROthiazide 12.5 MG tablet Take 1 tablet (12.5 mg) by mouth every morning. 90 tablet 1    LANCETS REGULAR MISC One Touch US Lancet- Used twice daily 100 Each 6    lidocaine-prilocaine (EMLA) 2.5-2.5 % external cream Apply topically daily as needed for moderate pain. 30 g 2    lisinopril (ZESTRIL) 40 MG tablet Take 1 tablet (40 mg) by mouth daily. (Patient taking differently: Take 20 mg by mouth daily.) 90 tablet 1    magic mouthwash (ENTER INGREDIENTS IN COMMENTS) suspension Take 5 mLs by mouth every 4 hours as needed (Swish and spit or swallow every 4 hours as needed for mouth sores). 240 mL 2    metFORMIN (GLUCOPHAGE XR) 500 MG 24 hr tablet 3 tabs daily. 270 tablet 1    MULTI-VITAMIN OR TABS Take 1 tablet by mouth daily       ondansetron (ZOFRAN) 8 MG tablet Take 1 tablet (8 mg) by mouth every 8 hours as needed for nausea (vomiting). 30 tablet 2    pioglitazone (ACTOS) 30 MG tablet Take 0.5 tablets (15 mg) by mouth daily. 90 tablet 1    prochlorperazine (COMPAZINE) 10 MG tablet Take 1 tablet (10 mg) by mouth every 6 hours as needed for nausea or vomiting. 30 tablet 2    rosuvastatin (CRESTOR) 10 MG tablet Take 1 tablet (10 mg) by mouth daily. 90 tablet 1    Semaglutide (RYBELSUS) 14 MG tablet Take 7-14 mg by mouth daily. 90 tablet 1    sodium chloride, PF, 0.9% PF flush Inject 10 mLs into the vein as needed for line flush. Flush IV before and after med administration as directed and/or at least every 24 hours, or prior to deaccessing for no further use  and/or at least every 4 weeks when not accessed. [id number] mL 0     No current facility-administered medications for this visit.     Facility-Administered Medications Ordered in Other Visits   Medication Dose Route Frequency Provider Last Rate Last Admin    gadobutrol (GADAVIST) injection 15 mL  15 mL Intravenous Once Friedell, Peter E, MD        glucagon injection 1 mg  1 mg Intravenous Once Friedell, Peter E, MD            Family History: A three generation pedigree was obtained by genetic counselor Latrice Ortega at [facility] oncology genetic counseling visit and scanned into the electronic medical record. Please see her note from 2/5/25 for details.     Genetic Counseling Discussion:  For review, our bodies are made of cells that contain our chromosomes which are made up of long stretches of DNA containing our genes. Our genes serve as the instructions for our bodies to grow and function. We have two copies of each gene, one inherited from our mother and one inherited from our father.     What pharmacogenomic testing can tell us:  There are several factors that can determine how an individual responds to medications. Some of these factors include environmental factors such as lifestyle choices (diet, alcohol and/or tobacco use) or other medications an individual might be taking, and other factors can include a person's age, sex, ethnic background, disease, and underlying genetic factors. There are several genes in our body that provide instructions for breaking down the medications we take. Changes in these genes (sometimes called variants or polymorphisms/SNPs) can alter how the body breaks down certain medications. For example, a change in a gene can slow down the process of medication breakdown leading to too much of that medication/drug in the body which can cause side effects. On the other hand, a change in a gene can speed up the breakdown of a medication so a therapeutic level is not reached and the medication may  not work well at the standard doses. Therefore, identifying changes in these genes via pharmacogenomic testing can help guide which medications might work best for an individual, what dose of a medication may be best, or if a certain medication has a high risk for causing serious side effects.     The pharmacogenomic testing offered at [facility] analyzes several different polymorphisms in different genes associated with drug metabolism. These variants have been determined to be medically actionable by practice guidelines including CPIC (Clinical Pharmacogenetics Implementation Consortium), PharmGKB and/or FDA gene-drug interaction guidelines. Therefore, prescribing recommendations can be made by the results of this test, so this testing for Celestine is DIAGNOSTIC and is NOT investigational.     The results of this test can provide guidance for several different types of drugs such as antiinflammatories, antithrombotics, lipid-lowering medication, acid-lowering medications, antiemetics, immunosuppressants, antivirals, antifungals, antidepressants, ADHD medications, antimetabolites, and/or hormone antagonists. This means that, while this testing was recommended for a specific reason right now (Celestine's cancer treatment), it may provide guidance for future medication use.     As previously mentioned, we inherit our genes from our parents. This means that some of the pharmacogenomic variants found on this test may be shared by other relatives. These results could be helpful to share with other relatives, but it is important to remember that there are many factors that can contribute to how an individual responds to medications. Relatives should consider their own pharmacogenomics testing to better determine their recommendations and they should consult with their health care providers before making any changes.     What pharmacogenomic testing cannot tell us:  This pharmacogenomic testing is not looking at every known  pharmacogenomic polymorphism and therefore the results cannot tell us about every possible gene-drug interaction. It is also not looking at genes outside of the scope of pharmacogenomics, and therefore, the results will not tell us if Celestine is at risk for other genetic conditions. If Celestine has questions about a possible underlying genetic condition, he should talk with his primary care provider about seeking an appointment in our Genetics Clinic.     Testing logistics:  [facility] will try to obtain insurance coverage for the pharmacogenomic testing, however this is not always a covered service. A cost waiver form (ABN and Medicare replacement non-coverage form) is required, but cost to the patient is not expected to exceed $250.     A blood or buccal sample will be collected for DNA analysis. The results of this test take 1-2 weeks. An appointment will be made with the pharmacist to discuss these results in detail and to discuss the medication recommendations based on these results. These test results will be accessible in [facility] and may be viewable prior to the appointment with the pharmacist, but NO CHANGES SHOULD BE MADE TO MEDICATIONS BEFORE TALKING TO THE PHARMACIST OR HEALTHCARE PROVIDER.     Celestine consented to pharmacogenomic testing today. The insurance and billing were explained and Celestine agreed to the billing plan. Due to the fact that this was a virtual visit, Celestine gave me verbal permission/consent to sign the genetic testing consent form and the cost waiver form (ABN and Medicare replacement non-coverage form) on his behalf.     It was a pleasure meeting Celestine today. He was encouraged to reach out to me if he has any further questions.     Plan:  Celestine will arrange a lab appointment at a [place] laboratory to have a blood sample drawn for the [facility] Pharmacogenomic Test  Celestine declined an [facility] pharmacy visit today. He can reach out to me or request a referral from his PCP if he is interested in this  in the future.       Chelsey Ayon MS, [facility]  Licensed Genetic Counselor   [facility]  Phone: [phone]  Fax: [phone]    25 minutes spent on the date of the encounter in chart review, patient visit, test coordination/review, documentation, and/or discussion with other providers about the issues documented above.

## 2025-02-19 ENCOUNTER — INFUSION THERAPY VISIT (OUTPATIENT)
Dept: INFUSION THERAPY | Facility: CLINIC | Age: 70
End: 2025-02-19
Attending: INTERNAL MEDICINE
Payer: MEDICARE

## 2025-02-19 VITALS — DIASTOLIC BLOOD PRESSURE: 83 MMHG | TEMPERATURE: 98.2 F | SYSTOLIC BLOOD PRESSURE: 152 MMHG | HEART RATE: 80 BPM

## 2025-02-19 DIAGNOSIS — D70.1 CHEMOTHERAPY-INDUCED NEUTROPENIA: ICD-10-CM

## 2025-02-19 DIAGNOSIS — E86.0 DEHYDRATION: ICD-10-CM

## 2025-02-19 DIAGNOSIS — C19 MALIGNANT NEOPLASM OF RECTOSIGMOID JUNCTION (H): Primary | ICD-10-CM

## 2025-02-19 DIAGNOSIS — T45.1X5A CHEMOTHERAPY-INDUCED NEUTROPENIA: ICD-10-CM

## 2025-02-19 LAB
Lab: NORMAL
PERFORMING LABORATORY: NORMAL
SPECIMEN STATUS: NORMAL
TEST NAME: NORMAL

## 2025-02-19 PROCEDURE — 258N000003 HC RX IP 258 OP 636: Performed by: NURSE PRACTITIONER

## 2025-02-19 PROCEDURE — 96360 HYDRATION IV INFUSION INIT: CPT

## 2025-02-19 PROCEDURE — 250N000011 HC RX IP 250 OP 636: Performed by: INTERNAL MEDICINE

## 2025-02-19 RX ORDER — HEPARIN SODIUM (PORCINE) LOCK FLUSH IV SOLN 100 UNIT/ML 100 UNIT/ML
5 SOLUTION INTRAVENOUS
Status: CANCELLED | OUTPATIENT
Start: 2025-02-19

## 2025-02-19 RX ORDER — HEPARIN SODIUM (PORCINE) LOCK FLUSH IV SOLN 100 UNIT/ML 100 UNIT/ML
5 SOLUTION INTRAVENOUS
Status: DISCONTINUED | OUTPATIENT
Start: 2025-02-19 | End: 2025-02-19 | Stop reason: HOSPADM

## 2025-02-19 RX ADMIN — SODIUM CHLORIDE 1000 ML: 0.9 INJECTION, SOLUTION INTRAVENOUS at 10:52

## 2025-02-19 RX ADMIN — Medication 5 ML: at 11:55

## 2025-02-19 NOTE — PROGRESS NOTES
Infusion Nursing Note:  Celestine Simon presents today for pump disconnect and IV fluids.    Patient seen by provider today: No   present during visit today: Not Applicable.    Note: N/A.      Intravenous Access:  Implanted Port.    Treatment Conditions:  Not Applicable.      Post Infusion Assessment:  Patient tolerated infusion without incident.  Blood return noted pre and post infusion.  Site patent and intact, free from redness, edema or discomfort.  No evidence of extravasations.  Access discontinued per protocol.       Discharge Plan:   Patient discharged in stable condition accompanied by: wife.  Departure Mode: Ambulatory.  Pt to return tomorrow at 1:30 pm for IV fluids.       Chantell Velazco RN

## 2025-02-20 ENCOUNTER — INFUSION THERAPY VISIT (OUTPATIENT)
Dept: INFUSION THERAPY | Facility: CLINIC | Age: 70
End: 2025-02-20
Attending: INTERNAL MEDICINE
Payer: MEDICARE

## 2025-02-20 ENCOUNTER — VIRTUAL VISIT (OUTPATIENT)
Dept: CONSULT | Facility: CLINIC | Age: 70
End: 2025-02-20
Attending: GENETIC COUNSELOR, MS
Payer: MEDICARE

## 2025-02-20 VITALS — HEART RATE: 74 BPM | SYSTOLIC BLOOD PRESSURE: 135 MMHG | TEMPERATURE: 97.8 F | DIASTOLIC BLOOD PRESSURE: 78 MMHG

## 2025-02-20 DIAGNOSIS — C19 MALIGNANT NEOPLASM OF RECTOSIGMOID JUNCTION (H): Primary | ICD-10-CM

## 2025-02-20 DIAGNOSIS — T45.1X5A CHEMOTHERAPY INDUCED DIARRHEA: ICD-10-CM

## 2025-02-20 DIAGNOSIS — D70.1 CHEMOTHERAPY-INDUCED NEUTROPENIA: Primary | ICD-10-CM

## 2025-02-20 DIAGNOSIS — Z71.83 ENCOUNTER FOR NONPROCREATIVE GENETIC COUNSELING: ICD-10-CM

## 2025-02-20 DIAGNOSIS — C19 MALIGNANT NEOPLASM OF RECTOSIGMOID JUNCTION (H): ICD-10-CM

## 2025-02-20 DIAGNOSIS — T45.1X5A CHEMOTHERAPY-INDUCED NEUTROPENIA: Primary | ICD-10-CM

## 2025-02-20 DIAGNOSIS — E86.0 DEHYDRATION: ICD-10-CM

## 2025-02-20 DIAGNOSIS — K52.1 CHEMOTHERAPY INDUCED DIARRHEA: ICD-10-CM

## 2025-02-20 DIAGNOSIS — I10 ESSENTIAL HYPERTENSION: ICD-10-CM

## 2025-02-20 DIAGNOSIS — T88.7XXA DRUG SIDE EFFECTS: ICD-10-CM

## 2025-02-20 DIAGNOSIS — E78.5 DYSLIPIDEMIA: ICD-10-CM

## 2025-02-20 PROCEDURE — 250N000011 HC RX IP 250 OP 636: Performed by: INTERNAL MEDICINE

## 2025-02-20 PROCEDURE — 96041 GENETIC COUNSELING SVC EA 30: CPT | Mod: GT,95 | Performed by: GENETIC COUNSELOR, MS

## 2025-02-20 PROCEDURE — 258N000003 HC RX IP 258 OP 636: Performed by: NURSE PRACTITIONER

## 2025-02-20 RX ORDER — HEPARIN SODIUM (PORCINE) LOCK FLUSH IV SOLN 100 UNIT/ML 100 UNIT/ML
5 SOLUTION INTRAVENOUS
OUTPATIENT
Start: 2025-02-20

## 2025-02-20 RX ORDER — HEPARIN SODIUM (PORCINE) LOCK FLUSH IV SOLN 100 UNIT/ML 100 UNIT/ML
5 SOLUTION INTRAVENOUS
Status: DISCONTINUED | OUTPATIENT
Start: 2025-02-20 | End: 2025-02-20 | Stop reason: HOSPADM

## 2025-02-20 RX ADMIN — Medication 5 ML: at 14:49

## 2025-02-20 RX ADMIN — SODIUM CHLORIDE 1000 ML: 0.9 INJECTION, SOLUTION INTRAVENOUS at 13:39

## 2025-02-20 NOTE — PROGRESS NOTES
Infusion Nursing Note:  Celestine Bircheman presents today for IV fluids.    Patient seen by provider today: No   present during visit today: Not Applicable.    Note: Pt states that he is tired and not feeling well.  He does not think that he needs IV fluids tomorrow.   Intravenous Access:  Implanted Port.    Treatment Conditions:  Not Applicable.      Post Infusion Assessment:  Patient tolerated infusion without incident.  Access discontinued per protocol.       Discharge Plan:   AVS to patient via [employer].  Patient will return 3/3 for next appointment.  Appt for fluids tomorrow was cancelled.  Patient discharged in stable condition accompanied by: wife.  Departure Mode: Ambulatory.      Kristi Kern RN

## 2025-02-20 NOTE — LETTER
"2/20/2025      RE: Celestine Simon  [address]  [facility] [address]     Dear Colleague,    Thank you for the opportunity to participate in the care of your patient, Celestine Simon, at the [facility] at [facility]. Please see a copy of my visit note below.    GENETIC COUNSELING CONSULTATION NOTE    Date of visit: Feb 20, 2025    Presenting Information:   Celestine Simon is a 69 year old male referred to the [facility] today. Celestine was seen for a genetic counseling appointment to discuss the details of pharmacogenomic testing and to coordinate this testing.     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 He is followed by Dr. Friedell in Oncology. Celestine had a bad reaction to his first FOLFOX 6 chemo treatment for his colorecetal cancer including nausea, diarrhea, severe hypotension, stomatitis, rash, and chemo-induced neutropenia. 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 His chemo dose was reduced due to this.     There is still some question of other medication reactions and Celestine reports that his brother and mother often have to take \"less of a dose\" than normal for medications in order to avoid side affects. He is concerned about other chemotherapy recommendations and different anesthesia since he has more treatment and surgeries coming up.    Current Medications:  Current Outpatient Medications   Medication Sig Dispense Refill     acetaminophen (TYLENOL) 325 MG tablet Take 2 tablets (650 mg) by mouth every 4 hours as needed for mild pain. 50 tablet 0     aspirin 81 MG EC tablet Take 1 tablet (81 mg) by mouth daily       Blood Glucose Monitoring Suppl (IN TOUCH) MISC 1 strip once a week Tests weekly- One Touch 90 each 1     Chemo Kit For RN use only. Do not remove items " from bag. Contents: 1  sodium chloride 0.9% flush, 4 medium gloves, 1 chemo gown, 1/4 chemo mat, 1 connector female, 1 chemo bag. [id number] kit 0     Continuous Glucose Sensor (FREESTYLE SCOTT 14 DAY SENSOR) [facility] [place] Device every 14 days Change sensor as directed every 14 days 6 each 1     dexAMETHasone (DECADRON) 4 MG tablet Take 2 tablets (8 mg) by mouth daily. Take for 2 days, starting the day after chemo. Take with food. 4 tablet 2     doxazosin (CARDURA) 4 MG tablet Take 1 tablet (4 mg) by mouth daily. 90 tablet 1     Fluorouracil (ADRUCIL) 2,905 mg in sodium chloride 0.9 % 230 mL via CADD pump Infuse 2,905 mg at 5 mL/hr over 46 hours into the vein once for 1 dose. 013838 mL 0     GLUCOSAMINE-CHONDROITIN PO Take 2 capsules by mouth daily       HERBALS Take 1 each by mouth daily [employer] Blood Sugar Support.       hydroCHLOROthiazide 12.5 MG tablet Take 1 tablet (12.5 mg) by mouth every morning. 90 tablet 1     LANCETS REGULAR MISC One Touch US Lancet- Used twice daily 100 Each 6     lidocaine-prilocaine (EMLA) 2.5-2.5 % external cream Apply topically daily as needed for moderate pain. 30 g 2     lisinopril (ZESTRIL) 40 MG tablet Take 1 tablet (40 mg) by mouth daily. (Patient taking differently: Take 20 mg by mouth daily.) 90 tablet 1     magic mouthwash (ENTER INGREDIENTS IN COMMENTS) suspension Take 5 mLs by mouth every 4 hours as needed (Swish and spit or swallow every 4 hours as needed for mouth sores). 240 mL 2     metFORMIN (GLUCOPHAGE XR) 500 MG 24 hr tablet 3 tabs daily. 270 tablet 1     MULTI-VITAMIN OR TABS Take 1 tablet by mouth daily        ondansetron (ZOFRAN) 8 MG tablet Take 1 tablet (8 mg) by mouth every 8 hours as needed for nausea (vomiting). 30 tablet 2     pioglitazone (ACTOS) 30 MG tablet Take 0.5 tablets (15 mg) by mouth daily. 90 tablet 1     prochlorperazine (COMPAZINE) 10 MG tablet Take 1 tablet (10 mg) by mouth every 6 hours as needed for nausea or vomiting. 30 tablet 2      rosuvastatin (CRESTOR) 10 MG tablet Take 1 tablet (10 mg) by mouth daily. 90 tablet 1     Semaglutide (RYBELSUS) 14 MG tablet Take 7-14 mg by mouth daily. 90 tablet 1     sodium chloride, PF, 0.9% PF flush Inject 10 mLs into the vein as needed for line flush. Flush IV before and after med administration as directed and/or at least every 24 hours, or prior to deaccessing for no further use and/or at least every 4 weeks when not accessed. 386508 mL 0     No current facility-administered medications for this visit.     Facility-Administered Medications Ordered in Other Visits   Medication Dose Route Frequency Provider Last Rate Last Admin     gadobutrol (GADAVIST) injection 15 mL  15 mL Intravenous Once Friedell, Peter E, MD         glucagon injection 1 mg  1 mg Intravenous Once Friedell, Peter E, MD            Family History: A three generation pedigree was obtained by genetic counselor Latrice Ortega at [facility] oncology genetic counseling visit and scanned into the electronic medical record. Please see her note from 2/5/25 for details.     Genetic Counseling Discussion:  For review, our bodies are made of cells that contain our chromosomes which are made up of long stretches of DNA containing our genes. Our genes serve as the instructions for our bodies to grow and function. We have two copies of each gene, one inherited from our mother and one inherited from our father.     What pharmacogenomic testing can tell us:  There are several factors that can determine how an individual responds to medications. Some of these factors include environmental factors such as lifestyle choices (diet, alcohol and/or tobacco use) or other medications an individual might be taking, and other factors can include a person's age, sex, ethnic background, disease, and underlying genetic factors. There are several genes in our body that provide instructions for breaking down the medications we take. Changes in these genes (sometimes called  variants or polymorphisms/SNPs) can alter how the body breaks down certain medications. For example, a change in a gene can slow down the process of medication breakdown leading to too much of that medication/drug in the body which can cause side effects. On the other hand, a change in a gene can speed up the breakdown of a medication so a therapeutic level is not reached and the medication may not work well at the standard doses. Therefore, identifying changes in these genes via pharmacogenomic testing can help guide which medications might work best for an individual, what dose of a medication may be best, or if a certain medication has a high risk for causing serious side effects.     The pharmacogenomic testing offered at [facility] analyzes several different polymorphisms in different genes associated with drug metabolism. These variants have been determined to be medically actionable by practice guidelines including CPIC (Clinical Pharmacogenetics Implementation Consortium), PharmGKB and/or FDA gene-drug interaction guidelines. Therefore, prescribing recommendations can be made by the results of this test, so this testing for Celestine is DIAGNOSTIC and is NOT investigational.     The results of this test can provide guidance for several different types of drugs such as antiinflammatories, antithrombotics, lipid-lowering medication, acid-lowering medications, antiemetics, immunosuppressants, antivirals, antifungals, antidepressants, ADHD medications, antimetabolites, and/or hormone antagonists. This means that, while this testing was recommended for a specific reason right now (Celestine's cancer treatment), it may provide guidance for future medication use.     As previously mentioned, we inherit our genes from our parents. This means that some of the pharmacogenomic variants found on this test may be shared by other relatives. These results could be helpful to share with other relatives, but it is important to  remember that there are many factors that can contribute to how an individual responds to medications. Relatives should consider their own pharmacogenomics testing to better determine their recommendations and they should consult with their health care providers before making any changes.     What pharmacogenomic testing cannot tell us:  This pharmacogenomic testing is not looking at every known pharmacogenomic polymorphism and therefore the results cannot tell us about every possible gene-drug interaction. It is also not looking at genes outside of the scope of pharmacogenomics, and therefore, the results will not tell us if Celestine is at risk for other genetic conditions. If Celestine has questions about a possible underlying genetic condition, he should talk with his primary care provider about seeking an appointment in our Genetics Clinic.     Testing logistics:  [facility] will try to obtain insurance coverage for the pharmacogenomic testing, however this is not always a covered service. A cost waiver form (ABN and Medicare replacement non-coverage form) is required, but cost to the patient is not expected to exceed $250.     A blood or buccal sample will be collected for DNA analysis. The results of this test take 1-2 weeks. An appointment will be made with the pharmacist to discuss these results in detail and to discuss the medication recommendations based on these results. These test results will be accessible in [employer] and may be viewable prior to the appointment with the pharmacist, but NO CHANGES SHOULD BE MADE TO MEDICATIONS BEFORE TALKING TO THE PHARMACIST OR HEALTHCARE PROVIDER.     Celestine consented to pharmacogenomic testing today. The insurance and billing were explained and Celestine agreed to the billing plan. Due to the fact that this was a virtual visit, Celestine gave me verbal permission/consent to sign the genetic testing consent form and the cost waiver form (ABN and Medicare replacement non-coverage form) on  his behalf.     It was a pleasure meeting Celestine today. He was encouraged to reach out to me if he has any further questions.     Plan:  Celestine will arrange a lab appointment at a [facility] to have a blood sample drawn for the [facility] Pharmacogenomic Test  Celestine declined an [facility] pharmacy visit today. He can reach out to me or request a referral from his PCP if he is interested in this in the future.       Chelsey Ayon MS, [facility]  Licensed Genetic Counselor   [facility]  Phone: [phone]  Fax: [phone]    25 minutes spent on the date of the encounter in chart review, patient visit, test coordination/review, documentation, and/or discussion with other providers about the issues documented above.      Please do not hesitate to contact me if you have any questions/concerns.     Sincerely,       Marifer Ayon, GC

## 2025-02-25 LAB
SCANNED LAB RESULT: NORMAL
TEST NAME: NORMAL

## 2025-02-26 RX ORDER — HEPARIN SODIUM (PORCINE) LOCK FLUSH IV SOLN 100 UNIT/ML 100 UNIT/ML
5 SOLUTION INTRAVENOUS
OUTPATIENT
Start: 2025-02-26

## 2025-02-26 RX ORDER — HEPARIN SODIUM,PORCINE 10 UNIT/ML
5-20 VIAL (ML) INTRAVENOUS DAILY PRN
OUTPATIENT
Start: 2025-02-26

## 2025-03-03 ENCOUNTER — ONCOLOGY VISIT (OUTPATIENT)
Dept: ONCOLOGY | Facility: CLINIC | Age: 70
End: 2025-03-03
Attending: INTERNAL MEDICINE
Payer: MEDICARE

## 2025-03-03 ENCOUNTER — LAB (OUTPATIENT)
Dept: LAB | Facility: CLINIC | Age: 70
End: 2025-03-03
Payer: MEDICARE

## 2025-03-03 ENCOUNTER — TELEPHONE (OUTPATIENT)
Dept: ONCOLOGY | Facility: CLINIC | Age: 70
End: 2025-03-03

## 2025-03-03 ENCOUNTER — INFUSION THERAPY VISIT (OUTPATIENT)
Dept: INFUSION THERAPY | Facility: CLINIC | Age: 70
End: 2025-03-03
Attending: INTERNAL MEDICINE
Payer: MEDICARE

## 2025-03-03 VITALS
OXYGEN SATURATION: 96 % | BODY MASS INDEX: 34.13 KG/M2 | RESPIRATION RATE: 16 BRPM | WEIGHT: 252 LBS | SYSTOLIC BLOOD PRESSURE: 129 MMHG | TEMPERATURE: 98.1 F | HEART RATE: 84 BPM | DIASTOLIC BLOOD PRESSURE: 70 MMHG | HEIGHT: 72 IN

## 2025-03-03 DIAGNOSIS — T88.7XXA DRUG SIDE EFFECTS: ICD-10-CM

## 2025-03-03 DIAGNOSIS — T45.1X5A CHEMOTHERAPY INDUCED DIARRHEA: ICD-10-CM

## 2025-03-03 DIAGNOSIS — C19 MALIGNANT NEOPLASM OF RECTOSIGMOID JUNCTION (H): ICD-10-CM

## 2025-03-03 DIAGNOSIS — T45.1X5A CHEMOTHERAPY-INDUCED NEUTROPENIA: ICD-10-CM

## 2025-03-03 DIAGNOSIS — K52.1 CHEMOTHERAPY INDUCED DIARRHEA: ICD-10-CM

## 2025-03-03 DIAGNOSIS — D70.1 CHEMOTHERAPY-INDUCED NEUTROPENIA: ICD-10-CM

## 2025-03-03 DIAGNOSIS — C19 MALIGNANT NEOPLASM OF RECTOSIGMOID JUNCTION (H): Primary | ICD-10-CM

## 2025-03-03 DIAGNOSIS — E78.5 DYSLIPIDEMIA: ICD-10-CM

## 2025-03-03 DIAGNOSIS — I10 ESSENTIAL HYPERTENSION: ICD-10-CM

## 2025-03-03 LAB
ALBUMIN SERPL BCG-MCNC: 3.8 G/DL (ref 3.5–5.2)
ALP SERPL-CCNC: 67 U/L (ref 40–150)
ALT SERPL W P-5'-P-CCNC: 23 U/L (ref 0–70)
ANION GAP SERPL CALCULATED.3IONS-SCNC: 10 MMOL/L (ref 7–15)
AST SERPL W P-5'-P-CCNC: 24 U/L (ref 0–45)
BASOPHILS # BLD AUTO: 0 10E3/UL (ref 0–0.2)
BASOPHILS NFR BLD AUTO: 0 %
BILIRUB SERPL-MCNC: 0.3 MG/DL
BUN SERPL-MCNC: 18.7 MG/DL (ref 8–23)
CALCIUM SERPL-MCNC: 10.1 MG/DL (ref 8.8–10.4)
CEA SERPL-MCNC: 4.8 NG/ML
CHLORIDE SERPL-SCNC: 103 MMOL/L (ref 98–107)
CREAT SERPL-MCNC: 0.85 MG/DL (ref 0.67–1.17)
EGFRCR SERPLBLD CKD-EPI 2021: >90 ML/MIN/1.73M2
EOSINOPHIL # BLD AUTO: 0.1 10E3/UL (ref 0–0.7)
EOSINOPHIL NFR BLD AUTO: 2 %
ERYTHROCYTE [DISTWIDTH] IN BLOOD BY AUTOMATED COUNT: 19 % (ref 10–15)
GLUCOSE SERPL-MCNC: 145 MG/DL (ref 70–99)
HCO3 SERPL-SCNC: 24 MMOL/L (ref 22–29)
HCT VFR BLD AUTO: 36 % (ref 40–53)
HGB BLD-MCNC: 11.3 G/DL (ref 13.3–17.7)
IMM GRANULOCYTES # BLD: 0 10E3/UL
IMM GRANULOCYTES NFR BLD: 0 %
LYMPHOCYTES # BLD AUTO: 1.2 10E3/UL (ref 0.8–5.3)
LYMPHOCYTES NFR BLD AUTO: 29 %
MCH RBC QN AUTO: 26.1 PG (ref 26.5–33)
MCHC RBC AUTO-ENTMCNC: 31.4 G/DL (ref 31.5–36.5)
MCV RBC AUTO: 83 FL (ref 78–100)
MONOCYTES # BLD AUTO: 0.5 10E3/UL (ref 0–1.3)
MONOCYTES NFR BLD AUTO: 12 %
NEUTROPHILS # BLD AUTO: 2.2 10E3/UL (ref 1.6–8.3)
NEUTROPHILS NFR BLD AUTO: 56 %
NRBC # BLD AUTO: 0 10E3/UL
NRBC BLD AUTO-RTO: 0 /100
PLATELET # BLD AUTO: 241 10E3/UL (ref 150–450)
POTASSIUM SERPL-SCNC: 4.2 MMOL/L (ref 3.4–5.3)
PROT SERPL-MCNC: 6.4 G/DL (ref 6.4–8.3)
RBC # BLD AUTO: 4.33 10E6/UL (ref 4.4–5.9)
SODIUM SERPL-SCNC: 137 MMOL/L (ref 135–145)
WBC # BLD AUTO: 4 10E3/UL (ref 4–11)

## 2025-03-03 PROCEDURE — 82040 ASSAY OF SERUM ALBUMIN: CPT | Performed by: INTERNAL MEDICINE

## 2025-03-03 PROCEDURE — G0463 HOSPITAL OUTPT CLINIC VISIT: HCPCS | Performed by: INTERNAL MEDICINE

## 2025-03-03 PROCEDURE — 85048 AUTOMATED LEUKOCYTE COUNT: CPT | Performed by: INTERNAL MEDICINE

## 2025-03-03 PROCEDURE — 82247 BILIRUBIN TOTAL: CPT | Performed by: INTERNAL MEDICINE

## 2025-03-03 PROCEDURE — G2211 COMPLEX E/M VISIT ADD ON: HCPCS | Performed by: INTERNAL MEDICINE

## 2025-03-03 PROCEDURE — 99215 OFFICE O/P EST HI 40 MIN: CPT | Performed by: INTERNAL MEDICINE

## 2025-03-03 PROCEDURE — 82378 CARCINOEMBRYONIC ANTIGEN: CPT | Performed by: INTERNAL MEDICINE

## 2025-03-03 PROCEDURE — 81418 RX METAB GEN SEQ ALYS PNL 6: CPT | Performed by: INTERNAL MEDICINE

## 2025-03-03 PROCEDURE — 36415 COLL VENOUS BLD VENIPUNCTURE: CPT

## 2025-03-03 PROCEDURE — 85004 AUTOMATED DIFF WBC COUNT: CPT | Performed by: INTERNAL MEDICINE

## 2025-03-03 ASSESSMENT — PAIN SCALES - GENERAL: PAINLEVEL_OUTOF10: NO PAIN (0)

## 2025-03-03 NOTE — PROGRESS NOTES
Oncology Rooming Note    March 3, 2025 9:55 AM   Celestine Simon is a 69 year old male who presents for:    Chief Complaint   Patient presents with    Oncology Clinic Visit     Malignant neoplasm of rectosigmoid junction - labs and provider visit     Initial Vitals: /70 (BP Location: Right arm, Patient Position: Sitting, Cuff Size: Adult Large)   Pulse 84   Temp 98.1  F (36.7  C) (Tympanic)   Resp 16   Ht 1.829 m (6')   Wt 114.3 kg (252 lb)   SpO2 96%   BMI 34.18 kg/m   Estimated body mass index is 34.18 kg/m  as calculated from the following:    Height as of this encounter: 1.829 m (6').    Weight as of this encounter: 114.3 kg (252 lb). Body surface area is 2.41 meters squared.  No Pain (0) Comment: Data Unavailable   No LMP for male patient.  Allergies reviewed: Yes  Medications reviewed: Yes    Medications: Medication refills not needed today.  Pharmacy name entered into [employer]:    [id number] - [place], [place] - [address] [facility] - [place], [place] - [address] [facility] - [place] [address]  [facility] [place] - [address] [place] [address] PHARMACY [place], [place] - [address] [facility] - [place], [place] - [address] [facility] - [place], [place] - [address]    Frailty Screening:   Is the patient here for a new oncology consult visit in cancer care? 2. No    PHQ9:  Did this patient require a PHQ9?: No      Clinical concerns:  None today.       Lu Zabala, CMA

## 2025-03-03 NOTE — PROGRESS NOTES
Pt was not drawn by RN.  He wanted his labs drawn peripherally, therefore the  meenu the labs.  Kristi Kern RN
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
## 2025-03-03 NOTE — TELEPHONE ENCOUNTER
Prior Authorization Not Needed- covered under Part B    Medication: CAPECITABINE 500 MG PO TABS  Insurance Company: [employer] - Phone [phone] Fax [phone]  Expected CoPay: $    Pharmacy Filling the Rx:                  Thank you,    Mary Carrillo  Oncology Pharmacy Liaison II  [email or web address]  Phone: [phone]  Fax: [phone]
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
## 2025-03-12 ENCOUNTER — ANCILLARY PROCEDURE (OUTPATIENT)
Dept: CT IMAGING | Facility: CLINIC | Age: 70
End: 2025-03-12
Attending: INTERNAL MEDICINE
Payer: MEDICARE

## 2025-03-12 DIAGNOSIS — C19 MALIGNANT NEOPLASM OF RECTOSIGMOID JUNCTION (H): ICD-10-CM

## 2025-03-12 PROCEDURE — 74177 CT ABD & PELVIS W/CONTRAST: CPT | Mod: GC | Performed by: STUDENT IN AN ORGANIZED HEALTH CARE EDUCATION/TRAINING PROGRAM

## 2025-03-12 PROCEDURE — 71260 CT THORAX DX C+: CPT | Mod: GC | Performed by: STUDENT IN AN ORGANIZED HEALTH CARE EDUCATION/TRAINING PROGRAM

## 2025-03-12 RX ORDER — IOPAMIDOL 755 MG/ML
123 INJECTION, SOLUTION INTRAVASCULAR ONCE
Status: COMPLETED | OUTPATIENT
Start: 2025-03-12 | End: 2025-03-12

## 2025-03-12 RX ADMIN — IOPAMIDOL 123 ML: 755 INJECTION, SOLUTION INTRAVASCULAR at 13:40

## 2025-03-12 NOTE — DISCHARGE INSTRUCTIONS

## 2025-03-13 ENCOUNTER — TELEPHONE (OUTPATIENT)
Dept: ONCOLOGY | Facility: CLINIC | Age: 70
End: 2025-03-13
Payer: MEDICARE

## 2025-03-13 ENCOUNTER — OFFICE VISIT (OUTPATIENT)
Dept: RADIATION THERAPY | Facility: OUTPATIENT CENTER | Age: 70
End: 2025-03-13
Payer: MEDICARE

## 2025-03-13 DIAGNOSIS — C19 MALIGNANT NEOPLASM OF RECTOSIGMOID JUNCTION (H): Primary | ICD-10-CM

## 2025-03-13 DIAGNOSIS — D70.1 CHEMOTHERAPY-INDUCED NEUTROPENIA: ICD-10-CM

## 2025-03-13 DIAGNOSIS — T45.1X5A CHEMOTHERAPY-INDUCED NEUTROPENIA: ICD-10-CM

## 2025-03-13 LAB
GO4PGX COMMENTS: NORMAL
GO4PGX DISCLAIMER: NORMAL
GO4PGX LIMITATIONS: NORMAL
GO4PGX METHODOLOGY: NORMAL
LAB DIRECTOR RESULTS: NORMAL
PGX ABOUT THE TEST: NORMAL
RADIOLOGIST FLAGS: ABNORMAL

## 2025-03-13 RX ORDER — CAPECITABINE 500 MG/1
TABLET, FILM COATED ORAL
Qty: 50 TABLET | Refills: 0 | OUTPATIENT
Start: 2025-03-17

## 2025-03-13 NOTE — PROGRESS NOTES
Patient present for CT simulation.     Consent obtained.    Amari Tracy M.D.  Department of Radiation Oncology  [facility]

## 2025-03-13 NOTE — TELEPHONE ENCOUNTER
Oral Chemotherapy Monitoring Program    Primary Oncologist: Dr. Friedell  Primary Oncology Clinic: [facility]  Cancer Diagnosis: Rectal Cancer    Drug: Xeloda 500 mg BID  Start Date: 3/24/2025 with radiation  Expected duration of therapy: 5 weeks    Drug Interaction Assessment: No interactions found    Lab Monitoring Plan  Per protocol    Subjective/Objective:  Celestine Simon is a 69 year old male contacted by phone for an initial visit for oral chemotherapy education.          3/5/2025     9:00 AM 3/13/2025     2:00 PM   ORAL CHEMOTHERAPY   Assessment Type Initial Work up New Teach   Diagnosis Code Rectal Cancer Rectal Cancer   Providers Friedell Friedell   Clinic Name/Location [facility]   Drug Name Xeloda (capecitabine) Xeloda (capecitabine)   Dose 500 mg 500 mg   Current Schedule BID BID   Cycle Details M-F only during XRT M-F only during XRT   Any new drug interactions? No    Is the dose as ordered appropriate for the patient? Yes        Vitals:  BP:   BP Readings from Last 1 Encounters:   03/03/25 129/70     Wt Readings from Last 1 Encounters:   03/03/25 114.3 kg (252 lb)     Estimated body surface area is 2.41 meters squared as calculated from the following:    Height as of 3/3/25: 1.829 m (6').    Weight as of 3/3/25: 114.3 kg (252 lb).    Labs:  _  Result Component Current Result Ref Range   Sodium 137 (3/3/2025) 135 - 145 mmol/L     _  Result Component Current Result Ref Range   Potassium 4.2 (3/3/2025) 3.4 - 5.3 mmol/L     _  Result Component Current Result Ref Range   Calcium 10.1 (3/3/2025) 8.8 - 10.4 mg/dL     No results found for Mag within last 30 days.     No results found for Phos within last 30 days.     _  Result Component Current Result Ref Range   Albumin 3.8 (3/3/2025) 3.5 - 5.2 g/dL     _  Result Component Current Result Ref Range   Urea Nitrogen 18.7 (3/3/2025) 8.0 - 23.0 mg/dL     _  Result Component Current Result Ref Range   Creatinine 0.85 (3/3/2025) 0.67 - 1.17 mg/dL        _  Result Component Current Result Ref Range   AST 24 (3/3/2025) 0 - 45 U/L     _  Result Component Current Result Ref Range   ALT 23 (3/3/2025) 0 - 70 U/L     _  Result Component Current Result Ref Range   Bilirubin Total 0.3 (3/3/2025) <=1.2 mg/dL       _  Result Component Current Result Ref Range   WBC Count 4.0 (3/3/2025) 4.0 - 11.0 10e3/uL     _  Result Component Current Result Ref Range   Hemoglobin 11.3 (L) (3/3/2025) 13.3 - 17.7 g/dL     _  Result Component Current Result Ref Range   Platelet Count 241 (3/3/2025) 150 - 450 10e3/uL     No results found for ANC within last 30 days.         Assessment:  Patient is appropriate to start therapy.    Plan:  Basic chemotherapy teaching was reviewed with the patient and the patient's family including indication, start date of therapy, dose, administration, adverse effects, missed doses, food and drug interactions, monitoring, side effect management, office contact information, and safe handling. Written materials were provided and all questions answered.    Follow-Up:  TBD - Day 21 labs     Alejandrina Gibson  Oncology Pharmacy [facility]  [phone]

## 2025-03-13 NOTE — LETTER
3/13/2025      Celestine Simon  [address]  [facility] [address]      Dear Colleague,    Thank you for referring your patient, Celestine Simon, to the [facility]. Please see a copy of my visit note below.    Patient present for CT simulation.     Consent obtained.    Amari Tracy M.D.  Department of Radiation Oncology  [facility]       Again, thank you for allowing me to participate in the care of your patient.        Sincerely,        Amari Tracy MD    Electronically signed

## 2025-03-24 ENCOUNTER — OFFICE VISIT (OUTPATIENT)
Dept: SURGERY | Facility: CLINIC | Age: 70
End: 2025-03-24
Payer: MEDICARE

## 2025-03-24 VITALS
BODY MASS INDEX: 34.18 KG/M2 | TEMPERATURE: 99.3 F | DIASTOLIC BLOOD PRESSURE: 70 MMHG | WEIGHT: 252 LBS | SYSTOLIC BLOOD PRESSURE: 114 MMHG | HEART RATE: 103 BPM

## 2025-03-24 DIAGNOSIS — C19 MALIGNANT NEOPLASM OF RECTOSIGMOID JUNCTION (H): ICD-10-CM

## 2025-03-24 DIAGNOSIS — Z45.2 ENCOUNTER FOR REMOVAL OF TUNNELED CENTRAL VENOUS CATHETER (CVC) WITH PORT: Primary | ICD-10-CM

## 2025-03-24 PROCEDURE — 1126F AMNT PAIN NOTED NONE PRSNT: CPT | Performed by: STUDENT IN AN ORGANIZED HEALTH CARE EDUCATION/TRAINING PROGRAM

## 2025-03-24 PROCEDURE — 3074F SYST BP LT 130 MM HG: CPT | Performed by: STUDENT IN AN ORGANIZED HEALTH CARE EDUCATION/TRAINING PROGRAM

## 2025-03-24 PROCEDURE — 36590 REMOVAL TUNNELED CV CATH: CPT | Performed by: STUDENT IN AN ORGANIZED HEALTH CARE EDUCATION/TRAINING PROGRAM

## 2025-03-24 PROCEDURE — 3078F DIAST BP <80 MM HG: CPT | Performed by: STUDENT IN AN ORGANIZED HEALTH CARE EDUCATION/TRAINING PROGRAM

## 2025-03-24 ASSESSMENT — PAIN SCALES - GENERAL: PAINLEVEL_OUTOF10: NO PAIN (0)

## 2025-03-24 NOTE — NURSING NOTE
Initial /70 (BP Location: Right arm, Patient Position: Chair, Cuff Size: Adult Large)   Pulse 103   Temp 99.3  F (37.4  C) (Tympanic)   Wt 114.3 kg (252 lb)   BMI 34.18 kg/m   Estimated body mass index is 34.18 kg/m  as calculated from the following:    Height as of 3/3/25: 1.829 m (6').    Weight as of this encounter: 114.3 kg (252 lb). .  Kirti Morgan LPN

## 2025-03-24 NOTE — PROGRESS NOTES
Procedure Date: 03/24/25     PREOPERATIVE DIAGNOSIS: rectosigmoid colon cancer  POSTOPERATIVE DIAGNOSIS: Same  PROCEDURE: Removal of right internal jugular infusion port    ATTENDING SURGEON: Cj Tee MD     ESTIMATED BLOOD LOSS: 3 mL    INDICATIONS: Celestine Simon presents with a history of rectosigmoid colon cancer who has now completed IV chemotherapy. He presents for elective removal of his infusion port, and was counseled about the indications, risks, benefits and alternatives to surgery. He understood the information given, and wishes to proceed.     DESCRIPTION OF PROCEDURE: The patient was brought to the operating room and placed supine on the operating room table. Sedative agents were administered by the anesthesia service. The bilateral chest and neck were then prepped and draped in the usual sterile fashion. Local anesthetic was delivered at the right chest wall at the site of the infusion port reservoir, and an incision created over the existing scar. The tissue was dissected using a cautery and sharp dissection, and two points of fixation each released sequentially, and all suture material removed. The port reservoir and catheter were then removed as a single unit, and the catheter inspected carefully, and appeared wholly intact and unremarkable. Hemostasis was assured at the surgical field/wound. An instrument count was conducted, and included laparotomy pads, needles and sponges, and was found to be correct. The subcutaneous tissue was closed with interrupted 3-0 Vicryl. Skin edges were re-approximated using 4-0 Monocryl, and the wound was cleansed and dried prior to application dermabond.     The patient tolerated the procedure well, was allowed to recover and was discharged home in stable condition.     Cj Tee MD on 3/24/2025 at 2:14 PM

## 2025-03-24 NOTE — LETTER
3/24/2025      Celestine Simon  [address]  [facility] [address]      Dear Colleague,    Thank you for referring your patient, Celestine Simon, to the [facility]. Please see a copy of my visit note below.    Procedure Date: 03/24/25     PREOPERATIVE DIAGNOSIS: rectosigmoid colon cancer  POSTOPERATIVE DIAGNOSIS: Same  PROCEDURE: Removal of right internal jugular infusion port    ATTENDING SURGEON: Cj Tee MD     ESTIMATED BLOOD LOSS: 3 mL    INDICATIONS: Celestine Simon presents with a history of rectosigmoid colon cancer who has now completed IV chemotherapy. He presents for elective removal of his infusion port, and was counseled about the indications, risks, benefits and alternatives to surgery. He understood the information given, and wishes to proceed.     DESCRIPTION OF PROCEDURE: The patient was brought to the operating room and placed supine on the operating room table. Sedative agents were administered by the anesthesia service. The bilateral chest and neck were then prepped and draped in the usual sterile fashion. Local anesthetic was delivered at the right chest wall at the site of the infusion port reservoir, and an incision created over the existing scar. The tissue was dissected using a cautery and sharp dissection, and two points of fixation each released sequentially, and all suture material removed. The port reservoir and catheter were then removed as a single unit, and the catheter inspected carefully, and appeared wholly intact and unremarkable. Hemostasis was assured at the surgical field/wound. An instrument count was conducted, and included laparotomy pads, needles and sponges, and was found to be correct. The subcutaneous tissue was closed with interrupted 3-0 Vicryl. Skin edges were re-approximated using 4-0 Monocryl, and the wound was cleansed and dried prior to application dermabond.     The patient tolerated the procedure well, was allowed to recover  and was discharged home in stable condition.     Cj Tee MD on 3/24/2025 at 2:14 PM      Again, thank you for allowing me to participate in the care of your patient.        Sincerely,        Cj Tee MD    Electronically signed

## 2025-03-24 NOTE — PATIENT INSTRUCTIONS
Tylenol and ibuprofen as needed for pain control  Ok to shower tomorrow   Erythromycin Counseling:  I discussed with the patient the risks of erythromycin including but not limited to GI upset, allergic reaction, drug rash, diarrhea, increase in liver enzymes, and yeast infections.

## 2025-03-25 ENCOUNTER — PATIENT OUTREACH (OUTPATIENT)
Dept: ONCOLOGY | Facility: CLINIC | Age: 70
End: 2025-03-25
Payer: MEDICARE

## 2025-03-25 NOTE — CONFIDENTIAL NOTE
[facility]: Cancer Care                                                                                        Called patient to confirm he started his xeloda yesterday with radiation. Patient stated he did. He notes he is doing ok other than had bad night sweats last night. He will keep an eye on them and let us know if they continue.       Signature:  Jessica Major RN

## 2025-03-26 ENCOUNTER — OFFICE VISIT (OUTPATIENT)
Dept: RADIATION THERAPY | Facility: OUTPATIENT CENTER | Age: 70
End: 2025-03-26
Payer: MEDICARE

## 2025-03-26 VITALS
RESPIRATION RATE: 18 BRPM | DIASTOLIC BLOOD PRESSURE: 77 MMHG | WEIGHT: 249 LBS | SYSTOLIC BLOOD PRESSURE: 139 MMHG | OXYGEN SATURATION: 95 % | BODY MASS INDEX: 33.77 KG/M2 | HEART RATE: 91 BPM

## 2025-03-26 DIAGNOSIS — C19 MALIGNANT NEOPLASM OF RECTOSIGMOID JUNCTION (H): Primary | ICD-10-CM

## 2025-03-26 ASSESSMENT — PAIN SCALES - GENERAL: PAINLEVEL_OUTOF10: NO PAIN (0)

## 2025-03-26 NOTE — PROGRESS NOTES
[facility]  SPECIALIZING IN BREAKTHROUGHS  Radiation Oncology    On Treatment Visit Note      Celestine Simon      Date: 3/26/2025   MRN: [id number]   : 1955  Diagnosis: Rectal Ca      Reason for Visit:  On Radiation Treatment Visit     Treatment Summary to Date  Treatment Site: rectal/pelvis Current Dose: 460/5500 cGy Fractions: 3/25      Chemotherapy  Chemo concurrent with radx?: Yes  Oncologist: Dr. Friedell  Drug Name/Frequency 1: Xeloda    Subjective:   Doing okay.  No acute complaints.  No GI bother.  No bleeding per rectum.  Energy is okay.    Nursing ROS:   Nutrition Alteration  Diet Type: Patient's Preference  Skin  Skin Reaction: 0 - No changes        Cardiovascular  Respiratory effort: 1 - Normal - without distress  Gastrointestinal  Diarrhea: 0 - None  GI Note: a little softer/looser BM's        Pain Assessment  0-10 Pain Scale: 0      Objective:   /77   Pulse 91   Resp 18   Wt 112.9 kg (249 lb)   SpO2 95%   BMI 33.77 kg/m    NAD    Labs:  CBC RESULTS:   Recent Labs   Lab Test 25  0946   WBC 4.0   RBC 4.33*   HGB 11.3*   HCT 36.0*   MCV 83   MCH 26.1*   MCHC 31.4*   RDW 19.0*        ELECTROLYTES:  Recent Labs   Lab Test 25  0946      POTASSIUM 4.2   CHLORIDE 103   ALIZE 10.1   CO2 24   BUN 18.7   CR 0.85   *       Assessment:  Mr. Simon is a 69 year old male with a diagnosis of locally advanced rectal cancer, clinical T3 N2 M0.  The patient is undergoing preoperative chemoradiation.    Tolerating radiation therapy well.  All questions and concerns addressed.    Plan:   Continue current therapy.        Mosaiq chart and setup information reviewed  Ports checked    Medication Review  Med list reviewed with patient?: Yes    Educational Topic Discussed  Education Instructions: possible SE reviewed and how to manage      Amari Tracy MD

## 2025-03-26 NOTE — LETTER
3/26/2025      Celestine Simon  [address]  [facility] [address]      Dear Colleague,    Thank you for referring your patient, Celestine Simon, to [facility]. Please see a copy of my visit note below.    [facility]  SPECIALIZING IN BREAKTHROUGHS  Radiation Oncology    On Treatment Visit Note      Celestine Simon      Date: 3/26/2025   MRN: [id number]   : 1955  Diagnosis: Rectal Ca      Reason for Visit:  On Radiation Treatment Visit     Treatment Summary to Date  Treatment Site: rectal/pelvis Current Dose: 460/5500 cGy Fractions: 3/25      Chemotherapy  Chemo concurrent with radx?: Yes  Oncologist: Dr. Friedell  Drug Name/Frequency 1: Xeloda    Subjective:   Doing okay.  No acute complaints.  No GI bother.  No bleeding per rectum.  Energy is okay.    Nursing ROS:   Nutrition Alteration  Diet Type: Patient's Preference  Skin  Skin Reaction: 0 - No changes        Cardiovascular  Respiratory effort: 1 - Normal - without distress  Gastrointestinal  Diarrhea: 0 - None  GI Note: a little softer/looser 's        Pain Assessment  0-10 Pain Scale: 0      Objective:   /77   Pulse 91   Resp 18   Wt 112.9 kg (249 lb)   SpO2 95%   BMI 33.77 kg/m    NAD    Labs:  CBC RESULTS:   Recent Labs   Lab Test 25  0946   WBC 4.0   RBC 4.33*   HGB 11.3*   HCT 36.0*   MCV 83   MCH 26.1*   MCHC 31.4*   RDW 19.0*        ELECTROLYTES:  Recent Labs   Lab Test 25  0946      POTASSIUM 4.2   CHLORIDE 103   ALIZE 10.1   CO2 24   BUN 18.7   CR 0.85   *       Assessment:  Mr. Simon is a 69 year old male with a diagnosis of locally advanced rectal cancer, clinical T3 N2 M0.  The patient is undergoing preoperative chemoradiation.    Tolerating radiation therapy well.  All questions and concerns addressed.    Plan:   Continue current therapy.        Mosaiq chart and setup information reviewed  Ports checked    Medication Review  Med list reviewed with  patient?: Yes    Educational Topic Discussed  Education Instructions: possible SE reviewed and how to manage      Amari Tracy MD          Again, thank you for allowing me to participate in the care of your patient.        Sincerely,        Amari Tracy MD    Electronically signed

## 2025-03-31 ENCOUNTER — TELEPHONE (OUTPATIENT)
Dept: ONCOLOGY | Facility: CLINIC | Age: 70
End: 2025-03-31
Payer: MEDICARE

## 2025-03-31 NOTE — ORAL ONC MGMT
Oral Chemotherapy Monitoring Program    Primary Oncologist: Dr. Friedell  Drug: Capecitabine (Xeloda) 500 mg BID, M-F during XRT  Start Date: 3/24/25    Subjective/Objective:  Celestine Simon is a 69 year old male contacted by phone for a follow-up visit for oral chemotherapy. Celestine is doing well on capecitabine so far with minimal side effects. He has noticed that stools are a little looser but not bothering him.         3/5/2025     9:00 AM 3/13/2025     2:00 PM 3/13/2025     2:29 PM 3/31/2025    12:00 PM   ORAL CHEMOTHERAPY   Assessment Type Initial Work up New Teach Refill Initial Follow up   Diagnosis Code Rectal Cancer Rectal Cancer Rectal Cancer Rectal Cancer   Providers Friedell Friedell Friedell Friedell   Clinic Name/Location [facility]   Drug Name Xeloda (capecitabine) Xeloda (capecitabine) Xeloda (capecitabine) Xeloda (capecitabine)   Dose 500 mg 500 mg 500 mg 500 mg   Current Schedule BID BID BID BID   Cycle Details M-F only during XRT M-F only during XRT M-F only during XRT M-F only during XRT   Start Date of Last Cycle    3/24/2025   Doses missed in last 2 weeks    0   Adherence Assessment    Adherent   Adverse Effects    No AE identified during assessment   Any new drug interactions? No      Is the dose as ordered appropriate for the patient? Yes          Vitals:  BP:   BP Readings from Last 1 Encounters:   03/26/25 139/77     Wt Readings from Last 1 Encounters:   03/26/25 112.9 kg (249 lb)     Estimated body surface area is 2.39 meters squared as calculated from the following:    Height as of 3/3/25: 1.829 m (6').    Weight as of 3/26/25: 112.9 kg (249 lb).    Labs:  _  Result Component Current Result Ref Range   Sodium 137 (3/3/2025) 135 - 145 mmol/L     _  Result Component Current Result Ref Range   Potassium 4.2 (3/3/2025) 3.4 - 5.3 mmol/L     _  Result Component Current Result Ref Range   Calcium 10.1 (3/3/2025) 8.8 - 10.4 mg/dL     No results found for Mag  within last 30 days.     No results found for Phos within last 30 days.     _  Result Component Current Result Ref Range   Albumin 3.8 (3/3/2025) 3.5 - 5.2 g/dL     _  Result Component Current Result Ref Range   Urea Nitrogen 18.7 (3/3/2025) 8.0 - 23.0 mg/dL     _  Result Component Current Result Ref Range   Creatinine 0.85 (3/3/2025) 0.67 - 1.17 mg/dL       _  Result Component Current Result Ref Range   AST 24 (3/3/2025) 0 - 45 U/L     _  Result Component Current Result Ref Range   ALT 23 (3/3/2025) 0 - 70 U/L     _  Result Component Current Result Ref Range   Bilirubin Total 0.3 (3/3/2025) <=1.2 mg/dL       _  Result Component Current Result Ref Range   WBC Count 4.0 (3/3/2025) 4.0 - 11.0 10e3/uL     _  Result Component Current Result Ref Range   Hemoglobin 11.3 (L) (3/3/2025) 13.3 - 17.7 g/dL     _  Result Component Current Result Ref Range   Platelet Count 241 (3/3/2025) 150 - 450 10e3/uL     No results found for ANC within last 30 days.         Assessment/Plan:  Tolerating capecitabine well with no significant side effects. OK to continue current dose & schedule; we will continue to follow.     Follow-Up:  4/7: Friedell appt Adam Kolling, PharmD, BCOP  Hematology/Oncology Clinical Pharmacist  [facility]  [phone]

## 2025-04-02 ENCOUNTER — OFFICE VISIT (OUTPATIENT)
Dept: RADIATION THERAPY | Facility: OUTPATIENT CENTER | Age: 70
End: 2025-04-02
Payer: MEDICARE

## 2025-04-02 VITALS
BODY MASS INDEX: 33.5 KG/M2 | WEIGHT: 247 LBS | DIASTOLIC BLOOD PRESSURE: 56 MMHG | HEART RATE: 76 BPM | SYSTOLIC BLOOD PRESSURE: 92 MMHG

## 2025-04-02 DIAGNOSIS — C19 MALIGNANT NEOPLASM OF RECTOSIGMOID JUNCTION (H): Primary | ICD-10-CM

## 2025-04-02 NOTE — PROGRESS NOTES
[facility]  SPECIALIZING IN BREAKTHROUGHS  Radiation Oncology    On Treatment Visit Note      Celestine Simon      Date: 2025   MRN: [id number]   : 1955  Diagnosis: Rectal Ca      Reason for Visit:  On Radiation Treatment Visit     Treatment Summary to Date  Treatment Site: rectal/pelvis Current Dose: 1760/5500 cGy Fractions:       Chemotherapy  Chemo concurrent with radx?: Yes  Oncologist: Dr. Friedell  Drug Name/Frequency 1: Xeloda    Subjective:   Doing okay.  No acute complaints.  Mild GI bother.  No bleeding per rectum.  More fatigue. Had chills this weekend, now resolved.    Nursing ROS:   Nutrition Alteration  Diet Type: Patient's Preference  Skin  Skin Reaction: 0 - No changes        Cardiovascular  Respiratory effort: 1 - Normal - without distress  Gastrointestinal  Diarrhea: 0 - None  GI Note: a little softer/looser BM's        Pain Assessment  0-10 Pain Scale: 0      Objective:   BP 92/56   Pulse 76   Wt 112 kg (247 lb)   BMI 33.50 kg/m    NAD    Labs:  CBC RESULTS:   Recent Labs   Lab Test 25  0946   WBC 4.0   RBC 4.33*   HGB 11.3*   HCT 36.0*   MCV 83   MCH 26.1*   MCHC 31.4*   RDW 19.0*        ELECTROLYTES:  Recent Labs   Lab Test 25  0946      POTASSIUM 4.2   CHLORIDE 103   ALIZE 10.1   CO2 24   BUN 18.7   CR 0.85   *       Assessment:  Mr. Simon is a 69 year old male with a diagnosis of locally advanced rectal cancer, clinical T3 N2 M0.  The patient is undergoing preoperative chemoradiation.    Tolerating radiation therapy well.  All questions and concerns addressed.    Plan:   Continue current therapy.    GI bother. Imodium as needed. Continue to hydrate.       Mosaiq chart and setup information reviewed  Ports checked    Medication Review  Med list reviewed with patient?: Yes    Educational Topic Discussed  Education Instructions: possible SE reviewed and how to manage      Amari Tracy MD

## 2025-04-02 NOTE — LETTER
2025      Celestine Simon  [address]  [facility] [address]      Dear Colleague,    Thank you for referring your patient, Celestine Simon, to the  [facility]. Please see a copy of my visit note below.    [facility]  SPECIALIZING IN BREAKTHROUGHS  Radiation Oncology    On Treatment Visit Note      Celestine Simon      Date: 2025   MRN: [id number]   : 1955  Diagnosis: Rectal Ca      Reason for Visit:  On Radiation Treatment Visit     Treatment Summary to Date  Treatment Site: rectal/pelvis Current Dose: 1760/5500 cGy Fractions:       Chemotherapy  Chemo concurrent with radx?: Yes  Oncologist: Dr. Friedell  Drug Name/Frequency 1: Xeloda    Subjective:   Doing okay.  No acute complaints.  Mild GI bother.  No bleeding per rectum.  More fatigue. Had chills this weekend, now resolved.    Nursing ROS:   Nutrition Alteration  Diet Type: Patient's Preference  Skin  Skin Reaction: 0 - No changes        Cardiovascular  Respiratory effort: 1 - Normal - without distress  Gastrointestinal  Diarrhea: 0 - None  GI Note: a little softer/looser 's        Pain Assessment  0-10 Pain Scale: 0      Objective:   BP 92/56   Pulse 76   Wt 112 kg (247 lb)   BMI 33.50 kg/m    NAD    Labs:  CBC RESULTS:   Recent Labs   Lab Test 25  0946   WBC 4.0   RBC 4.33*   HGB 11.3*   HCT 36.0*   MCV 83   MCH 26.1*   MCHC 31.4*   RDW 19.0*        ELECTROLYTES:  Recent Labs   Lab Test 25  0946      POTASSIUM 4.2   CHLORIDE 103   AILZE 10.1   CO2 24   BUN 18.7   CR 0.85   *       Assessment:  Mr. Simon is a 69 year old male with a diagnosis of locally advanced rectal cancer, clinical T3 N2 M0.  The patient is undergoing preoperative chemoradiation.    Tolerating radiation therapy well.  All questions and concerns addressed.    Plan:   Continue current therapy.    GI bother. Imodium as needed. Continue to hydrate.       Mosaiq chart and setup information  reviewed  Ports checked    Medication Review  Med list reviewed with patient?: Yes    Educational Topic Discussed  Education Instructions: possible SE reviewed and how to manage      Amari Tracy MD          Again, thank you for allowing me to participate in the care of your patient.        Sincerely,        Amari Tracy MD    Electronically signed

## 2025-04-07 ENCOUNTER — LAB (OUTPATIENT)
Dept: LAB | Facility: CLINIC | Age: 70
End: 2025-04-07
Payer: MEDICARE

## 2025-04-07 ENCOUNTER — ONCOLOGY VISIT (OUTPATIENT)
Dept: ONCOLOGY | Facility: CLINIC | Age: 70
End: 2025-04-07
Attending: INTERNAL MEDICINE
Payer: MEDICARE

## 2025-04-07 VITALS
RESPIRATION RATE: 16 BRPM | WEIGHT: 253 LBS | DIASTOLIC BLOOD PRESSURE: 68 MMHG | HEART RATE: 89 BPM | OXYGEN SATURATION: 96 % | TEMPERATURE: 98.4 F | BODY MASS INDEX: 34.27 KG/M2 | HEIGHT: 72 IN | SYSTOLIC BLOOD PRESSURE: 111 MMHG

## 2025-04-07 DIAGNOSIS — C19 MALIGNANT NEOPLASM OF RECTOSIGMOID JUNCTION (H): Primary | ICD-10-CM

## 2025-04-07 DIAGNOSIS — T45.1X5A CHEMOTHERAPY-INDUCED NEUTROPENIA: ICD-10-CM

## 2025-04-07 DIAGNOSIS — C19 MALIGNANT NEOPLASM OF RECTOSIGMOID JUNCTION (H): ICD-10-CM

## 2025-04-07 DIAGNOSIS — D70.1 CHEMOTHERAPY-INDUCED NEUTROPENIA: ICD-10-CM

## 2025-04-07 LAB
ALBUMIN SERPL BCG-MCNC: 3.8 G/DL (ref 3.5–5.2)
ALP SERPL-CCNC: 58 U/L (ref 40–150)
ALT SERPL W P-5'-P-CCNC: 11 U/L (ref 0–70)
ANION GAP SERPL CALCULATED.3IONS-SCNC: 14 MMOL/L (ref 7–15)
AST SERPL W P-5'-P-CCNC: 16 U/L (ref 0–45)
BASOPHILS # BLD AUTO: 0 10E3/UL (ref 0–0.2)
BASOPHILS NFR BLD AUTO: 0 %
BILIRUB SERPL-MCNC: 0.4 MG/DL
BUN SERPL-MCNC: 14.6 MG/DL (ref 8–23)
CALCIUM SERPL-MCNC: 10 MG/DL (ref 8.8–10.4)
CHLORIDE SERPL-SCNC: 99 MMOL/L (ref 98–107)
CREAT SERPL-MCNC: 0.88 MG/DL (ref 0.67–1.17)
EGFRCR SERPLBLD CKD-EPI 2021: >90 ML/MIN/1.73M2
EOSINOPHIL # BLD AUTO: 0.2 10E3/UL (ref 0–0.7)
EOSINOPHIL NFR BLD AUTO: 4 %
ERYTHROCYTE [DISTWIDTH] IN BLOOD BY AUTOMATED COUNT: 18.4 % (ref 10–15)
GLUCOSE SERPL-MCNC: 146 MG/DL (ref 70–99)
HCO3 SERPL-SCNC: 23 MMOL/L (ref 22–29)
HCT VFR BLD AUTO: 34.8 % (ref 40–53)
HGB BLD-MCNC: 10.9 G/DL (ref 13.3–17.7)
IMM GRANULOCYTES # BLD: 0 10E3/UL
IMM GRANULOCYTES NFR BLD: 1 %
LYMPHOCYTES # BLD AUTO: 0.5 10E3/UL (ref 0.8–5.3)
LYMPHOCYTES NFR BLD AUTO: 11 %
MCH RBC QN AUTO: 26.1 PG (ref 26.5–33)
MCHC RBC AUTO-ENTMCNC: 31.3 G/DL (ref 31.5–36.5)
MCV RBC AUTO: 84 FL (ref 78–100)
MONOCYTES # BLD AUTO: 0.4 10E3/UL (ref 0–1.3)
MONOCYTES NFR BLD AUTO: 8 %
NEUTROPHILS # BLD AUTO: 3.5 10E3/UL (ref 1.6–8.3)
NEUTROPHILS NFR BLD AUTO: 76 %
NRBC # BLD AUTO: 0 10E3/UL
NRBC BLD AUTO-RTO: 0 /100
PLATELET # BLD AUTO: 284 10E3/UL (ref 150–450)
POTASSIUM SERPL-SCNC: 4.3 MMOL/L (ref 3.4–5.3)
PROT SERPL-MCNC: 6.6 G/DL (ref 6.4–8.3)
RBC # BLD AUTO: 4.17 10E6/UL (ref 4.4–5.9)
SODIUM SERPL-SCNC: 136 MMOL/L (ref 135–145)
WBC # BLD AUTO: 4.6 10E3/UL (ref 4–11)

## 2025-04-07 PROCEDURE — 84155 ASSAY OF PROTEIN SERUM: CPT

## 2025-04-07 PROCEDURE — G0463 HOSPITAL OUTPT CLINIC VISIT: HCPCS | Performed by: INTERNAL MEDICINE

## 2025-04-07 PROCEDURE — 99215 OFFICE O/P EST HI 40 MIN: CPT | Performed by: INTERNAL MEDICINE

## 2025-04-07 PROCEDURE — 82435 ASSAY OF BLOOD CHLORIDE: CPT

## 2025-04-07 PROCEDURE — 36415 COLL VENOUS BLD VENIPUNCTURE: CPT

## 2025-04-07 PROCEDURE — 85025 COMPLETE CBC W/AUTO DIFF WBC: CPT

## 2025-04-07 PROCEDURE — G2211 COMPLEX E/M VISIT ADD ON: HCPCS | Performed by: INTERNAL MEDICINE

## 2025-04-07 PROCEDURE — 84450 TRANSFERASE (AST) (SGOT): CPT

## 2025-04-07 ASSESSMENT — PAIN SCALES - GENERAL: PAINLEVEL_OUTOF10: NO PAIN (0)

## 2025-04-07 NOTE — PROGRESS NOTES
Oncology Rooming Note    April 7, 2025 8:29 AM   Celestine Simon is a 69 year old male who presents for:    Chief Complaint   Patient presents with    Oncology Clinic Visit     Malignant neoplasm of rectosigmoid junction - Labs and provider visits     Initial Vitals: /68 (BP Location: Right arm, Patient Position: Sitting, Cuff Size: Adult Large)   Pulse 89   Temp 98.4  F (36.9  C) (Tympanic)   Resp 16   Ht 1.829 m (6')   Wt 114.8 kg (253 lb)   SpO2 96%   BMI 34.31 kg/m   Estimated body mass index is 34.31 kg/m  as calculated from the following:    Height as of this encounter: 1.829 m (6').    Weight as of this encounter: 114.8 kg (253 lb). Body surface area is 2.41 meters squared.  No Pain (0) Comment: Data Unavailable   No LMP for male patient.  Allergies reviewed: Yes  Medications reviewed: Yes    Medications: Medication refills not needed today.  Pharmacy name entered into [employer]:    [id number] - [place], [place] - [address] [facility] [place] - [address] [facility]  [facility] [place], [place] [address]  [facility] [address] [place] [address] PHARMACY [place] - [address] [facility] - [place], [place] - [address] [facility] - [place], [place] - [address] [facility] - [place], [place] - [address]    Frailty Screening:   Is the patient here for a new oncology consult visit in cancer care? 2. No    PHQ9:  Did this patient require a PHQ9?: No      Clinical concerns:  None today.      Lu Zabala, CMA
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
## 2025-04-08 DIAGNOSIS — D70.1 CHEMOTHERAPY-INDUCED NEUTROPENIA: ICD-10-CM

## 2025-04-08 DIAGNOSIS — C19 MALIGNANT NEOPLASM OF RECTOSIGMOID JUNCTION (H): Primary | ICD-10-CM

## 2025-04-08 DIAGNOSIS — T45.1X5A CHEMOTHERAPY-INDUCED NEUTROPENIA: ICD-10-CM

## 2025-04-08 RX ORDER — CAPECITABINE 500 MG/1
TABLET, FILM COATED ORAL
Qty: 10 TABLET | Refills: 0 | Status: SHIPPED | OUTPATIENT
Start: 2025-04-14

## 2025-04-09 ENCOUNTER — OFFICE VISIT (OUTPATIENT)
Dept: RADIATION THERAPY | Facility: OUTPATIENT CENTER | Age: 70
End: 2025-04-09
Payer: MEDICARE

## 2025-04-09 VITALS
DIASTOLIC BLOOD PRESSURE: 72 MMHG | BODY MASS INDEX: 34.58 KG/M2 | SYSTOLIC BLOOD PRESSURE: 119 MMHG | WEIGHT: 255 LBS | HEART RATE: 89 BPM

## 2025-04-09 DIAGNOSIS — C19 MALIGNANT NEOPLASM OF RECTOSIGMOID JUNCTION (H): Primary | ICD-10-CM

## 2025-04-09 NOTE — LETTER
2025      Celestine Simon  [address]  [place] [address]      Dear Colleague,    Thank you for referring your patient, Celestine Simon, to the  [facility]. Please see a copy of my visit note below.    [facility]  SPECIALIZING IN BREAKTHROUGHS  Radiation Oncology    On Treatment Visit Note      Celestine Simon      Date: 2025   MRN: [id number]   : 1955  Diagnosis: Rectal Ca      Reason for Visit:  On Radiation Treatment Visit     Treatment Summary to Date  Treatment Site: rectal/pelvis Current Dose: 2860/5500 cGy Fractions:       Chemotherapy  Chemo concurrent with radx?: Yes  Oncologist: Dr. Friedell  Drug Name/Frequency 1: Xeloda    Subjective:   Doing okay.  No acute complaints.  Mild GI bother.  No bleeding per rectum.  More fatigue.     Nursing ROS:   Nutrition Alteration  Diet Type: Patient's Preference  Skin  Skin Reaction: 0 - No changes        Cardiovascular  Respiratory effort: 1 - Normal - without distress  Gastrointestinal  Diarrhea: 0 - None  GI Note: a little softer/looser 's        Pain Assessment  0-10 Pain Scale: 0      Objective:   /72   Pulse 89   Wt 115.7 kg (255 lb)   BMI 34.58 kg/m    NAD    Labs:  CBC RESULTS:   Recent Labs   Lab Test 25  0946   WBC 4.0   RBC 4.33*   HGB 11.3*   HCT 36.0*   MCV 83   MCH 26.1*   MCHC 31.4*   RDW 19.0*        ELECTROLYTES:  Recent Labs   Lab Test 25  0946      POTASSIUM 4.2   CHLORIDE 103   ALIZE 10.1   CO2 24   BUN 18.7   CR 0.85   *       Assessment:  Mr. Simon is a 69 year old male with a diagnosis of locally advanced rectal cancer, clinical T3 N2 M0.  The patient is undergoing preoperative chemoradiation.    Tolerating radiation therapy well.  All questions and concerns addressed.    Plan:   Continue current therapy.    GI bother. Imodium as needed. Continue to hydrate.       Mosaiq chart and setup information reviewed  Ports checked    Medication  Review  Med list reviewed with patient?: Yes    Educational Topic Discussed  Education Instructions: possible SE reviewed and how to manage      Amari Tracy MD          Again, thank you for allowing me to participate in the care of your patient.        Sincerely,        Amari Tracy MD    Electronically signed

## 2025-04-16 ENCOUNTER — OFFICE VISIT (OUTPATIENT)
Dept: RADIATION THERAPY | Facility: OUTPATIENT CENTER | Age: 70
End: 2025-04-16
Payer: MEDICARE

## 2025-04-16 VITALS
DIASTOLIC BLOOD PRESSURE: 56 MMHG | HEART RATE: 85 BPM | SYSTOLIC BLOOD PRESSURE: 95 MMHG | WEIGHT: 246.8 LBS | BODY MASS INDEX: 33.47 KG/M2

## 2025-04-16 DIAGNOSIS — C19 MALIGNANT NEOPLASM OF RECTOSIGMOID JUNCTION (H): Primary | ICD-10-CM

## 2025-04-16 NOTE — PROGRESS NOTES
[facility]  SPECIALIZING IN BREAKTHROUGHS  Radiation Oncology    On Treatment Visit Note      Celestine Simon      Date: 2025   MRN: [id number]   : 1955  Diagnosis: Rectal Ca      Reason for Visit:  On Radiation Treatment Visit     Treatment Summary to Date  Treatment Site: rectal/pelvis Current Dose: 3960/5500 cGy Fractions:       Chemotherapy  Chemo concurrent with radx?: Yes  Oncologist: Dr. Friedell  Drug Name/Frequency 1: Xeloda    Subjective:     Doing much better this week from a GI standpoint.  He has implemented dietary changes and is using Imodium as previously directed.  Reports significantly less loose stools.  Denies pain and is taking Xeloda as previously directed.    Nursing ROS:   Nutrition Alteration  Diet Type: Patient's Preference  Nutrition Note: has switched to bland diet  Skin  Skin Reaction: 0 - No changes  Skin Note: mild rectal irritation        Cardiovascular  Respiratory effort: 1 - Normal - without distress  Gastrointestinal  Diarrhea: 0 - None  GI Note: severe diarrhea over the weekend, much better now, using imodium as needed  Genitourinary   Note: mild changes, no meds or tx needed     Pain Assessment  Pain Note: occasional low back pain - tylenol      Objective:   BP 95/56   Pulse 85   Wt 111.9 kg (246 lb 12.8 oz)   BMI 33.47 kg/m    Gen: Appears well, in no acute distress      Labs:  CBC RESULTS:   Recent Labs   Lab Test 25  0755   WBC 4.6   RBC 4.17*   HGB 10.9*   HCT 34.8*   MCV 84   MCH 26.1*   MCHC 31.3*   RDW 18.4*        ELECTROLYTES:  Recent Labs   Lab Test 25  0755      POTASSIUM 4.3   CHLORIDE 99   ALIZE 10.0   CO2 23   BUN 14.6   CR 0.88   *       Assessment:    Tolerating radiation therapy well.  All questions and concerns addressed.    Toxicities:  Diarrhea: Grade 2: Increase of 4-6 stools per day over baseline; moderate increase in ostomy output compared to baseline    Plan:   Continue current  therapy.    Xeloda per medical oncology  Continue with Imodium and diet modifications to help with diarrhea      Mosaiq chart and setup information reviewed  MVCT/IGRT images checked    Medication Review  Med list reviewed with patient?: Yes    Educational Topic Discussed  Education Instructions: possible SE reviewed and how to manage        Dequan Gay MD    Please do not send letter to referring physician.

## 2025-04-16 NOTE — LETTER
2025      Celestine Simon  [address]  [facility] [address]      Dear Colleague,    Thank you for referring your patient, Celestine Simon, to the  [facility]. Please see a copy of my visit note below.    [facility]  SPECIALIZING IN BREAKTHROUGHS  Radiation Oncology    On Treatment Visit Note      Celestine Simon      Date: 2025   MRN: [id number]   : 1955  Diagnosis: Rectal Ca      Reason for Visit:  On Radiation Treatment Visit     Treatment Summary to Date  Treatment Site: rectal/pelvis Current Dose: 3960/5500 cGy Fractions:       Chemotherapy  Chemo concurrent with radx?: Yes  Oncologist: Dr. Friedell  Drug Name/Frequency 1: Xeloda    Subjective:     Doing much better this week from a GI standpoint.  He has implemented dietary changes and is using Imodium as previously directed.  Reports significantly less loose stools.  Denies pain and is taking Xeloda as previously directed.    Nursing ROS:   Nutrition Alteration  Diet Type: Patient's Preference  Nutrition Note: has switched to bland diet  Skin  Skin Reaction: 0 - No changes  Skin Note: mild rectal irritation        Cardiovascular  Respiratory effort: 1 - Normal - without distress  Gastrointestinal  Diarrhea: 0 - None  GI Note: severe diarrhea over the weekend, much better now, using imodium as needed  Genitourinary   Note: mild changes, no meds or tx needed     Pain Assessment  Pain Note: occasional low back pain - tylenol      Objective:   BP 95/56   Pulse 85   Wt 111.9 kg (246 lb 12.8 oz)   BMI 33.47 kg/m    Gen: Appears well, in no acute distress      Labs:  CBC RESULTS:   Recent Labs   Lab Test 25  0755   WBC 4.6   RBC 4.17*   HGB 10.9*   HCT 34.8*   MCV 84   MCH 26.1*   MCHC 31.3*   RDW 18.4*        ELECTROLYTES:  Recent Labs   Lab Test 25  0755      POTASSIUM 4.3   CHLORIDE 99   ALIZE 10.0   CO2 23   BUN 14.6   CR 0.88   *       Assessment:     Tolerating radiation therapy well.  All questions and concerns addressed.    Toxicities:  Diarrhea: Grade 2: Increase of 4-6 stools per day over baseline; moderate increase in ostomy output compared to baseline    Plan:   Continue current therapy.    Xeloda per medical oncology  Continue with Imodium and diet modifications to help with diarrhea      Mosaiq chart and setup information reviewed  MVCT/IGRT images checked    Medication Review  Med list reviewed with patient?: Yes    Educational Topic Discussed  Education Instructions: possible SE reviewed and how to manage        Dequan Gay MD    Please do not send letter to referring physician.        Again, thank you for allowing me to participate in the care of your patient.        Sincerely,        ROSALIND Gay MD    Electronically signed

## 2025-04-22 DIAGNOSIS — C20 RECTAL CANCER (H): Primary | ICD-10-CM

## 2025-04-23 ENCOUNTER — OFFICE VISIT (OUTPATIENT)
Dept: RADIATION THERAPY | Facility: OUTPATIENT CENTER | Age: 70
End: 2025-04-23
Payer: MEDICARE

## 2025-04-23 VITALS
DIASTOLIC BLOOD PRESSURE: 77 MMHG | WEIGHT: 252 LBS | BODY MASS INDEX: 34.18 KG/M2 | HEART RATE: 68 BPM | SYSTOLIC BLOOD PRESSURE: 137 MMHG

## 2025-04-23 DIAGNOSIS — C19 MALIGNANT NEOPLASM OF RECTOSIGMOID JUNCTION (H): Primary | ICD-10-CM

## 2025-04-23 NOTE — PROGRESS NOTES
[facility]  SPECIALIZING IN BREAKTHROUGHS  Radiation Oncology    On Treatment Visit Note      Celestine Simon      Date: 2025   MRN: [id number]   : 1955  Diagnosis: Rectal Ca      Reason for Visit:  On Radiation Treatment Visit     Treatment Summary to Date  Treatment Site: rectal/pelvis Current Dose: 5060/5500 cGy Fractions:       Chemotherapy  Chemo concurrent with radx?: Yes  Oncologist: Dr. Friedell  Drug Name/Frequency 1: Xeloda    Subjective:   Doing okay.  No acute complaints.  Moderate GI bother.  Controlled with Imodium.  No bleeding per rectum.  More fatigue.     Nursing ROS:   Nutrition Alteration  Diet Type: Patient's Preference  Skin  Skin Reaction: 0 - No changes        Cardiovascular  Respiratory effort: 1 - Normal - without distress  Gastrointestinal  Diarrhea: 0 - None  GI Note: a little softer/looser BM's        Pain Assessment  0-10 Pain Scale: 0      Objective:   /77   Pulse 68   Wt 114.3 kg (252 lb)   BMI 34.18 kg/m    NAD    Labs:  CBC RESULTS:   Recent Labs   Lab Test 25  0946   WBC 4.0   RBC 4.33*   HGB 11.3*   HCT 36.0*   MCV 83   MCH 26.1*   MCHC 31.4*   RDW 19.0*        ELECTROLYTES:  Recent Labs   Lab Test 25  0946      POTASSIUM 4.2   CHLORIDE 103   ALIZE 10.1   CO2 24   BUN 18.7   CR 0.85   *       Assessment:  Mr. Simon is a 69 year old male with a diagnosis of locally advanced rectal cancer, clinical T3 N2 M0.  The patient is undergoing preoperative chemoradiation.    Tolerating radiation therapy well.  All questions and concerns addressed.    Plan:   Continue current therapy.  EOT on Friday.  Return to clinic in 4-6 weeks.  GI bother. Imodium as needed. Continue to hydrate.       Mosaiq chart and setup information reviewed  Ports checked    Medication Review  Med list reviewed with patient?: Yes    Educational Topic Discussed  Education Instructions: possible SE reviewed and how to manage      Amari Tracy MD

## 2025-04-23 NOTE — LETTER
2025      Celestine Simon  [address]  [facility] [address]      Dear Colleague,    Thank you for referring your patient, Celestine Simon, to the  [facility]. Please see a copy of my visit note below.    [facility]  SPECIALIZING IN BREAKTHROUGHS  Radiation Oncology    On Treatment Visit Note      Celestine Simon      Date: 2025   MRN: [id number]   : 1955  Diagnosis: Rectal Ca      Reason for Visit:  On Radiation Treatment Visit     Treatment Summary to Date  Treatment Site: rectal/pelvis Current Dose: 5060/5500 cGy Fractions:       Chemotherapy  Chemo concurrent with radx?: Yes  Oncologist: Dr. Friedell  Drug Name/Frequency 1: Xeloda    Subjective:   Doing okay.  No acute complaints.  Moderate GI bother.  Controlled with Imodium.  No bleeding per rectum.  More fatigue.     Nursing ROS:   Nutrition Alteration  Diet Type: Patient's Preference  Skin  Skin Reaction: 0 - No changes        Cardiovascular  Respiratory effort: 1 - Normal - without distress  Gastrointestinal  Diarrhea: 0 - None  GI Note: a little softer/looser BM's        Pain Assessment  0-10 Pain Scale: 0      Objective:   /77   Pulse 68   Wt 114.3 kg (252 lb)   BMI 34.18 kg/m    NAD    Labs:  CBC RESULTS:   Recent Labs   Lab Test 25  0946   WBC 4.0   RBC 4.33*   HGB 11.3*   HCT 36.0*   MCV 83   MCH 26.1*   MCHC 31.4*   RDW 19.0*        ELECTROLYTES:  Recent Labs   Lab Test 25  0946      POTASSIUM 4.2   CHLORIDE 103   ALIZE 10.1   CO2 24   BUN 18.7   CR 0.85   *       Assessment:  Mr. Simon is a 69 year old male with a diagnosis of locally advanced rectal cancer, clinical T3 N2 M0.  The patient is undergoing preoperative chemoradiation.    Tolerating radiation therapy well.  All questions and concerns addressed.    Plan:   Continue current therapy.  EOT on Friday.  Return to clinic in 4-6 weeks.  GI bother. Imodium as needed. Continue to hydrate.        Mosaiq chart and setup information reviewed  Ports checked    Medication Review  Med list reviewed with patient?: Yes    Educational Topic Discussed  Education Instructions: possible SE reviewed and how to manage      Amari Tracy MD          Again, thank you for allowing me to participate in the care of your patient.        Sincerely,        Amari Tracy MD    Electronically signed

## 2025-05-10 ENCOUNTER — HEALTH MAINTENANCE LETTER (OUTPATIENT)
Age: 70
End: 2025-05-10

## 2025-05-14 ENCOUNTER — ONCOLOGY VISIT (OUTPATIENT)
Dept: ONCOLOGY | Facility: CLINIC | Age: 70
End: 2025-05-14
Attending: INTERNAL MEDICINE
Payer: MEDICARE

## 2025-05-14 ENCOUNTER — LAB (OUTPATIENT)
Dept: LAB | Facility: CLINIC | Age: 70
End: 2025-05-14
Attending: INTERNAL MEDICINE
Payer: MEDICARE

## 2025-05-14 VITALS
TEMPERATURE: 97.7 F | OXYGEN SATURATION: 94 % | WEIGHT: 259 LBS | HEART RATE: 68 BPM | SYSTOLIC BLOOD PRESSURE: 142 MMHG | BODY MASS INDEX: 35.08 KG/M2 | RESPIRATION RATE: 16 BRPM | DIASTOLIC BLOOD PRESSURE: 85 MMHG | HEIGHT: 72 IN

## 2025-05-14 DIAGNOSIS — C19 MALIGNANT NEOPLASM OF RECTOSIGMOID JUNCTION (H): Primary | ICD-10-CM

## 2025-05-14 DIAGNOSIS — E88.89: ICD-10-CM

## 2025-05-14 DIAGNOSIS — C19 MALIGNANT NEOPLASM OF RECTOSIGMOID JUNCTION (H): ICD-10-CM

## 2025-05-14 LAB
ALBUMIN SERPL BCG-MCNC: 4.2 G/DL (ref 3.5–5.2)
ALP SERPL-CCNC: 46 U/L (ref 40–150)
ALT SERPL W P-5'-P-CCNC: 16 U/L (ref 0–70)
ANION GAP SERPL CALCULATED.3IONS-SCNC: 10 MMOL/L (ref 7–15)
AST SERPL W P-5'-P-CCNC: 18 U/L (ref 0–45)
BASOPHILS # BLD AUTO: 0 10E3/UL (ref 0–0.2)
BASOPHILS NFR BLD AUTO: 1 %
BILIRUB SERPL-MCNC: 0.5 MG/DL
BUN SERPL-MCNC: 20 MG/DL (ref 8–23)
CALCIUM SERPL-MCNC: 9.9 MG/DL (ref 8.8–10.4)
CHLORIDE SERPL-SCNC: 103 MMOL/L (ref 98–107)
CREAT SERPL-MCNC: 0.91 MG/DL (ref 0.67–1.17)
EGFRCR SERPLBLD CKD-EPI 2021: >90 ML/MIN/1.73M2
EOSINOPHIL # BLD AUTO: 0.2 10E3/UL (ref 0–0.7)
EOSINOPHIL NFR BLD AUTO: 7 %
ERYTHROCYTE [DISTWIDTH] IN BLOOD BY AUTOMATED COUNT: 22 % (ref 10–15)
GLUCOSE SERPL-MCNC: 143 MG/DL (ref 70–99)
HCO3 SERPL-SCNC: 25 MMOL/L (ref 22–29)
HCT VFR BLD AUTO: 34.9 % (ref 40–53)
HGB BLD-MCNC: 11.1 G/DL (ref 13.3–17.7)
IMM GRANULOCYTES # BLD: 0 10E3/UL
IMM GRANULOCYTES NFR BLD: 0 %
LYMPHOCYTES # BLD AUTO: 0.5 10E3/UL (ref 0.8–5.3)
LYMPHOCYTES NFR BLD AUTO: 16 %
MCH RBC QN AUTO: 28.5 PG (ref 26.5–33)
MCHC RBC AUTO-ENTMCNC: 31.8 G/DL (ref 31.5–36.5)
MCV RBC AUTO: 90 FL (ref 78–100)
MONOCYTES # BLD AUTO: 0.4 10E3/UL (ref 0–1.3)
MONOCYTES NFR BLD AUTO: 12 %
NEUTROPHILS # BLD AUTO: 2.1 10E3/UL (ref 1.6–8.3)
NEUTROPHILS NFR BLD AUTO: 65 %
NRBC # BLD AUTO: 0 10E3/UL
NRBC BLD AUTO-RTO: 0 /100
PLATELET # BLD AUTO: 205 10E3/UL (ref 150–450)
POTASSIUM SERPL-SCNC: 4.5 MMOL/L (ref 3.4–5.3)
PROT SERPL-MCNC: 6.5 G/DL (ref 6.4–8.3)
RBC # BLD AUTO: 3.89 10E6/UL (ref 4.4–5.9)
SODIUM SERPL-SCNC: 138 MMOL/L (ref 135–145)
WBC # BLD AUTO: 3.2 10E3/UL (ref 4–11)

## 2025-05-14 PROCEDURE — 36415 COLL VENOUS BLD VENIPUNCTURE: CPT

## 2025-05-14 PROCEDURE — G0463 HOSPITAL OUTPT CLINIC VISIT: HCPCS | Performed by: INTERNAL MEDICINE

## 2025-05-14 PROCEDURE — G2211 COMPLEX E/M VISIT ADD ON: HCPCS | Performed by: INTERNAL MEDICINE

## 2025-05-14 PROCEDURE — 80053 COMPREHEN METABOLIC PANEL: CPT

## 2025-05-14 PROCEDURE — 85004 AUTOMATED DIFF WBC COUNT: CPT

## 2025-05-14 PROCEDURE — 99213 OFFICE O/P EST LOW 20 MIN: CPT | Performed by: INTERNAL MEDICINE

## 2025-05-14 ASSESSMENT — PAIN SCALES - GENERAL: PAINLEVEL_OUTOF10: NO PAIN (0)

## 2025-05-14 NOTE — PROGRESS NOTES
Oncology Rooming Note    May 14, 2025 7:49 AM   Celestine Simon is a 69 year old male who presents for:    Chief Complaint   Patient presents with    Oncology Clinic Visit     Malignant neoplasm of rectosigmoid junction - Labs and provider visits     Initial Vitals: BP (!) 142/85 (BP Location: Right arm, Patient Position: Sitting, Cuff Size: Adult Regular)   Pulse 68   Temp 97.7  F (36.5  C) (Tympanic)   Resp 16   Ht 1.829 m (6')   Wt 117.5 kg (259 lb)   SpO2 94%   BMI 35.13 kg/m   Estimated body mass index is 35.13 kg/m  as calculated from the following:    Height as of this encounter: 1.829 m (6').    Weight as of this encounter: 117.5 kg (259 lb). Body surface area is 2.44 meters squared.  No Pain (0) Comment: Data Unavailable   No LMP for male patient.  Allergies reviewed: Yes  Medications reviewed: Yes    Medications: Medication refills not needed today.  Pharmacy name entered into [facility] [employer]:    [facility] - [place], [place] - [address] [facility]  [facility] [place] [address]  [facility] [place] [address] [place] [address] [facility] [place] [address] [facility] - [place], [place] - [address] [facility] - [place], [place] - [address] [facility] - [place], [place] - [address]    Frailty Screening:   Is the patient here for a new oncology consult visit in cancer care? 2. No    PHQ9:  Did this patient require a PHQ9?: No      Clinical concerns:  follow up      Lu Zabala, CMA
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
## 2025-05-22 ENCOUNTER — OFFICE VISIT (OUTPATIENT)
Dept: RADIATION THERAPY | Facility: OUTPATIENT CENTER | Age: 70
End: 2025-05-22
Payer: MEDICARE

## 2025-05-22 VITALS
SYSTOLIC BLOOD PRESSURE: 133 MMHG | HEART RATE: 93 BPM | RESPIRATION RATE: 18 BRPM | WEIGHT: 259.4 LBS | BODY MASS INDEX: 35.18 KG/M2 | OXYGEN SATURATION: 95 % | DIASTOLIC BLOOD PRESSURE: 77 MMHG

## 2025-05-22 DIAGNOSIS — C19 MALIGNANT NEOPLASM OF RECTOSIGMOID JUNCTION (H): Primary | ICD-10-CM

## 2025-05-22 NOTE — NURSING NOTE
Oncology Rooming Note    May 22, 2025 3:36 PM   Celestine Simon is a 69 year old male who presents for:    Chief Complaint   Patient presents with    Radiation Therapy     return     Initial Vitals: /77   Pulse 93   Resp 18   Wt 117.7 kg (259 lb 6.4 oz)   SpO2 95%   BMI 35.18 kg/m   Estimated body mass index is 35.18 kg/m  as calculated from the following:    Height as of 5/14/25: 1.829 m (6').    Weight as of this encounter: 117.7 kg (259 lb 6.4 oz). Body surface area is 2.45 meters squared.  Data Unavailable Comment: Data Unavailable   No LMP for male patient.  Allergies reviewed: Yes  Medications reviewed: Yes    Medications: Medication refills not needed today.  Pharmacy name entered into [employer]:    [facility] - [place], [place] - [address] [facility]  [facility] - [place], [place] - [address]  [place] [facility] [place], [place] [address] [place] [address] [place], [place] - [address] [facility]ARMAC[facility] - [place], [place] - [address] [facility] 2[facility] - [place], [place] - [address] [facility]SPECIALTY PH[facility]ACY - [place], [place] - [address]    Frailty Screening:   Is the patient here for a new oncology consult visit in cancer care? 2. No    PHQ9:  Did this patient require a PHQ9?: No      Clinical concerns: completed concurrent chemoradiation on 4/25/25  rectal cancer    Pt did have some radiation burns after RT - used triple abx - improved/resolved  Recently had some BLE/testicle swelling - re started his hydrochlorothiazide - improved    Patient feels his energy level is getting much better - was out in the yard weeding and planting today.    Dr. Friedell medical oncology - scans and visit mid June  Dr. Familia Morales early June for [place] [facility] was notified.      Zeinab Garber RN

## 2025-05-22 NOTE — LETTER
5/22/2025      Celestine Simon  [address]  [facility] [address]      Dear Colleague,    Thank you for referring your patient, Celestine Simon, to the [facility]. Please see a copy of my visit note below.    Radiation Oncology Progress Note    HPI:  Mr. Simon is a 69 year old male with a diagnosis of locally advanced rectal cancer, clinical T3 N2 M0.  The patient underwent preoperative chemoradiation.       Radiation Treatment  3/24/25 to 4/25/25: Rectum/Pelvis, 5500 cGy in 25 fractions    Patient returns for follow-up.    He is overall doing okay.  Posttreatment did experience more GI bother and dermatitis.  This is since improved.  Denies any current pain.  No bleeding per stool.  Increasing diameter with bowel movement noted.    Pending scope on 6/4/2025 per surgery team (Dr. Sheets).    Restaging scans on 6/11/2025.  Will see Dr. Friedell on 6/20/2025.    Plan:  Appropriate recovery post completion of preoperative chemoradiation.  Anticipate further recovery with time.  Continue follow-up with medical oncology team.  Continue follow-up with colorectal team.  Return to clinic as needed.    30 minutes spent on the date of the encounter doing chart review, review of outside records, review of test results, interpretation of tests, patient visit, documentation, discussion, and plan.    Amari Tracy M.D.  Department of Radiation Oncology  [facility]      Again, thank you for allowing me to participate in the care of your patient.        Sincerely,        Amari Tracy MD    Electronically signed

## 2025-05-22 NOTE — PROGRESS NOTES
Radiation Oncology Progress Note    HPI:  Mr. Simon is a 69 year old male with a diagnosis of locally advanced rectal cancer, clinical T3 N2 M0.  The patient underwent preoperative chemoradiation.       Radiation Treatment  3/24/25 to 4/25/25: Rectum/Pelvis, 5500 cGy in 25 fractions    Patient returns for follow-up.    He is overall doing okay.  Posttreatment did experience more GI bother and dermatitis.  This is since improved.  Denies any current pain.  No bleeding per stool.  Increasing diameter with bowel movement noted.    Pending scope on 6/4/2025 per surgery team (Dr. Sheets).    Restaging scans on 6/11/2025.  Will see Dr. Friedell on 6/20/2025.    Plan:  Appropriate recovery post completion of preoperative chemoradiation.  Anticipate further recovery with time.  Continue follow-up with medical oncology team.  Continue follow-up with colorectal team.  Return to clinic as needed.    30 minutes spent on the date of the encounter doing chart review, review of outside records, review of test results, interpretation of tests, patient visit, documentation, discussion, and plan.    Amari Tracy M.D.  Department of Radiation Oncology  [facility]

## 2025-05-28 ENCOUNTER — OFFICE VISIT (OUTPATIENT)
Dept: URGENT CARE | Facility: URGENT CARE | Age: 70
End: 2025-05-28
Payer: MEDICARE

## 2025-05-28 VITALS
DIASTOLIC BLOOD PRESSURE: 87 MMHG | RESPIRATION RATE: 20 BRPM | HEART RATE: 86 BPM | HEIGHT: 72 IN | BODY MASS INDEX: 34.62 KG/M2 | TEMPERATURE: 97.4 F | WEIGHT: 255.6 LBS | SYSTOLIC BLOOD PRESSURE: 135 MMHG | OXYGEN SATURATION: 96 %

## 2025-05-28 DIAGNOSIS — C19 MALIGNANT NEOPLASM OF RECTOSIGMOID JUNCTION (H): ICD-10-CM

## 2025-05-28 DIAGNOSIS — J34.89 INTERNAL NASAL LESION: ICD-10-CM

## 2025-05-28 DIAGNOSIS — J01.40 ACUTE NON-RECURRENT PANSINUSITIS: Primary | ICD-10-CM

## 2025-05-28 PROCEDURE — 99214 OFFICE O/P EST MOD 30 MIN: CPT | Performed by: NURSE PRACTITIONER

## 2025-05-28 PROCEDURE — 3079F DIAST BP 80-89 MM HG: CPT | Performed by: NURSE PRACTITIONER

## 2025-05-28 PROCEDURE — 3075F SYST BP GE 130 - 139MM HG: CPT | Performed by: NURSE PRACTITIONER

## 2025-05-28 RX ORDER — MUPIROCIN 20 MG/G
OINTMENT TOPICAL 3 TIMES DAILY
Qty: 15 G | Refills: 2 | Status: SHIPPED | OUTPATIENT
Start: 2025-05-28
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
## 2025-06-04 ENCOUNTER — OFFICE VISIT (OUTPATIENT)
Dept: SURGERY | Facility: CLINIC | Age: 70
End: 2025-06-04
Payer: MEDICARE

## 2025-06-04 VITALS
OXYGEN SATURATION: 97 % | SYSTOLIC BLOOD PRESSURE: 124 MMHG | HEART RATE: 91 BPM | DIASTOLIC BLOOD PRESSURE: 80 MMHG | WEIGHT: 251.6 LBS | BODY MASS INDEX: 34.08 KG/M2 | HEIGHT: 72 IN

## 2025-06-04 DIAGNOSIS — Z92.21 STATUS POST CHEMOTHERAPY: ICD-10-CM

## 2025-06-04 DIAGNOSIS — Z92.3 PERSONAL HISTORY OF RADIATION THERAPY: ICD-10-CM

## 2025-06-04 DIAGNOSIS — C19 MALIGNANT NEOPLASM OF RECTOSIGMOID JUNCTION (H): Primary | ICD-10-CM

## 2025-06-04 PROCEDURE — 1126F AMNT PAIN NOTED NONE PRSNT: CPT | Performed by: COLON & RECTAL SURGERY

## 2025-06-04 PROCEDURE — 45330 DIAGNOSTIC SIGMOIDOSCOPY: CPT | Performed by: COLON & RECTAL SURGERY

## 2025-06-04 PROCEDURE — 3079F DIAST BP 80-89 MM HG: CPT | Performed by: COLON & RECTAL SURGERY

## 2025-06-04 PROCEDURE — 3074F SYST BP LT 130 MM HG: CPT | Performed by: COLON & RECTAL SURGERY

## 2025-06-04 RX ORDER — NEOMYCIN SULFATE 500 MG/1
1000 TABLET ORAL EVERY 6 HOURS
Qty: 6 TABLET | Refills: 0 | Status: SHIPPED | OUTPATIENT
Start: 2025-06-04

## 2025-06-04 RX ORDER — POLYETHYLENE GLYCOL 3350 17 G/17G
POWDER, FOR SOLUTION ORAL
Qty: 238 G | Refills: 0 | Status: SHIPPED | OUTPATIENT
Start: 2025-06-04

## 2025-06-04 RX ORDER — MAGNESIUM CARB/ALUMINUM HYDROX 105-160MG
TABLET,CHEWABLE ORAL
Qty: 296 ML | Refills: 0 | Status: SHIPPED | OUTPATIENT
Start: 2025-06-04

## 2025-06-04 RX ORDER — METRONIDAZOLE 500 MG/1
500 TABLET ORAL EVERY 6 HOURS
Qty: 3 TABLET | Refills: 0 | Status: SHIPPED | OUTPATIENT
Start: 2025-06-04

## 2025-06-04 RX ORDER — ONDANSETRON 4 MG/1
4 TABLET, FILM COATED ORAL EVERY 6 HOURS
Qty: 3 TABLET | Refills: 0 | Status: SHIPPED | OUTPATIENT
Start: 2025-06-04

## 2025-06-04 ASSESSMENT — PAIN SCALES - GENERAL: PAINLEVEL_OUTOF10: NO PAIN (0)
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
## 2025-06-04 NOTE — PATIENT INSTRUCTIONS
Follow up:    Scheduling will give you a call within three business days to schedule surgery       Appointments you will need in prep: pre op physical with our anesthesia team and blood work(surgery scheduling will schedule these with you when they call)    You will need to complete preoperative education with a nurse today in person and two weeks before your surgery via telephone call. You will receive a handout that outlines your hospital admission expectations and the goals for you to go home. You need to bring this handout with you to the hospital.     You will need to do a full bowel prep with antibiotics the day before surgery. I have sent these to your pharmacy. You will receive a surgery packet through [employer] and mail.       You will be undergoing a large operation. In preparation for your surgery we ask that you focus on a healthy lifestyle to help in the aid of your recovery and to reduce your post surgical complications. Below are a few guidelines to follow:    Schedule an appointment with your primary care physician to discuss how to best improve your overall health condition   If you have diabetes, please visit with your provider to optimize your blood sugar control   Engage in 30 minutes of exercise 5x a week or to the best of your ability. This can be in the form of walking, cycling, weight lifting, running or swimming  Focus on a healthy diet, high in fiber and protein  Vegetables and fruit at every meal   Lean proteins such as fish and chicken   Protein shakes are also encouraged   Drink at least 64 ounces of water daily   Avoid alcohol and excessive caffeine   Stop smoking if you are currently smoking     [facility] [phone]    Clinic Fax Number [phone]    Surgery Scheduling [phone]      BOWEL PREP: MIRALAX/GATORADE  ANTIBIOTICS/ZOFRAN    Please go to your local pharmacy or store and purchase:    - 8.3 oz bottle of  Miralax (Powder)  - 10 oz bottle of Magnesium Citrate  - 64 oz of  Gatorade (no red or purple)    FIVE DAYS BEFORE YOUR SURGERY  - Stop taking any of the following over-the-counter medications: Ibuprofen, Asprin,Iron supplements.    - If you are taking a blood thinner medication such as Coumadin, Plavix, or Warfarin, you MUST contact the prescribing physician regarding clearance for this procedure, or instructions for stopping/changing this medication before your procedure.     -Please contact the nurse line at [phone] option #3 for any questions regarding other medications.     THREE DAYS BEFORE YOUR SURGERY  - Begin restricted low-residue diet.  Suggested foods include: white bread or rolls, plain crackers, white rice, skinless potatoes, low fiber cereals, chicken, turkey, fish or seafood, and eggs.  You may also have applesauce, pear sauce, soft honeydew, cantaloupe, ripe banana, canned fruit without seeds or skins.      - Do not eat: Corn (any form), raw vegetables, foods with seeds, whole wheat or grain breads and cereals, brown or wild rice, granola, raisins and dried fruit, berries, popcorn, all varieties of nuts, peanuts, and seeds.     THE DAY BEFORE YOUR SURGERY    - Begin allowed clear liquid diet at breakfast time.    - Mix Miralax and 64 oz. of Gatorade in a pitcher, and place in the fridge.      - Begin the allowed clear liquid diet at breakfast time.  Drink at least 1 (one) gallon of water or allowed clear liquids throughout the day.      ANTIBIOTICS    8:00 A.M.- Take one tab of Metronidazole (flagyl) and two tabs of Neomycin with one tab of ondansetron (zofran). Please take the ondansetron with the antibiotics as they can cause nausea.      2:00 P.M.- Take one tab of Metronidazole (flagyl) and two tabs of Neomycin with one tab of ondansetron (zofran). Please take the ondansetron with the antibiotics as they can cause nausea.     8:00 P.M.- Take one tab of Metronidazole (flagyl) and two tabs of Neomycin with one tab of ondansetron (zofran). Please take the  ondansetron with the antibiotics as they can cause nausea.        Bowel Prep:    4:00 P.M.  Start drinking one 8-ounce glass of the solution every 15-30 minutes till all 64 oz are gone. If you start to feel nauseous, you may space out your drinking intervals.     8:00 P.M. Drink the bottle of Magnesium Citrate.    You may have CLEAR LIQUIDS until 2 hours prior to your procedure.    ALLOWED CLEAR LIQUIDS  - Water  - Regular or decaf coffee (no cream)  - Jell-O or popsicles (no red or purple)  - Plain hard candy (no red or purple)  - Clear juices or Gatorade (no red or purple)  - Chicken broth (no vegetables or noodles)  - Ensure Clear or Boost Clear    DO NOT DRINK  - Milk or mild products such as ice cream, malts, or shakes  - Boost or other protein drinks  - Red or purple juices of any kind  - Lavell-aid, cranberry, cherry, or grape juice  - Juice with pulp (orange, grapefruit, pineapple, or tomato)  - Cream soups of any kind  - Alcoholic beverages

## 2025-06-04 NOTE — NURSING NOTE
Chief Complaint   Patient presents with    Flexible Sigmoidoscopy       Vitals:    06/04/25 1506   BP: 124/80   BP Location: Left arm   Patient Position: Sitting   Cuff Size: Adult Large   Pulse: 91   SpO2: 97%   Weight: 251 lb 9.6 oz   Height: 6'       Body mass index is 34.12 kg/m .    Zee Reyes EMT

## 2025-06-11 ENCOUNTER — ANCILLARY PROCEDURE (OUTPATIENT)
Dept: CT IMAGING | Facility: CLINIC | Age: 70
End: 2025-06-11
Attending: COLON & RECTAL SURGERY
Payer: MEDICARE

## 2025-06-11 ENCOUNTER — ANCILLARY PROCEDURE (OUTPATIENT)
Dept: MRI IMAGING | Facility: CLINIC | Age: 70
End: 2025-06-11
Attending: COLON & RECTAL SURGERY
Payer: MEDICARE

## 2025-06-11 DIAGNOSIS — C20 RECTAL CANCER (H): ICD-10-CM

## 2025-06-11 PROCEDURE — 71260 CT THORAX DX C+: CPT | Mod: GC | Performed by: RADIOLOGY

## 2025-06-11 PROCEDURE — 74177 CT ABD & PELVIS W/CONTRAST: CPT | Mod: GC | Performed by: RADIOLOGY

## 2025-06-11 PROCEDURE — 72197 MRI PELVIS W/O & W/DYE: CPT | Performed by: RADIOLOGY

## 2025-06-11 PROCEDURE — A9585 GADOBUTROL INJECTION: HCPCS | Performed by: RADIOLOGY

## 2025-06-11 RX ORDER — IOPAMIDOL 755 MG/ML
123 INJECTION, SOLUTION INTRAVASCULAR ONCE
Status: COMPLETED | OUTPATIENT
Start: 2025-06-11 | End: 2025-06-11

## 2025-06-11 RX ORDER — GADOBUTROL 604.72 MG/ML
15 INJECTION INTRAVENOUS ONCE
Status: COMPLETED | OUTPATIENT
Start: 2025-06-11 | End: 2025-06-11

## 2025-06-11 RX ADMIN — IOPAMIDOL 123 ML: 755 INJECTION, SOLUTION INTRAVASCULAR at 13:33

## 2025-06-11 RX ADMIN — GADOBUTROL 11 ML: 604.72 INJECTION INTRAVENOUS at 16:16

## 2025-06-11 NOTE — DISCHARGE INSTRUCTIONS
MRI Contrast Discharge Instructions    The IV contrast you received today will pass out of your body in your  urine. This will happen in the next 24 hours. You will not feel this process.  Your urine will not change color.    Drink at least 4 extra glasses of water or juice today (unless your doctor  has restricted your fluids). This reduces the stress on your kidneys.  You may take your regular medicines.    If you are on dialysis: It is best to have dialysis today.    If you have a reaction: Most reactions happen right away. If you have  any new symptoms after leaving the hospital (such as hives or swelling),  call your hospital at the correct number below. Or call your family doctor.  If you have breathing distress or wheezing, call 911.    Special instructions: ***    I have read and understand the above information.    Signature:______________________________________ Date:___________    Staff:__________________________________________ Date:___________     Time:__________    [place] [facility] Departments:    ___[facility]: [phone]  ___[facility]: [phone]  ___[facility]: [phone] ___[facility]: [phone]  ___[facility]: [phone]  ___[facility]: [phone]  ___[phone]  ___[facility]: [phone]  ___[phone]

## 2025-06-11 NOTE — DISCHARGE INSTRUCTIONS

## 2025-06-13 ENCOUNTER — OFFICE VISIT (OUTPATIENT)
Dept: URGENT CARE | Facility: URGENT CARE | Age: 70
End: 2025-06-13
Payer: MEDICARE

## 2025-06-13 VITALS
BODY MASS INDEX: 34.04 KG/M2 | OXYGEN SATURATION: 98 % | WEIGHT: 251 LBS | TEMPERATURE: 97 F | DIASTOLIC BLOOD PRESSURE: 82 MMHG | HEART RATE: 72 BPM | RESPIRATION RATE: 16 BRPM | SYSTOLIC BLOOD PRESSURE: 148 MMHG

## 2025-06-13 DIAGNOSIS — J01.41 ACUTE RECURRENT PANSINUSITIS: Primary | ICD-10-CM

## 2025-06-13 PROCEDURE — 3077F SYST BP >= 140 MM HG: CPT | Performed by: NURSE PRACTITIONER

## 2025-06-13 PROCEDURE — 99214 OFFICE O/P EST MOD 30 MIN: CPT | Performed by: NURSE PRACTITIONER

## 2025-06-13 PROCEDURE — 3079F DIAST BP 80-89 MM HG: CPT | Performed by: NURSE PRACTITIONER
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
## 2025-06-13 NOTE — PATIENT INSTRUCTIONS
Use Bactroban ointment to the nostrils as before.   If you notice worsening of your symptoms start the oral antibiotics.  Follow up with your surgeon as planned.

## 2025-06-20 ENCOUNTER — LAB (OUTPATIENT)
Dept: LAB | Facility: CLINIC | Age: 70
End: 2025-06-20
Payer: MEDICARE

## 2025-06-20 DIAGNOSIS — D70.1 CHEMOTHERAPY-INDUCED NEUTROPENIA: ICD-10-CM

## 2025-06-20 DIAGNOSIS — T45.1X5A CHEMOTHERAPY-INDUCED NEUTROPENIA: ICD-10-CM

## 2025-06-20 DIAGNOSIS — C19 MALIGNANT NEOPLASM OF RECTOSIGMOID JUNCTION (H): ICD-10-CM

## 2025-06-20 LAB
ALBUMIN SERPL BCG-MCNC: 4.3 G/DL (ref 3.5–5.2)
ALP SERPL-CCNC: 49 U/L (ref 40–150)
ALT SERPL W P-5'-P-CCNC: 21 U/L (ref 0–70)
ANION GAP SERPL CALCULATED.3IONS-SCNC: 10 MMOL/L (ref 7–15)
AST SERPL W P-5'-P-CCNC: 21 U/L (ref 0–45)
BASOPHILS # BLD AUTO: 0 10E3/UL (ref 0–0.2)
BASOPHILS NFR BLD AUTO: 1 %
BILIRUB SERPL-MCNC: 0.6 MG/DL
BUN SERPL-MCNC: 25.8 MG/DL (ref 8–23)
CALCIUM SERPL-MCNC: 10.2 MG/DL (ref 8.8–10.4)
CHLORIDE SERPL-SCNC: 104 MMOL/L (ref 98–107)
CREAT SERPL-MCNC: 1.03 MG/DL (ref 0.67–1.17)
EGFRCR SERPLBLD CKD-EPI 2021: 80 ML/MIN/1.73M2
EOSINOPHIL # BLD AUTO: 0.3 10E3/UL (ref 0–0.7)
EOSINOPHIL NFR BLD AUTO: 8 %
ERYTHROCYTE [DISTWIDTH] IN BLOOD BY AUTOMATED COUNT: 18.2 % (ref 10–15)
GLUCOSE SERPL-MCNC: 125 MG/DL (ref 70–99)
HCO3 SERPL-SCNC: 24 MMOL/L (ref 22–29)
HCT VFR BLD AUTO: 39.9 % (ref 40–53)
HGB BLD-MCNC: 12.9 G/DL (ref 13.3–17.7)
IMM GRANULOCYTES # BLD: 0 10E3/UL
IMM GRANULOCYTES NFR BLD: 0 %
LYMPHOCYTES # BLD AUTO: 0.7 10E3/UL (ref 0.8–5.3)
LYMPHOCYTES NFR BLD AUTO: 16 %
MCH RBC QN AUTO: 29.5 PG (ref 26.5–33)
MCHC RBC AUTO-ENTMCNC: 32.3 G/DL (ref 31.5–36.5)
MCV RBC AUTO: 91 FL (ref 78–100)
MONOCYTES # BLD AUTO: 0.4 10E3/UL (ref 0–1.3)
MONOCYTES NFR BLD AUTO: 10 %
NEUTROPHILS # BLD AUTO: 2.6 10E3/UL (ref 1.6–8.3)
NEUTROPHILS NFR BLD AUTO: 65 %
NRBC # BLD AUTO: 0 10E3/UL
NRBC BLD AUTO-RTO: 0 /100
PLATELET # BLD AUTO: 224 10E3/UL (ref 150–450)
POTASSIUM SERPL-SCNC: 4.6 MMOL/L (ref 3.4–5.3)
PROT SERPL-MCNC: 7.1 G/DL (ref 6.4–8.3)
RBC # BLD AUTO: 4.37 10E6/UL (ref 4.4–5.9)
SODIUM SERPL-SCNC: 138 MMOL/L (ref 135–145)
WBC # BLD AUTO: 4 10E3/UL (ref 4–11)

## 2025-06-20 PROCEDURE — 84155 ASSAY OF PROTEIN SERUM: CPT

## 2025-06-20 PROCEDURE — 36415 COLL VENOUS BLD VENIPUNCTURE: CPT

## 2025-06-20 PROCEDURE — 85025 COMPLETE CBC W/AUTO DIFF WBC: CPT

## 2025-06-22 ENCOUNTER — MYC MEDICAL ADVICE (OUTPATIENT)
Dept: FAMILY MEDICINE | Facility: CLINIC | Age: 70
End: 2025-06-22
Payer: MEDICARE

## 2025-06-25 LAB
ABO + RH BLD: NORMAL
BLD GP AB SCN SERPL QL: NEGATIVE
SPECIMEN EXP DATE BLD: NORMAL

## 2025-06-26 ENCOUNTER — VIRTUAL VISIT (OUTPATIENT)
Dept: SURGERY | Facility: CLINIC | Age: 70
End: 2025-06-26
Payer: MEDICARE

## 2025-06-26 ENCOUNTER — LAB (OUTPATIENT)
Dept: LAB | Facility: CLINIC | Age: 70
End: 2025-06-26
Payer: MEDICARE

## 2025-06-26 VITALS — BODY MASS INDEX: 33.72 KG/M2 | WEIGHT: 249 LBS | HEIGHT: 72 IN

## 2025-06-26 DIAGNOSIS — Z13.6 SCREENING FOR CARDIOVASCULAR CONDITION: Primary | ICD-10-CM

## 2025-06-26 DIAGNOSIS — R80.9 TYPE 2 DIABETES MELLITUS WITH MICROALBUMINURIA, WITHOUT LONG-TERM CURRENT USE OF INSULIN (H): ICD-10-CM

## 2025-06-26 DIAGNOSIS — E11.29 TYPE 2 DIABETES MELLITUS WITH MICROALBUMINURIA, WITHOUT LONG-TERM CURRENT USE OF INSULIN (H): ICD-10-CM

## 2025-06-26 DIAGNOSIS — C19 MALIGNANT NEOPLASM OF RECTOSIGMOID JUNCTION (H): ICD-10-CM

## 2025-06-26 DIAGNOSIS — Z01.818 PREOP EXAMINATION: Primary | ICD-10-CM

## 2025-06-26 DIAGNOSIS — Z01.818 PREOP EXAMINATION: ICD-10-CM

## 2025-06-26 LAB
ANION GAP SERPL CALCULATED.3IONS-SCNC: 16 MMOL/L (ref 7–15)
BASOPHILS # BLD AUTO: 0 10E3/UL (ref 0–0.2)
BASOPHILS NFR BLD AUTO: 0 %
BUN SERPL-MCNC: 16.3 MG/DL (ref 8–23)
CALCIUM SERPL-MCNC: 10.8 MG/DL (ref 8.8–10.4)
CHLORIDE SERPL-SCNC: 100 MMOL/L (ref 98–107)
CHOLEST SERPL-MCNC: 135 MG/DL
CREAT SERPL-MCNC: 1.04 MG/DL (ref 0.67–1.17)
CREAT UR-MCNC: 34.6 MG/DL
EGFRCR SERPLBLD CKD-EPI 2021: 79 ML/MIN/1.73M2
EOSINOPHIL # BLD AUTO: 0.2 10E3/UL (ref 0–0.7)
EOSINOPHIL NFR BLD AUTO: 4 %
ERYTHROCYTE [DISTWIDTH] IN BLOOD BY AUTOMATED COUNT: 17.6 % (ref 10–15)
EST. AVERAGE GLUCOSE BLD GHB EST-MCNC: 151 MG/DL
FASTING STATUS PATIENT QL REPORTED: YES
FASTING STATUS PATIENT QL REPORTED: YES
GLUCOSE SERPL-MCNC: 128 MG/DL (ref 70–99)
HBA1C MFR BLD: 6.9 % (ref 0–5.6)
HCO3 SERPL-SCNC: 23 MMOL/L (ref 22–29)
HCT VFR BLD AUTO: 41.9 % (ref 40–53)
HDLC SERPL-MCNC: 68 MG/DL
HGB BLD-MCNC: 13.6 G/DL (ref 13.3–17.7)
IMM GRANULOCYTES # BLD: 0 10E3/UL
IMM GRANULOCYTES NFR BLD: 0 %
LDLC SERPL CALC-MCNC: 45 MG/DL
LYMPHOCYTES # BLD AUTO: 0.7 10E3/UL (ref 0.8–5.3)
LYMPHOCYTES NFR BLD AUTO: 15 %
MCH RBC QN AUTO: 29.2 PG (ref 26.5–33)
MCHC RBC AUTO-ENTMCNC: 32.5 G/DL (ref 31.5–36.5)
MCV RBC AUTO: 90 FL (ref 78–100)
MICROALBUMIN UR-MCNC: <12 MG/L
MICROALBUMIN/CREAT UR: NORMAL MG/G{CREAT}
MONOCYTES # BLD AUTO: 0.5 10E3/UL (ref 0–1.3)
MONOCYTES NFR BLD AUTO: 10 %
NEUTROPHILS # BLD AUTO: 3.2 10E3/UL (ref 1.6–8.3)
NEUTROPHILS NFR BLD AUTO: 70 %
NONHDLC SERPL-MCNC: 67 MG/DL
PLATELET # BLD AUTO: 209 10E3/UL (ref 150–450)
POTASSIUM SERPL-SCNC: 4.4 MMOL/L (ref 3.4–5.3)
PREALB SERPL-MCNC: 31.8 MG/DL (ref 20–40)
RBC # BLD AUTO: 4.65 10E6/UL (ref 4.4–5.9)
SODIUM SERPL-SCNC: 139 MMOL/L (ref 135–145)
TRIGL SERPL-MCNC: 110 MG/DL
WBC # BLD AUTO: 4.5 10E3/UL (ref 4–11)

## 2025-06-26 ASSESSMENT — ENCOUNTER SYMPTOMS
SEIZURES: 0
ORTHOPNEA: 0

## 2025-06-26 ASSESSMENT — COPD QUESTIONNAIRES: COPD: 0

## 2025-06-26 ASSESSMENT — LIFESTYLE VARIABLES: TOBACCO_USE: 1

## 2025-06-26 ASSESSMENT — PAIN SCALES - GENERAL: PAINLEVEL_OUTOF10: NO PAIN (0)

## 2025-06-26 NOTE — PATIENT INSTRUCTIONS
Preparing for Your Surgery      Name:  Celestine Simon   MRN:  [id number]   :  1955   Today's Date:  2025     The [place] [employer] [address] [employer] Project                                Timeline 2025 -2025    This project will affect travel to the [facility] and [facility], as well as the [facility].      Please check the [facility] [address] project website for the most up to date information and give yourself additional time to reach your destination.        Arriving for surgery:  Surgery date:  25  Arrival time:  7:00 am  Surgery time: 9:00 am    Please come to:     Please come to:       [facility]    [address]  [address]     The [facility] [place] Patient/Visitor Ramp is at [address]. Patients and visitors who self-park will receive the reduced hospital parking rate. If the Patient /Visitor Ramp is full, please follow the signs to the [employer] car park located at the [facility] entrance.       parking is available (24 hours/ 7 days a week)      Discounted parking pass options are available for patients and visitors. They can be purchased at the [employer] desk at the [facility] entrance.     -    Stop at the security desk and they will direct surgery patients to the Surgery Check in and Family Lounge. [phone]        - If you need directions, a wheelchair or an escort please stop at the Information/security desk in the lobby.     What can I eat or drink?follow your surgeon's bowel prep instructions below;      BOWEL PREP: MIRALAX/GATORADE  ANTIBIOTICS/ZOFRAN     Please go to your local pharmacy or store and purchase:  - 8.3 oz bottle of  Miralax (Powder)  - 10 oz bottle of Magnesium Citrate  - 64 oz of Gatorade (no red or purple)  **Please  prescribed pre-surgery  medications from your pharmacy     FIVE DAYS BEFORE YOUR SURGERY     - Stop taking any of the following over-the-counter medications: Ibuprofen, Asprin,Iron supplements.     - If you are taking a blood thinner medication such as Coumadin, Plavix, or Warfarin, you MUST contact the prescribing physician regarding clearance for this procedure, or instructions for stopping/changing this medication before your procedure.      -Please contact the nurse line at [phone] option #3 for any questions regarding other medications.      THREE DAYS BEFORE YOUR SURGERY     - Begin restricted low-residue diet.  Suggested foods include: white bread or rolls, plain crackers, white rice, skinless potatoes, low fiber cereals, chicken, turkey, fish or seafood, and eggs.  You may also have applesauce, pear sauce, soft honeydew, cantaloupe, ripe banana, canned fruit without seeds or skins.       - Do not eat: Corn (any form), raw vegetables, foods with seeds, whole wheat or grain breads and cereals, brown or wild rice, granola, raisins and dried fruit, berries, popcorn, all varieties of nuts, peanuts, and seeds.               THE DAY BEFORE YOUR SURGERY     - Begin allowed clear liquid diet at breakfast time.     - Mix Miralax and 64 oz. of Gatorade in a pitcher, and place in the fridge.       - Begin the allowed clear liquid diet at breakfast time.  Drink at least 1 (one) gallon of water or allowed clear liquids throughout the day.       ANTIBIOTICS     8:00 A.M.- Take one tab of Metronidazole (flagyl) and two tabs of Neomycin with one tab of ondansetron (zofran). Please take the ondansetron with the antibiotics as they can cause nausea.      2:00 P.M.- Take one tab of Metronidazole (flagyl) and two tabs of Neomycin with one tab of ondansetron (zofran). Please take the ondansetron with the antibiotics as they can cause nausea.      8:00 P.M.- Take one tab of Metronidazole (flagyl) and two tabs of Neomycin with one tab of ondansetron  (zofran). Please take the ondansetron with the antibiotics as they can cause nausea.          Bowel Prep:     4:00 P.M.  Start drinking one 8-ounce glass of the solution every 15-30 minutes till all 64 oz are gone. If you start to feel nauseous, you may space out your drinking intervals.      8:00 P.M. Drink the bottle of Magnesium Citrate.     You may have CLEAR LIQUIDS until 2 hours prior to your arrival time - stop by 5:00 am on 7/11/25.     ALLOWED CLEAR LIQUIDS  - Water  - Regular or decaf coffee (no cream)  - Jell-O or popsicles (no red or purple)  - Plain hard candy (no red or purple)  - Clear juices or Gatorade (no red or purple)  - Chicken broth (no vegetables or noodles)  - Ensure Clear or Boost Clear     DO NOT DRINK  - Milk or mild products such as ice cream, malts, or shakes  - Boost or other protein drinks  - Red or purple juices of any kind  - Lavell-aid, cranberry, cherry, or grape juice  - Juice with pulp (orange, grapefruit, pineapple, or tomato)  - Cream soups of any kind  - Alcoholic beverages     -  No Alcohol or cannabis products for at least 24 hours before surgery.     Which medicines can I take?    Hold Aspirin for 7 days before surgery.   Hold Multivitamins for 7 days before surgery.  Hold Supplements for 7 days before surgery.  Hold Ibuprofen (Advil, Motrin) for 1 day(s) before surgery--unless otherwise directed by surgeon.  Hold Naproxen (Aleve) for 4 days before surgery.  Hold Semaglutide (Rybelsus) for 7 days before surgery.    -  DO NOT take these medications the day of surgery:  Metformin  Lisinopril  Bactroban ointment  Pioglitazone (Actos)  Hydrochlorothiazide     -  PLEASE TAKE these medications the day of surgery:  Tylenol if needed  Doxazosin (Cardura)  Rosuvastatin (Crestor)      How do I prepare myself?  - Please take 2 showers (one the night prior to surgery and one the morning of surgery) using Scrubcare or Hibiclens soap.    Use this soap only from the neck to your toes. Avoid  genital area      Leave the soap on your skin for one minute--then rinse thoroughly.      You may use your own shampoo and conditioner. No other hair products.   - Please remove all jewelry and body piercings.  - No lotions, deodorants or fragrance.  - No makeup or fingernail polish.   - Bring your ID and insurance card.    -For patients being admitted to the [facility]  Family members are to take the patient belongings with them and place them in the lockers provided in the Family Lounge.  Please limit the items you bring to 1 bag as the lockers are small.      -If you use a CPAP machine, please bring the CPAP machine, tubing, and mask to hospital.    -If you have a Deep Brain Stimulator, Spinal Cord Stimulator, or any Neuro Stimulator device---you must bring the remote control to the hospital.      ALL PATIENTS GOING HOME THE SAME DAY OF SURGERY ARE REQUIRED TO HAVE A RESPONSIBLE ADULT TO DRIVE AND BE IN ATTENDANCE WITH THEM FOR 24 HOURS FOLLOWING SURGERY.    Covid testing policy as of 12/06/2022  Your surgeon will notify and schedule you for a COVID test if one is needed before surgery--please direct any questions or COVID symptoms to your surgeon      Questions or Concerns:    - For any questions regarding the day of surgery or your hospital stay, please contact the Pre Admission Nursing Office at [phone].       - If you have health changes between today and your surgery, please call your surgeon.       - For questions after surgery, please call your surgeons office.           Current Visitor Guidelines    2 adult visitors for adult patients in the pre op area    If additional visitors come (beyond a patient care attendant or a group home caregiver), the additional visitors will be asked to wait in the main lobby of the hospital    Visiting hours: 8 a.m. to 8:30 p.m.    Patients confirmed or suspected to have symptoms of COVID 19 or flu:     No visitors allowed for adult patients.   Children (under  age 18) can have 1 named visitor.     People who are sick or showing symptoms of COVID 19 or flu:    Are not allowed to visit patients--we can only make exceptions in special situations.       Please follow these guidelines for your visit:          Please maintain social distance          Masking is optional--however at times you may be asked to wear a mask for the safety of yourself and others     Clean your hands with alcohol hand . Do this when you arrive at and leave the building and patient room,    And again after you touch your mask or anything in the room.     Go directly to and from the room you are visiting.     Stay in the patient s room during your visit. Limit going to other places in the hospital as much as possible     Leave bags and jackets at home or in the car.     For everyone s health, please don t come and go during your visit. That includes for smoking   during your visit.

## 2025-06-26 NOTE — PROGRESS NOTES
Celestine is a 69 year old who is being evaluated via a billable video visit.    How would you like to obtain your AVS? MyChart  If the video visit is dropped, the invitation should be resent by: Text to cell phone: [phone]      Subjective   Celestine is a 69 year old, presenting for the following health issues:  Pre-Op Exam      HPI          Physical Exam
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
## 2025-06-27 ENCOUNTER — RESULTS FOLLOW-UP (OUTPATIENT)
Dept: FAMILY MEDICINE | Facility: CLINIC | Age: 70
End: 2025-06-27

## 2025-06-30 DIAGNOSIS — E11.29 TYPE 2 DIABETES MELLITUS WITH MICROALBUMINURIA, WITHOUT LONG-TERM CURRENT USE OF INSULIN (H): ICD-10-CM

## 2025-06-30 DIAGNOSIS — I10 ESSENTIAL HYPERTENSION: ICD-10-CM

## 2025-06-30 DIAGNOSIS — R80.9 TYPE 2 DIABETES MELLITUS WITH MICROALBUMINURIA, WITHOUT LONG-TERM CURRENT USE OF INSULIN (H): ICD-10-CM

## 2025-06-30 NOTE — TELEPHONE ENCOUNTER
[facility]  [facility].   Jessie Melendez RN on 6/30/2025 at 3:49 PM

## 2025-06-30 NOTE — TELEPHONE ENCOUNTER
Medication Question or Refill        What medication are you calling about (include dose and sig)?: Pending Prescriptions:                       Disp   Refills    doxazosin (CARDURA) 4 MG tablet           90 tab*1            Sig: Take 1 tablet (4 mg) by mouth daily.    metFORMIN (GLUCOPHAGE XR) 500 MG 24 hr ta*270 ta*1            Sig: 3 tabs daily.    lisinopril (ZESTRIL) 40 MG tablet         90 tab*1            Sig: Take 1 tablet (40 mg) by mouth daily.    hydroCHLOROthiazide 12.5 MG tablet        90 tab*1            Sig: Take 1 tablet (12.5 mg) by mouth every morning.    rosuvastatin (CRESTOR) 10 MG tablet       90 tab*1            Sig: Take 1 tablet (10 mg) by mouth daily.    Semaglutide (RYBELSUS) 14 MG tablet       90 tab*1            Sig: Take 7-14 mg by mouth daily.    pioglitazone (ACTOS) 30 MG tablet         90 tab*1            Sig: Take 0.5 tablets (15 mg) by mouth daily.      Preferred Pharmacy:   [address] [address]  Phone: [phone] Fax: [phone]    Controlled Substance Agreement on file:   CSA -- Patient Level:    CSA: None found at the patient level.       Who prescribed the medication?: PCP: Deborah Delgado PA-C     Do you need a refill? Yes    Could we send this information to you in [facility] or would you prefer to receive a phone call?:   Patient would prefer a phone call   Okay to leave a detailed message?: Yes at Home number on file [phone] (home)    Monica Maria/ Patient

## 2025-07-01 RX ORDER — HYDROCHLOROTHIAZIDE 12.5 MG/1
12.5 TABLET ORAL EVERY MORNING
Qty: 90 TABLET | Refills: 1 | Status: CANCELLED | OUTPATIENT
Start: 2025-07-01

## 2025-07-01 RX ORDER — DOXAZOSIN 4 MG/1
4 TABLET ORAL EVERY MORNING
Qty: 90 TABLET | Refills: 1 | Status: CANCELLED | OUTPATIENT
Start: 2025-07-01

## 2025-07-01 RX ORDER — PIOGLITAZONE 30 MG/1
15 TABLET ORAL DAILY
Qty: 90 TABLET | Refills: 1 | Status: CANCELLED | OUTPATIENT
Start: 2025-07-01

## 2025-07-01 RX ORDER — ROSUVASTATIN CALCIUM 10 MG/1
10 TABLET, COATED ORAL DAILY
Qty: 90 TABLET | Refills: 1 | Status: CANCELLED | OUTPATIENT
Start: 2025-07-01

## 2025-07-01 RX ORDER — LISINOPRIL 40 MG/1
40 TABLET ORAL EVERY MORNING
Qty: 90 TABLET | Refills: 1 | Status: CANCELLED | OUTPATIENT
Start: 2025-07-01

## 2025-07-01 RX ORDER — ORAL SEMAGLUTIDE 14 MG/1
7-14 TABLET ORAL DAILY
Qty: 90 TABLET | Refills: 1 | Status: CANCELLED | OUTPATIENT
Start: 2025-07-01

## 2025-07-01 RX ORDER — METFORMIN HYDROCHLORIDE 500 MG/1
TABLET, EXTENDED RELEASE ORAL
Qty: 270 TABLET | Refills: 1 | Status: CANCELLED | OUTPATIENT
Start: 2025-07-01

## 2025-07-01 NOTE — TELEPHONE ENCOUNTER
Please call patient.  He is due to see me.  Give bridge refills if needed but if breaks in medication he should not restart until seen.  Can be virtual or in person, depending on his preferences.

## 2025-07-02 NOTE — TELEPHONE ENCOUNTER
The patient was scheduled for appt. Tomorrow ( 7/3/25).  The patient does not want bridge, he only wants 90 day he is to be worked in.  He is having colorectal surgery on Friday and is not going to deal with this after.      Ling DEVINE RN

## 2025-07-03 ENCOUNTER — OFFICE VISIT (OUTPATIENT)
Dept: FAMILY MEDICINE | Facility: CLINIC | Age: 70
End: 2025-07-03
Payer: MEDICARE

## 2025-07-03 VITALS
WEIGHT: 255 LBS | SYSTOLIC BLOOD PRESSURE: 124 MMHG | TEMPERATURE: 97.4 F | HEART RATE: 80 BPM | HEIGHT: 72 IN | RESPIRATION RATE: 18 BRPM | DIASTOLIC BLOOD PRESSURE: 86 MMHG | OXYGEN SATURATION: 97 % | BODY MASS INDEX: 34.54 KG/M2

## 2025-07-03 DIAGNOSIS — Z23 NEED FOR VACCINATION: ICD-10-CM

## 2025-07-03 DIAGNOSIS — R90.89 ABNORMAL BRAIN MRI: ICD-10-CM

## 2025-07-03 DIAGNOSIS — E04.2 MULTIPLE THYROID NODULES: ICD-10-CM

## 2025-07-03 DIAGNOSIS — E78.5 DYSLIPIDEMIA: ICD-10-CM

## 2025-07-03 DIAGNOSIS — C19 MALIGNANT NEOPLASM OF RECTOSIGMOID JUNCTION (H): Primary | ICD-10-CM

## 2025-07-03 DIAGNOSIS — I10 ESSENTIAL HYPERTENSION: ICD-10-CM

## 2025-07-03 DIAGNOSIS — R80.9 TYPE 2 DIABETES MELLITUS WITH MICROALBUMINURIA, WITHOUT LONG-TERM CURRENT USE OF INSULIN (H): ICD-10-CM

## 2025-07-03 DIAGNOSIS — E11.29 TYPE 2 DIABETES MELLITUS WITH MICROALBUMINURIA, WITHOUT LONG-TERM CURRENT USE OF INSULIN (H): ICD-10-CM

## 2025-07-03 PROBLEM — E66.01 CLASS 2 SEVERE OBESITY DUE TO EXCESS CALORIES WITH SERIOUS COMORBIDITY IN ADULT (H): Status: RESOLVED | Noted: 2024-08-13 | Resolved: 2025-07-03

## 2025-07-03 PROBLEM — E04.1 THYROID NODULE: Status: ACTIVE | Noted: 2024-10-25

## 2025-07-03 PROBLEM — E66.812 CLASS 2 SEVERE OBESITY DUE TO EXCESS CALORIES WITH SERIOUS COMORBIDITY IN ADULT (H): Status: RESOLVED | Noted: 2024-08-13 | Resolved: 2025-07-03

## 2025-07-03 RX ORDER — LISINOPRIL 40 MG/1
40 TABLET ORAL DAILY
Qty: 90 TABLET | Refills: 3 | Status: SHIPPED | OUTPATIENT
Start: 2025-07-03

## 2025-07-03 RX ORDER — ORAL SEMAGLUTIDE 14 MG/1
7-14 TABLET ORAL DAILY
Qty: 90 TABLET | Refills: 3 | Status: SHIPPED | OUTPATIENT
Start: 2025-07-03

## 2025-07-03 RX ORDER — METFORMIN HYDROCHLORIDE 500 MG/1
TABLET, EXTENDED RELEASE ORAL
Qty: 270 TABLET | Refills: 3 | Status: SHIPPED | OUTPATIENT
Start: 2025-07-03

## 2025-07-03 RX ORDER — PIOGLITAZONE 30 MG/1
15 TABLET ORAL DAILY
Qty: 90 TABLET | Refills: 3 | Status: SHIPPED | OUTPATIENT
Start: 2025-07-03

## 2025-07-03 RX ORDER — DOXAZOSIN 4 MG/1
4 TABLET ORAL DAILY
Qty: 90 TABLET | Refills: 3 | Status: SHIPPED | OUTPATIENT
Start: 2025-07-03

## 2025-07-03 RX ORDER — ROSUVASTATIN CALCIUM 10 MG/1
10 TABLET, COATED ORAL DAILY
Qty: 90 TABLET | Refills: 3 | Status: SHIPPED | OUTPATIENT
Start: 2025-07-03

## 2025-07-03 RX ORDER — HYDROCHLOROTHIAZIDE 12.5 MG/1
12.5 TABLET ORAL EVERY MORNING
Qty: 90 TABLET | Refills: 3 | Status: SHIPPED | OUTPATIENT
Start: 2025-07-03

## 2025-07-03 ASSESSMENT — PAIN SCALES - GENERAL: PAINLEVEL_OUTOF10: MILD PAIN (1)

## 2025-07-03 NOTE — PATIENT INSTRUCTIONS
Set up CT recheck of abnormal spot seen on February head imaging - Call [phone] to set up your imaging testing.   Set up ENT follow up on this. Will be called to schedule.  This will be booked out.      Set up thyroid recheck for end of Oct/Nov - Call [phone] to set up your imaging testing.     Ask surgeon, day of surgery, to settle the question once and for all on if you have a spleen so we can know if we need to do further vaccinations    No med changes today before surgery but asking you to discuss diabetes meds with your daughter   Pioglitazone - weight gain, increased bladder cancer risk   I suggest changing rybelsus to once a week injectable (ozempic or mounjaro).  This is stronger and would also allow us to discontinue pioglitazone, AND help weight loss, among other things.  If no changes now, in Jan if still wanting to reduce cost, suggest you would likely need both januvia AND jardiance to replace rybelsus    Due for eye exam for diabetes monitoring  Technically due for medicare wellness visit after 10/14, but at a minimum see me in 6 months

## 2025-07-03 NOTE — PROGRESS NOTES
Assessment & Plan     Malignant neoplasm of rectosigmoid junction (H)  Active disease, upcoming surgical resection    Type 2 diabetes mellitus with microalbuminuria, without long-term current use of insulin (H)  Controlled with A1c 6.7  - Semaglutide (RYBELSUS) 14 MG tablet; Take 7-14 mg by mouth daily.  - rosuvastatin (CRESTOR) 10 MG tablet; Take 1 tablet (10 mg) by mouth daily.  - pioglitazone (ACTOS) 30 MG tablet; Take 0.5 tablets (15 mg) by mouth daily.  - metFORMIN (GLUCOPHAGE XR) 500 MG 24 hr tablet; 3 tabs daily.    Essential hypertension  Stable refill    - lisinopril (ZESTRIL) 40 MG tablet; Take 1 tablet (40 mg) by mouth daily.  - hydroCHLOROthiazide 12.5 MG tablet; Take 1 tablet (12.5 mg) by mouth every morning.  - doxazosin (CARDURA) 4 MG tablet; Take 1 tablet (4 mg) by mouth daily.    Dyslipidemia  stable  - rosuvastatin (CRESTOR) 10 MG tablet; Take 1 tablet (10 mg) by mouth daily.    Multiple thyroid nodules  Requires ongoing monitoring - 1,2,3,5 yr  - US Thyroid; Future    Abnormal brain MRI  Re-eval per radiology, favored fibrous dysplasia, but recheck given ongoing colon cancer  - CT Head w Contrast; Future  - Adult ENT  Referral; Future    Need for vaccination    The longitudinal plan of care for the diagnosis(es)/condition(s) as documented were addressed during this visit. Due to the added complexity in care, I will continue to support Celestine in the subsequent management and with ongoing continuity of care.    54 min total day of visit, largely in visit with patient encouraging further work ups and discussing diabetes med options.    BMI  Estimated body mass index is 34.58 kg/m  as calculated from the following:    Height as of this encounter: 1.829 m (6').    Weight as of this encounter: 115.7 kg (255 lb).   Weight management plan: Discussed healthy diet and exercise guidelines    Patient Instructions   Set up CT recheck of abnormal spot seen on February head imaging - Call [phone] to  set up your imaging testing.   Set up ENT follow up on this. Will be called to schedule.  This will be booked out.      Set up thyroid recheck for end of Oct/Nov - Call [phone] to set up your imaging testing.     Ask surgeon, day of surgery, to settle the question once and for all on if you have a spleen so we can know if we need to do further vaccinations    No med changes today before surgery but asking you to discuss diabetes meds with your daughter   Pioglitazone - weight gain, increased bladder cancer risk   I suggest changing rybelsus to once a week injectable (ozempic or mounjaro).  This is stronger and would also allow us to discontinue pioglitazone, AND help weight loss, among other things.  If no changes now, in Jan if still wanting to reduce cost, suggest you would likely need both januvia AND jardiance to replace rybelsus    Due for eye exam for diabetes monitoring  Technically due for medicare wellness visit after 10/14, but at a minimum see me in 6 months            Subjective   Celestine is a 69 year old, presenting for the following health issues:  Diabetes (Med recheck, would like 90 day refills. 1 refill of pioglitazone, 2 refills of the metformin, rybelsushyrdrochlorothiazide, rosuvastatin, and doxazosin. ), Hypertension, and Lipids        7/3/2025     9:39 AM   Additional Questions   Roomed by Monica GOMES   Accompanied by  Ina- wife          7/3/2025     9:39 AM   Patient Reported Additional Medications   Patient reports taking the following new medications no new meds     Via the Health Maintenance questionnaire, the patient has reported the following services have been completed -Eye Exam: [facility] 2024-07-15, this information has been sent to the abstraction team.      History of Present Illness       Reason for visit:  Renew meds    He eats 2-3 servings of fruits and vegetables daily.He consumes 0 sweetened beverage(s) daily.He exercises with enough effort to increase his  heart rate 30 to 60 minutes per day.  He exercises with enough effort to increase his heart rate 4 days per week.   He is taking medications regularly.        Colon cancer - did not respond to chemo, shrunk 40-50% based on radiation, now upcoming resection.    Brain mass -   2/3/25 brain MRI:      Diabetes Follow-up  How often are you checking your blood sugar? A few times a week  What time of day are you checking your blood sugars (select all that apply)?  Before and after meals  Have you had any blood sugars above 200?  No  Have you had any blood sugars below 70?  No  What symptoms do you notice when your blood sugar is low?  None  What concerns do you have today about your diabetes? None   Do you have any of these symptoms? (Select all that apply)  Redness, sores, or blisters on feet      Hyperlipidemia Follow-Up  Are you regularly taking any medication or supplement to lower your cholesterol?   Yes- rosuvastatin   Are you having muscle aches or other side effects that you think could be caused by your cholesterol lowering medication?  Yes- unsure if due from meds     Hypertension Follow-up  Do you check your blood pressure regularly outside of the clinic? Yes   Are you following a low salt diet? No  Are your blood pressures ever more than 140 on the top number (systolic) OR more   than 90 on the bottom number (diastolic), for example 140/90? No    BP Readings from Last 2 Encounters:   07/03/25 124/86   06/20/25 120/70     Hemoglobin A1C (%)   Date Value   06/26/2025 6.9 (H)   10/10/2024 7.6 (H)   12/24/2018 7.6 (H)   08/07/2018 8.6 (H)     LDL Cholesterol Calculated (mg/dL)   Date Value   06/26/2025 45   05/11/2024 37   08/07/2018 40   08/04/2017 92         Objective    /86   Pulse 80   Temp 97.4  F (36.3  C) (Tympanic)   Resp 18   Ht 1.829 m (6')   Wt 115.7 kg (255 lb)   SpO2 97%   BMI 34.58 kg/m    Body mass index is 34.58 kg/m .  Physical Exam   EXAM: MR BRAIN W/O and W CONTRAST  LOCATION: [facility]  DATE: 2/3/2025     INDICATION: Indeterminate lesion on CT head study, MRI recommended. History of rectosigmoid malignancy, on chemotherapy.  COMPARISON: CT head.  CONTRAST: Gadobutrol (GADAVIST) injection 10 mL  TECHNIQUE: Routine multiplanar multisequence head MRI without and with intravenous contrast.     FINDINGS:  INTRACRANIAL CONTENTS: No acute or subacute infarct. No mass, acute hemorrhage, or extra-axial fluid collections. Normal brain parenchymal signal. Normal ventricles and sulci. Normal position of the cerebellar tonsils. No pathologic contrast enhancement.     SELLA: No abnormality accounting for technique.     OSSEOUS STRUCTURES/SOFT TISSUES: Normal marrow signal. The major intracranial vascular flow voids are maintained.      ORBITS: No abnormality accounting for technique.      SINUSES/MASTOIDS: There is an enhancing soft tissue lesion within the sphenoid sinus with lobulated borders, measures 3.6 x 1.7 x 2.7 cm, demonstrates low signal on T1, markedly hyperintense on T2, low signal on diffusion. No middle ear or mastoid   effusion.                                                                       IMPRESSION:  1.  No acute intracranial abnormality.  2.  Incidental enhancing lesion with lobulated borders located within the sphenoid sinus, (with ground glass appearance along its margins and subtle amorphous calcification centrally on the same day CT). The appearance favors fibrous dysplasia. The   appearance is very atypical for metastatic disease. Also in the differential is chronic skull base osteomyelitis in the appropriate clinical setting (although extreme suspicion). Consider follow-up CT in 6-8 weeks to assess for stability/interval   progression.    Narrative & Impression   US THYROID 10/31/2024 9:27 AM     COMPARISON: CT chest abdomen and pelvis 10/10/2024     HISTORY: Thyroid nodule on Chest ct scan; Thyroid nodule     FINDINGS:    Thyroid parenchyma: heterogenous. Mild thyromegaly.  The right lobe of the thyroid measures: 4.5 x 2.5 x 2.7 cm  The left lobe of the thyroid measures: 4.9 x 2.2 x 2.1 cm  The thyroid isthmus measures: 0.3 cm     Nodule 1:  Lobe: Right  Location: Superior  Size: 2.4 x 1.8 x 1.3 cm  Composition: Solid or almost completely solid (2 points)  Echogenicity: Hypoechoic (2 points)  Shape: Wider than tall (0 points)  Margin: Smooth (0 points)  Echogenic Foci: None or large comet tail artifact (0 points)  Stability: Not previously imaged  TIRADS: TR4 (4-6 points)      Nodule 2:  Lobe: Left  Location: Superior  Size: 0.6 x 0.6 x 0.4 cm  Composition: Solid or almost completely solid (2 points)  Echogenicity: Hypoechoic (2 points)  Shape: Wider than tall (0 points)  Margin: Smooth (0 points)  Echogenic Foci: None or large comet tail artifact (0 points)  Stability: Not previously imaged  TIRADS: TR4 (4-6 points)      Nodule 3:  Lobe: Left  Location: Superior  Size: 0.3 x 0.4 x 0.2 cm  Composition: Solid or almost completely solid (2 points)  Echogenicity: Hypoechoic (2 points)  Shape: Wider than tall (0 points)  Margin: Smooth (0 points)  Echogenic Foci: None or large comet tail artifact (0 points)  Stability: Not previously imaged  TIRADS: TR4 (4-6 points)      Nodule 4:  Lobe: Left  Location: Mid lobe  Size: 0.7 x 0.8 x 1.1 cm  Composition: Solid or almost completely solid (2 points)  Echogenicity: Hypoechoic (2 points)  Shape: Taller than wide (3 points)  Margin: Ill-defined (0 points)  Echogenic Foci: None or large comet tail artifact (0 points)  Stability: Not previously imaged  TIRADS: TR5 (>/= 7 points)      Nodule 5:  Lobe: Left  Location: Inferior  Size: 0.9 x 0.9 x 0.6 cm  Composition: Solid or almost completely solid (2 points)  Echogenicity: Hypoechoic (2 points)  Shape: Wider than tall (0 points)  Margin: Smooth (0 points)  Echogenic Foci: None or large comet tail artifact (0 points)  Stability: Not previously  imaged  TIRADS: TR4 (4-6 points)                                                                          Impression: Multiple thyroid nodules as described, including nodule#1,  TIRADS TR4 measuring up to 2.4 cm and nodule #4, TIRADS TR5 measuring  up to 1.1 cm ; consider management (fine-needle aspiration biopsy) as  per ACR TIRADS recommendations below        ACR TI-RADS recommendations  TR2 (2 points) & TR1 (0 points) -No FNA or follow-up  TR3 (3 points) - FNA if ? 2.5cm, follow-up if 1.5 -2.4 cm in 1, 3 and  5 years  TR4 (4-6 points) - FNA if ? 1.5cm, follow-up if 1 -1.4 cm in 1, 2, 3  and 5 years  TR5 (?7 points) - FNA if ? 1cm, follow-up if 0.5 -0.9 cm every year  for 5 years           Signed Electronically by: Deborah Delgado PA-C

## 2025-07-05 ENCOUNTER — PATIENT OUTREACH (OUTPATIENT)
Dept: CARE COORDINATION | Facility: CLINIC | Age: 70
End: 2025-07-05
Payer: MEDICARE

## 2025-07-07 ENCOUNTER — PATIENT OUTREACH (OUTPATIENT)
Dept: CARE COORDINATION | Facility: CLINIC | Age: 70
End: 2025-07-07
Payer: MEDICARE

## 2025-07-08 NOTE — TELEPHONE ENCOUNTER
FUTURE VISIT INFORMATION:  Appointment Date: 8/5/25  Appointment Time: 8:40 AM   REFERRAL INFORMATION:  Referring By: Deborah Delgado PA-C   Referring Clinic: [facility]  Reason for Visit/Diagnosis: 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]  Abnormal brain MRI   referred by Deborah Delgado PA-C in [facility]  3:20 7/11 okay per NB      NOTES STATUS DETAILS   OFFICE NOTE from referring provider [facility]  7/3/25 OV: Deborah Delgado PA-C    OFFICE NOTE from other specialist [facility] URGENT CARE   6/13/25 OV: Julianne Ervin NP    IMAGES *pertaining images & report*       CT, MRI, PET, NM, US, XRAYS, etc ... PACS [facility]  7/28/25 CT head *scheduled*  2/3/25 MR brain  2/3/25 CT head

## 2025-07-11 ENCOUNTER — HOSPITAL ENCOUNTER (INPATIENT)
Facility: CLINIC | Age: 70
LOS: 6 days | Discharge: HOME HEALTH | DRG: 330 | End: 2025-07-17
Attending: COLON & RECTAL SURGERY | Admitting: COLON & RECTAL SURGERY
Payer: MEDICARE

## 2025-07-11 DIAGNOSIS — C19 MALIGNANT NEOPLASM OF RECTOSIGMOID JUNCTION (H): ICD-10-CM

## 2025-07-11 DIAGNOSIS — G89.18 ACUTE POST-OPERATIVE PAIN: ICD-10-CM

## 2025-07-11 DIAGNOSIS — E86.0 DEHYDRATION: ICD-10-CM

## 2025-07-11 DIAGNOSIS — Z98.890 POST-OPERATIVE STATE: Primary | ICD-10-CM

## 2025-07-11 DIAGNOSIS — Z93.2 S/P ILEOSTOMY (H): ICD-10-CM

## 2025-07-11 DIAGNOSIS — R19.8 HIGH OUTPUT ILEOSTOMY (H): ICD-10-CM

## 2025-07-11 DIAGNOSIS — Z93.2 HIGH OUTPUT ILEOSTOMY (H): ICD-10-CM

## 2025-07-11 DIAGNOSIS — E83.42 HYPOMAGNESEMIA: ICD-10-CM

## 2025-07-11 LAB
ANION GAP SERPL CALCULATED.3IONS-SCNC: 12 MMOL/L (ref 7–15)
BASE EXCESS BLDV CALC-SCNC: -3.5 MMOL/L (ref -3–3)
BASE EXCESS BLDV CALC-SCNC: -4.2 MMOL/L (ref -3–3)
BASE EXCESS BLDV CALC-SCNC: -7.8 MMOL/L (ref -3–3)
BUN SERPL-MCNC: 25.1 MG/DL (ref 8–23)
CA-I BLD-MCNC: 4.7 MG/DL (ref 4.4–5.2)
CA-I BLD-MCNC: 4.7 MG/DL (ref 4.4–5.2)
CA-I BLD-MCNC: 5 MG/DL (ref 4.4–5.2)
CALCIUM SERPL-MCNC: 8.9 MG/DL (ref 8.8–10.4)
CHLORIDE SERPL-SCNC: 104 MMOL/L (ref 98–107)
CREAT SERPL-MCNC: 1.4 MG/DL (ref 0.67–1.17)
EGFRCR SERPLBLD CKD-EPI 2021: 55 ML/MIN/1.73M2
ERYTHROCYTE [DISTWIDTH] IN BLOOD BY AUTOMATED COUNT: 15.7 % (ref 10–15)
GLUCOSE BLD-MCNC: 131 MG/DL (ref 70–99)
GLUCOSE BLD-MCNC: 159 MG/DL (ref 70–99)
GLUCOSE BLD-MCNC: 182 MG/DL (ref 70–99)
GLUCOSE BLDC GLUCOMTR-MCNC: 149 MG/DL (ref 70–99)
GLUCOSE BLDC GLUCOMTR-MCNC: 149 MG/DL (ref 70–99)
GLUCOSE BLDC GLUCOMTR-MCNC: 150 MG/DL (ref 70–99)
GLUCOSE BLDC GLUCOMTR-MCNC: 158 MG/DL (ref 70–99)
GLUCOSE BLDC GLUCOMTR-MCNC: 168 MG/DL (ref 70–99)
GLUCOSE BLDC GLUCOMTR-MCNC: 178 MG/DL (ref 70–99)
GLUCOSE BLDC GLUCOMTR-MCNC: 180 MG/DL (ref 70–99)
GLUCOSE BLDC GLUCOMTR-MCNC: 200 MG/DL (ref 70–99)
GLUCOSE SERPL-MCNC: 168 MG/DL (ref 70–99)
HCO3 BLDV-SCNC: 18 MMOL/L (ref 21–28)
HCO3 BLDV-SCNC: 23 MMOL/L (ref 21–28)
HCO3 BLDV-SCNC: 23 MMOL/L (ref 21–28)
HCO3 SERPL-SCNC: 21 MMOL/L (ref 22–29)
HCT VFR BLD AUTO: 32.8 % (ref 40–53)
HGB BLD-MCNC: 10.6 G/DL (ref 13.3–17.7)
HGB BLD-MCNC: 11.5 G/DL (ref 13.3–17.7)
HGB BLD-MCNC: 12.2 G/DL (ref 13.3–17.7)
HGB BLD-MCNC: 9.4 G/DL (ref 13.3–17.7)
LACTATE BLD-SCNC: 2 MMOL/L (ref 0.7–2)
LACTATE BLD-SCNC: 2.4 MMOL/L (ref 0.7–2)
LACTATE BLD-SCNC: 8.3 MMOL/L (ref 0.7–2)
MCH RBC QN AUTO: 29.4 PG (ref 26.5–33)
MCHC RBC AUTO-ENTMCNC: 32.3 G/DL (ref 31.5–36.5)
MCV RBC AUTO: 91 FL (ref 78–100)
O2/TOTAL GAS SETTING VFR VENT: 70 %
OXYHGB MFR BLDV: 77 % (ref 70–75)
OXYHGB MFR BLDV: 80 % (ref 70–75)
OXYHGB MFR BLDV: 81 % (ref 70–75)
PCO2 BLDV: 37 MM HG (ref 40–50)
PCO2 BLDV: 44 MM HG (ref 40–50)
PCO2 BLDV: 47 MM HG (ref 40–50)
PH BLDV: 7.29 [PH] (ref 7.32–7.43)
PH BLDV: 7.3 [PH] (ref 7.32–7.43)
PH BLDV: 7.32 [PH] (ref 7.32–7.43)
PLATELET # BLD AUTO: 171 10E3/UL (ref 150–450)
PO2 BLDV: 48 MM HG (ref 25–47)
PO2 BLDV: 50 MM HG (ref 25–47)
PO2 BLDV: 50 MM HG (ref 25–47)
POTASSIUM BLD-SCNC: 3.2 MMOL/L (ref 3.4–5.3)
POTASSIUM BLD-SCNC: 3.6 MMOL/L (ref 3.4–5.3)
POTASSIUM BLD-SCNC: 3.8 MMOL/L (ref 3.4–5.3)
POTASSIUM SERPL-SCNC: 3.9 MMOL/L (ref 3.4–5.3)
RBC # BLD AUTO: 3.61 10E6/UL (ref 4.4–5.9)
SAO2 % BLDV: 78 % (ref 70–75)
SAO2 % BLDV: 82 % (ref 70–75)
SAO2 % BLDV: 83 % (ref 70–75)
SODIUM BLD-SCNC: 134 MMOL/L (ref 135–145)
SODIUM BLD-SCNC: 136 MMOL/L (ref 135–145)
SODIUM BLD-SCNC: 138 MMOL/L (ref 135–145)
SODIUM SERPL-SCNC: 137 MMOL/L (ref 135–145)
WBC # BLD AUTO: 12.6 10E3/UL (ref 4–11)

## 2025-07-11 PROCEDURE — 88309 TISSUE EXAM BY PATHOLOGIST: CPT | Mod: 26 | Performed by: PATHOLOGY

## 2025-07-11 PROCEDURE — 44310 ILEOSTOMY/JEJUNOSTOMY: CPT | Mod: GC | Performed by: COLON & RECTAL SURGERY

## 2025-07-11 PROCEDURE — 250N000011 HC RX IP 250 OP 636: Performed by: COLON & RECTAL SURGERY

## 2025-07-11 PROCEDURE — 120N000002 HC R&B MED SURG/OB UMMC

## 2025-07-11 PROCEDURE — 250N000009 HC RX 250

## 2025-07-11 PROCEDURE — 360N000077 HC SURGERY LEVEL 4, PER MIN: Performed by: COLON & RECTAL SURGERY

## 2025-07-11 PROCEDURE — 370N000017 HC ANESTHESIA TECHNICAL FEE, PER MIN: Performed by: COLON & RECTAL SURGERY

## 2025-07-11 PROCEDURE — 258N000003 HC RX IP 258 OP 636

## 2025-07-11 PROCEDURE — 710N000010 HC RECOVERY PHASE 1, LEVEL 2, PER MIN: Performed by: COLON & RECTAL SURGERY

## 2025-07-11 PROCEDURE — 0DBP0ZZ EXCISION OF RECTUM, OPEN APPROACH: ICD-10-PCS | Performed by: COLON & RECTAL SURGERY

## 2025-07-11 PROCEDURE — 0DJD8ZZ INSPECTION OF LOWER INTESTINAL TRACT, VIA NATURAL OR ARTIFICIAL OPENING ENDOSCOPIC: ICD-10-PCS | Performed by: COLON & RECTAL SURGERY

## 2025-07-11 PROCEDURE — 88309 TISSUE EXAM BY PATHOLOGIST: CPT | Mod: TC | Performed by: COLON & RECTAL SURGERY

## 2025-07-11 PROCEDURE — 85018 HEMOGLOBIN: CPT | Performed by: STUDENT IN AN ORGANIZED HEALTH CARE EDUCATION/TRAINING PROGRAM

## 2025-07-11 PROCEDURE — 36415 COLL VENOUS BLD VENIPUNCTURE: CPT | Performed by: STUDENT IN AN ORGANIZED HEALTH CARE EDUCATION/TRAINING PROGRAM

## 2025-07-11 PROCEDURE — 84132 ASSAY OF SERUM POTASSIUM: CPT

## 2025-07-11 PROCEDURE — 250N000013 HC RX MED GY IP 250 OP 250 PS 637

## 2025-07-11 PROCEDURE — 272N000001 HC OR GENERAL SUPPLY STERILE: Performed by: COLON & RECTAL SURGERY

## 2025-07-11 PROCEDURE — 0D1B0Z4 BYPASS ILEUM TO CUTANEOUS, OPEN APPROACH: ICD-10-PCS | Performed by: COLON & RECTAL SURGERY

## 2025-07-11 PROCEDURE — 250N000025 HC SEVOFLURANE, PER MIN: Performed by: COLON & RECTAL SURGERY

## 2025-07-11 PROCEDURE — 0WJG4ZZ INSPECTION OF PERITONEAL CAVITY, PERCUTANEOUS ENDOSCOPIC APPROACH: ICD-10-PCS | Performed by: COLON & RECTAL SURGERY

## 2025-07-11 PROCEDURE — 88304 TISSUE EXAM BY PATHOLOGIST: CPT | Mod: 26 | Performed by: PATHOLOGY

## 2025-07-11 PROCEDURE — 0DTN0ZZ RESECTION OF SIGMOID COLON, OPEN APPROACH: ICD-10-PCS | Performed by: COLON & RECTAL SURGERY

## 2025-07-11 PROCEDURE — 999N000141 HC STATISTIC PRE-PROCEDURE NURSING ASSESSMENT: Performed by: COLON & RECTAL SURGERY

## 2025-07-11 PROCEDURE — 82310 ASSAY OF CALCIUM: CPT | Performed by: STUDENT IN AN ORGANIZED HEALTH CARE EDUCATION/TRAINING PROGRAM

## 2025-07-11 PROCEDURE — 44145 PARTIAL REMOVAL OF COLON: CPT | Mod: 22 | Performed by: COLON & RECTAL SURGERY

## 2025-07-11 PROCEDURE — 258N000003 HC RX IP 258 OP 636: Performed by: STUDENT IN AN ORGANIZED HEALTH CARE EDUCATION/TRAINING PROGRAM

## 2025-07-11 RX ORDER — ACETAMINOPHEN 325 MG/1
650 TABLET ORAL EVERY 6 HOURS
Status: DISCONTINUED | OUTPATIENT
Start: 2025-07-11 | End: 2025-07-17 | Stop reason: HOSPADM

## 2025-07-11 RX ORDER — METRONIDAZOLE 500 MG/100ML
500 INJECTION, SOLUTION INTRAVENOUS
Status: COMPLETED | OUTPATIENT
Start: 2025-07-11 | End: 2025-07-11

## 2025-07-11 RX ORDER — SODIUM CHLORIDE, SODIUM LACTATE, POTASSIUM CHLORIDE, CALCIUM CHLORIDE 600; 310; 30; 20 MG/100ML; MG/100ML; MG/100ML; MG/100ML
INJECTION, SOLUTION INTRAVENOUS CONTINUOUS
Status: DISCONTINUED | OUTPATIENT
Start: 2025-07-11 | End: 2025-07-16

## 2025-07-11 RX ORDER — SODIUM CHLORIDE, SODIUM LACTATE, POTASSIUM CHLORIDE, CALCIUM CHLORIDE 600; 310; 30; 20 MG/100ML; MG/100ML; MG/100ML; MG/100ML
INJECTION, SOLUTION INTRAVENOUS CONTINUOUS
Status: DISCONTINUED | OUTPATIENT
Start: 2025-07-11 | End: 2025-07-11 | Stop reason: HOSPADM

## 2025-07-11 RX ORDER — NALOXONE HYDROCHLORIDE 0.4 MG/ML
0.4 INJECTION, SOLUTION INTRAMUSCULAR; INTRAVENOUS; SUBCUTANEOUS
Status: DISCONTINUED | OUTPATIENT
Start: 2025-07-11 | End: 2025-07-17 | Stop reason: HOSPADM

## 2025-07-11 RX ORDER — DEXAMETHASONE SODIUM PHOSPHATE 4 MG/ML
4 INJECTION, SOLUTION INTRA-ARTICULAR; INTRALESIONAL; INTRAMUSCULAR; INTRAVENOUS; SOFT TISSUE
Status: DISCONTINUED | OUTPATIENT
Start: 2025-07-11 | End: 2025-07-11 | Stop reason: HOSPADM

## 2025-07-11 RX ORDER — ENOXAPARIN SODIUM 100 MG/ML
40 INJECTION SUBCUTANEOUS EVERY 24 HOURS
Status: DISCONTINUED | OUTPATIENT
Start: 2025-07-12 | End: 2025-07-17 | Stop reason: HOSPADM

## 2025-07-11 RX ORDER — HEPARIN SODIUM 5000 [USP'U]/.5ML
5000 INJECTION, SOLUTION INTRAVENOUS; SUBCUTANEOUS
Status: COMPLETED | OUTPATIENT
Start: 2025-07-11 | End: 2025-07-11

## 2025-07-11 RX ORDER — OXYCODONE HCL 5 MG/5 ML
5 SOLUTION, ORAL ORAL EVERY 4 HOURS PRN
Refills: 0 | Status: DISCONTINUED | OUTPATIENT
Start: 2025-07-11 | End: 2025-07-17 | Stop reason: HOSPADM

## 2025-07-11 RX ORDER — NALOXONE HYDROCHLORIDE 0.4 MG/ML
0.1 INJECTION, SOLUTION INTRAMUSCULAR; INTRAVENOUS; SUBCUTANEOUS
Status: DISCONTINUED | OUTPATIENT
Start: 2025-07-11 | End: 2025-07-11 | Stop reason: HOSPADM

## 2025-07-11 RX ORDER — HYDROMORPHONE HCL IN WATER/PF 6 MG/30 ML
0.2 PATIENT CONTROLLED ANALGESIA SYRINGE INTRAVENOUS EVERY 5 MIN PRN
Status: DISCONTINUED | OUTPATIENT
Start: 2025-07-11 | End: 2025-07-11 | Stop reason: HOSPADM

## 2025-07-11 RX ORDER — PROCHLORPERAZINE MALEATE 5 MG/1
5 TABLET ORAL EVERY 6 HOURS PRN
Status: DISCONTINUED | OUTPATIENT
Start: 2025-07-11 | End: 2025-07-17 | Stop reason: HOSPADM

## 2025-07-11 RX ORDER — LIDOCAINE 40 MG/G
CREAM TOPICAL
Status: DISCONTINUED | OUTPATIENT
Start: 2025-07-11 | End: 2025-07-17 | Stop reason: HOSPADM

## 2025-07-11 RX ORDER — ONDANSETRON 4 MG/1
4 TABLET, ORALLY DISINTEGRATING ORAL EVERY 6 HOURS PRN
Status: DISCONTINUED | OUTPATIENT
Start: 2025-07-11 | End: 2025-07-17 | Stop reason: HOSPADM

## 2025-07-11 RX ORDER — PHENYLEPHRINE HCL IN 0.9% NACL 50MG/250ML
.2-2 PLASTIC BAG, INJECTION (ML) INTRAVENOUS CONTINUOUS
Status: DISCONTINUED | OUTPATIENT
Start: 2025-07-11 | End: 2025-07-11 | Stop reason: HOSPADM

## 2025-07-11 RX ORDER — NALOXONE HYDROCHLORIDE 0.4 MG/ML
0.2 INJECTION, SOLUTION INTRAMUSCULAR; INTRAVENOUS; SUBCUTANEOUS
Status: DISCONTINUED | OUTPATIENT
Start: 2025-07-11 | End: 2025-07-17 | Stop reason: HOSPADM

## 2025-07-11 RX ORDER — ONDANSETRON 4 MG/1
4 TABLET, ORALLY DISINTEGRATING ORAL EVERY 30 MIN PRN
Status: DISCONTINUED | OUTPATIENT
Start: 2025-07-11 | End: 2025-07-11 | Stop reason: HOSPADM

## 2025-07-11 RX ORDER — FENTANYL CITRATE 50 UG/ML
25 INJECTION, SOLUTION INTRAMUSCULAR; INTRAVENOUS EVERY 5 MIN PRN
Status: DISCONTINUED | OUTPATIENT
Start: 2025-07-11 | End: 2025-07-11 | Stop reason: HOSPADM

## 2025-07-11 RX ORDER — ONDANSETRON 2 MG/ML
4 INJECTION INTRAMUSCULAR; INTRAVENOUS EVERY 6 HOURS PRN
Status: DISCONTINUED | OUTPATIENT
Start: 2025-07-11 | End: 2025-07-17 | Stop reason: HOSPADM

## 2025-07-11 RX ORDER — ONDANSETRON 2 MG/ML
4 INJECTION INTRAMUSCULAR; INTRAVENOUS EVERY 30 MIN PRN
Status: DISCONTINUED | OUTPATIENT
Start: 2025-07-11 | End: 2025-07-11 | Stop reason: HOSPADM

## 2025-07-11 RX ORDER — ONDANSETRON 2 MG/ML
4 INJECTION INTRAMUSCULAR; INTRAVENOUS ONCE
Status: DISCONTINUED | OUTPATIENT
Start: 2025-07-11 | End: 2025-07-11 | Stop reason: HOSPADM

## 2025-07-11 RX ORDER — HYDROMORPHONE HCL IN WATER/PF 6 MG/30 ML
0.4 PATIENT CONTROLLED ANALGESIA SYRINGE INTRAVENOUS EVERY 5 MIN PRN
Status: DISCONTINUED | OUTPATIENT
Start: 2025-07-11 | End: 2025-07-11 | Stop reason: HOSPADM

## 2025-07-11 RX ORDER — HYDROMORPHONE HCL IN WATER/PF 6 MG/30 ML
0.4 PATIENT CONTROLLED ANALGESIA SYRINGE INTRAVENOUS
Status: DISCONTINUED | OUTPATIENT
Start: 2025-07-11 | End: 2025-07-17

## 2025-07-11 RX ORDER — CEFAZOLIN SODIUM/WATER 2 G/20 ML
2 SYRINGE (ML) INTRAVENOUS SEE ADMIN INSTRUCTIONS
Status: DISCONTINUED | OUTPATIENT
Start: 2025-07-11 | End: 2025-07-11 | Stop reason: HOSPADM

## 2025-07-11 RX ORDER — DEXTROSE MONOHYDRATE 25 G/50ML
25-50 INJECTION, SOLUTION INTRAVENOUS
Status: DISCONTINUED | OUTPATIENT
Start: 2025-07-11 | End: 2025-07-11 | Stop reason: HOSPADM

## 2025-07-11 RX ORDER — HYDROMORPHONE HCL IN WATER/PF 6 MG/30 ML
0.2 PATIENT CONTROLLED ANALGESIA SYRINGE INTRAVENOUS
Status: DISCONTINUED | OUTPATIENT
Start: 2025-07-11 | End: 2025-07-17

## 2025-07-11 RX ORDER — DEXTROSE MONOHYDRATE 100 MG/ML
INJECTION, SOLUTION INTRAVENOUS CONTINUOUS PRN
Status: DISCONTINUED | OUTPATIENT
Start: 2025-07-11 | End: 2025-07-11 | Stop reason: HOSPADM

## 2025-07-11 RX ORDER — FENTANYL CITRATE 50 UG/ML
50 INJECTION, SOLUTION INTRAMUSCULAR; INTRAVENOUS EVERY 5 MIN PRN
Status: DISCONTINUED | OUTPATIENT
Start: 2025-07-11 | End: 2025-07-11 | Stop reason: HOSPADM

## 2025-07-11 RX ORDER — CEFAZOLIN SODIUM/WATER 2 G/20 ML
2 SYRINGE (ML) INTRAVENOUS
Status: COMPLETED | OUTPATIENT
Start: 2025-07-11 | End: 2025-07-11

## 2025-07-11 RX ORDER — NICOTINE POLACRILEX 4 MG
15-30 LOZENGE BUCCAL
Status: DISCONTINUED | OUTPATIENT
Start: 2025-07-11 | End: 2025-07-11 | Stop reason: HOSPADM

## 2025-07-11 RX ADMIN — Medication 0.5 MCG/KG/MIN: at 16:00

## 2025-07-11 RX ADMIN — SODIUM CHLORIDE, SODIUM LACTATE, POTASSIUM CHLORIDE, AND CALCIUM CHLORIDE: .6; .31; .03; .02 INJECTION, SOLUTION INTRAVENOUS at 20:55

## 2025-07-11 RX ADMIN — SODIUM CHLORIDE, SODIUM LACTATE, POTASSIUM CHLORIDE, AND CALCIUM CHLORIDE: .6; .31; .03; .02 INJECTION, SOLUTION INTRAVENOUS at 16:20

## 2025-07-11 RX ADMIN — SODIUM CHLORIDE, SODIUM LACTATE, POTASSIUM CHLORIDE, AND CALCIUM CHLORIDE 1000 ML: .6; .31; .03; .02 INJECTION, SOLUTION INTRAVENOUS at 19:00

## 2025-07-11 RX ADMIN — HEPARIN SODIUM 5000 UNITS: 5000 INJECTION, SOLUTION INTRAVENOUS; SUBCUTANEOUS at 08:16

## 2025-07-11 RX ADMIN — ACETAMINOPHEN 650 MG: 325 TABLET ORAL at 22:05

## 2025-07-11 RX ADMIN — METRONIDAZOLE 500 MG: 500 INJECTION, SOLUTION INTRAVENOUS at 08:16

## 2025-07-11 ASSESSMENT — ACTIVITIES OF DAILY LIVING (ADL)
TOILETING_ISSUES: NO
HEARING_DIFFICULTY_OR_DEAF: NO
ADLS_ACUITY_SCORE: 37
ADLS_ACUITY_SCORE: 37
ADLS_ACUITY_SCORE: 39
WEAR_GLASSES_OR_BLIND: YES
ADLS_ACUITY_SCORE: 37
DIFFICULTY_COMMUNICATING: NO
DIFFICULTY_EATING/SWALLOWING: NO
ADLS_ACUITY_SCORE: 39
ADLS_ACUITY_SCORE: 37
ADLS_ACUITY_SCORE: 39
CHANGE_IN_FUNCTIONAL_STATUS_SINCE_ONSET_OF_CURRENT_ILLNESS/INJURY: NO
ADLS_ACUITY_SCORE: 24
VISION_MANAGEMENT: GLASSES
ADLS_ACUITY_SCORE: 39
ADLS_ACUITY_SCORE: 39
ADLS_ACUITY_SCORE: 37
DRESSING/BATHING_DIFFICULTY: NO
ADLS_ACUITY_SCORE: 37
FALL_HISTORY_WITHIN_LAST_SIX_MONTHS: NO
DOING_ERRANDS_INDEPENDENTLY_DIFFICULTY: NO
ADLS_ACUITY_SCORE: 37
ADLS_ACUITY_SCORE: 37
ADLS_ACUITY_SCORE: 22
ADLS_ACUITY_SCORE: 37
ADLS_ACUITY_SCORE: 37
WALKING_OR_CLIMBING_STAIRS_DIFFICULTY: NO
CONCENTRATING,_REMEMBERING_OR_MAKING_DECISIONS_DIFFICULTY: NO

## 2025-07-11 NOTE — BRIEF OP NOTE
[facility]    Brief Operative Note    Pre-operative diagnosis: 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]  Post-operative diagnosis Same as pre-operative diagnosis    Procedure: laparoscopic low anterior resection, converted to open ,diverting loop ileostomy, N/A - Abdomen  with flexible sigmoidoscopy, N/A - Anus    Surgeon: Surgeons and Role:     * Shahida Sheets MD - Primary     * Genesis Lepe MD PhD - Resident - Assisting     * Kel Juarez MD - Fellow - Assisting  Anesthesia: General   Estimated Blood Loss: 150 mL from 7/11/2025  9:35 AM to 7/11/2025  3:40 PM      Drains: Jonatan-Soliz  Specimens:   ID Type Source Tests Collected by Time Destination   1 : Sigmoid colon, rectum Tissue Rectum SURGICAL PATHOLOGY EXAM Shahida Sheets MD 7/11/2025  1:42 PM    2 : Anastomtic donuts Tissue Other SURGICAL PATHOLOGY EXAM Shahida Sheets MD 7/11/2025  2:36 PM      Findings:   Anastomosis at 6cm.  Negative leak test  Complications: None.  Implants: * No implants in log *

## 2025-07-11 NOTE — OR NURSING
Ancom paged: first call colo rectal  PACU Post Op Pt Celestine Simon [place] 18 needs brief op note, admit orders, patient transfer orders, thanks Donn Cordon RN on [facility] or [phone]

## 2025-07-12 ENCOUNTER — APPOINTMENT (OUTPATIENT)
Dept: OCCUPATIONAL THERAPY | Facility: CLINIC | Age: 70
DRG: 330 | End: 2025-07-12
Payer: MEDICARE

## 2025-07-12 ENCOUNTER — APPOINTMENT (OUTPATIENT)
Dept: PHYSICAL THERAPY | Facility: CLINIC | Age: 70
DRG: 330 | End: 2025-07-12
Payer: MEDICARE

## 2025-07-12 LAB
ANION GAP SERPL CALCULATED.3IONS-SCNC: 9 MMOL/L (ref 7–15)
BUN SERPL-MCNC: 22.2 MG/DL (ref 8–23)
CALCIUM SERPL-MCNC: 9 MG/DL (ref 8.8–10.4)
CHLORIDE SERPL-SCNC: 105 MMOL/L (ref 98–107)
CREAT SERPL-MCNC: 1.18 MG/DL (ref 0.67–1.17)
EGFRCR SERPLBLD CKD-EPI 2021: 68 ML/MIN/1.73M2
ERYTHROCYTE [DISTWIDTH] IN BLOOD BY AUTOMATED COUNT: 15.7 % (ref 10–15)
GLUCOSE BLDC GLUCOMTR-MCNC: 137 MG/DL (ref 70–99)
GLUCOSE SERPL-MCNC: 153 MG/DL (ref 70–99)
HCO3 SERPL-SCNC: 23 MMOL/L (ref 22–29)
HCT VFR BLD AUTO: 33.6 % (ref 40–53)
HGB BLD-MCNC: 11 G/DL (ref 13.3–17.7)
MAGNESIUM SERPL-MCNC: 1.7 MG/DL (ref 1.7–2.3)
MCH RBC QN AUTO: 29.9 PG (ref 26.5–33)
MCHC RBC AUTO-ENTMCNC: 32.7 G/DL (ref 31.5–36.5)
MCV RBC AUTO: 91 FL (ref 78–100)
PHOSPHATE SERPL-MCNC: 3.1 MG/DL (ref 2.5–4.5)
PLATELET # BLD AUTO: 167 10E3/UL (ref 150–450)
POTASSIUM SERPL-SCNC: 4.1 MMOL/L (ref 3.4–5.3)
RBC # BLD AUTO: 3.68 10E6/UL (ref 4.4–5.9)
SODIUM SERPL-SCNC: 137 MMOL/L (ref 135–145)
WBC # BLD AUTO: 7.8 10E3/UL (ref 4–11)

## 2025-07-12 PROCEDURE — G0463 HOSPITAL OUTPT CLINIC VISIT: HCPCS

## 2025-07-12 PROCEDURE — 120N000002 HC R&B MED SURG/OB UMMC

## 2025-07-12 PROCEDURE — 250N000013 HC RX MED GY IP 250 OP 250 PS 637

## 2025-07-12 PROCEDURE — 36415 COLL VENOUS BLD VENIPUNCTURE: CPT

## 2025-07-12 PROCEDURE — 97535 SELF CARE MNGMENT TRAINING: CPT | Mod: GO

## 2025-07-12 PROCEDURE — 250N000011 HC RX IP 250 OP 636

## 2025-07-12 PROCEDURE — 84100 ASSAY OF PHOSPHORUS: CPT

## 2025-07-12 PROCEDURE — 83735 ASSAY OF MAGNESIUM: CPT

## 2025-07-12 PROCEDURE — 97530 THERAPEUTIC ACTIVITIES: CPT | Mod: GP

## 2025-07-12 PROCEDURE — 999N000199 HC STATISTIC WOC PT EDUCATION, 31-45 MIN

## 2025-07-12 PROCEDURE — 85018 HEMOGLOBIN: CPT

## 2025-07-12 PROCEDURE — 97530 THERAPEUTIC ACTIVITIES: CPT | Mod: GO

## 2025-07-12 PROCEDURE — 97166 OT EVAL MOD COMPLEX 45 MIN: CPT | Mod: GO

## 2025-07-12 PROCEDURE — 80048 BASIC METABOLIC PNL TOTAL CA: CPT

## 2025-07-12 PROCEDURE — 97161 PT EVAL LOW COMPLEX 20 MIN: CPT | Mod: GP

## 2025-07-12 PROCEDURE — 258N000003 HC RX IP 258 OP 636

## 2025-07-12 RX ORDER — CALCIUM CARBONATE 500 MG/1
500 TABLET, CHEWABLE ORAL 2 TIMES DAILY PRN
Status: DISCONTINUED | OUTPATIENT
Start: 2025-07-12 | End: 2025-07-12

## 2025-07-12 RX ORDER — FAMOTIDINE 20 MG/1
20 TABLET, FILM COATED ORAL 2 TIMES DAILY PRN
Status: DISCONTINUED | OUTPATIENT
Start: 2025-07-12 | End: 2025-07-17 | Stop reason: HOSPADM

## 2025-07-12 RX ORDER — CALCIUM CARBONATE 500 MG/1
500-1000 TABLET, CHEWABLE ORAL 2 TIMES DAILY PRN
Status: DISCONTINUED | OUTPATIENT
Start: 2025-07-12 | End: 2025-07-17 | Stop reason: HOSPADM

## 2025-07-12 RX ORDER — MAGNESIUM HYDROXIDE/ALUMINUM HYDROXICE/SIMETHICONE 120; 1200; 1200 MG/30ML; MG/30ML; MG/30ML
30 SUSPENSION ORAL 2 TIMES DAILY PRN
Status: DISCONTINUED | OUTPATIENT
Start: 2025-07-12 | End: 2025-07-17 | Stop reason: HOSPADM

## 2025-07-12 RX ADMIN — ACETAMINOPHEN 650 MG: 325 TABLET ORAL at 03:56

## 2025-07-12 RX ADMIN — ACETAMINOPHEN 650 MG: 325 TABLET ORAL at 10:03

## 2025-07-12 RX ADMIN — FAMOTIDINE 20 MG: 20 TABLET, FILM COATED ORAL at 21:34

## 2025-07-12 RX ADMIN — CALCIUM CARBONATE (ANTACID) CHEW TAB 500 MG 1000 MG: 500 CHEW TAB at 16:47

## 2025-07-12 RX ADMIN — SODIUM CHLORIDE, SODIUM LACTATE, POTASSIUM CHLORIDE, AND CALCIUM CHLORIDE: .6; .31; .03; .02 INJECTION, SOLUTION INTRAVENOUS at 22:16

## 2025-07-12 RX ADMIN — ACETAMINOPHEN 650 MG: 325 TABLET ORAL at 16:45

## 2025-07-12 RX ADMIN — CALCIUM CARBONATE (ANTACID) CHEW TAB 500 MG 1000 MG: 500 CHEW TAB at 01:55

## 2025-07-12 RX ADMIN — ENOXAPARIN SODIUM 40 MG: 40 INJECTION SUBCUTANEOUS at 16:48

## 2025-07-12 ASSESSMENT — ACTIVITIES OF DAILY LIVING (ADL)
ADLS_ACUITY_SCORE: 25
ADLS_ACUITY_SCORE: 33
ADLS_ACUITY_SCORE: 25
ADLS_ACUITY_SCORE: 33
ADLS_ACUITY_SCORE: 24
ADLS_ACUITY_SCORE: 25
ADLS_ACUITY_SCORE: 25
ADLS_ACUITY_SCORE: 33
ADLS_ACUITY_SCORE: 34
ADLS_ACUITY_SCORE: 34
ADLS_ACUITY_SCORE: 24
ADLS_ACUITY_SCORE: 25
ADLS_ACUITY_SCORE: 33
PREVIOUS_RESPONSIBILITIES: MEAL PREP;HOUSEKEEPING;SHOPPING;LAUNDRY;YARDWORK;MEDICATION MANAGEMENT;FINANCES;DRIVING
ADLS_ACUITY_SCORE: 25
ADLS_ACUITY_SCORE: 25

## 2025-07-12 NOTE — PROGRESS NOTES
SURGERY POST-OP CHECK NOTE  07/11/2025 9:19 PM    Patient: Celestine Simon  MRN: [id number]    Subjective  No acute events postoperatively. Pain well controlled. Denies nausea, vomiting, chest pain, shortness of breath. Patient reports feeling well. No other complaints.      Objective  Temp:  [96.3  F (35.7  C)-97.8  F (36.6  C)] 96.3  F (35.7  C)  Pulse:  [] 84  Resp:  [10-21] 13  BP: ()/(52-74) 102/55  SpO2:  [91 %-98 %] 97 %    General: AAOx4, NAD, lying in bed, appears comfortable  CV: RRR, no murmurs  Pulm: CTAB, breathing comfortably on 2L NC  Abd: soft, non-tender to palpation, mildly distended, incisions c/d/I. LILY drain in place with serosanguinous output.  Extremities: warm, well-perfused without edema  Neuro: facial movement grossly symmetric, moving all extremities spontaneously without apparent deficit    Labs:  Recent Labs   Lab 07/11/25 1944   WBC 12.6*   RBC 3.61*   HGB 10.6*   HCT 32.8*   MCV 91   MCH 29.4   MCHC 32.3   RDW 15.7*          Recent Labs   Lab 07/11/25 2042 07/11/25 1944   NA  --  137   POTASSIUM  --  3.9   CHLORIDE  --  104   CO2  --  21*   BUN  --  25.1*   CR  --  1.40*   * 168*   ALIZE  --  8.9       Assessment/Plan  Celestine Simon is a 69 year old male with a h/o T2DM and rectosigmoid adenocarcinoma now POD#0 s/p laparoscopic LAR converted to open with diverting loop ileostomy, doing well.     - MMPR  - mIVF  - CLD  - House in place  - CBC, BMP in AM  - WOC consult      Indira Augustin MD  General Surgery PGY-1  [facility]

## 2025-07-12 NOTE — PROGRESS NOTES
COLON & RECTAL SURGERY  PROGRESS NOTE    07/12/2025  Post-op Day # 1    SUBJECTIVE:  Feeling well. Pain controlled. Denies nausea. Having ileostomy output. Weaned off pressors in PACU last night after additional resuscitation.    OBJECTIVE:  Temp:  [96.3  F (35.7  C)-98.6  F (37  C)] 98.6  F (37  C)  Pulse:  [57-91] 85  Resp:  [10-21] 16  BP: ()/(52-73) 118/72  SpO2:  [91 %-98 %] 98 %    Intake/Output Summary (Last 24 hours) at 7/12/2025 0931  Last data filed at 7/12/2025 0659  Gross per 24 hour   Intake 5100 ml   Output 1145 ml   Net 3955 ml       GENERAL:  Awake, alert, no acute distress  HEAD: Normocephalic atraumatic  SCLERA: Anicteric  EXTREMITIES: Warm and well perfused  ABDOMEN:  Soft, appropriately tender, non-distended. No guarding, rigidity, or peritoneal signs. Ostomy pink with succus in appliance.  INCISIONS:  C/D/I  DRAIN: serosanguinous, 135 cc    LABS:  Lab Results   Component Value Date    WBC 7.8 07/12/2025     Lab Results   Component Value Date    HGB 11.0 07/12/2025     Lab Results   Component Value Date    CR 1.18 07/12/2025    CR 0.99 08/07/2018       ASSESSMENT/PLAN: Celestine Simon is a 69 year old male s/p lap LAR with DLI on 7/11, recovering appropriately. Post op SHANA improved today after additional resuscitation last night.    - Multimodal pain control  - Advance to full liquid diet  - Continue IVF today given SHANA yesterday  - Keep House  - Lovenox for ppx  - Labs tomorrow    Kory Caceres MD, MS  Fellow, Colon & Rectal Surgery  [facility]  07/12/2025  9:31 AM

## 2025-07-12 NOTE — PROGRESS NOTES
"   07/12/25 1323   Appointment Info   Signing Clinician's Name / Credentials (PT) Roberta Crisostomo DPT   Rehab Comments (PT) Abd prec, binder for comfort   Living Environment   People in Home spouse   Current Living Arrangements house   Home Accessibility stairs to enter home;stairs within home   Number of Stairs, Main Entrance 2  (brick to lean on)   Stair Railings, Main Entrance none   Number of Stairs, Within Home, Primary greater than 10 stairs   Stair Railings, Within Home, Primary railings on both sides of stairs   Transportation Anticipated family or friend will provide;car, drives self   Living Environment Comments Pt lives in a 3-level house w/ his wife, Ina, who is also retired. There are 7 stairs w/ railings to the basement, and 9 stairs w/ railings to the upper level, with 2 stairs to enter home w/o railings. They have a walk-in shower. Most needs can be met on the lower level w/ the bathroom he uses which has a walk-in shower. He reports that he has access to a shower chair.   Self-Care   Usual Activity Tolerance good   Current Activity Tolerance fair   Exercise Amount/Frequency daily   Equipment Currently Used at Home none   Fall history within last six months no   Activity/Exercise/Self-Care Comment Pt reports prior IND in all ADLs. Reports that he gets \"~6,000 steps every day\" at baseline and gardens.   General Information   Onset of Illness/Injury or Date of Surgery 07/11/25   Referring Physician Mirlande Barbosa GenevieveB   Patient/Family Therapy Goals Statement (PT) REturn home   Pertinent History of Current Problem (include personal factors and/or comorbidities that impact the POC) s/p lap LAR with DLI on 7/11   Existing Precautions/Restrictions abdominal   Cognition   Affect/Mental Status (Cognition) WFL   Pain Assessment   Patient Currently in Pain   (pain 3.5 with activity)   Integumentary/Edema   Integumentary/Edema Comments binder in place, not assessed, drain without concern   Posture  "   Posture Comments tendency for flexed posture   Range of Motion (ROM)   Range of Motion ROM is WFL   Strength (Manual Muscle Testing)   Strength Comments generalized deconditioning   Bed Mobility   Bed Mobility rolling right;supine-sit   Rolling Right Adams (Bed Mobility) moderate assist (50% patient effort);verbal cues   Supine-Sit Adams (Bed Mobility) moderate assist (50% patient effort);verbal cues   Assistive Device (Bed Mobility) bed rails   Comment, (Bed Mobility) HOB elevated.   Transfers   Transfers sit-stand transfer   Sit-Stand Transfer   Sit-Stand Adams (Transfers) minimum assist (75% patient effort)   Comment, (Sit-Stand Transfer) HHA   Balance   Balance Comments SBA dynamic sitting balance   Coordination   Coordination no deficits were identified   Muscle Tone   Muscle Tone no deficits were identified   Clinical Impression   Criteria for Skilled Therapeutic Intervention Yes, treatment indicated   PT Diagnosis (PT) impaired functional mobility   Influenced by the following impairments pain, weakness   Functional limitations due to impairments impaired bed mobility, transfers, gait   Clinical Presentation (PT Evaluation Complexity) stable   Clinical Presentation Rationale clinical judgement   Clinical Decision Making (Complexity) moderate complexity   Planned Therapy Interventions (PT) bed mobility training;gait training;home exercise program;patient/family education;ROM (range of motion);stair training;strengthening;transfer training   Risk & Benefits of therapy have been explained evaluation/treatment results reviewed;care plan/treatment goals reviewed;patient   PT Total Evaluation Time   PT Eval, Low Complexity Minutes (88436) 10   Physical Therapy Goals   PT Frequency 6x/week   PT Predicted Duration/Target Date for Goal Attainment 07/26/25   PT Goals Transfers;Gait;Stairs   PT: Transfers Modified independent;Sit to/from stand;Bed to/from chair;Assistive device   PT: Gait Modified  independent;Assistive device;150 feet;Within precautions   PT: Stairs Modified independent;Greater than 10 stairs;Rail on both sides   Interventions   Interventions Quick Adds Therapeutic Activity;Therapeutic Procedure   Therapeutic Procedure/Exercise   Treatment Detail/Skilled Intervention Supine: AP, QS x 10 reps. Seated: heel raises, LAQ; x 10 reps.   Therapeutic Activity   Therapeutic Activities: dynamic activities to improve functional performance Minutes (05811) 20   Treatment Detail/Skilled Intervention Greeted in bed, willing to participate. Reviewed precautions. VSS prior to session. Binder in place. Performed bed ex. V/c and mod A for log roll to R. Increased HOB for ease of transfer to EOB. Performed roll again and transferred to EOB with assist at legs and trunk. SBA scooting for feet on floor. V/c for breathing techniques throughout to avoid strain. Min A sit>stand with L HHA and R hand on bed. Min A stand>sit. Min A stand pivot to recliner with HHA and rail. 2WW ordered to room. Seated ex. VSS post session. Positioned for comfort, call light and needs in reach, no distress. Discussed POC and activity.   PT Discharge Planning   PT Plan Progress transfers. Initiate gait 2WW   PT Discharge Recommendation (DC Rec) home with assist;home with home care physical therapy   PT Rationale for DC Rec Pt is below baseline LOF. He will benefit from IP therapies to progress functional mobility. Anticipate he will be able to transition home with assist pending progression   PT Brief overview of current status Mod A bed mobility, Min A transfer to chair   PT Total Distance Amb During Session (feet) 0   Physical Therapy Time and Intention   Timed Code Treatment Minutes 20   Total Session Time (sum of timed and untimed services) 30

## 2025-07-12 NOTE — CONSULTS
[facility]  WO Nurse Inpatient Assessment     Consulted for: Diverting loop ileostomy      WO nurse follow-up plan: daily    Patient History (according to provider note(s):    Celestine Simon is a 69 year old male s/p lap LAR with DLI on 7/11, recovering appropriately. Post op SHANA improved today after additional resuscitation last night.     Celestine Simon is a 69 year old male with a h/o T2DM and rectosigmoid adenocarcinoma now POD#0 s/p laparoscopic LAR converted to open with diverting loop ileostomy, doing well.     Assessment:      Areas visualized during today's visit: Abdomen  Assessment of new loop Ileostomy:  Diagnosis Pertinent to Stoma: Cancer - Colon or Rectum      Surgery Date: 7/11/25  Surgeon:MD Pinedo [facility]  Pouching system in place on assessment today: Raisa two piece, cut to fit, and flat post op pouch  Pouch barrier status: intact  Pouch last changed/wear time: 7/11  Reason for pouch change today: pouch not changed today  Effectiveness of current pouching/ supply plan: will evaluate tomorrow  Change made with ostomy management today: No  Pouching system placed today: Raisa two piece, cut to fit, and flat   Supplies: supplies stored on unit  Stoma location: Q  Stoma size: N/A inches,   Stoma appearance: pink-red, round, and protruberant  Mucocutaneous junction:  not visualized (barrier in place)  Peristomal complication(s): none   Output: serosanguinous  Output volume emptied during visit: 0ml  Abdominal assessment: Distended  Surgical site(s): dressing dry and intact  NG still in place? No  Pain: Dull ache  Is patient still on a PCA? No    Ostomy education assessment:  Participant of teaching session today: patient   Education completed today: Intake and output recording, Fluid and electrolyte balance , Importance of hydration, When to seek medical attention, Low fiber diet , Odor/flatus management , Infection  "prevention/hygiene , Hernia prevention, and Lifestyle adjustments   Educational materials/methods: Verbal, Written, FOD Dawson education hand out, Addressed patient/caregiver questions/concerns, and Left packet of information at bedside   Education still needed: Initial fitting, Stoma assessment, Pouching system assessment , Evaluate leakage issue, Refitting of appliance , Adjustment of pouching plan, Pouch change demonstration, Pouch change return demonstration, Ostomy accessory product use , Introduction to pouches, Peristomal skin care, Pouch emptying demonstration, Pouch emptying return demonstration, Intake and output recording, and Discharge instructions  Learning Comprehension:   Psychosocial assessment: Patient was not anticipating having an ostomy and is still processing this.  Patient readiness for education today: attentive  Following today's visit: patient  is able to demonstrate;         1. How to empty their pouch? No        2. How to change their pouch? No        3. How to read and record intake and output correctly? No  Preparation for discharge completed: No discharge preparation started yet  Preparation for discharge still needed: No discharge preparation started yet  Pt support system on discharge: Wife  WOC recommend home care? Yes  Face to face time: 35 minutes           Treatment Plan:     LLQ Ileostomy pouching plan:   Pouching system: ostomy supplies pouches: [place] 57 FECAL ([id number]) ostomy supplies barrier: Raisa 57mm FLAT ([id number])  Accessories used: WOC ostomy accessories: 2\" Cera Barrier Ring ([id number]) and Cavilon no sting barrier film ([id number])   Frequency of pouch changes: PRN leakage and Three times a week  WOC follow up plan: Daily Monday-Friday (as able)  Bedside RN interventions: Change pouch PRN if leaking using the supplies above, Empty pouch when 1/3 to 1/2 full, ensure to clean pouch outlet after emptying to prevent odor, Notify WOC for ongoing pouch leakage, Document " stoma appearance and output volume, color, and consistency every shift, Encourage patient to empty pouch with assist, Encourage patient to empty pouch independently, Assist patient to measure and record output, Patient independent with cares, and Patient unable to participate in cares      Orders: Written    RECOMMEND PRIMARY TEAM ORDER: None, at this time  Notify [facility] if wound(s) deteriorate.  Nursing to notify the Provider(s) and re-consult the WO Nurse if new skin concern.    DATA:     Current support surface: Standard  Standard gel mattress (Isoflex)  Containment of urine/stool: Incontinent pad in bed and Ileostomy pouch  BMI: Body mass index is 32.65 kg/m .   Active diet order: Orders Placed This Encounter      Full Liquid Diet     Output: I/O last 3 completed shifts:  In: 5100 [I.V.:5100]  Out: 1145 [Urine:860; Drains:135; Blood:150]     Labs:   Recent Labs   Lab 07/12/25  0701   HGB 11.0*   WBC 7.8     Pressure injury risk assessment:   Sensory Perception: 4-->no impairment  Moisture: 4-->rarely moist  Activity: 3-->walks occasionally  Mobility: 3-->slightly limited  Nutrition: 3-->adequate  Friction and Shear: 3-->no apparent problem  Ari Score: 20    Mala Miller RN, CWOCN  Pager no longer is use, please contact through [employer] group: [facility] Nurse [place]  Dept. Office Number: [phone]

## 2025-07-12 NOTE — PLAN OF CARE
Goal Outcome Evaluation:      Plan of Care Reviewed With: patient    Overall Patient Progress: no change    Outcome Evaluation: POD#1    Significant 24 hour events: Passing gas and stool. Ambulated with PT and OT.     Level of Care: Acute    Summary Type: 5A Post Op Report   POD: #1 = 7/12  Pain:  Controlled- scheduled tylenol   Neuro:WDL  CV:WDL  Resp:WDL  GI:WDL- ileostomy with liquid output  :.WDL except, voiding ability/characteristics- grove  Skin: .WDL except, integrity  Activity Assistance: assistance, 1 person. Pivots.     A/Ox4. Temp max of 100.0- provider aware. OVSS on RA. Denies nausea but complains of heartburn/acid reflux. Managed with prn Tums and Pepcid x1. Having some gas and dried drainage from the rectum. Continue with POC.

## 2025-07-12 NOTE — PROGRESS NOTES
Nursing Focus: Admission    D: Patient admitted/transferred from home via transport for laparoscopic LAR converted to open with diverting loop ileostomy .      I: Upon arrival to the unit patient was oriented to room, unit, and call light. Patient s height, weight, and vital signs were obtained. Allergies reviewed and allergy band applied. MD notified of patient s arrival on the unit. Adult AVS completed by virtual RN. Head to toe assessment completed. Education assessment completed. Care plan initiated.     A: Vital signs stable upon admission. Patient rates pain at 2/10. Two RN skin assessment completed: Yes. Second RN was Qiana DUBON Significant Skin Findings include New ileostomy, midline lower incision, 1 lap site, LRQ LILY drain, skin rash/reaction on R lower back of his leg, old big midline incision. [facility] Nurse Consult Ordered: No. Bed Algorithm can be found in PCS flow sheets (Support Surface Algorithm) and on IP [facility] NURSE RESOURCE TAB, was this used during this assessment? No.     P: Continue to monitor patient s pain and incisions and intervene as needed. Continue with plan of care. Notify MD with any concerns or changes in patient status.

## 2025-07-12 NOTE — PLAN OF CARE
Goal Outcome Evaluation:      Plan of Care Reviewed With: patient    Overall Patient Progress: no changeOverall Patient Progress: no change    Outcome Evaluation: POD #0    Time    /68 (BP Location: Left arm)   Pulse 60   Temp 98.4  F (36.9  C) (Oral)   Resp 16   Ht 1.829 m (6')   Wt 109.2 kg (240 lb 11.9 oz)   SpO2 97%   BMI 32.65 kg/m      Reason for admission: laparoscopic low anterior resection, converted to open ,diverting loop ileostomy  Activity: Bedrest, has not been up since pre-op  Pain: 1-3/10 controlled with scheduled tylenol    Neuro: WDL x Upper extremity neuropathy  Cardiac: WDL  Respiratory: WDL  GI/: WDL x ileostomy no output besides a small amount of serosanguinous drainage, grove in place draining clear yellow urine   Diet: Clears   Lines: LILY draining red bloody, blub suction- Grove, PIV  Wounds: Lap site 1x DORIS, Midline incision DORIS   Labs/imaging: Labs in AM      New changes this shift: Complaints of acid reflux/heartburn TUMS given     Plan: Continue post-op care       Continue to monitor and follow POC

## 2025-07-12 NOTE — PLAN OF CARE
Goal Outcome Evaluation:      Plan of Care Reviewed With: patient    Overall Patient Progress: no change    Outcome Evaluation: admitted to 5A    Significant 24 hour events:  admitted to 5A    Level of Care: Acute    Summary Type: 5A Post Op Report   POD: #0   Pain:  Controlled- scheduled tylenol   Neuro:WDL  CV:WDL  Resp:WDL  GI:WDL- new ileostomy  :.WDL except, voiding ability/characteristics-grove  Skin: .WDL except, integrity    A/Ox4. VSS on 2L NC.  Pain rated at a 2/10. Denies nausea. Needs code status to get a new wrist band. LILY to bulb suction. One midline and one lap site C/D/I. Some discharge from the rectum. Clear liquid diet.1 PIV with LR @ 75 ml/hr infusing. Continue with POC.

## 2025-07-12 NOTE — PROGRESS NOTES
"   07/12/25 0857   Appointment Info   Signing Clinician's Name / Credentials (OT) EFE Spears   Student Supervision On-site supervision provided   Rehab Comments (OT) ABD, no sitting precs   Living Environment   People in Home spouse  ((wife))   Current Living Arrangements house   Home Accessibility stairs to enter home;stairs within home   Number of Stairs, Main Entrance 2   Stair Railings, Main Entrance none   Number of Stairs, Within Home, Primary greater than 10 stairs   Stair Railings, Within Home, Primary railings on both sides of stairs   Transportation Anticipated family or friend will provide;car, drives self   Living Environment Comments Pt lives in a 3-level house w/ his wife, Ina, who is also retired. There are 7 stairs w/ railings to the basement, and 9 stairs w/ railings to the upper level, with 2 stairs to enter home w/o railings. They have a walk-in shower. Most needs can be met on the lower level w/ the bathroom he uses which has a walk-in shower. He reports that he has access to a shower chair.   Self-Care   Usual Activity Tolerance good   Current Activity Tolerance fair   Regular Exercise Yes   Activity/Exercise Type other (see comments);walking  (Gardening)   Exercise Amount/Frequency daily   Equipment Currently Used at Home none   Fall history within last six months no   Activity/Exercise/Self-Care Comment Pt reports prior IND in all ADLs. Reports that he gets \"~6,000 steps every day\" at baseline and gardens.   Instrumental Activities of Daily Living (IADL)   Previous Responsibilities meal prep;housekeeping;shopping;laundry;yardwork;medication management;finances;driving   IADL Comments Pt reports prior IND in all IADLs. Retired, drives, and gardens.   General Information   Onset of Illness/Injury or Date of Surgery 07/11/25   Referring Physician Genesis Lepe MD PhD   Patient/Family Therapy Goal Statement (OT) To return home safely   Additional Occupational Profile " Info/Pertinent History of Current Problem Celestine is a 69 year old, presenting for the following health issues:  Diabetes (Med recheck, would like 90 day refills. 1 refill of pioglitazone, 2 refills of the metformin, rybelsushyrdrochlorothiazide, rosuvastatin, and doxazosin. ), Hypertension, and Lipids. He is s/p lap LAR with DLI on 7/11.   Existing Precautions/Restrictions abdominal;other (see comments)  (ABD binder)   Left Upper Extremity (Weight-bearing Status) partial weight-bearing (PWB)   Right Upper Extremity (Weight-bearing Status) partial weight-bearing (PWB)   Left Lower Extremity (Weight-bearing Status) full weight-bearing (FWB)   Right Lower Extremity (Weight-bearing Status) full weight-bearing (FWB)   General Observations and Info ADULT ICU Early Mobility Protocol   Cognitive Status Examination   Orientation Status orientation to person, place and time   Visual Perception   Visual Impairment/Limitations corrective lenses full-time   Sensory   Sensory Comments Pt reports slight numbness in dital phalanges (R>L) but improves throughout the day.   Pain Assessment   Patient Currently in Pain Yes, see Vital Sign flowsheet  (Incisional pain)   Range of Motion Comprehensive   Comment, General Range of Motion WFL (no formal testing)   Strength Comprehensive (MMT)   Comment, General Manual Muscle Testing (MMT) Assessment WFL (no formal testing)   Coordination   Coordination Comments WFL (no formal testing)   Bed Mobility   Bed Mobility sit-supine;supine-sit   Comment (Bed Mobility) ModAx1   Transfers   Transfers sit-stand transfer;toilet transfer;shower transfer   Sit-Stand Transfer   Sit/Stand Transfer Comments ModAx2   Shower Transfer   Shower Transfer Comments ModAx2 per clinical judgement   Toilet Transfer   Toilet Transfer Comments ModAx2 per clinical judgement   Activities of Daily Living   BADL Assessment/Intervention bathing;lower body dressing;grooming;toileting   Bathing Assessment/Intervention   Comment,  (Bathing) Ax1 per clinical judgement   Lower Body Dressing Assessment/Training   Comment, (Lower Body Dressing) Ax1 per clinical judgement   Grooming Assessment/Training   Comment, (Grooming) CGA per clinical judgement   Toileting   Comment, (Toileting) Ax1 per clinical judgement   Clinical Impression   Criteria for Skilled Therapeutic Interventions Met (OT) Yes, treatment indicated   OT Diagnosis Impaired ADL IND   OT Problem List-Impairments impacting ADL problems related to;activity tolerance impaired;mobility;strength;pain;post-surgical precautions   Assessment of Occupational Performance 3-5 Performance Deficits   Identified Performance Deficits fx mobility, txs, bathing, dressing, toileting   Planned Therapy Interventions (OT) ADL retraining;bed mobility training;strengthening;transfer training;home program guidelines;progressive activity/exercise;risk factor education   Clinical Decision Making Complexity (OT) detailed assessment/moderate complexity   Risk & Benefits of therapy have been explained evaluation/treatment results reviewed;care plan/treatment goals reviewed;risks/benefits reviewed;current/potential barriers reviewed;participants voiced agreement with care plan;participants included;patient   Clinical Impression Comments Pt to benefit from skilled OT to address above mentioned problem list.   OT Total Evaluation Time   OT Eval, Moderate Complexity Minutes (62954) 6   OT Goals   Therapy Frequency (OT) 6 times/week   OT Predicted Duration/Target Date for Goal Attainment 07/25/25   OT Goals Hygiene/Grooming;Lower Body Dressing;Lower Body Bathing;Bed Mobility;Toilet Transfer/Toileting;Transfers;OT Goal 1   OT: Hygiene/Grooming modified independent;within precautions   OT: Lower Body Dressing Minimal assist;using adaptive equipment;within precautions   OT: Lower Body Bathing Supervision/stand-by assist;using adaptive equipment;with precautions   OT: Bed Mobility Modified independent;within precautions    OT: Transfer Minimal assist;with assistive device;within precautions  ((shower tx))   OT: Toilet Transfer/Toileting Supervision/stand-by assist;using adaptive equipment;within precautions   OT: Goal 1 Pt will verbalize and demonstrate adherence of ABD precautions during ADLs by d/c.   OT Discharge Planning   OT Plan STS, amb, seated ADL (progress to standing)   OT Discharge Recommendation (DC Rec) Transitional Care Facility;home with assist;other (see comments)  (OP [place] rehab)   OT Rationale for DC Rec Pt below baseline level of fx limited by pain, fatigue, post-precautions, and dec activity tolerance. Currently pt is unlikely to tolerate 3 hours of therapy and requires Ax1-2 for ADLs, but anticipate safe d/c home w/ assist for heavy or tiring I/ADLs following cont therapy and improved medical status.   OT Brief overview of current status ModAx2   OT Total Distance Amb During Session (feet) 3   Total Session Time   Timed Code Treatment Minutes 29   Total Session Time (sum of timed and untimed services) 35

## 2025-07-12 NOTE — OR NURSING
Assumed care of patient at 1945. Patient completing LR bolus. BP maps greater than 70. Notified MD Indira Augustin of cbc result. Okay for patient to go to 5A. Will continue to monitor

## 2025-07-13 ENCOUNTER — APPOINTMENT (OUTPATIENT)
Dept: PHYSICAL THERAPY | Facility: CLINIC | Age: 70
DRG: 330 | End: 2025-07-13
Attending: COLON & RECTAL SURGERY
Payer: MEDICARE

## 2025-07-13 LAB
ANION GAP SERPL CALCULATED.3IONS-SCNC: 13 MMOL/L (ref 7–15)
BUN SERPL-MCNC: 16.9 MG/DL (ref 8–23)
CALCIUM SERPL-MCNC: 9.7 MG/DL (ref 8.8–10.4)
CHLORIDE SERPL-SCNC: 102 MMOL/L (ref 98–107)
CREAT SERPL-MCNC: 0.84 MG/DL (ref 0.67–1.17)
EGFRCR SERPLBLD CKD-EPI 2021: >90 ML/MIN/1.73M2
ERYTHROCYTE [DISTWIDTH] IN BLOOD BY AUTOMATED COUNT: 16.2 % (ref 10–15)
GLUCOSE SERPL-MCNC: 187 MG/DL (ref 70–99)
HCO3 SERPL-SCNC: 21 MMOL/L (ref 22–29)
HCT VFR BLD AUTO: 33.4 % (ref 40–53)
HGB BLD-MCNC: 11.5 G/DL (ref 13.3–17.7)
MAGNESIUM SERPL-MCNC: 1.5 MG/DL (ref 1.7–2.3)
MAGNESIUM SERPL-MCNC: 2 MG/DL (ref 1.7–2.3)
MCH RBC QN AUTO: 30.2 PG (ref 26.5–33)
MCHC RBC AUTO-ENTMCNC: 34.4 G/DL (ref 31.5–36.5)
MCV RBC AUTO: 88 FL (ref 78–100)
PHOSPHATE SERPL-MCNC: 2 MG/DL (ref 2.5–4.5)
PLATELET # BLD AUTO: 178 10E3/UL (ref 150–450)
POTASSIUM SERPL-SCNC: 4.3 MMOL/L (ref 3.4–5.3)
RBC # BLD AUTO: 3.81 10E6/UL (ref 4.4–5.9)
SODIUM SERPL-SCNC: 136 MMOL/L (ref 135–145)
WBC # BLD AUTO: 10.4 10E3/UL (ref 4–11)

## 2025-07-13 PROCEDURE — 97530 THERAPEUTIC ACTIVITIES: CPT | Mod: GP

## 2025-07-13 PROCEDURE — 83735 ASSAY OF MAGNESIUM: CPT | Performed by: COLON & RECTAL SURGERY

## 2025-07-13 PROCEDURE — 84100 ASSAY OF PHOSPHORUS: CPT

## 2025-07-13 PROCEDURE — 36415 COLL VENOUS BLD VENIPUNCTURE: CPT | Performed by: COLON & RECTAL SURGERY

## 2025-07-13 PROCEDURE — 80048 BASIC METABOLIC PNL TOTAL CA: CPT | Performed by: STUDENT IN AN ORGANIZED HEALTH CARE EDUCATION/TRAINING PROGRAM

## 2025-07-13 PROCEDURE — 36415 COLL VENOUS BLD VENIPUNCTURE: CPT | Performed by: STUDENT IN AN ORGANIZED HEALTH CARE EDUCATION/TRAINING PROGRAM

## 2025-07-13 PROCEDURE — 250N000013 HC RX MED GY IP 250 OP 250 PS 637: Performed by: COLON & RECTAL SURGERY

## 2025-07-13 PROCEDURE — 120N000002 HC R&B MED SURG/OB UMMC

## 2025-07-13 PROCEDURE — 85018 HEMOGLOBIN: CPT | Performed by: STUDENT IN AN ORGANIZED HEALTH CARE EDUCATION/TRAINING PROGRAM

## 2025-07-13 PROCEDURE — 250N000011 HC RX IP 250 OP 636

## 2025-07-13 PROCEDURE — 258N000003 HC RX IP 258 OP 636

## 2025-07-13 PROCEDURE — 250N000013 HC RX MED GY IP 250 OP 250 PS 637

## 2025-07-13 PROCEDURE — 83735 ASSAY OF MAGNESIUM: CPT

## 2025-07-13 PROCEDURE — 250N000011 HC RX IP 250 OP 636: Performed by: COLON & RECTAL SURGERY

## 2025-07-13 RX ORDER — DOXAZOSIN 4 MG/1
4 TABLET ORAL DAILY
Status: DISCONTINUED | OUTPATIENT
Start: 2025-07-13 | End: 2025-07-17 | Stop reason: HOSPADM

## 2025-07-13 RX ORDER — MAGNESIUM SULFATE HEPTAHYDRATE 40 MG/ML
4 INJECTION, SOLUTION INTRAVENOUS ONCE
Status: COMPLETED | OUTPATIENT
Start: 2025-07-13 | End: 2025-07-13

## 2025-07-13 RX ADMIN — ENOXAPARIN SODIUM 40 MG: 40 INJECTION SUBCUTANEOUS at 14:58

## 2025-07-13 RX ADMIN — POTASSIUM & SODIUM PHOSPHATES POWDER PACK 280-160-250 MG 1 PACKET: 280-160-250 PACK at 10:28

## 2025-07-13 RX ADMIN — DOXAZOSIN 4 MG: 4 TABLET ORAL at 10:28

## 2025-07-13 RX ADMIN — SODIUM CHLORIDE, SODIUM LACTATE, POTASSIUM CHLORIDE, AND CALCIUM CHLORIDE: .6; .31; .03; .02 INJECTION, SOLUTION INTRAVENOUS at 23:48

## 2025-07-13 RX ADMIN — POTASSIUM & SODIUM PHOSPHATES POWDER PACK 280-160-250 MG 1 PACKET: 280-160-250 PACK at 14:58

## 2025-07-13 RX ADMIN — MAGNESIUM SULFATE HEPTAHYDRATE 4 G: 40 INJECTION, SOLUTION INTRAVENOUS at 11:58

## 2025-07-13 RX ADMIN — SODIUM CHLORIDE, SODIUM LACTATE, POTASSIUM CHLORIDE, AND CALCIUM CHLORIDE: .6; .31; .03; .02 INJECTION, SOLUTION INTRAVENOUS at 11:44

## 2025-07-13 ASSESSMENT — ACTIVITIES OF DAILY LIVING (ADL)
ADLS_ACUITY_SCORE: 37
ADLS_ACUITY_SCORE: 38
ADLS_ACUITY_SCORE: 37
ADLS_ACUITY_SCORE: 37
ADLS_ACUITY_SCORE: 34
ADLS_ACUITY_SCORE: 37
ADLS_ACUITY_SCORE: 33
ADLS_ACUITY_SCORE: 37
ADLS_ACUITY_SCORE: 33
ADLS_ACUITY_SCORE: 33
ADLS_ACUITY_SCORE: 38
ADLS_ACUITY_SCORE: 33
ADLS_ACUITY_SCORE: 38
DEPENDENT_IADLS:: INDEPENDENT
ADLS_ACUITY_SCORE: 33
ADLS_ACUITY_SCORE: 33
ADLS_ACUITY_SCORE: 38
ADLS_ACUITY_SCORE: 33
ADLS_ACUITY_SCORE: 34
ADLS_ACUITY_SCORE: 33
ADLS_ACUITY_SCORE: 38
ADLS_ACUITY_SCORE: 33
ADLS_ACUITY_SCORE: 38
ADLS_ACUITY_SCORE: 38

## 2025-07-13 NOTE — PLAN OF CARE
BP (!) 144/83 (BP Location: Right arm)   Pulse 105   Temp 99.1  F (37.3  C) (Oral)   Resp 16   Ht 1.829 m (6')   Wt 109.2 kg (240 lb 11.9 oz)   SpO2 95%   BMI 32.65 kg/m      Cares from: 0700 - 1530    VS & Pain: VSS except hypertensive (baseline)  Neuro: AxO x4  Respiratory: wdl   Cardiac: wdl  Peripheral neurovascular: wdl   GI/: + bowel sounds. Ileostomy + grove in place, good amount   Nutrition: NPO. Denied nausea & vomiting   Skin: lap sites x3, ileostomy, + LILY drain  Musculoskeletal: generalized weakness/mildly impaired   Lines: L. PIV- infusing LR @ 75 mL/hr  Activity: assist x2 with gait belt + walker   Electrolytes: replaced phosphorus x2 doses, 1 dose left.Recheck tomorrow AM. Replaced magnesium x1, recheck at 1700     Plan: continue plan of care    Goal Outcome Evaluation:  Plan of Care Reviewed With: patient, spouse  Overall Patient Progress: no change

## 2025-07-13 NOTE — PROGRESS NOTES
[facility]  WO Nurse Inpatient Assessment     Consulted for: Diverting loop ileostomy      WO nurse follow-up plan: twice weekly and prn next on Tuesday 7/15    Patient History (according to provider note(s):    Celestine Simon is a 69 year old male s/p lap LAR with DLI on 7/11, recovering appropriately. Post op SHANA improved today after additional resuscitation last night.     Celestine Simon is a 69 year old male with a h/o T2DM and rectosigmoid adenocarcinoma now POD#0 s/p laparoscopic LAR converted to open with diverting loop ileostomy, doing well.     Assessment:      Areas visualized during today's visit: Abdomen  Assessment of new loop Ileostomy:  Diagnosis Pertinent to Stoma: Cancer - Colon or Rectum      Surgery Date: 7/11/25  Surgeon:MD Pinedo [facility]: [facility]  Pouching system in place on assessment today: Raisa two piece, cut to fit, and flat post op pouch  Pouch barrier status: 25% melted  Pouch last changed/wear time: 7/11  Reason for pouch change today: ostomy education and initial post-op assessment  Effectiveness of current pouching/ supply plan: Attempting new system, will re-evaluate next assessment  Change made with ostomy management today: Yes  Pouching system placed today: Raisa two piece, cut to fit, and flat with barrier ring  Supplies: supplies stored on unit  Stoma location: [facility]  Stoma size: 1 1/4 inches,   Stoma appearance: pink-red, round, and protruberant  Mucocutaneous junction:  intact  Peristomal complication(s): none   Output: green watery  Output volume emptied during visit: 0ml  Abdominal assessment: Distended  Surgical site(s): dressing dry and intact  NG still in place? No  Pain: Dull ache  Is patient still on a PCA? No    Ostomy education assessment:  Participant of teaching session today: patient  and spouse Ina  Education completed today: Pouching system assessment , Refitting of appliance , Adjustment of  "pouching plan, Pouch change demonstration, Ostomy accessory product use , Introduction to pouches, Pouch emptying demonstration, Importance of hydration, and When to seek medical attention  Educational materials/methods: Verbal, Written, Demonstration, FOD Rochelle education hand out, Hands on, Addressed patient/caregiver questions/concerns, and Left packet of information at bedside   Education still needed: Pouch change return demonstration, Ostomy accessory product use , Peristomal skin care, Pouch emptying return demonstration, Intake and output recording, and Discharge instructions  Learning Comprehension:   Psychosocial assessment: Patient was not anticipating having an ostomy and is still processing this.  Patient readiness for education today: attentive  Following today's visit: patient  is able to demonstrate;         1. How to empty their pouch? No        2. How to change their pouch? No        3. How to read and record intake and output correctly? No  Preparation for discharge completed: Placed prescription recommendations in discharge navigator for MD to sign and Ordered samples from  after gaining consent from patient/caregiver  Preparation for discharge still needed: No discharge preparation started yet, Ensured patient has extra supplies for discharge, Discussed how to order supplies after discharge , Discussed how and when to make an outpatient [facility] nurse appointment after discharge, Prepared for discharge home with home care, and Discuss signs/symptoms of when to seek medical attention  Pt support system on discharge: Wife  [facility] recommend home care? Yes  Face to face time: 45 minutes           Treatment Plan:     LLQ Ileostomy pouching plan:   Pouching system: ostomy supplies pouches: [place] 57 FECAL (970405) ostomy supplies barrier: Raisa 57mm FLAT (988361)  Accessories used: [facility] ostomy accessories: 2\" Cera Barrier Ring (105242) and Cavilon no sting barrier film (072738)   Frequency " of pouch changes: PRN leakage and Three times a week  WOC follow up plan: Daily Monday-Friday (as able)  Bedside RN interventions: Change pouch PRN if leaking using the supplies above, Empty pouch when 1/3 to 1/2 full, ensure to clean pouch outlet after emptying to prevent odor, Notify WOC for ongoing pouch leakage, Document stoma appearance and output volume, color, and consistency every shift, Encourage patient to empty pouch with assist, Encourage patient to empty pouch independently, Assist patient to measure and record output, Patient independent with cares, and Patient unable to participate in cares      Orders: Reviewed    RECOMMEND PRIMARY TEAM ORDER: None, at this time  Notify WOC if wound(s) deteriorate.  Nursing to notify the Provider(s) and re-consult the WOC Nurse if new skin concern.    DATA:     Current support surface: Standard  Standard gel mattress (Isoflex)  Containment of urine/stool: Incontinent pad in bed and Ileostomy pouch  BMI: Body mass index is 32.65 kg/m .   Active diet order: Orders Placed This Encounter      NPO for Medical/Clinical Reasons Except for: Ice Chips, Meds     Output: I/O last 3 completed shifts:  In: 1736 [P.O.:236; I.V.:1500]  Out: 2655 [Urine:1430; Drains:325; Stool:900]     Labs:   Recent Labs   Lab 07/13/25  0630   HGB 11.5*   WBC 10.4     Pressure injury risk assessment:   Sensory Perception: 4-->no impairment  Moisture: 4-->rarely moist  Activity: 3-->walks occasionally  Mobility: 3-->slightly limited  Nutrition: 2-->probably inadequate  Friction and Shear: 3-->no apparent problem  Ari Score: 19    Mala Miller RN, CWOCN  Pager no longer is use, please contact through [place] group: WOC Nurse [place]  Dept. Office Number: [phone]      [facility]     Name: Celestine Simon  Date: 7/13/2025    To order your ostomy supplies    The ostomy Supplier needs this supply list  to process your order. You will need to fax/deliver  this list, along with your Insurance information. Your home care nurse can assist with this process.    List of Ostomy Distributors      [facility]  Ph. [phone] [phone] Fax # [phone]  [facility] [employer]   Ph. [phone] ext- [phone]  [facility]   Ph. [phone] Ext-[phone]  Or Call your insurance provider for their preferred supplier    Your Medical Supplier will need your surgeon's name, phone and fax number    Clinic:                     Phone                            Fax  Colorectal Surgery:    [phone]    Verbal Order for ostomy supplies for 1 Month per:                                          Mala Miller, RN, CWOCN                                                                  Authorizing MD: Dr. Shahida Sheets    Quantity of pouches:    20 /mo.    Request the following supplies:      [place]     2 piece flat wafer 57mm  # 80485                           2 piece Fecal pouch with Filter 57mm- # 89478  Accessories  2  barrier ring #8805     Adapt powder #7906    No sting film barrier # 3345                          Adapt odor eliminator and lubricant 236ml bottle # 46923     Adapt universal adhesive remover #0071                          [place] barrier extenders #37336                           Change your pouch 2 times a week, more often if leaking.   If you are cutting a hole in the wafer of your pouch, recheck stoma size and adjust pouch opening as needed every week    . Call the Ostomy Nurse at [facility]       [address] [place] [address] : [phone]   Schedule a follow-up visit in 2 to 4 weeks after your surgery, sooner if having problems Bring a complete set of pouch-changing supplies to this visit       Problems you should Report  - The stoma turns blue or darker in color.  - Cuts or sores around the stoma.  - Red, raw or painful skin around the stoma.  - Any bulging of the skin around the stoma.  - A pouch that  leaks every day.  - Problems making the right size hole in the pouch wafer.   Please call with any questions or concerns.

## 2025-07-13 NOTE — PROGRESS NOTES
COLON & RECTAL SURGERY  PROGRESS NOTE    07/13/2025  Post-op Day #2    SUBJECTIVE:    Patient reports severe heartburn last night. No relief with Tums or Pepcid. Declined Maalox. Wife is bringing him Sharon Link today, opt for version without aspirin. Endorses nausea, distention, and intermittent hiccups. Ate a small amount of jello and drank milk yesterday. No vomiting.     OBJECTIVE:  Temp:  [98.5  F (36.9  C)-100  F (37.8  C)] 99.4  F (37.4  C)  Pulse:  [] 106  Resp:  [16-18] 18  BP: (122-154)/(58-92) 144/92  SpO2:  [95 %-98 %] 95 %    Intake/Output Summary (Last 24 hours) at 7/13/2025 0840  Last data filed at 7/13/2025 0659  Gross per 24 hour   Intake 1736 ml   Output 2655 ml   Net -919 ml     GENERAL:  Awake, alert, NAD  EXTREMITIES: Warm and well perfused  ABDOMEN:  Soft, appropriately tender. Moderately distended. No guarding, rigidity, or peritoneal signs. Colostomy appliance in place with loose green stool in bag. Stoma pink and viable.   : House in place.   INCISIONS:  C/D/I  DRAIN: Serosanguinous output in LILY drain.    LABS:  Lab Results   Component Value Date    WBC 7.8 07/12/2025     Lab Results   Component Value Date    HGB 11.0 07/12/2025     Lab Results   Component Value Date    CR 1.18 07/12/2025    CR 0.99 08/07/2018       ASSESSMENT/PLAN:   Celestine Simon is a 69 year old male s/p lap LAR with DLI on 7/11 for rectal adenocarcinoma. Post-operative SHANA, improved with additional resuscitation.     Pain well controlled. Developed nausea, heartburn, and distention yesterday, bowel rest today. Evaluate need for NG if patient vomits or ileostomy output decreases.     GI/FEN:   - NPO except for sips, ice chips, and meds   - LR @ 75 mL/hr  :   - House in place  Pain:   - MMPC  Ppx:   - Lovenox  Activity:   - Encourage ambulation  HTN:   -Restart PTA doxazosin    Debra Valencia, MS3  [employer] [facility] [employer]        I was present with the medical student who participated in the  service and in the documentation of the note. I agree with the assessment and plan of care as documented, with edits made as appropriate.    Patient was seen and examined with Dr. Kory Caceres, CRS Fellow. Plan of care was discussed with Dr. Shahida Sheets, CRS Attending Physician.    Genesis Sierra MD, PhD  General Surgery, PGY1  Pager: [phone]

## 2025-07-13 NOTE — PROVIDER NOTIFICATION
Paged Dr. Vashti Lynne at 7:54 regarding the patient having a temperature of 100.0 and a recheck temperature of 99.3 about 30 minutes later. Stated all other vital signs were stable and that the patient felt fine.    Outcome: provider aware and asked to be informed if the patient has another high temperature.

## 2025-07-13 NOTE — DISCHARGE INSTRUCTIONS
[facility]     Name: Celestine Simon  Date: 7/13/2025    To order your ostomy supplies    The ostomy Supplier needs this supply list  to process your order. You will need to fax/deliver this list, along with your Insurance information. Your home care nurse can assist with this process.    List of Ostomy Distributors      [facility]  Ph. [phone] [phone] Fax # [phone]  [employer]   Ph. [phone] ext- [phone]  [facility]   Ph. [phone] Ext-[phone]  Or Call your insurance provider for their preferred supplier    Your Medical Supplier will need your surgeon's name, phone and fax number    Clinic:                     Phone                            Fax  Colorectal Surgery:    [phone]    Verbal Order for ostomy supplies for 1 Month per:                                          Mala Miller, RN, CWOCN                                                                  Authorizing MD: Dr. Shahida Sheets    Quantity of pouches:    20 /mo.    Request the following supplies:      [place]     2 piece flat wafer 57mm  # 23986                           2 piece Fecal pouch with Filter 57mm- # 46866  Accessories  2  barrier ring #8805     Adapt powder #7906    No sting film barrier # 5835                          Adapt odor eliminator and lubricant 236ml bottle # 88907     Adapt universal adhesive remover #3008                          Raisa barrier extenders #13287                           Change your pouch 2 times a week, more often if leaking.   If you are cutting a hole in the wafer of your pouch, recheck stoma size and adjust pouch opening as needed every week    . Call the Ostomy Nurse at [facility]       [address] [place] [address], [place] : [phone]   Schedule a follow-up visit in 2 to 4 weeks after your surgery, sooner if having problems Bring a complete set of pouch-changing supplies to this visit        Problems you should Report  - The stoma turns blue or darker in color.  - Cuts or sores around the stoma.  - Red, raw or painful skin around the stoma.  - Any bulging of the skin around the stoma.  - A pouch that leaks every day.  - Problems making the right size hole in the pouch wafer.   Please call with any questions or concerns.

## 2025-07-13 NOTE — PHARMACY-ADMISSION MEDICATION HISTORY
Pharmacist Admission Medication History    Admission medication history is complete. The information provided in this note is only as accurate as the sources available at the time of the update.    Information Source(s): Patient's pharmacy, Clinic records, and [facility]    Pertinent Information: Pre-op RN marked last doses. Jenny held pre-op as instructed.    Changes made to PTA medication list:  Added: None  Deleted: Pre-op metronidazole, neomycin, ondansetron, PEG, mag citrate  Changed: None    Allergies reviewed with patient and updates made in EHR: no    Medication History Completed By: Colton Hidalgo [facility] 7/13/2025 12:27 PM    PTA Med List   Medication Sig Last Dose/Taking    acetaminophen (TYLENOL) 325 MG tablet Take 2 tablets (650 mg) by mouth every 4 hours as needed for mild pain. 7/8/2025    doxazosin (CARDURA) 4 MG tablet Take 1 tablet (4 mg) by mouth daily. 7/11/2025 Morning    Ferrous Sulfate (IRON SUPPLEMENT PO) Take 1 tablet by mouth every morning. Past Month    GLUCOSAMINE-CHONDROITIN PO Take 1 capsule by mouth every morning. Past Month    HERBALS Take 1 each by mouth every morning. [employer] Blood Sugar Support. Past Month    hydroCHLOROthiazide 12.5 MG tablet Take 1 tablet (12.5 mg) by mouth every morning. 7/10/2025    LANCETS REGULAR MISC One Touch US Lancet- Used twice daily 7/11/2025 Morning    lidocaine-prilocaine (EMLA) 2.5-2.5 % external cream Apply topically daily as needed for moderate pain. Unknown    lisinopril (ZESTRIL) 40 MG tablet Take 1 tablet (40 mg) by mouth daily. 7/10/2025    metFORMIN (GLUCOPHAGE XR) 500 MG 24 hr tablet 3 tabs daily. 7/10/2025    MULTI-VITAMIN OR TABS Take 1 tablet by mouth every morning. Past Week    pioglitazone (ACTOS) 30 MG tablet Take 0.5 tablets (15 mg) by mouth daily. 7/10/2025    rosuvastatin (CRESTOR) 10 MG tablet Take 1 tablet (10 mg) by mouth daily. 7/11/2025 Morning    Semaglutide (RYBELSUS) 14 MG tablet Take  7-14 mg by mouth daily. 7/4/2025     Ebenezer Hidalgo, PharmD, BCOP  Clinical Pharmacist - Hematology/Oncology & BMT

## 2025-07-13 NOTE — CONSULTS
"Care Management Initial Consult    General Information  Assessment completed with: PatientCelestine  Type of CM/SW Visit: Initial Assessment    Primary Care Provider verified and updated as needed: Yes   Readmission within the last 30 days: no previous admission in last 30 days      Reason for Consult: discharge planning  Advance Care Planning: Advance Care Planning Reviewed: no concerns identified (pt said he had a HCD)          Communication Assessment  Patient's communication style: spoken language (English or Bilingual)    Hearing Difficulty or Deaf: no   Wear Glasses or Blind: yes    Cognitive  Cognitive/Neuro/Behavioral: WDL                      Living Environment:   People in home: spouse     Current living Arrangements: house (3 levels. Can have needs met on main level if needed)      Able to return to prior arrangements: yes       Family/Social Support:  Care provided by: self, spouse/significant other  Provides care for: no one  Marital Status:   Support system: Wife          Description of Support System:           Current Resources:   Patient receiving home care services: No (Has used [facility] in the past)        Community Resources: None  Equipment currently used at home: other (see comments) (\"has everything if he needs it\")  Supplies currently used at home: None    Employment/Financial:  Employment Status: retired     Employment/ Comments: no  Financial Concerns: none   Referral to Financial Worker: No       Does the patient's insurance plan have a 3 day qualifying hospital stay waiver?  No    Lifestyle & Psychosocial Needs:  Social Drivers of Health     Food Insecurity: Low Risk  (7/11/2025)    Food Insecurity     Within the past 12 months, did you worry that your food would run out before you got money to buy more?: No     Within the past 12 months, did the food you bought just not last and you didn t have money to get more?: No   Depression: Not at risk (6/26/2025)    PHQ-2     PHQ-2 " Score: 0   Housing Stability: Low Risk  (7/11/2025)    Housing Stability     Do you have housing? : Yes     Are you worried about losing your housing?: No   Tobacco Use: Medium Risk (7/3/2025)    Patient History     Smoking Tobacco Use: Former     Smokeless Tobacco Use: Never     Passive Exposure: Not on file   Financial Resource Strain: Low Risk  (7/11/2025)    Financial Resource Strain     Within the past 12 months, have you or your family members you live with been unable to get utilities (heat, electricity) when it was really needed?: No   Alcohol Use: Not on file   Transportation Needs: Low Risk  (7/11/2025)    Transportation Needs     Within the past 12 months, has lack of transportation kept you from medical appointments, getting your medicines, non-medical meetings or appointments, work, or from getting things that you need?: No   Physical Activity: Insufficiently Active (10/7/2024)    Exercise Vital Sign     Days of Exercise per Week: 3 days     Minutes of Exercise per Session: 30 min   Interpersonal Safety: Low Risk  (7/11/2025)    Interpersonal Safety     Do you feel physically and emotionally safe where you currently live?: Yes     Within the past 12 months, have you been hit, slapped, kicked or otherwise physically hurt by someone?: No     Within the past 12 months, have you been humiliated or emotionally abused in other ways by your partner or ex-partner?: No   Stress: No Stress Concern Present (10/7/2024)    [employer] [place] [employer] - Occupational Stress Questionnaire     Feeling of Stress : Only a little   Social Connections: Unknown (10/7/2024)    Social Connection and Isolation Panel [NHANES]     Frequency of Communication with Friends and Family: Not on file     Frequency of Social Gatherings with Friends and Family: Twice a week     Attends Moravian Services: Not on file     Active Member of Clubs or Organizations: Not on file     Attends Club or Organization Meetings: Not on  file     Marital Status: Not on file   Health Literacy: Not on file       Functional Status:  Prior to admission patient needed assistance:   Dependent ADLs:: Independent  Dependent IADLs:: Independent  Mental Health Status:  Mental Health Status: No Current Concerns     Chemical Dependency Status:  Chemical Dependency Status: No Current Concerns    Values/Beliefs:  Spiritual, Cultural Beliefs, Adventism Practices, Values that affect care: no       Discussed  Partnership in Safe Discharge Planning  document with patient/family: No    Additional Information:  Per surgery note 7/11: Celestine Simon is a 69 year old male with a h/o T2DM and rectosigmoid adenocarcinoma now POD#0 s/p laparoscopic LAR converted to open with diverting loop ileostomy, doing well.     Care Coordination consulted for PT/OT recs for home care/TCU/ ADRIANA met with pt at bedside to complete assessment. ADRIANA introduced self and explained the reason for the visit. Pt agreed to speak with ADRIANA WRIGHT provided Advanced Care Planning (ACP) education which included information on Health Care Directives (HCD). Pt stated that he had a HC    We discussed PT/OT recs for home care/TCU. Pt prefers home w/ PT/OT. He has used [facility] in the past and is okay using them again.   Provider would like skilled nursing at home for Ileostomy care.     ADRIANA sent home care referral to [facility]  - Intake referral at 3:12 pm.     No additional questions or concerns were reported. ADRIANA provided availability and contact information and excused self from pt's bedside.         Next Steps:   [] Follow up on homecare referral for skilled nursing and PT/OT  []  EHO: Deborah Delgado       Social work will continue to follow and provide assistance to ensure a safe and timely discharge   HIRAM Moreau  Weekend Covering   [facility] Acute Care Management  Searchable on Vocera: 5A Onc 5201 thru 5219 , 5A Onc 5220 thru 5240 SW, 5C OFF SERVICE 7181 thru 1043  SW, 5C OFF SERVICE 6192 thru 2955 SW

## 2025-07-13 NOTE — PLAN OF CARE
Goal Outcome Evaluation:      Plan of Care Reviewed With: patient    Overall Patient Progress: no change       7/11  s/p laparoscopic low anterior resection, converted to open, diverting loop ileostomy  with flexible sigmoidoscopy      AVSS.  95% RA.  Some heartburn at start of shift, resolved after sitting up, belching.  Declined Maalox ordered by MD - states he would prefer to take AlkaSeltzer.  Per pt, his wife will bring in his home AlkaSeltzer - please have MDs clarify if pt can take that medication.  Minimal pain - declined scheduled Tylenol d/t heartburn.  PIV infusing MIV@75/hr.  Lap sites and lower midline DORIS.    House with good UVols.  Ileostomy intact, liquid stool output.  LILY patent with mod large output.  Pt up with assist 1-2, walker.  Cont with POC.

## 2025-07-13 NOTE — PLAN OF CARE
Goal Outcome Evaluation:      Plan of Care Reviewed With: patient    Overall Patient Progress: no change    Outcome Evaluation: POD#2 [id number]    Significant 24 hour events: no heartburn this evening     Level of Care: Acute  Summary Type: 5A Post Op Report   POD: #2 = 7/13  Pain:  Controlled- did not want tylenol at all this evening.   Neuro:WDL  CV:WDL  Resp:WDL  GI:.WDL except- ileostomy with liquid stool 7/13  :.WDL except, voiding ability/characteristics- Good UOP  Skin: .WDL except, integrity  Activity Assistance: assistance, 2 people- walker    A/Ox4. VSS on RA. Denies nausea and SOB. Resting on and off during the shift. LR @ 75 ml/hr. NPO except meds and ice chips. Continue with POC.

## 2025-07-14 ENCOUNTER — APPOINTMENT (OUTPATIENT)
Dept: OCCUPATIONAL THERAPY | Facility: CLINIC | Age: 70
DRG: 330 | End: 2025-07-14
Attending: COLON & RECTAL SURGERY
Payer: MEDICARE

## 2025-07-14 ENCOUNTER — APPOINTMENT (OUTPATIENT)
Dept: PHYSICAL THERAPY | Facility: CLINIC | Age: 70
DRG: 330 | End: 2025-07-14
Attending: COLON & RECTAL SURGERY
Payer: MEDICARE

## 2025-07-14 LAB
ANION GAP SERPL CALCULATED.3IONS-SCNC: 11 MMOL/L (ref 7–15)
BUN SERPL-MCNC: 23.5 MG/DL (ref 8–23)
CALCIUM SERPL-MCNC: 9.4 MG/DL (ref 8.8–10.4)
CHLORIDE SERPL-SCNC: 102 MMOL/L (ref 98–107)
CREAT SERPL-MCNC: 0.87 MG/DL (ref 0.67–1.17)
CREAT SERPL-MCNC: 0.88 MG/DL (ref 0.67–1.17)
EGFRCR SERPLBLD CKD-EPI 2021: >90 ML/MIN/1.73M2
EGFRCR SERPLBLD CKD-EPI 2021: >90 ML/MIN/1.73M2
GLUCOSE SERPL-MCNC: 168 MG/DL (ref 70–99)
HCO3 SERPL-SCNC: 23 MMOL/L (ref 22–29)
MAGNESIUM SERPL-MCNC: 1.7 MG/DL (ref 1.7–2.3)
MCV RBC AUTO: 90 FL (ref 78–100)
PHOSPHATE SERPL-MCNC: 2.5 MG/DL (ref 2.5–4.5)
PLATELET # BLD AUTO: 187 10E3/UL (ref 150–450)
POTASSIUM SERPL-SCNC: 4.1 MMOL/L (ref 3.4–5.3)
POTASSIUM SERPL-SCNC: 4.1 MMOL/L (ref 3.4–5.3)
SODIUM SERPL-SCNC: 136 MMOL/L (ref 135–145)

## 2025-07-14 PROCEDURE — 258N000003 HC RX IP 258 OP 636

## 2025-07-14 PROCEDURE — 85049 AUTOMATED PLATELET COUNT: CPT | Performed by: COLON & RECTAL SURGERY

## 2025-07-14 PROCEDURE — 120N000002 HC R&B MED SURG/OB UMMC

## 2025-07-14 PROCEDURE — 97535 SELF CARE MNGMENT TRAINING: CPT | Mod: GO

## 2025-07-14 PROCEDURE — 250N000013 HC RX MED GY IP 250 OP 250 PS 637

## 2025-07-14 PROCEDURE — 83735 ASSAY OF MAGNESIUM: CPT

## 2025-07-14 PROCEDURE — 84100 ASSAY OF PHOSPHORUS: CPT

## 2025-07-14 PROCEDURE — 97530 THERAPEUTIC ACTIVITIES: CPT | Mod: GP | Performed by: REHABILITATION PRACTITIONER

## 2025-07-14 PROCEDURE — 250N000011 HC RX IP 250 OP 636

## 2025-07-14 PROCEDURE — 36415 COLL VENOUS BLD VENIPUNCTURE: CPT

## 2025-07-14 PROCEDURE — 84132 ASSAY OF SERUM POTASSIUM: CPT

## 2025-07-14 PROCEDURE — 84132 ASSAY OF SERUM POTASSIUM: CPT | Performed by: COLON & RECTAL SURGERY

## 2025-07-14 PROCEDURE — 80048 BASIC METABOLIC PNL TOTAL CA: CPT

## 2025-07-14 RX ORDER — ROSUVASTATIN CALCIUM 10 MG/1
10 TABLET, COATED ORAL DAILY
Status: DISCONTINUED | OUTPATIENT
Start: 2025-07-14 | End: 2025-07-17 | Stop reason: HOSPADM

## 2025-07-14 RX ADMIN — SODIUM CHLORIDE, SODIUM LACTATE, POTASSIUM CHLORIDE, AND CALCIUM CHLORIDE 1000 ML: .6; .31; .03; .02 INJECTION, SOLUTION INTRAVENOUS at 14:35

## 2025-07-14 RX ADMIN — ACETAMINOPHEN 650 MG: 325 TABLET ORAL at 22:36

## 2025-07-14 RX ADMIN — ENOXAPARIN SODIUM 40 MG: 40 INJECTION SUBCUTANEOUS at 16:00

## 2025-07-14 RX ADMIN — DOXAZOSIN 4 MG: 4 TABLET ORAL at 08:32

## 2025-07-14 RX ADMIN — SODIUM CHLORIDE, SODIUM LACTATE, POTASSIUM CHLORIDE, AND CALCIUM CHLORIDE: .6; .31; .03; .02 INJECTION, SOLUTION INTRAVENOUS at 13:13

## 2025-07-14 RX ADMIN — ACETAMINOPHEN 650 MG: 325 TABLET ORAL at 16:00

## 2025-07-14 RX ADMIN — ROSUVASTATIN CALCIUM 10 MG: 10 TABLET, FILM COATED ORAL at 10:16

## 2025-07-14 RX ADMIN — ACETAMINOPHEN 650 MG: 325 TABLET ORAL at 10:17

## 2025-07-14 ASSESSMENT — ACTIVITIES OF DAILY LIVING (ADL)
ADLS_ACUITY_SCORE: 35
ADLS_ACUITY_SCORE: 38
ADLS_ACUITY_SCORE: 35
ADLS_ACUITY_SCORE: 38
ADLS_ACUITY_SCORE: 35
ADLS_ACUITY_SCORE: 35
ADLS_ACUITY_SCORE: 37
ADLS_ACUITY_SCORE: 35
ADLS_ACUITY_SCORE: 38
ADLS_ACUITY_SCORE: 37
ADLS_ACUITY_SCORE: 35
ADLS_ACUITY_SCORE: 38
ADLS_ACUITY_SCORE: 38

## 2025-07-14 NOTE — PROGRESS NOTES
COLON & RECTAL SURGERY  PROGRESS NOTE    07/14/2025  Post-op Day #3    SUBJECTIVE:    Patient reports improvement in heartburn with Sharon Elmer yesterday. No nausea while NPO but still some distention, and intermittent burping. No vomiting. He has been tolerating a small amount of ice chips.    OBJECTIVE:  Temp:  [98.2  F (36.8  C)-99.1  F (37.3  C)] 98.2  F (36.8  C)  Pulse:  [] 96  Resp:  [16-18] 18  BP: (118-144)/(73-84) 118/80  SpO2:  [94 %-97 %] 97 %    Intake/Output Summary (Last 24 hours) at 7/13/2025 0840  Last data filed at 7/13/2025 0659  Gross per 24 hour   Intake 1736 ml   Output 2655 ml   Net -919 ml     GENERAL:  Awake, alert, NAD  EXTREMITIES: Warm and well perfused  ABDOMEN:  Soft, appropriately tender. Moderately distended. No guarding, rigidity, or peritoneal signs. Colostomy appliance in place with loose green stool in bag. Stoma pink and viable.   INCISIONS:  C/D/I  DRAIN: Serosanguinous output in LILY drain.    LABS:  Lab Results   Component Value Date    WBC 7.8 07/12/2025     Lab Results   Component Value Date    HGB 11.0 07/12/2025     Lab Results   Component Value Date    CR 1.18 07/12/2025    CR 0.99 08/07/2018       ASSESSMENT/PLAN:   Celestine Simon is a 69 year old male s/p lap LAR with DLI on 7/11 for rectal adenocarcinoma. Post-operative SHANA, improved with additional resuscitation.     Pain well controlled. Developed nausea, heartburn, and distention on POD1. Bowel rest yesterday  (POD2) with some improvement, though patient continues to be moderately distended on exam and is still burping. Will cautiously advance diet back to clears and monitor symptoms.    GI/FEN:   - Advance to limited CLD (1 L per day)   - LR @ 50 mL/hr  :   - House removed by team today  Pain:   - MMPC  Ppx:   - Lovenox  Activity:   - Encourage ambulation  HTN:   - Restart PTA doxazosin   - Restart PTA statin          Patient was seen and examined with Dr. Kel Juarez, CRS Fellow. Plan of care was  discussed with Dr. Shahida Sheets, CRS Attending Physician.    Genesis Sierra MD, PhD  General Surgery, PGY1  Pager: [phone]

## 2025-07-14 NOTE — PROVIDER NOTIFICATION
Provider notification;    MD Genesis Sierra notified at [address].    Reason for notification; Hi, pt's grove was removed at 8:30. Per void protocol, it's been 6 hours and pt has not voided. He reports not feeling the need to void, and denies any pressure, pain, or discomfort with bladder. Bladder scan was done, showed 37 mL of urine retained. Only had about 400 mL of oral intake of fluids and LR at 50 mL/hr. Any recommendations! Let me know, thanks! - Elizabeth FRANCE    Plan: Provider aware. Order 1L bolus and BMP labs. Ordered to trail to void again in 2 hours (at 1630) after bolus is completed.

## 2025-07-14 NOTE — PLAN OF CARE
Goal Outcome Evaluation:      Plan of Care Reviewed With: patient    Overall Patient Progress: no changeOverall Patient Progress: no change    Outcome Evaluation: POD#3 [id number]    Summary Type: 5A Post Op Report   POD: #3 = 7/14  Pain:  Controlled- scheduled tylenol  Neuro:WDL. Aox4.   CV:WDL.   Resp:WDL. On RA. Denies SOB.   GI:.WDL except- ileostomy with liquid stool, passing gas. Denies N/V.   :.WDL except, voiding ability/characteristics. House removed at 8:30. Pt is due to void at 1430, has not voided and provider was notified. Provider aware, ordered 1L bolus and BMP labs. Per provider notification, retrial void after 2 hours at 1630.   Skin: .WDL except, integrity. [place]o drain x1 intact, abd lap sites intact and DORIS, midline incision DORIS.   Activity Assistance: assistance, 1 people. Walked around with PT and staff. Sat in chair x3, walked x2.   Safety/Falls Risk: Level of Risk: Moderate Risk  Consults: WOC, PT/OT  Note: Clear liquid diet with 1L restriction. Had 420 mL. VSS on RA. Continue with POC.

## 2025-07-14 NOTE — PLAN OF CARE
Goal Outcome Evaluation:       Plan of Care Reviewed With: patient     Overall Patient Progress: no change     7/11  s/p laparoscopic low anterior resection, converted to open, diverting loop ileostomy  with flexible sigmoidoscopy        AVSS.  95% RA.  Minimal pain - declined scheduled Tylenol.  Denies nausea, denies heartburn.  NPO x meds, ice chips.  PIV infusing LR@75/hr.  Lap sites and lower midline DORIS.    House with good UVols.  Ileostomy intact, liquid stool output and + gas.  LILY patent with mod large output.  Pt up in the chair at 0620.  Up with assist 1, walker, GB.  Enc chair and ambulation today.  Pt only got up 1x yesterday.    Cont with POC.

## 2025-07-15 ENCOUNTER — APPOINTMENT (OUTPATIENT)
Dept: OCCUPATIONAL THERAPY | Facility: CLINIC | Age: 70
DRG: 330 | End: 2025-07-15
Attending: COLON & RECTAL SURGERY
Payer: MEDICARE

## 2025-07-15 ENCOUNTER — APPOINTMENT (OUTPATIENT)
Dept: PHYSICAL THERAPY | Facility: CLINIC | Age: 70
DRG: 330 | End: 2025-07-15
Attending: COLON & RECTAL SURGERY
Payer: MEDICARE

## 2025-07-15 LAB
MAGNESIUM SERPL-MCNC: 1.6 MG/DL (ref 1.7–2.3)
PHOSPHATE SERPL-MCNC: 3 MG/DL (ref 2.5–4.5)
POTASSIUM SERPL-SCNC: 3.9 MMOL/L (ref 3.4–5.3)

## 2025-07-15 PROCEDURE — 97110 THERAPEUTIC EXERCISES: CPT | Mod: GP

## 2025-07-15 PROCEDURE — 120N000002 HC R&B MED SURG/OB UMMC

## 2025-07-15 PROCEDURE — 36415 COLL VENOUS BLD VENIPUNCTURE: CPT

## 2025-07-15 PROCEDURE — 258N000003 HC RX IP 258 OP 636

## 2025-07-15 PROCEDURE — 84100 ASSAY OF PHOSPHORUS: CPT

## 2025-07-15 PROCEDURE — 97530 THERAPEUTIC ACTIVITIES: CPT | Mod: GO

## 2025-07-15 PROCEDURE — 83735 ASSAY OF MAGNESIUM: CPT

## 2025-07-15 PROCEDURE — 250N000013 HC RX MED GY IP 250 OP 250 PS 637: Performed by: PHYSICIAN ASSISTANT

## 2025-07-15 PROCEDURE — 250N000011 HC RX IP 250 OP 636

## 2025-07-15 PROCEDURE — 84132 ASSAY OF SERUM POTASSIUM: CPT | Performed by: COLON & RECTAL SURGERY

## 2025-07-15 PROCEDURE — 250N000011 HC RX IP 250 OP 636: Performed by: PHYSICIAN ASSISTANT

## 2025-07-15 PROCEDURE — 250N000013 HC RX MED GY IP 250 OP 250 PS 637

## 2025-07-15 PROCEDURE — G0463 HOSPITAL OUTPT CLINIC VISIT: HCPCS

## 2025-07-15 PROCEDURE — 97530 THERAPEUTIC ACTIVITIES: CPT | Mod: GP

## 2025-07-15 PROCEDURE — 258N000003 HC RX IP 258 OP 636: Performed by: STUDENT IN AN ORGANIZED HEALTH CARE EDUCATION/TRAINING PROGRAM

## 2025-07-15 RX ORDER — MAGNESIUM SULFATE HEPTAHYDRATE 40 MG/ML
4 INJECTION, SOLUTION INTRAVENOUS ONCE
Status: COMPLETED | OUTPATIENT
Start: 2025-07-15 | End: 2025-07-15

## 2025-07-15 RX ADMIN — ACETAMINOPHEN 650 MG: 325 TABLET ORAL at 16:52

## 2025-07-15 RX ADMIN — DOXAZOSIN 4 MG: 4 TABLET ORAL at 08:50

## 2025-07-15 RX ADMIN — ENOXAPARIN SODIUM 40 MG: 40 INJECTION SUBCUTANEOUS at 16:52

## 2025-07-15 RX ADMIN — SODIUM CHLORIDE, SODIUM LACTATE, POTASSIUM CHLORIDE, AND CALCIUM CHLORIDE 500 ML: .6; .31; .03; .02 INJECTION, SOLUTION INTRAVENOUS at 16:52

## 2025-07-15 RX ADMIN — SODIUM CHLORIDE, SODIUM LACTATE, POTASSIUM CHLORIDE, AND CALCIUM CHLORIDE: .6; .31; .03; .02 INJECTION, SOLUTION INTRAVENOUS at 00:35

## 2025-07-15 RX ADMIN — SODIUM CHLORIDE 500 ML: 0.9 INJECTION, SOLUTION INTRAVENOUS at 06:25

## 2025-07-15 RX ADMIN — ACETAMINOPHEN 650 MG: 325 TABLET ORAL at 21:37

## 2025-07-15 RX ADMIN — ACETAMINOPHEN 650 MG: 325 TABLET ORAL at 04:15

## 2025-07-15 RX ADMIN — ACETAMINOPHEN 650 MG: 325 TABLET ORAL at 09:38

## 2025-07-15 RX ADMIN — MAGNESIUM SULFATE HEPTAHYDRATE 4 G: 40 INJECTION, SOLUTION INTRAVENOUS at 17:02

## 2025-07-15 RX ADMIN — ROSUVASTATIN CALCIUM 10 MG: 10 TABLET, FILM COATED ORAL at 08:50

## 2025-07-15 RX ADMIN — PSYLLIUM HUSK 1 PACKET: 3.4 POWDER ORAL at 16:52

## 2025-07-15 ASSESSMENT — ACTIVITIES OF DAILY LIVING (ADL)
ADLS_ACUITY_SCORE: 39
ADLS_ACUITY_SCORE: 35
ADLS_ACUITY_SCORE: 38
ADLS_ACUITY_SCORE: 39
ADLS_ACUITY_SCORE: 39
ADLS_ACUITY_SCORE: 42
ADLS_ACUITY_SCORE: 42
ADLS_ACUITY_SCORE: 37
ADLS_ACUITY_SCORE: 37
ADLS_ACUITY_SCORE: 39
ADLS_ACUITY_SCORE: 37
ADLS_ACUITY_SCORE: 39
ADLS_ACUITY_SCORE: 38
ADLS_ACUITY_SCORE: 37
ADLS_ACUITY_SCORE: 38
ADLS_ACUITY_SCORE: 39
ADLS_ACUITY_SCORE: 38
ADLS_ACUITY_SCORE: 39
ADLS_ACUITY_SCORE: 39
ADLS_ACUITY_SCORE: 37
ADLS_ACUITY_SCORE: 39

## 2025-07-15 NOTE — PLAN OF CARE
Goal Outcome Evaluation:      Plan of Care Reviewed With: patient    Overall Patient Progress: improving    POD 5 s/p lap LAR with DLI on 7/11 for rectal adenocarcinoma.  Alert, oriented, AVSS.  Up with Ax1/W to bathroom.  Tolerating clear liquids with no N/V.  Burping occasionally.   Ileostomy with lots of gas and lots of watery stool- 1L overnight.  500cc bolus currently infusing.  Voiding adequate amounts.  LILY with serosang drainage.  Mild abdominal discomfort managed with tylenol.

## 2025-07-15 NOTE — PROGRESS NOTES
[facility]  WO Nurse Inpatient Assessment     Consulted for: Diverting loop ileostomy      WO nurse follow-up plan: twice weekly and prn next on Wed 7/16 for possible discharge    Patient History (according to provider note(s):    Celestine Simon is a 69 year old male s/p lap LAR with DLI on 7/11, recovering appropriately. Post op SHANA improved today after additional resuscitation last night.     Celestine Simon is a 69 year old male with a h/o T2DM and rectosigmoid adenocarcinoma now POD#0 s/p laparoscopic LAR converted to open with diverting loop ileostomy, doing well.     Assessment:      Areas visualized during today's visit: Abdomen  Assessment of new loop Ileostomy:  Diagnosis Pertinent to Stoma: Cancer - Colon or Rectum      Surgery Date: 7/11/25  Surgeon:MD Pinedo [facility]: [facility]  Pouching system in place on assessment today: [place] two piece, cut to fit, and flat   Pouch barrier status: intact and Minimal melting  Pouch last changed/wear time: 7/13 (3 days)  Reason for pouch change today: ostomy education and routine schedule  Effectiveness of current pouching/ supply plan: Recommend continuing current plan  Change made with ostomy management today: Yes (high output pouch)  Pouching system placed today: [place] two piece, cut to fit, and flat with barrier ring  Supplies: ordered discharge ostomy supplies and supplies stored on unit  Stoma location: [facility]  Stoma size: 35mm   Stoma appearance: red, round, and protruberant  Mucocutaneous junction:  intact  Peristomal complication(s): none   Output: green watery  Output volume emptied during visit: 200 ml  Abdominal assessment: Distended  Surgical site(s): dressing dry and intact  NG still in place? No  Pain: Denies  Is patient still on a PCA? No    Ostomy education assessment:  Participant of teaching session today: patient  and spouse Ina  Education completed today: Stoma assessment,  Pouching system assessment , Refitting of appliance , Adjustment of pouching plan, Pouch change return demonstration, Ostomy accessory product use , Peristomal skin care, Pouch emptying demonstration, Pouch emptying return demonstration, Intake and output recording, Fluid and electrolyte balance , Importance of hydration, When to seek medical attention, and Low fiber diet   Educational materials/methods: Verbal, Written, Demonstration, Return demonstration, Hands on, Addressed patient/caregiver questions/concerns, Left packet of information at bedside , and spouse wrote down step by step pouch change instructions in her own words  Education still needed: Discharge instructions  Learning Comprehension:   Psychosocial assessment: Patient was not anticipating having an ostomy and is still processing this.  Patient readiness for education today: attentive and active participation  Following today's visit: patient  is able to demonstrate;         1. How to empty their pouch? Yes        2. How to change their pouch? Yes, with minimal assist, and Needs additional practice         3. How to read and record intake and output correctly? Yes  Preparation for discharge completed: Placed prescription recommendations in discharge navigator for MD to sign, Ensured patient has extra supplies for discharge, Discussed how to order supplies after discharge , Ordered samples from  after gaining consent from patient/caregiver, Discussed how and when to make an outpatient [facility] nurse appointment after discharge, Prepared for discharge home with home care, and Discuss signs/symptoms of when to seek medical attention (discharge supplies ordered, not in room yet)  Preparation for discharge still needed: Ordered samples from  after gaining consent from patient/caregiver  Pt support system on discharge: Wife  [facility] recommend home care? Yes  Face to face time: 60 minutes           Treatment Plan:     LLQ Ileostomy pouching  "plan:   Pouching system: ostomy supplies pouches: [place] 57 HIGH OUTPUT ([id number]) , [place] 57 FECAL ([id number])  Accessories used: [facility] ostomy accessories: 4\" Adapt Barrier Ring (924653) and Cavilon no sting barrier film (454686)   Frequency of pouch changes: PRN leakage and Three times a week  WO follow up plan: Daily Monday-Friday (as able)  Bedside RN interventions: Change pouch PRN if leaking using the supplies above, Empty pouch when 1/3 to 1/2 full, ensure to clean pouch outlet after emptying to prevent odor, Notify WOC for ongoing pouch leakage, Document stoma appearance and output volume, color, and consistency every shift, Encourage patient to empty pouch with assist, Encourage patient to empty pouch independently, Assist patient to measure and record output, Patient independent with cares, and Patient unable to participate in cares      Orders: Reviewed and Updated    RECOMMEND PRIMARY TEAM ORDER: None, at this time  Notify WOC if wound(s) deteriorate.  Nursing to notify the Provider(s) and re-consult the WO Nurse if new skin concern.    DATA:     Current support surface: Standard  Standard gel mattress (Isoflex)  Containment of urine/stool: Continent of bladder and Ileostomy pouch  BMI: Body mass index is 33.42 kg/m .   Active diet order: Orders Placed This Encounter      Low Fiber Diet     Output: I/O last 3 completed shifts:  In: 3460 [P.O.:760; I.V.:1200; IV Piggyback:1500]  Out: 3355 [Urine:1000; Drains:180; Stool:2175]     Labs:   Recent Labs   Lab 07/13/25  0630   HGB 11.5*   WBC 10.4     Pressure injury risk assessment:   Sensory Perception: 4-->no impairment  Moisture: 4-->rarely moist  Activity: 3-->walks occasionally  Mobility: 4-->no limitation  Nutrition: 2-->probably inadequate  Friction and Shear: 3-->no apparent problem  Ari Score: 20      Pager no longer is use, please contact through [employer] group: [facility] Nurse [place]  Dept. Office Number: [phone]    Cintia Pacheco RN " CWOCN      [facility]     Name: Celestine Simon  Date: 7/13/2025    To order your ostomy supplies    The ostomy Supplier needs this supply list  to process your order. You will need to fax/deliver this list, along with your Insurance information. Your home care nurse can assist with this process.    List of Ostomy Distributors      [facility]  Ph. [phone] Fax # [phone]  [facility] [employer]   Ph. [phone] [phone]  [facility]   Ph. [phone] [phone]  Or Call your insurance provider for their preferred supplier    Your Medical Supplier will need your surgeon's name, phone and fax number    Clinic:                     Phone                            Fax  Colorectal Surgery:    [phone]    Verbal Order for ostomy supplies for 1 Month per:                                          Mala Miller RN, [facility]                                                                  Authorizing MD: Dr. Shahida Sheets    Quantity of pouches:    20 /mo.    Request the following supplies:      Raisa     2 piece flat wafer 57mm  # [id number]                           2 piece Fecal pouch with Filter 57mm- # [id number]  Accessories  2  barrier ring #[id number]     Adapt powder #7906    No sting film barrier # 3345                          Adapt odor eliminator and lubricant 236ml bottle # 97120     Adapt universal adhesive remover #4603                          Raisa barrier extenders #01759                           Change your pouch 2 times a week, more often if leaking.   If you are cutting a hole in the wafer of your pouch, recheck stoma size and adjust pouch opening as needed every week    . Call the Ostomy Nurse at [facility]       [address] [place] [address], [place] : [phone]   Schedule a follow-up visit in 2 to 4 weeks after your surgery, sooner if having problems Bring a complete set of pouch-changing supplies to this  visit       Problems you should Report  - The stoma turns blue or darker in color.  - Cuts or sores around the stoma.  - Red, raw or painful skin around the stoma.  - Any bulging of the skin around the stoma.  - A pouch that leaks every day.  - Problems making the right size hole in the pouch wafer.   Please call with any questions or concerns.

## 2025-07-15 NOTE — PROGRESS NOTES
"COLON & RECTAL SURGERY  PROGRESS NOTE    07/15/2025  Post-op Day #3    SUBJECTIVE:    Patient states he is having his best day so far, but still having some \"gurgling.\" Was tolerating CLD yesterday without nausea and interested in advancing his diet today. Has been voiding spontaneously since grove removal yesterday. Looking forward to ostomy teaching today with ALESSIO and his wife.  Less burping today.      OBJECTIVE:  Temp:  [98.3  F (36.8  C)-98.5  F (36.9  C)] 98.5  F (36.9  C)  Pulse:  [82-96] 91  Resp:  [16-18] 18  BP: (113-149)/(83-95) 133/83  SpO2:  [94 %-98 %] 98 %    Intake/Output Summary (Last 24 hours) at 7/15/2025 0900  Last data filed at 7/15/2025 0816  Gross per 24 hour   Intake 3750 ml   Output 3225 ml   Net 525 ml       GENERAL:  Awake, alert, NAD, sitting up in chair  EXTREMITIES: Warm and well perfused  ABDOMEN:  Soft, appropriately tender. Mildly distended. No guarding, rigidity, or peritoneal signs. Colostomy appliance in place with loose yellow stool in bag. Stoma pink and viable. LILY drain with serosanguinous output  INCISIONS:  C/D/I  DRAIN: Serosanguinous output in LILY drain.    LABS:  Recent Results (from the past 24 hours)   Basic metabolic panel   Result Value Ref Range    Sodium 136 135 - 145 mmol/L    Potassium 4.1 3.4 - 5.3 mmol/L    Chloride 102 98 - 107 mmol/L    Carbon Dioxide (CO2) 23 22 - 29 mmol/L    Anion Gap 11 7 - 15 mmol/L    Urea Nitrogen 23.5 (H) 8.0 - 23.0 mg/dL    Creatinine 0.87 0.67 - 1.17 mg/dL    GFR Estimate >90 >60 mL/min/1.73m2    Calcium 9.4 8.8 - 10.4 mg/dL    Glucose 168 (H) 70 - 99 mg/dL   Magnesium   Result Value Ref Range    Magnesium 1.6 (L) 1.7 - 2.3 mg/dL   Phosphorus   Result Value Ref Range    Phosphorus 3.0 2.5 - 4.5 mg/dL   Potassium   Result Value Ref Range    Potassium 3.9 3.4 - 5.3 mmol/L   Extra Tube    Narrative    The following orders were created for panel order Extra Tube.  Procedure                               Abnormality         Status           "           ---------                               -----------         ------                     Extra Purple Top Tube[[id number]]                           In process                   Please view results for these tests on the individual orders.         ASSESSMENT/PLAN:   Celestine Simon is a 69 year old male s/p lap LAR with DLI on 7/11 for rectal adenocarcinoma. Post-operative SHANA, improved with additional resuscitation.     Pain well controlled. Developed nausea, heartburn, and distention on POD1. Bowel rest yesterday (POD2) with some improvement, though patient continues to be moderately distended on exam and is still burping (POD3). Cautiously advanced diet back to clears (POD4) with improvement in distension but ongoing burping. Ileostomy with large, liquid output.    GI/FEN:   - Advance to low fiber diet   - can start metamucil later today if tolerate some solids   - LR @ 50 mL/hr  :   - 500 mL NS bolus  Pain:   - MMPC  Ppx:   - Lovenox  Activity:   - Encourage ambulation   - PT/OT  HTN:   - Restart PTA doxazosin   - Restart PTA statin    Ppx:  lovenox ppx    Dispo: new ileostomy and 30 days of lovenox ppx.  Will likely remove LILY drain prior to discharge.  Possible discharge in the next 1-2 days pending ileostomy outputs.        Patient was seen and examined with Dr. Kel Juarez, CRS Fellow. Plan of care was discussed with Dr. Shahida Sheets, CRS Attending Physician.    Hawk Sommers, MS4  [employer] [facility] [employer]      Addendum:  pt seen and examined independently.  Modifications made prn to note above.     HAMIDA Orozco.................7/15/2025   11:36 AM  Colon and Rectal Surgery    Patient was seen and discussed with Dr. Juarez    This plan of care was communicated to me by CRS Staff Dr. Shahida Sheets.    Medical Decision Making       25 MINUTES SPENT BY ME on the date of service doing chart review, history, exam, documentation & further activities per the  note.    [id number] post op hospital visit

## 2025-07-15 NOTE — PLAN OF CARE
Goal Outcome Evaluation:    Plan of Care Reviewed With: patient    Overall Patient Progress: no change    Outcome Evaluation: [id number]    VSS on RA. Pain managed with scheduled Tylenol. No c/o nausea. Tolerating low fiber diet. Up with 1 assist. Voiding. Ileostomy with about 450 mL watery stool out this shift. Wife changed pouch with WOC RN today. LILY drain with serosanguinous drainage. Ambulating in halls. Continue to monitor and with POC.

## 2025-07-15 NOTE — PLAN OF CARE
Goal Outcome Evaluation:      Plan of Care Reviewed With: patient    Overall Patient Progress: no change    Outcome Evaluation: POD#3 [id number]    Significant 24 hour events: Voided 300 at 1926.     Level of Care: Acute   POD: #3 = 7/14  Pain:  Controlled- scheduled tylenol  Neuro:WDL  CV:WDL  Resp:WDL  GI:.WDL except- ileostomy with liquid stool 7/14. Denies N/V and heartburn  :.WDL - grove pulled and has voided  Skin: .WDL except, integrity  Activity Assistance: assistance, 1 people- walker    A/Ox4. VSS on RA. Clear liquid diet with 1 L restriction. Had 220 this shift so a total of 640 today. LILY drain dressing changed today. Continue with POC.

## 2025-07-16 ENCOUNTER — APPOINTMENT (OUTPATIENT)
Dept: PHYSICAL THERAPY | Facility: CLINIC | Age: 70
DRG: 330 | End: 2025-07-16
Attending: COLON & RECTAL SURGERY
Payer: MEDICARE

## 2025-07-16 LAB
MAGNESIUM SERPL-MCNC: 1.9 MG/DL (ref 1.7–2.3)
PHOSPHATE SERPL-MCNC: 3.2 MG/DL (ref 2.5–4.5)
POTASSIUM SERPL-SCNC: 4 MMOL/L (ref 3.4–5.3)

## 2025-07-16 PROCEDURE — 84132 ASSAY OF SERUM POTASSIUM: CPT | Performed by: COLON & RECTAL SURGERY

## 2025-07-16 PROCEDURE — 250N000011 HC RX IP 250 OP 636

## 2025-07-16 PROCEDURE — 120N000002 HC R&B MED SURG/OB UMMC

## 2025-07-16 PROCEDURE — 83735 ASSAY OF MAGNESIUM: CPT

## 2025-07-16 PROCEDURE — 97530 THERAPEUTIC ACTIVITIES: CPT | Mod: GP

## 2025-07-16 PROCEDURE — 999N000198 HC STATISTIC WOC PT EDUCATION, 16-30 MIN

## 2025-07-16 PROCEDURE — 84100 ASSAY OF PHOSPHORUS: CPT

## 2025-07-16 PROCEDURE — 97116 GAIT TRAINING THERAPY: CPT | Mod: GP

## 2025-07-16 PROCEDURE — 36415 COLL VENOUS BLD VENIPUNCTURE: CPT

## 2025-07-16 PROCEDURE — 250N000013 HC RX MED GY IP 250 OP 250 PS 637: Performed by: PHYSICIAN ASSISTANT

## 2025-07-16 PROCEDURE — 250N000013 HC RX MED GY IP 250 OP 250 PS 637

## 2025-07-16 PROCEDURE — 250N000011 HC RX IP 250 OP 636: Performed by: COLON & RECTAL SURGERY

## 2025-07-16 PROCEDURE — 258N000003 HC RX IP 258 OP 636

## 2025-07-16 RX ORDER — LOPERAMIDE HYDROCHLORIDE 2 MG/1
2 CAPSULE ORAL 2 TIMES DAILY
Status: DISCONTINUED | OUTPATIENT
Start: 2025-07-16 | End: 2025-07-17 | Stop reason: HOSPADM

## 2025-07-16 RX ORDER — MAGNESIUM SULFATE HEPTAHYDRATE 40 MG/ML
2 INJECTION, SOLUTION INTRAVENOUS ONCE
Status: COMPLETED | OUTPATIENT
Start: 2025-07-16 | End: 2025-07-16

## 2025-07-16 RX ADMIN — ENOXAPARIN SODIUM 40 MG: 40 INJECTION SUBCUTANEOUS at 17:05

## 2025-07-16 RX ADMIN — MAGNESIUM SULFATE HEPTAHYDRATE 2 G: 2 INJECTION, SOLUTION INTRAVENOUS at 11:54

## 2025-07-16 RX ADMIN — ACETAMINOPHEN 325 MG: 325 TABLET ORAL at 23:08

## 2025-07-16 RX ADMIN — DOXAZOSIN 4 MG: 4 TABLET ORAL at 08:51

## 2025-07-16 RX ADMIN — ACETAMINOPHEN 325 MG: 325 TABLET ORAL at 23:17

## 2025-07-16 RX ADMIN — LOPERAMIDE HYDROCHLORIDE 2 MG: 2 CAPSULE ORAL at 20:16

## 2025-07-16 RX ADMIN — ACETAMINOPHEN 650 MG: 325 TABLET ORAL at 09:00

## 2025-07-16 RX ADMIN — ACETAMINOPHEN 650 MG: 325 TABLET ORAL at 17:05

## 2025-07-16 RX ADMIN — ACETAMINOPHEN 650 MG: 325 TABLET ORAL at 03:56

## 2025-07-16 RX ADMIN — SODIUM CHLORIDE, SODIUM LACTATE, POTASSIUM CHLORIDE, AND CALCIUM CHLORIDE: .6; .31; .03; .02 INJECTION, SOLUTION INTRAVENOUS at 02:25

## 2025-07-16 RX ADMIN — PSYLLIUM HUSK 1 PACKET: 3.4 POWDER ORAL at 08:50

## 2025-07-16 RX ADMIN — LOPERAMIDE HYDROCHLORIDE 2 MG: 2 CAPSULE ORAL at 08:51

## 2025-07-16 RX ADMIN — ROSUVASTATIN CALCIUM 10 MG: 10 TABLET, FILM COATED ORAL at 08:51

## 2025-07-16 ASSESSMENT — ACTIVITIES OF DAILY LIVING (ADL)
ADLS_ACUITY_SCORE: 39
ADLS_ACUITY_SCORE: 38
ADLS_ACUITY_SCORE: 39
ADLS_ACUITY_SCORE: 38
ADLS_ACUITY_SCORE: 38
ADLS_ACUITY_SCORE: 39
ADLS_ACUITY_SCORE: 38
ADLS_ACUITY_SCORE: 39
ADLS_ACUITY_SCORE: 39
ADLS_ACUITY_SCORE: 38
ADLS_ACUITY_SCORE: 39
ADLS_ACUITY_SCORE: 39
ADLS_ACUITY_SCORE: 38
ADLS_ACUITY_SCORE: 39
ADLS_ACUITY_SCORE: 38

## 2025-07-16 NOTE — OP NOTE
SURGEON: Shahida Sheets MD   ASSISTANT: Kel Juarez MD, Colorectal Surgery fellow. Genesis Sierra MD Surgery  PREOPERATIVE DIAGNOSIS: Rectosigmoid cancer status post neoadjuvant chemoradiotherapy.   POSTOPERATIVE DIAGNOSIS: Rectal cancer in the mid-rectum status post neoadjuvant chemoradiotherapy. Intraabdominal adhesions from previous surgery.  PROCEDURES: Laparoscopic converted to open low anterior resection with colorectal anastomosis and loop ileostomy. Flexible sigmoidoscopy.   Please also add a Modifier 22: This case was extremely difficult due to the very large amount of involved viscera and induration from having a narrow pelvis and radiation changes as well as previous adhesions that retraction, exposure, and identification of appropriate tissue plains difficult.  This case was at least 50% more difficult and took 2 hours longer than typical for a similar case.  INDICATIONS:  Celestine Simon is a 69 year old male who Underwent a colonoscopy October of 2024 which demonstrated a partially obstructing mass in the rectosigmoid 2/3 the lumen circumference. This demonstrated an invasive adenocarcinoma. He underwent a Magnetic Resonance Imaging (MRI) of the pelvis which demonstrate rectal mass and rectal fat with prominent lymph nodes which was staged at a T3N3 disease. I performed a flexible sigmoidoscopy in clinic which demonstrated a mass at 13 cm approximately half the circumference approximately 5 cm in length. The tumor could be traversed using the scope. He underwent chemotherapy and then chemoradiaton.  A repeat flexible sigmoidoscopy was performed and the tumor was estimated to be approximately one-third of the circumference demonstrating some but incomplete response. He presents now for surgical treatment. The risks and benefits of surgery were thoroughly discussed with the patient and he agreed to proceed.  DESCRIPTION OF PROCEDURE: The patient was brought to the operating  room, placed supine on the operating table with a pink pad in place. General endotracheal anesthesia was induced. House catheter was placed. he was placed in modified lithotomy position with Yellofin stirrups can carefully secured to the table. The abdomen was prepped and draped in the usual sterile fashion. We began the procedure with a timeout and placed a periumbilical port under direct visualization with a Calles technique (12 mm). There was evidence of adhesions near the periumbilical port which were taken down along the paramedian incision. This was several loops of small bowel. Under direct visualization, 5 mm ports were place in the right lower quadrant, left side and suprapubic positions. The colon was fully mobilized along the left lateral aspect up towards the left upper quadrant.The dissection was carried down towards the pelvis and we open the retroperitoneal plane and identified the ureter. This was fully developed to isolate the inferior mesenteric artery.  Direction we place a small lower midline incision approximately 6 cm in size and placed a medium  wound protector.  The right sided adhesions were taken down with sharp dissection. The inferior mesenteric artery were taken using the Ligature device. The energy device was insufficient and there was bleeding. This was oversewn without significant blood loss and good hemostasis. We proceeded with the mesorectal dissection going posteriorly and then laterally towards the pelvic floor. We then performed our anterior dissection in the cul-de-sac and again went to the pelvic floor. The dissection was quite difficult and the pelvis was narrow and there was obvious edema from the radiation as well as poorer planes particularly along the right posterior aspect of the dissection. We continued carefully and slowly. The tumor was not easily identified and with further inspection with the scope, it became clear that the distal aspect was about 8 cm. We carried  out the dissection and were able to go distal to the tumor and then cleared the mesorectum posteriorly where we wished to fire the stapler. Contour stapler was used to divide the rectum distal to the tumor. The remaining mesorectum was divided using a ligature device. The descending colon was divided along with the mesentery using a bowel clamp, and a 3-0 pursestring was placed followed by a 29 anvil for the EEA stapler. We then proceeded with our colorectal anastomosis with a end-to end configuration. Or was some oozing in the pelvis and it required placement of hemostatic agent and careful localization of the bleeding. After a number of times examining the area this was nicely hemostatic. The 29 EEA stapler was placed through the end of the staple time under direct visualization and the anvil was mated with the pin. This was successful, and the orientation of the descending was confirmed. The stapler was fired and there were 2 intact anastomotic rings. A flexible sigmoidoscopy was performed and anastomosis was submerged and the colon proximally occluded. This demonstrated a health anastomosis at 6 cm with no bleeding which was airtight. A diverting loop ileostomy was performed so as to ensure that the anastomosis would have a higher chance of healing without problem in light of the level and radiation. We carefully traced the small intestine approximately another 30 cm proximally and brought up our loop through the left lower quadrant. This site was chosen because of the paramedian scar on the right. The abdomen was irrigated out with a liter of sterile water. A 19-Georgian Lorenzo LILY was placed through the right lower quadrant port site in the pelvis near the anastomosis and secured with 2-0 nylon. The 15 mm right lower quadrant port was closed with a 0-Vicryl with a figure of eight. Once the ileum was up, the exteriorization site fascia was closed using #1 PDS suture. Subcutaneous tissue was irrigated out. The skin  closed with 4-0 Monocryl subcuticular suture followed by the application of Dermabond. The loop ileostomy was matured in a Kyra fashion using 3-0 Vicryls and a stoma appliance placed. The patient was emerged from general endotracheal anesthesia, taken postoperative anesthesia care unit in good condition. All instruments and sponge counts were correct x2 at the end the case.   SPECIMEN: Sigmoid colon and rectum. Anastomotic donuts  Colon Resection  Operation performed with curative intent Yes   Tumor Location Rectum, NOS   Extent of colon and vascular resection Other low anterior resection with division of LÁZARO         Shahida Sheets MD  Colon and Rectal Surgery Attending  Department of Surgery  [facility]

## 2025-07-16 NOTE — PLAN OF CARE
Goal Outcome Evaluation:      Plan of Care Reviewed With: patient    Overall Patient Progress: improving    Outcome Evaluation: [id number]      Significant 24 hour events:     Level of Care: Acute    Summary Type: 5A Post Op Report   POD: #5 = 7/16  Complications:   Pain:  Controlled- scheduled tylenol  Neuro:WDL  CV:WDL  Resp:WDL  GI:.WDL except, GI symptoms- ileostomy with liquid stool, passing gas  :WDL.   Skin: .WDL except, integrity old sharath drain x1, abd lap sites, midline incision DORIS.   Activity Assistance: assistance, 1 person  Safety/Falls Risk: Level of Risk: Moderate Risk  Consults: WOC, PT/OT  Procedures/Imaging:   Precautions: Fall Risk Precautions    AVSS on room air. Ileostomy with decreased output, improved from yesterday. Old SHARATH site with gauze/tegaderm, CDI. Voided spontaneously. Up with stand by assist and walker. Hopeful to discharge home tomorrow. Patient (or family member) needs to demonstrate Lovenox administration. Writer reinforced education but patient did not self-administer today.

## 2025-07-16 NOTE — PROGRESS NOTES
[facility]  WO Nurse Inpatient Assessment     Consulted for: Diverting loop ileostomy      WO nurse follow-up plan: daily    Patient History (according to provider note(s):    Celestine Simon is a 69 year old male s/p lap LAR with DLI on 7/11, recovering appropriately. Post op SHANA improved today after additional resuscitation last night.     Celestine Simon is a 69 year old male with a h/o T2DM and rectosigmoid adenocarcinoma now POD#0 s/p laparoscopic LAR converted to open with diverting loop ileostomy, doing well.     Assessment:      Areas visualized during today's visit: Abdomen  Assessment of new loop Ileostomy:  Diagnosis Pertinent to Stoma: Cancer - Colon or Rectum      Surgery Date: 7/11/25  Surgeon:MD [facility]: [facility]  Pouching system in place on assessment today: Raisa two piece, cut to fit, and flat   Pouch barrier status: intact  Pouch last changed/wear time: 7/15   Reason for pouch change today: pouch not changed today  Effectiveness of current pouching/ supply plan: Recommend continuing current plan  Change made with ostomy management today: No (high output pouch)  Pouching system placed today: Presho two piece, cut to fit, and flat with barrier ring  Supplies: ordered discharge ostomy supplies and supplies stored on unit- 2 week supply at bedside  Stoma location: LLQ  Stoma size: 35mm   Stoma appearance: red, round, and protruberant  Mucocutaneous junction:  intact  Peristomal complication(s): none   Output: green watery  Output volume emptied during visit:0 ml  Abdominal assessment: Distended  Surgical site(s): dressing dry and intact  NG still in place? No  Pain: Denies  Is patient still on a PCA? No    Ostomy education assessment:  Participant of teaching session today: patient   Education completed today: When to seek medical attention and Discharge instructions  Educational materials/methods: Verbal, Written, Addressed  "patient/caregiver questions/concerns, and Left packet of information at bedside   Education still needed: all topics covered, plan to review pouch change once more tomorrow  Learning Comprehension:   Psychosocial assessment: Patient was not anticipating having an ostomy and is still processing this.  Patient readiness for education today: attentive and active participation  Following today's visit: patient  is able to demonstrate;         1. How to empty their pouch? Yes        2. How to change their pouch? Yes, with minimal assist, and Needs additional practice         3. How to read and record intake and output correctly? Yes  Preparation for discharge completed: Placed prescription recommendations in discharge navigator for MD to sign, Ensured patient has extra supplies for discharge, Discussed how to order supplies after discharge , Ordered samples from  after gaining consent from patient/caregiver, Discussed how and when to make an outpatient WOC nurse appointment after discharge, Prepared for discharge home with home care, and Discuss signs/symptoms of when to seek medical attention  Pt support system on discharge: Wife  WOC recommend home care? Yes  Face to face time: 20 minutes           Treatment Plan:     LLQ Ileostomy pouching plan:   Pouching system: ostomy supplies pouches: Raisa 57 HIGH OUTPUT (978142) , Raisa 57 FECAL ([id number])  Accessories used: WO ostomy accessories: 2\" Cera Barrier Ring ([id number]) and Cavilon no sting barrier film (231668)   Frequency of pouch changes: PRN leakage and Three times a week  WOC follow up plan: Daily Monday-Friday (as able)  Bedside RN interventions: Change pouch PRN if leaking using the supplies above, Empty pouch when 1/3 to 1/2 full, ensure to clean pouch outlet after emptying to prevent odor, Notify WOC for ongoing pouch leakage, Document stoma appearance and output volume, color, and consistency every shift, Encourage patient to empty pouch with " assist, Encourage patient to empty pouch independently, Assist patient to measure and record output, Patient independent with cares, and Patient unable to participate in cares      Orders: Reviewed and Updated    RECOMMEND PRIMARY TEAM ORDER: None, at this time  Notify [facility] if wound(s) deteriorate.  Nursing to notify the Provider(s) and re-consult the [facility] Nurse if new skin concern.    DATA:     Current support surface: Standard  Standard gel mattress (Isoflex)  Containment of urine/stool: Continent of bladder and Ileostomy pouch  BMI: Body mass index is 33.42 kg/m .   Active diet order: Orders Placed This Encounter      Low Fiber Diet     Output: I/O last 3 completed shifts:  In: 2493.75 [P.O.:1020; I.V.:1473.75]  Out: 3805 [Urine:1750; Drains:55; Stool:2000]     Labs:   Recent Labs   Lab 07/13/25  0630   HGB 11.5*   WBC 10.4     Pressure injury risk assessment:   Sensory Perception: 4-->no impairment  Moisture: 4-->rarely moist  Activity: 3-->walks occasionally  Mobility: 4-->no limitation  Nutrition: 2-->probably inadequate  Friction and Shear: 3-->no apparent problem  Ari Score: 20    Mala Miller RN, CWOCN  Pager no longer is use, please contact through [employer] group: [facility] Nurse [place]  Dept. Office Number: [phone]        [facility]     Name: Celestine Simon  Date: 7/13/2025    To order your ostomy supplies    The ostomy Supplier needs this supply list  to process your order. You will need to fax/deliver this list, along with your Insurance information. Your home care nurse can assist with this process.    List of Ostomy Distributors      [facility]  Ph. [phone] Fax # [phone]  [facility]   Ph. [phone] [phone]  [facility]   Ph. [phone] [phone]  Or Call your insurance provider for their preferred supplier    Your Medical Supplier will need your surgeon's name, phone and fax number    Clinic:                      Phone                            Fax  Colorectal Surgery:    [phone]    Verbal Order for ostomy supplies for 1 Month per:                                          Mala Miller, RN, CWOCN                                                                  Authorizing MD: Dr. Shhaida Sheets    Quantity of pouches:    20 /mo.    Request the following supplies:      Raisa     2 piece flat wafer 57mm  # [id number]                           2 piece Fecal pouch with Filter 57mm- # [id number]  Accessories  2  barrier ring #[id number]     Adapt powder #7906    No sting film barrier # 3345                          Adapt odor eliminator and lubricant 236ml bottle # 33904     Adapt universal adhesive remover #0706                          Raisa barrier extenders #37899                           Change your pouch 2 times a week, more often if leaking.   If you are cutting a hole in the wafer of your pouch, recheck stoma size and adjust pouch opening as needed every week    . Call the Ostomy Nurse at [facility]       [address] [place] [address] : [phone]   Schedule a follow-up visit in 2 to 4 weeks after your surgery, sooner if having problems Bring a complete set of pouch-changing supplies to this visit       Problems you should Report  - The stoma turns blue or darker in color.  - Cuts or sores around the stoma.  - Red, raw or painful skin around the stoma.  - Any bulging of the skin around the stoma.  - A pouch that leaks every day.  - Problems making the right size hole in the pouch wafer.   Please call with any questions or concerns.

## 2025-07-16 NOTE — PLAN OF CARE
Goal Outcome Evaluation:      Plan of Care Reviewed With: patient    Overall Patient Progress: improving    Reason for Admission: PMH of T2DM and rectosigmoid adenocarcinoma now POD#6 s/p laparoscopic LAR converted to open with diverting loop ileostomy (7/11/25).    Status: stable.    RN assumed cares at 1900    Vitals: VSS on RA. Afebrile.  Pain: 2/10 in abdomen, managed with scheduled tylenol.  Neuro: WDL, A&O. Denies n/v.  Cardiac: WDL, Denies chest pain.  Respiratory: WDL, denies SOB.  : Up to the bathroom to void, good output.  GI: Ileostomy with good output, still watery but getting better slowly. Abd moderately distended.  Diet: Regular, low fiber.  IV/Drains: R PIV running LR @ 50 mL/hr. LILY drain to bulb suction with serosanguinous output.  Activity: SBA with walker & GB.  Skin: 2 abd incisions, ileostomy.   Labs: Reviewed. Mag replaced 7/15.    Plan of Care:     Pt slept well overnight. Dishcarge likely tomorrow to home with skilled nursing for ileostomy cares. Starting imodium today and hoping to have output less than 1L from ileostomy.

## 2025-07-16 NOTE — PROGRESS NOTES
COLON & RECTAL SURGERY  PROGRESS NOTE    07/16/2025  Post-op Day #5    SUBJECTIVE:    Patient doing well today. Tolerated low fiber diet yesterday. [facility] performed ostomy teaching with patient and wife yesterday.    OBJECTIVE:  Temp:  [97.5  F (36.4  C)-98.4  F (36.9  C)] 98.4  F (36.9  C)  Pulse:  [81-88] 84  Resp:  [16-20] 18  BP: (113-147)/(71-85) 113/71  SpO2:  [94 %-97 %] 97 %    Intake/Output Summary (Last 24 hours) at 7/15/2025 0900  Last data filed at 7/15/2025 0816    Intake/Output Summary (Last 24 hours) at 7/16/2025 0842  Last data filed at 7/16/2025 0659  Gross per 24 hour   Intake 1993.75 ml   Output 3735 ml   Net -1741.25 ml       GENERAL:  Awake, alert, NAD, sitting up in chair  EXTREMITIES: Warm and well perfused  ABDOMEN:  Soft, appropriately tender. Minimally distended. No guarding, rigidity, or peritoneal signs. Colostomy appliance in place with loose green/brown stool in bag. Stoma pink and viable.  INCISIONS:  C/D/I  DRAIN: Serosanguinous output in LILY drain prior to removal    LABS:  Recent Results (from the past 24 hours)   Magnesium   Result Value Ref Range    Magnesium 1.9 1.7 - 2.3 mg/dL   Phosphorus   Result Value Ref Range    Phosphorus 3.2 2.5 - 4.5 mg/dL   Potassium   Result Value Ref Range    Potassium 4.0 3.4 - 5.3 mmol/L   Extra Tube    Narrative    The following orders were created for panel order Extra Tube.  Procedure                               Abnormality         Status                     ---------                               -----------         ------                     Extra Purple Top Tube[[id number]]                           In process                   Please view results for these tests on the individual orders.         ASSESSMENT/PLAN:   Celestine Simon is a 69 year old male s/p lap LAR with DLI on 7/11 for rectal adenocarcinoma. Post-operative SHANA, improved with additional resuscitation.     Pain well controlled. Developed nausea, heartburn, and distention on POD1.  Bowel rest yesterday (POD2) with some improvement, though patient continues to be moderately distended on exam and is still burping (POD3). Cautiously advanced diet back to clears and then low fiber diet (POD4) with improvement in distension but ongoing burping. Ileostomy with large, liquid output.    GI/FEN:   - Low fiber diet   - Continue metamucil   - Start imodium 2 mg BID   - Discontinue IVF  Pain:   - MMPC  Ppx:   - Lovenox  Activity:   - Encourage ambulation   - PT/OT  HTN:   - PTA doxazosin   - PTA statin  Other:   - Drain removed today by CRS team    Ppx:  lovenox ppx    Dispo: new ileostomy and 30 days of lovenox ppx.  Possible discharge in the next 1-2 days pending ileostomy outputs.        Hawk Sommers, MS4  [employer]    I was present with the medical student who participated in the service and in the documentation of the note. I agree with the assessment and plan of care as documented, with edits made as appropriate.    Patient was seen and examined with Dr. Kel Juarez, CRS Fellow. Plan of care was discussed with Dr. Shahida Sheets, CRS Attending Physician.    Genesis Sierra MD, PhD  General Surgery, PGY1  Pager: [phone]

## 2025-07-16 NOTE — PROGRESS NOTES
Care Management Follow Up    Length of Stay (days): 5    Expected Discharge Date: 07/17/2025     Concerns to be Addressed: discharge planning     Patient plan of care discussed at interdisciplinary rounds: Yes    Anticipated Discharge Disposition: Home Care     Anticipated Discharge Services: Home Care  Anticipated Discharge DME: None    Patient/family educated on Medicare website which has current facility and service quality ratings: yes  Education Provided on the Discharge Plan:    Patient/Family in Agreement with the Plan: yes    Referrals Placed by CM/SW: Homecare  Private pay costs discussed: Not applicable    Discussed  Partnership in Safe Discharge Planning  document with patient/family: No     Handoff Completed: No, handoff not indicated or clinically appropriate    Additional Information:  RNCC met with the Pt at the bedside to review discharge plan and needs. Pt is anticipated to discharge Thursday following WO education. Pt's spouse is second learner and will be present for education.    RNCC explained home care. Pt is appreciative of the home care and stated he also has good family support, his daughter is an RN and the WOC RN at [facility] is a family friend.    RNCC will continue to follow and support safe discharge planning as needed.     Confirmed Home Care  [facility]  RN  PT   * orders placed     Next Steps:   [] Update HC, send orders   [] TERRIE Palumbo  Care Management Department  Covering 5A: 7393-2053 & 5C: 6780-6745 (non-BMT)   Phone: [phone]  Teams  Vocera: weekdays 8:00 am - 4:30 pm.   See Vocera Care Team for off-day coverage

## 2025-07-16 NOTE — PROGRESS NOTES
"RN: 1530-1930    A&Ox4. Pleasant, calm, cooperative. VSS on RA. Afebrile. Pain managed with scheduled Tylenol.   Denies nausea. Low fiber diet. Pt was not interested in ordering dinner and said \"I want to take it slow\" in regards to eating solid foods.  A1 w/ walker. Voiding spontaneously. Ileostomy with watery stool- given metamucil. LILY drain to bulb suction.  R PIV infusing. Given 500ml bolus of LR. Cont LR running at 75 ml/hr. Given IV magnesium for Mg 1.6.   Able to make needs known. Call light within reach.     FYI offered to do lovenox administration teaching with patient in case he is going home with lovenox orders- pt stated he wanted to wait until his wife was present.   "

## 2025-07-16 NOTE — PLAN OF CARE
Goal Outcome Evaluation:  Time  1984-1634    /71 (BP Location: Right arm, Patient Position: Chair, Cuff Size: Adult Large)   Pulse 84   Temp 98.4  F (36.9  C) (Oral)   Resp 18   Ht 1.829 m (6')   Wt 111.8 kg (246 lb 6.4 oz)   SpO2 97%   BMI 33.42 kg/m      Reason for admission: Post-op laparoscopic low anterior resection, converted to open ,diverting loop ileostomy, with flexible sigmoidoscopy  Activity: Assistance 1 person plus walker  Pain: Rated pain a max of 1 during shift; gave tylenol  Neuro: WDL  Cardiac: WDL  Respiratory: WDL  GI/: WD; taking Imodium and metamucil due to high output in ostomy  Diet: Low fiber diet  Lines: PIV R forearm, Ostomy LUQ. LILY drain was removed today per team  Wounds: bilateral incision sites on abdomen along with an incision site on lower abdomen     Replaced magnesium via IV per protocol   Plan:       Follow POC   Lovenox teaching should be continued for pt.      Plan of Care Reviewed With: patient

## 2025-07-17 ENCOUNTER — APPOINTMENT (OUTPATIENT)
Dept: OCCUPATIONAL THERAPY | Facility: CLINIC | Age: 70
DRG: 330 | End: 2025-07-17
Attending: COLON & RECTAL SURGERY
Payer: MEDICARE

## 2025-07-17 ENCOUNTER — APPOINTMENT (OUTPATIENT)
Dept: PHYSICAL THERAPY | Facility: CLINIC | Age: 70
DRG: 330 | End: 2025-07-17
Attending: COLON & RECTAL SURGERY
Payer: MEDICARE

## 2025-07-17 VITALS
RESPIRATION RATE: 16 BRPM | HEIGHT: 72 IN | DIASTOLIC BLOOD PRESSURE: 75 MMHG | HEART RATE: 70 BPM | WEIGHT: 244.6 LBS | SYSTOLIC BLOOD PRESSURE: 120 MMHG | BODY MASS INDEX: 33.13 KG/M2 | OXYGEN SATURATION: 97 % | TEMPERATURE: 98.2 F

## 2025-07-17 LAB
CREAT SERPL-MCNC: 0.74 MG/DL (ref 0.67–1.17)
EGFRCR SERPLBLD CKD-EPI 2021: >90 ML/MIN/1.73M2
MAGNESIUM SERPL-MCNC: 1.6 MG/DL (ref 1.7–2.3)
MCV RBC AUTO: 90 FL (ref 78–100)
PHOSPHATE SERPL-MCNC: 3.1 MG/DL (ref 2.5–4.5)
PLATELET # BLD AUTO: 264 10E3/UL (ref 150–450)
POTASSIUM SERPL-SCNC: 4.1 MMOL/L (ref 3.4–5.3)

## 2025-07-17 PROCEDURE — 250N000011 HC RX IP 250 OP 636: Performed by: PHYSICIAN ASSISTANT

## 2025-07-17 PROCEDURE — 36415 COLL VENOUS BLD VENIPUNCTURE: CPT

## 2025-07-17 PROCEDURE — 82565 ASSAY OF CREATININE: CPT

## 2025-07-17 PROCEDURE — 97535 SELF CARE MNGMENT TRAINING: CPT | Mod: GO

## 2025-07-17 PROCEDURE — 83735 ASSAY OF MAGNESIUM: CPT

## 2025-07-17 PROCEDURE — 36415 COLL VENOUS BLD VENIPUNCTURE: CPT | Performed by: COLON & RECTAL SURGERY

## 2025-07-17 PROCEDURE — 250N000013 HC RX MED GY IP 250 OP 250 PS 637

## 2025-07-17 PROCEDURE — 250N000011 HC RX IP 250 OP 636

## 2025-07-17 PROCEDURE — 85049 AUTOMATED PLATELET COUNT: CPT | Performed by: COLON & RECTAL SURGERY

## 2025-07-17 PROCEDURE — 999N000199 HC STATISTIC WOC PT EDUCATION, 31-45 MIN

## 2025-07-17 PROCEDURE — 97530 THERAPEUTIC ACTIVITIES: CPT | Mod: GP

## 2025-07-17 PROCEDURE — G0463 HOSPITAL OUTPT CLINIC VISIT: HCPCS

## 2025-07-17 PROCEDURE — 250N000013 HC RX MED GY IP 250 OP 250 PS 637: Performed by: PHYSICIAN ASSISTANT

## 2025-07-17 PROCEDURE — 84100 ASSAY OF PHOSPHORUS: CPT

## 2025-07-17 PROCEDURE — 84132 ASSAY OF SERUM POTASSIUM: CPT | Performed by: COLON & RECTAL SURGERY

## 2025-07-17 RX ORDER — FAMOTIDINE 20 MG/1
20 TABLET, FILM COATED ORAL 2 TIMES DAILY PRN
Qty: 28 TABLET | Refills: 0 | Status: SHIPPED | OUTPATIENT
Start: 2025-07-17 | End: 2025-07-31

## 2025-07-17 RX ORDER — MAGNESIUM SULFATE HEPTAHYDRATE 40 MG/ML
4 INJECTION, SOLUTION INTRAVENOUS ONCE
Status: COMPLETED | OUTPATIENT
Start: 2025-07-17 | End: 2025-07-17

## 2025-07-17 RX ORDER — OXYCODONE HCL 5 MG/5 ML
2.5-5 SOLUTION, ORAL ORAL EVERY 8 HOURS PRN
Qty: 30 ML | Refills: 0 | Status: SHIPPED | OUTPATIENT
Start: 2025-07-17

## 2025-07-17 RX ORDER — LOPERAMIDE HYDROCHLORIDE 2 MG/1
2 CAPSULE ORAL 2 TIMES DAILY
Qty: 120 CAPSULE | Refills: 0 | Status: SHIPPED | OUTPATIENT
Start: 2025-07-17

## 2025-07-17 RX ORDER — ENOXAPARIN SODIUM 100 MG/ML
40 INJECTION SUBCUTANEOUS EVERY 24 HOURS
Qty: 9.6 ML | Refills: 0 | Status: ACTIVE | OUTPATIENT
Start: 2025-07-17 | End: 2025-08-10

## 2025-07-17 RX ORDER — ACETAMINOPHEN 325 MG/1
650 TABLET ORAL EVERY 6 HOURS
Qty: 80 TABLET | Refills: 0 | Status: SHIPPED | OUTPATIENT
Start: 2025-07-17

## 2025-07-17 RX ADMIN — ROSUVASTATIN CALCIUM 10 MG: 10 TABLET, FILM COATED ORAL at 08:22

## 2025-07-17 RX ADMIN — DOXAZOSIN 4 MG: 4 TABLET ORAL at 08:22

## 2025-07-17 RX ADMIN — ACETAMINOPHEN 650 MG: 325 TABLET ORAL at 05:09

## 2025-07-17 RX ADMIN — ENOXAPARIN SODIUM 40 MG: 40 INJECTION SUBCUTANEOUS at 14:58

## 2025-07-17 RX ADMIN — MAGNESIUM SULFATE HEPTAHYDRATE 4 G: 40 INJECTION, SOLUTION INTRAVENOUS at 09:48

## 2025-07-17 RX ADMIN — ACETAMINOPHEN 650 MG: 325 TABLET ORAL at 09:44

## 2025-07-17 RX ADMIN — MAGNESIUM 64 MG (MAGNESIUM CHLORIDE) TABLET,DELAYED RELEASE 535 MG: at 09:44

## 2025-07-17 ASSESSMENT — ACTIVITIES OF DAILY LIVING (ADL)
ADLS_ACUITY_SCORE: 38

## 2025-07-17 NOTE — PLAN OF CARE
Goal Outcome Evaluation:  Significant 24 hour events: stool thickening up. Pt still needs lovenox education.     Level of Care: Acute    Summary Type: 5A Post Op Report   POD: #6 = 7/17  laparoscopic low anterior resection (Abdomen) with flexible sigmoidoscopy (Anus)  Complications:  loose stool w/ high ileostomy output  Pain:  Controlled- denied pain and nausea.   Neuro:WDL  CV:WDL elevated BP  Resp:WDL RA  GI:.WDL except, GI symptoms- ileostomy with liquid stool, passing gas  :WDL. Voiding spon.   Skin: .WDL except, integrity sharath removal site, abd lap sites, midline incision DORIS, ileostomy, PIV x1   Activity Assistance: assistance, stand-by  Safety/Falls Risk: Level of Risk: Moderate Risk  Consults: WOC, PT/OT  Procedures/Imaging:   Precautions:     Plan of Care Reviewed With: patient    Overall Patient Progress: improvingOverall Patient Progress: improving

## 2025-07-17 NOTE — PROGRESS NOTES
LMTCB   [facility]  WO Nurse Inpatient Assessment     Consulted for: Diverting loop ileostomy      WO nurse follow-up plan: daily    Patient History (according to provider note(s):    Celestine Simon is a 69 year old male s/p lap LAR with DLI on 7/11, recovering appropriately. Post op SHANA improved today after additional resuscitation last night.     Celestine Simon is a 69 year old male with a h/o T2DM and rectosigmoid adenocarcinoma now POD#0 s/p laparoscopic LAR converted to open with diverting loop ileostomy, doing well.     Assessment:      Areas visualized during today's visit: Abdomen  Assessment of new loop Ileostomy:  Diagnosis Pertinent to Stoma: Cancer - Colon or Rectum      Surgery Date: 7/11/25  Surgeon:MD Pinedo [facility]: [facility]  Pouching system in place on assessment today: Raisa two piece, cut to fit, and flat with barrier ring  Pouch barrier status: Minimal melting  Pouch last changed/wear time: 7/15   Reason for pouch change today: ostomy education and routine schedule  Effectiveness of current pouching/ supply plan: Recommend continuing current plan  Change made with ostomy management today: No returned to fecal pouch  Pouching system placed today: Raisa two piece, cut to fit, and flat with barrier ring  Supplies: ordered discharge ostomy supplies and supplies stored on unit- 2 week supply at bedside  Stoma location: Q  Stoma size: 35mm   Stoma appearance: red, round, and protruberant  Mucocutaneous junction:  intact  Peristomal complication(s): none   Output: green watery  Output volume emptied during visit:75 ml  Abdominal assessment: Distended  Surgical site(s): dressing dry and intact  NG still in place? No  Pain: Denies  Is patient still on a PCA? No    Ostomy education assessment:  Participant of teaching session today: patient   Education completed today: Pouch change return demonstration, Ostomy accessory product use , and  "Peristomal skin care  Educational materials/methods: Verbal, Written, Addressed patient/caregiver questions/concerns, and Left packet of information at bedside   Education still needed: all topics covered  Learning Comprehension:   Psychosocial assessment: Patient was not anticipating having an ostomy and is still processing this.  Patient readiness for education today: attentive and active participation  Following today's visit: patient  and spouse Ina is able to demonstrate;         1. How to empty their pouch? Yes        2. How to change their pouch? Yes and with minimal assist        3. How to read and record intake and output correctly? Yes  Preparation for discharge completed: Placed prescription recommendations in discharge navigator for MD to sign, Ensured patient has extra supplies for discharge, Discussed how to order supplies after discharge , Ordered samples from  after gaining consent from patient/caregiver, Discussed how and when to make an outpatient WOC nurse appointment after discharge, Prepared for discharge home with home care, and Discuss signs/symptoms of when to seek medical attention  Pt support system on discharge: Wife  WOC recommend home care? Yes  Face to face time: 35 minutes           Treatment Plan:     LLQ Ileostomy pouching plan:   Pouching system: ostomy supplies pouches: Raisa 57 HIGH OUTPUT (251962) , [place] 57 FECAL ([id number])  Accessories used: WO ostomy accessories: 2\" Cera Barrier Ring (169812) and Cavilon no sting barrier film (957555)   Frequency of pouch changes: PRN leakage and Three times a week  WOC follow up plan: Daily Monday-Friday (as able)  Bedside RN interventions: Change pouch PRN if leaking using the supplies above, Empty pouch when 1/3 to 1/2 full, ensure to clean pouch outlet after emptying to prevent odor, Notify WOC for ongoing pouch leakage, Document stoma appearance and output volume, color, and consistency every shift, Encourage patient " to empty pouch with assist, Encourage patient to empty pouch independently, Assist patient to measure and record output, Patient independent with cares, and Patient unable to participate in cares      Orders: Reviewed    RECOMMEND PRIMARY TEAM ORDER: None, at this time  Notify [facility] if wound(s) deteriorate.  Nursing to notify the Provider(s) and re-consult the [facility] Nurse if new skin concern.    DATA:     Current support surface: Standard  Standard gel mattress (Isoflex)  Containment of urine/stool: Continent of bladder and Ileostomy pouch  BMI: Body mass index is 33.17 kg/m .   Active diet order: Orders Placed This Encounter      Low Fiber Diet      Diet     Output: I/O last 3 completed shifts:  In: 120 [P.O.:120]  Out: 1435 [Urine:650; Drains:35; Stool:750]     Labs:   Recent Labs   Lab 07/13/25  0630   HGB 11.5*   WBC 10.4     Pressure injury risk assessment:   Sensory Perception: 4-->no impairment  Moisture: 4-->rarely moist  Activity: 3-->walks occasionally  Mobility: 4-->no limitation  Nutrition: 3-->adequate  Friction and Shear: 3-->no apparent problem  Ari Score: 21    Mala Miller RN, CWOCN  Pager no longer is use, please contact through [employer] group: [facility] Nurse Bricelyn  Dept. Office Number: [phone]        [facility]     Name: Celestine Simon  Date: 7/13/2025    To order your ostomy supplies    The ostomy Supplier needs this supply list  to process your order. You will need to fax/deliver this list, along with your Insurance information. Your home care nurse can assist with this process.    List of Ostomy Distributors      [facility]  Ph. [phone] Fax # [phone]  [facility]   Ph. [phone] [phone]  [facility]   Ph. [phone]  Or Call your insurance provider for their preferred supplier    Your Medical Supplier will need your surgeon's name, phone and fax number    Clinic:                     Phone                             Fax  Colorectal Surgery:    [phone]    Verbal Order for ostomy supplies for 1 Month per:                                          Mala Miller, RN, CWOCN                                                                  Authorizing MD: Dr. Shahida Sheets    Quantity of pouches:    20 /mo.    Request the following supplies:      Raisa     2 piece flat wafer 57mm  # [id number]                           2 piece Fecal pouch with Filter 57mm- # 47732  Accessories  2  barrier ring #8805     Adapt powder #7906    No sting film barrier # 3345                          Adapt odor eliminator and lubricant 236ml bottle # 21857     Adapt universal adhesive remover #1261                          Raisa barrier extenders #06365                           Change your pouch 2 times a week, more often if leaking.   If you are cutting a hole in the wafer of your pouch, recheck stoma size and adjust pouch opening as needed every week    . Call the Ostomy Nurse at [facility]       [address] [place] [address], [place] : [phone]   Schedule a follow-up visit in 2 to 4 weeks after your surgery, sooner if having problems Bring a complete set of pouch-changing supplies to this visit       Problems you should Report  - The stoma turns blue or darker in color.  - Cuts or sores around the stoma.  - Red, raw or painful skin around the stoma.  - Any bulging of the skin around the stoma.  - A pouch that leaks every day.  - Problems making the right size hole in the pouch wafer.   Please call with any questions or concerns.

## 2025-07-17 NOTE — DISCHARGE SUMMARY
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]  S/p total neoadjuvant chemotherapy  Post-operative state [Z98.890]  Hypertension   Hyperlipidemia  Obstructive Sleep Apnea, not on CPAP  Osteoarthritis   Type 2 diabetes mellitus   Thyroid nodule           Discharge Diagnosis:   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]  Post-operative state [Z98.890]  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]  S/p total neoadjuvant chemotherapy  Post-operative state [Z98.890]  Hypertension   Hyperlipidemia  Obstructive Sleep Apnea, not on CPAP  Osteoarthritis   Type 2 diabetes mellitus   Thyroid nodule  Asplenia  Thyroid nodule           Procedures:   Procedure/Surgery Information   Procedure: Procedure(s):  laparoscopic low anterior resection, converted to  open ,diverting loop ileostomy  Sigmoidoscopy flexible   Surgeon(s): Surgeons and Role:     * Shahida Sheets MD - Primary     * Genesis Lepe MD PhD - Resident - Assisting     * Kel Juarez MD - Fellow - Assisting   Specimens: ID Type Source Tests Collected by Time Destination   1 : Sigmoid colon, rectum Tissue Rectum SURGICAL PATHOLOGY EXAM Shahida Sheets MD 7/11/2025  1:42 PM    2 : Anastomtic donuts Tissue Other SURGICAL PATHOLOGY EXAM Shahida Sheets MD 7/11/2025  2:36 PM       Non-operative procedures None performed            Consultations:   WOUND OSTOMY CONTINENCE NURSE  IP CONSULT  PHYSICAL THERAPY ADULT IP CONSULT  OCCUPATIONAL THERAPY ADULT IP  CONSULT  CARE MANAGEMENT / SOCIAL WORK IP CONSULT         Imaging Studies:     Results for orders placed or performed in visit on 06/11/25   MR Rectum w/o & w Contrast    Narrative    EXAMINATION: MR RECTUM W/O & W CONTRAST, 6/11/2025 4:32 PM     COMPARISON: MRI 2/11/2025, 10/23/2024. CT 6/11/2025.    TECHNIQUE:  Images were acquired without and with intravenous contrast  through the pelvis. The following MR images were acquired without  contrast: multiplanar T1, multiplanar T2, axial diffusion-weighted and  axial apparent diffusion coefficient. T1-weighted images with fat  saturation were acquired at the following intervals relative to  intravenous contrast administration: pre-contrast, immediate post  contrast, 1 minute, 3 minutes and delayed.  Contrast dose: 11ml  Gadavist    HISTORY: Rectal cancer (H); rectal cancer. s/p radiation    FINDINGS:    LOCATION/SIZE:  On prior exam dated 10/23/2024 there was a tumor identified in the  upper rectum. Previously this tumor measured 5.5 cm, and currently it  measures 5.5 cm. This is best seen on series (5/27). Edematous  appearance of the mesorectal and presacral space, presumably  posttreatment related.    TREATMENT RESPONSE:   Approximately 60% of the current mass demonstrates tumour signal  intensity, with the remainder appearing to represent fibrosis of which  appears greater than prior MRI. This corresponds to a tumour response  grade of mrTRG-3.     EXTENT:  The tumor does not clearly involve the anal sphincters or levator ani  muscle.     CIRCUMFERENTIAL RESECTION MARGIN (CRM):  In terms of the resection margin, there is not involvement of the  mesorectal fascia.    LYMPH NODES:  Decreased size of previously described prominent mesorectal and  superior rectal lymph nodes, example includes:  -Left posterolateral 4 mm rounded mesorectal lymph node (6/6)  previously measuring short axis    No new suspicious pelvic adenopathy.    VEINS:  There is not evidence of  "extramural venous extension.     MISCELLANEOUS FINDINGS:  Small amount of free fluid in the pelvis. Right hip arthroplasty. No  suspicious osseous lesion.      Impression    IMPRESSION:     1. There has been a partial response to treatment, with a  corresponding tumor regression grade of mrTRG-3.      2. Continued decreased size of several prominent mesorectal lymph  nodes. No new suspicious pelvic adenopathy.    ----------------------------------------------------------------------  ----------------------------------------------------------------------    *Based on the article: \"Magnetic resonance imaging-detected tumor  response for locally advanced rectal cancer predicts survival  outcomes: [facility].\" published in the Journal of Clinical  Oncology Oct 1, 2011.    MRI assessment of tumor regression grade (mrTRG):  mrTRG-1 was identified as the absence of any tumor signal.   mrTRG-2 was identified as small amounts of residual tumor visible but  with a predominant fibrotic low signal intensity.   mrTRG-3 was identified as mixed areas of low signal fibrosis and  intermediate signal intensity present but without predominance of  tumor.   mrTRG-4 was identified by predominantly tumor signal intensity remains  with minimal fibrotic low signal intensity.   mrTRG-5 was identified as no fibrosis evident, tumor signal visible  only.    I have personally reviewed the examination and initial interpretation  and I agree with the findings.    EVELIN RUIZ MD         SYSTEM ID:  [id number]          Medications Prior to Admission:     Medications Prior to Admission   Medication Sig Dispense Refill Last Dose/Taking    doxazosin (CARDURA) 4 MG tablet Take 1 tablet (4 mg) by mouth daily. 90 tablet 3 7/11/2025 Morning    Ferrous Sulfate (IRON SUPPLEMENT PO) Take 1 tablet by mouth every morning.   Past Month    GLUCOSAMINE-CHONDROITIN PO Take 1 capsule by mouth every morning.   Past Month    HERBALS Take 1 each by mouth " every morning. [employer] Blood Sugar Support.   Past Month    LANCETS REGULAR MISC One Touch US Lancet- Used twice daily 100 Each 6 7/11/2025 Morning    lidocaine-prilocaine (EMLA) 2.5-2.5 % external cream Apply topically daily as needed for moderate pain. 30 g 2 Unknown    metFORMIN (GLUCOPHAGE XR) 500 MG 24 hr tablet 3 tabs daily. 270 tablet 3 7/10/2025    MULTI-VITAMIN OR TABS Take 1 tablet by mouth every morning.   Past Week    pioglitazone (ACTOS) 30 MG tablet Take 0.5 tablets (15 mg) by mouth daily. 90 tablet 3 7/10/2025    rosuvastatin (CRESTOR) 10 MG tablet Take 1 tablet (10 mg) by mouth daily. 90 tablet 3 7/11/2025 Morning    Semaglutide (RYBELSUS) 14 MG tablet Take 7-14 mg by mouth daily. 90 tablet 3 7/4/2025    Sodium Bicarbonate-Citric Acid 3224-7606 MG TBEF Take 2 tablets by mouth every 4 hours as needed (heartburn).   Taking As Needed    Blood Glucose Monitoring Suppl (IN TOUCH) MISC 1 strip once a week Tests weekly- One Touch 90 each 1 7/3/2025    Continuous Glucose Sensor (FREESTYLE SCOTT 14 DAY SENSOR) [facility] 1 Device every 14 days Change sensor as directed every 14 days 6 each 1 7/3/2025    mupirocin (BACTROBAN) 2 % external ointment Apply topically 3 times daily. 15 g 2 Unknown    [DISCONTINUED] acetaminophen (TYLENOL) 325 MG tablet Take 2 tablets (650 mg) by mouth every 4 hours as needed for mild pain. 50 tablet 0 7/8/2025    [DISCONTINUED] hydroCHLOROthiazide 12.5 MG tablet Take 1 tablet (12.5 mg) by mouth every morning. 90 tablet 3 7/10/2025    [DISCONTINUED] lisinopril (ZESTRIL) 40 MG tablet Take 1 tablet (40 mg) by mouth daily. 90 tablet 3 7/10/2025            Discharge Medications:     Current Discharge Medication List        START taking these medications    Details   enoxaparin ANTICOAGULANT (LOVENOX) 40 MG/0.4ML syringe Inject 0.4 mLs (40 mg) subcutaneously every 24 hours for 24 days.  Qty: 9.6 mL, Refills: 0    Associated Diagnoses: Post-operative state; Malignant neoplasm of  rectosigmoid junction (H)      famotidine (PEPCID) 20 MG tablet Take 1 tablet (20 mg) by mouth 2 times daily as needed (acid reflux).  Qty: 28 tablet, Refills: 0    Associated Diagnoses: High output ileostomy (H)      loperamide (IMODIUM) 2 MG capsule Take 1 capsule (2 mg) by mouth 2 times daily.  Qty: 120 capsule, Refills: 0    Associated Diagnoses: High output ileostomy (H)      magnesium chloride 535 (64 Mg) MG TBEC CR tablet Take 1 tablet (535 mg) by mouth daily.  Qty: 14 tablet, Refills: 0    Associated Diagnoses: High output ileostomy (H); S/P ileostomy (H); Hypomagnesemia      oxyCODONE (ROXICODONE) 5 MG/5ML solution Take 2.5-5 mLs (2.5-5 mg) by mouth every 8 hours as needed for severe pain.  Qty: 30 mL, Refills: 0    Associated Diagnoses: Acute post-operative pain      psyllium (METAMUCIL/KONSYL) Packet Take 1 packet by mouth daily.  Qty: 60 packet, Refills: 0    Associated Diagnoses: High output ileostomy (H)      Sodium Bicarbonate-Citric Acid 2350-4355 MG TBEF Take 2 tablets by mouth every 4 hours as needed (heartburn).           CONTINUE these medications which have CHANGED    Details   acetaminophen (TYLENOL) 325 MG tablet Take 2 tablets (650 mg) by mouth every 6 hours.  Qty: 80 tablet, Refills: 0    Associated Diagnoses: Acute post-operative pain           CONTINUE these medications which have NOT CHANGED    Details   doxazosin (CARDURA) 4 MG tablet Take 1 tablet (4 mg) by mouth daily.  Qty: 90 tablet, Refills: 3    Associated Diagnoses: Type 2 diabetes mellitus with microalbuminuria, without long-term current use of insulin (H)      Ferrous Sulfate (IRON SUPPLEMENT PO) Take 1 tablet by mouth every morning.      GLUCOSAMINE-CHONDROITIN PO Take 1 capsule by mouth every morning.      HERBALS Take 1 each by mouth every morning. [employer] Blood Sugar Support.      LANCETS REGULAR MISC One Touch US Lancet- Used twice daily  Qty: 100 Each, Refills: 6    Associated Diagnoses: Type II or unspecified type  diabetes mellitus without mention of complication, not stated as uncontrolled      lidocaine-prilocaine (EMLA) 2.5-2.5 % external cream Apply topically daily as needed for moderate pain.  Qty: 30 g, Refills: 2    Associated Diagnoses: Malignant neoplasm of rectosigmoid junction (H)      metFORMIN (GLUCOPHAGE XR) 500 MG 24 hr tablet 3 tabs daily.  Qty: 270 tablet, Refills: 3    Associated Diagnoses: Type 2 diabetes mellitus with microalbuminuria, without long-term current use of insulin (H)      MULTI-VITAMIN OR TABS Take 1 tablet by mouth every morning.      pioglitazone (ACTOS) 30 MG tablet Take 0.5 tablets (15 mg) by mouth daily.  Qty: 90 tablet, Refills: 3    Associated Diagnoses: Type 2 diabetes mellitus with microalbuminuria, without long-term current use of insulin (H)      rosuvastatin (CRESTOR) 10 MG tablet Take 1 tablet (10 mg) by mouth daily.  Qty: 90 tablet, Refills: 3    Associated Diagnoses: Type 2 diabetes mellitus with microalbuminuria, without long-term current use of insulin (H)      Semaglutide (RYBELSUS) 14 MG tablet Take 7-14 mg by mouth daily.  Qty: 90 tablet, Refills: 3    Associated Diagnoses: Type 2 diabetes mellitus with microalbuminuria, without long-term current use of insulin (H)      Blood Glucose Monitoring Suppl (IN TOUCH) MISC 1 strip once a week Tests weekly- One Touch  Qty: 90 each, Refills: 1    Associated Diagnoses: Type 2 diabetes mellitus with hyperglycemia, without long-term current use of insulin (H)      Continuous Glucose Sensor (FREESTYLE SCOTT 14 DAY SENSOR) MISC 1 Device every 14 days Change sensor as directed every 14 days  Qty: 6 each, Refills: 1    Associated Diagnoses: Type 2 diabetes mellitus with hyperglycemia, without long-term current use of insulin (H)      mupirocin (BACTROBAN) 2 % external ointment Apply topically 3 times daily.  Qty: 15 g, Refills: 2    Associated Diagnoses: Internal nasal lesion           STOP taking these medications        hydroCHLOROthiazide 12.5 MG tablet Comments:   Reason for Stopping:         lisinopril (ZESTRIL) 40 MG tablet Comments:   Reason for Stopping:                     Brief History of Illness:   Celestine Simon is a 69 year old male s/p laparoscopic low anterior resection with diverting loop ileostomy on 7/11/2025 for metastatic rectal adenocarcinoma s/p total neoadjuvant chemotherapy.           Hospital Course:   The patient was doing well on POD#1 with immediate return of bowel function noted by ileostomy output, but on POD#2 developed nausea, distension, and intermittent hiccups and was placed on subsequent bowel rest. He did not require an NG tube and his diet was advanced slowly beginning POD#3. House removed and patient had return of spontaneous urine output. His ileostomy output significantly increased on POD#3 and he was started on metamucil 1 packet daily and imodium 2mg BID which were effective. On POD#5, abdominal drain was removed. By POD#6, patient's distension and nausea were completely resolved and his ileostomy output was well-managed. Ileostomy output goals are to be greater than 500mL and less than 1000mL-1200mL in a 24 hour period.  Patient was tolerating a low fiber diet and was provided ileostomy and lovenox teaching prior to discharge.    Patient and spouse was seen by wound care ostomy nursing and taught how to manage his new ostomy. Patient was seen by PT/OT and deemed appropriate for discharge home.  Post-operative pain was controlled with scheduled tylenol. Patient is to follow up in the [facility] in 2-3 week with Debra Bess NP or Sandra Sinha PA-C and then with Dr. Sheets in 2-3 weeks after. Patient is to follow up with PCP in 1-2 weeks.      Now with ileostomy and high ileostomy outputs, pt is at increased risk for dehydration, SHANA, abnormal electrolytes, hypotension, falls.  Pt is to stop home HCTZ and Lisinopril.  And should hold for the duration of  the ileostomy.  If ileostomy outputs stabilize and pt continues to have high blood pressures, can consider a low dose, single anti-hypertensive agent.  And to use caution and go slowly with titrating anti-hypertensive medications while pt has an ileostomy.           Day of Discharge Physical Exam:   Blood pressure (!) 142/75, pulse 78, temperature 97.6  F (36.4  C), temperature source Oral, resp. rate 16, height 1.829 m (6'), weight 111.4 kg (245 lb 11.2 oz), SpO2 96%.    Gen: AAOx3, NAD  Pulm: Non-labored breathing  Abd: Soft, non-tender, non-distended, no guarding   Incision C/D/I   Stoma pink and viable with yellow/brown stool and gas in bag  Ext:  Warm and well-perfused         Final Pathology Result:   Pending at time of discharge    Results for orders placed or performed during the hospital encounter of 10/10/24   Surgical Pathology Exam    Collection Time: 10/10/24  7:46 AM   Result Value Ref Range    Case Report       Surgical Pathology Report                         Case: [id number]                                  Authorizing Provider:  Cj eTe MD       Collected:           10/10/2024 07:46 AM          Ordering Location:     [facility]   Received:            10/10/2024 08:06 AM                                 [place]                                                                      Pathologist:           Chris Samaniego MD                                                                           Specimens:   A) - Large Intestine, Colon, Transverse, transverse colon polyp                                     B) - Large Intestine, Colon, Sigmoid/Rectal, Rectosigmoid mass                             Addendum       B: For further evaluation, stains on block B1 (mismatch repair (MMR) protein panel, including MLH1 clone [id number]/Optiview detection, MSH2 clone [id number]/Optiview detection, MSH6 clone  [id number]/Ultraview detection, and PMS2 clone [id number]/Optiview detection) are performed with appropriate controls:    MLH1: Present  MSH2: Present  MSH6: Present  PMS2: Present    Interpretation: Normal pattern of mismatch repair (MMR) protein expression by immunohistochemical stains (low probability of MSI-H) (see comment).    COMMENT: All four DNA mismatch repair proteins are expressed in the tumor nuclei, which is the pattern of normal tissue and which strongly diminishes the likelihood of Oconnor Syndrome (Hereditary Nonpolyposis Colorectal Cancer; HNPCC) due to defective DNA mismatch repair.  Microsatellite instability (MSI) testing by PCR is recommended if the patient's family history meets the [place] guidelines or Revised [place] criteria for possible Oconnor syndrome.  If the tumor is MSI high this could indicate a false negative IHC test (reported to occur in about 2% of tested cases) or the possibility of an abnormality in one of the other genes involved in DNA mismatch repair.  The possibility that the tumor in this patient is due to an inherited defect in another gene not involved in mismatch repair cannot be ruled out by negative results by either IHC or MSI testing.    The following assays; ALK (D5F3), ER, HER2, PD-L1, BRAF, CD20, CD30 AZF, CD30 Formalin, MLH-1, MSH-2, MSH-6 and PMS-2, have not been validated on decalcified tissues. Results should be interpreted with caution given the possibility of false negative results on decalcified specimens.          Final Diagnosis       A: Large intestine, transverse, polyp, biopsy/polypectomy:  - Benign mucosal lymphoid aggregate.     B: Large intestine, rectosigmoid, mass, biopsy:  - Invasive adenocarcinoma.  - Additional stains for mismatch repair (MMR) protein expression are pending and will be reported in an addendum.      Clinical Information       Procedure:  COLONOSCOPY, FLEXIBLE, WITH LESION REMOVAL USING SNARE  TATTOOING, WITH COLONOSCOPY      Gross  Description       A(1). Large Intestine, Colon, Transverse, transverse colon polyp:  The specimen is received in formalin, labeled with the patient's name, medical record number and other identifying information and designated  transverse colon polyp . It consists of 4 tan soft tissue fragments ranging from 0.2-0.7 cm. Entirely submitted in one cassette.    B(2). Large Intestine, Colon, Sigmoid/Rectal, Rectosigmoid mass:  The specimen is received in formalin, labeled with the patient's name, medical record number and other identifying information and designated  rectosigmoid mass . It consists of 4 tan soft tissue fragments ranging from 0.2-0.5 cm. Entirely submitted in one cassette.   (ORI Ruelas) 10/10/2024 2:21 PM       Microscopic Description       A microscopic examination is performed.  Intradepartmental consultation was obtained on part B.  The pertinent findings in this case were reported to Dr. Tee on October 11, 2024 at 8:47 AM via Staff Message in the [facility] medical record by Dr. Samaniego.      Disclaimer         The following assays; ALK (D5F3), ER, HER2, PD-L1, BRAF, CD20, CD30 AZF, CD30 Formalin, MLH-1, MSH-2, MSH-6 and PMS-2, have not been validated on decalcified tissues. Results should be interpreted with caution given the possibility of false negative results on decalcified specimens.          MCRS Yes (A) N/A    Performing Labs       The technical component of this testing was completed at [facility].    Stain controls for all stains resulted within this report have been reviewed and show appropriate reactivity.       Case Images            Discharge Instructions and Follow-Up:     Discharge Procedure Orders   Basic metabolic panel   Standing Status: Future Standing Exp. Date: 07/17/26   Order Comments: We recommend blood work early next week, 7/21 or 7/22 to check your hydration status, kidney function and electrolytes.   "(Labs:  BMP, Magnesium, Phosphorus).  The lab results should be faxed to the Colon and Rectal Surgery clinic, Attn Dr. Sheets.  Fax: [phone].   Scheduling Instructions: HOVER FOR \"APPROX\" AND \"EXPECTED DATE COMMENT\" INSTUCTIONS (Read Only):      Approx. Setting:  - Allows collection from 2 weeks before Expected Date until order expiration.  - Uncheck if collection should only occur on or after Expected Date.    Expected Date Comment Button:  - Use for additional instructions for collecting staff (e.g., for specialty follow up, don't collect x and y on same day).  - Avoid referencing order date as \"now\" (e.g., 2 months from now).     Magnesium   Standing Status: Future Standing Exp. Date: 07/17/26   Order Comments: We recommend blood work early next week, 7/21 or 7/22 to check your hydration status, kidney function and electrolytes.  (Labs:  BMP, Magnesium, Phosphorus).  The lab results should be faxed to the Colon and Rectal Surgery clinic, Attn Dr. Sheets.  Fax: [phone].   Scheduling Instructions: HOVER FOR \"APPROX\" AND \"EXPECTED DATE COMMENT\" INSTUCTIONS (Read Only):      Approx. Setting:  - Allows collection from 2 weeks before Expected Date until order expiration.  - Uncheck if collection should only occur on or after Expected Date.    Expected Date Comment Button:  - Use for additional instructions for collecting staff (e.g., for specialty follow up, don't collect x and y on same day).  - Avoid referencing order date as \"now\" (e.g., 2 months from now).     Phosphorus   Standing Status: Future Standing Exp. Date: 07/17/26   Order Comments: We recommend blood work early next week, 7/21 or 7/22 to check your hydration status, kidney function and electrolytes.  (Labs:  BMP, Magnesium, Phosphorus).  The lab results should be faxed to the Colon and Rectal Surgery clinic, Attn Dr. Sheets.  Fax: [phone].   Scheduling Instructions: HOVER FOR \"APPROX\" AND \"EXPECTED DATE COMMENT\" " "INSTUCTIONS (Read Only):      Approx. Setting:  - Allows collection from 2 weeks before Expected Date until order expiration.  - Uncheck if collection should only occur on or after Expected Date.    Expected Date Comment Button:  - Use for additional instructions for collecting staff (e.g., for specialty follow up, don't collect x and y on same day).  - Avoid referencing order date as \"now\" (e.g., 2 months from now).     Home Care Referral   Referral Priority: Routine: Next available opening Referral Type: Home Health Therapies & Aides   Number of Visits Requested: 1     Reason for your hospital stay   Order Comments: 7/11/2025:   PROCEDURES: Laparoscopic converted to open low anterior resection with colorectal anastomosis and loop ileostomy. Flexible sigmoidoscopy.     Activity   Order Comments: Your activity upon discharge:   ACTIVITY  -No lifting, pushing, pulling greater than 10 lbs and no strenuous exercise for 6 weeks   -Do not insert anything into your anus or rectum for 6-8 weeks while your new surgical connection heals  -No driving while on narcotic analgesics (i.e. Percocet, oxycodone, Vicodin)  -No driving until you are able to fully twist to both sides or slam on brakes quickly and without any pain  -We encourage walking at least 4-5 times per day     Order Specific Question Answer Comments   Is discharge order? Yes      ADULT [facility] Specialty Follow-up and recommended labs and tests   Order Comments: WOUND CARE  -Inspect your wounds daily for signs of infection (increased redness, drainage, pain)  -Keep your wound clean and dry  -You may shower, but do not soak in tub or pool    NOTIFY  Please contact Priti ACOSTA RN, Mulu LU RN or Ashtyn FELDER RN at [phone] for problems after discharge such as:  -Temperature > 101F, chills, rigors, dizziness  -Redness around or purulent drainage from wound  -Inability to tolerate diet, nausea or vomiting  -You stop passing gas (or your stoma stops functioning), " develop significant bloating, abdominal pain  -Have blood in stools/vomit  -Have severe diarrhea/constipation  -Any other questions or concerns.  - At nights (after 4:30pm), on weekends, or if urgent, call [phone] and ask the  to speak with the on-call Colorectal Surgery resident or fellow    -Measure your total daily ileostomy output and record.  If you have LESS than 500mL or GREATER than 1000mL of ileostomy output within a 24 hour period, please notify the Colorectal Nurse.  If you have greater than 1000-1200mL in a 24 hour period or greater than 500cc for three consecutive 8 hour shifts, take Imodium 2mg once, stay hydrated especially with Pedialyte and call the Colorectal Clinic to further discuss.      Medication Instructions  Some of your medications may have changed. Please take only prescribed and resumed medications     FOLLOW-UP  1.  You will need to follow-up with Debra Bess NP or Sandra Sinha PA-C in the Colon and Rectal Surgery clinic in 2-3 week(s) and then with CRS Staff: Dr. Sheets in 4-5 weeks after.  Please contact our Nurses (phone # [phone]) if you have not heard from our clinic in 3 business days afer discharge to schedule a follow-up appointment.  Please bring your log of daily ileostomy outputs to your follow up clinic appointment.     2.  Follow up with your primary care provider in 1-2 weeks after discharge from the hospital to review this hospitalization.   And to review your blood pressure medications as discussed below in point #3.      3.  Please do NOT take your prior Hydrochlorothiazide and do NOT take your lisinopril.  You are at higher risk of Dehydration, Electrolyte abnormalities, Low blood pressures and thus Kidney Injury and falls.  Both Hydrochlorothiazide and Lisinopril can worsen/exacerbate any dehydration, electrolyte abnormalities, low blood pressures and kidney injury and falls.  You will need blood pressure checks to monitor your  blood pressures.  Once your ileostomy outputs have stabilized and you have become more attuned and accustomed to it and if your blood pressures are still high, discuss with primary care provider regarding a LOW dose blood pressure medication.  But we do not recommend this for the first 2-3 weeks while your body is adjusting and recovering from surgery.  (Watch for low volume urine and dark urine - this is a sign of dehydration.)    4.  We recommend blood work early next week, 7/21 or 7/22 to check your hydration status, kidney function and electrolytes.  (Labs:  BMP, Magnesium, Phosphorus).  The lab results should be faxed to the Colon and Rectal Surgery clinic, Attn Dr. Sheets.  Fax: [phone].    5. Periodically measure your weight and keep a record/log of this.      6.  Please do NOT hesitate to call the Colon and Rectal Surgery clinic with any questions and concerns.  If your ileostomy outputs continue to be very watery and high, the Colon and Rectal Surgery team can help you titrate your Metamucil and Imodium as needed and can also help obtain blood work as needed.          ORAL REHYDRATION RECIPE for Home Use  With your new ileostomy, you are at increased risk for dehydration,  Especially if you have high ileostomy outputs, or low appetites, you can help prevent it by drinking this mixture as directed.  Some examples of commercially prepared Oral Rehydration Solutions are: Pedialyte (solution, powder packet, Freeze Pops), or Drip Drop.     Electrolyte Water   -4 cups of water (equal to 1 quart or 32 ounces)   -  teaspoon salt   -6 teaspoons sugar   -Crystal Light if desired (or other choice Nutrasweet or Splenda flavoring - SUGAR-FREE) to taste (recommend lemonade or orange-pineapple flavors, but any flavor will work)    Add two cups of tap water to a large container. With measuring spoons (not table silverware), add the dry ingredients and stir or shake well. Add two additional cups of water. This  "will equal one quart of Electrolyte Water. Add sugar-free flavoring if desired. Best if chilled. Sip solution throughout the day. Discard after 24 hours.      Appointments on [place] and/or [facility] (with [facility] or [facility] provider or service). Call [phone] if you haven't heard regarding these appointments within 7 days of discharge.     Wound care and dressings   Order Comments: Instructions to care for your wound at home:   Abdominal incisions:  keep clean and dry     Monitor and record   Order Comments: MONITOR AND RECORD: total daily ileostomy outputs.  Keep a log/record of your total daily ileostomy outputs and bring this with you to your first Colon and Rectal Surgery post-operative clinic visit.     Miscellaneous DME Order   Order Comments: Your Medical Supplier will need your surgeon's name, phone and fax number    Clinic:                     Phone                            Fax  Colorectal Surgery:    [phone]                                                                 Authorizing MD: Dr. Shahida Sheets    Quantity of pouches:    20 /mo.    Request the following supplies:      Raisa     2 piece flat wafer 57mm  # [id number]                           2 piece Fecal pouch with Filter 57mm- # [id number]  Accessories  2\" barrier ring #[id number]     Adapt powder #7906    No sting film barrier # [id number]                          Adapt odor eliminator and lubricant 236ml bottle # 14916     Adapt universal adhesive remover #0094                          [place] barrier extenders #07588                            Change your pouch 2 times a week, more often if leaking.        DME Documentation:   Describe the reason for need to support medical necessity: diverting loop ileostomy.     I, the undersigned, certify that the above prescribed supplies are medically necessary for this patient and is both reasonable and necessary in reference to accepted standards of medical and necessary in " reference to accepted standards of medical practice in the treatment of this patient's condition and is not prescribed as a convenience.     Order Specific Question Answer Comments   PATIENT INSTRUCTIONS: Please contact your preferred vendor or insurance provider for assistance in obtaining the ordered medical equipment item.    Start Date: 7/17/2025    DME Item Needed: ileostomy    Length of Need: 12 months    DME Item Quantity: 20 / month      Diet   Order Comments: Follow this diet upon discharge:   DIET  - Low Residue Diet for at least 4-6 weeks unless cleared by Colorectal surgery.  No raw vegetables, fruit skins, fibrous foods that require a lot of chewing, nuts, seeds, corn, popcorn.   -We recommend eating slowly, chewing thoroughly, eating small frequent meals throughout the day  -Stay well hydrated.    -Follow the food recommendations given by the Nutritionist and the Wound Ostomy Nurse.     Order Specific Question Answer Comments   Is discharge order? Yes             Home Health Care:     Arranged           Discharge Disposition:     Discharged to home      Condition at discharge: Stable      Hawk Sommers, MS4  [facility]    HAMIDA Orozco.................7/17/2025   8:02 AM  Colon and Rectal Surgery     Pt was seen and discussed with CRS fellow, Dr. Juarez.    The above plan of care was performed and communicated to me by CRS Staff: Dr. Sheets     Medical Decision Making       35 MINUTES SPENT BY ME on the date of service doing chart review, history, exam, documentation & further activities per the note.      [id number] post op hospital visit      Staff Addendum:  Agree with the discharge summary as documented. I was personally involved with the discharge planning and hospital decision-making for this patient.  Shahida Sheets MD  Colon and Rectal Surgery Staff  [facility]

## 2025-07-17 NOTE — PROGRESS NOTES
Care Management Discharge Note    Discharge Date: 07/17/2025       Discharge Disposition: Home Care    Discharge Services: Home Care    Discharge DME: Ostomy and supplies     Discharge Transportation: Spouse     Private pay costs discussed: Not applicable    Does the patient's insurance plan have a 3 day qualifying hospital stay waiver?  No    PAS Confirmation Code:  n/a   Patient/family educated on Medicare website which has current facility and service quality ratings: yes    Education Provided on the Discharge Plan:  yes   Persons Notified of Discharge Plans: Pt and Spouse   Patient/Family in Agreement with the Plan: yes    Handoff Referral Completed: Yes, RICARDO PCP: Internal handoff referral completed    Additional Information:  RNCC notified Pt is medically ready for discharge. Pt will discharge home with a new ostomy and home care. Pt will also need labs done next week, RNCC confirmed home care can do labs next week and added labs needed to home  care order. Orders Faxed.    RNCC met with Pt at the bedside to review discharge plan. No further questions or discharge needs at this time.    Accepting Home Care  [facility]  Nursing  PT  1x labs    TERRIE Graves  Care Management Department  Covering 5A: 6932-5086 & 5C: [id number] (non-BMT)   Phone: [phone]  Teams  Vocera: weekdays 8:00 am - 4:30 pm.   See Vocera Care Team for off-day coverage

## 2025-07-17 NOTE — PLAN OF CARE
Occupational Therapy Discharge Summary    Reason for therapy discharge:    Discharged to home with home therapy.    Progress towards therapy goal(s). See goals on Care Plan in [facility] electronic health record for goal details.  Goals partially met.  Barriers to achieving goals:   discharge from facility.    Therapy recommendation(s):    Continued therapy is recommended.  Rationale/Recommendations:   OT to progress IND/safety within ADLs and daily routine.

## 2025-07-17 NOTE — PLAN OF CARE
Nursing Focus: Discharge    D: Patient discharged to home at 1536. Patient left via wheelchair and accompanied by patient transport.    I: Discharge prescriptions sent to discharge pharmacy to be filled. All discharge medications and instructions reviewed with patient and spouse by virtual RN. Patient instructed to call clinic triage nurse if he experiences a fever >100.4, uncontrolled nausea, vomiting, diarrhea, or pain; or experiences any signs or symptoms of bleeding. Other phone numbers to call with questions or concerns after discharge reviewed. PIV removed. Education completed.    A: Patient verbalized understanding of discharge medications and instructions. Medications picked up from discharge pharmacy and given to patient.     P: Patient to follow-up in clinic with OP team.      A&Ox4, able to make needs known. VSS. On RA. Denies SOB/pain/nausea. Low fiber diet - tolerating well. Magnesium 1.6 - replaced. Has an ileostomy with soft, loose output. Pt is able to empty his ileostomy by himself with minimal assistance from staff. Voiding w/o difficulty. PIV removed prior to discharge. Midline abdominal incision DORIS - WNL. Prior LILY site covered in gauze and transparent dressing - C/D/I. Lap site x1. Pt refused his scheduled Imodium and Psyllium this morning due to more formed stool. SBA with walker. Resting comfortably in between cares. Wife at bedside. Lovenox teaching given to wife and patient by virtual nurse. Wife demonstrated proper technique of Lovenox administration with bedside RN. Ready to discharge home this afternoon.

## 2025-07-18 ENCOUNTER — MYC MEDICAL ADVICE (OUTPATIENT)
Dept: FAMILY MEDICINE | Facility: CLINIC | Age: 70
End: 2025-07-18
Payer: MEDICARE

## 2025-07-18 LAB
PATH REPORT.COMMENTS IMP SPEC: ABNORMAL
PATH REPORT.COMMENTS IMP SPEC: ABNORMAL
PATH REPORT.COMMENTS IMP SPEC: YES
PATH REPORT.FINAL DX SPEC: ABNORMAL
PATH REPORT.GROSS SPEC: ABNORMAL
PATH REPORT.MICROSCOPIC SPEC OTHER STN: ABNORMAL
PATH REPORT.RELEVANT HX SPEC: ABNORMAL
PATHOLOGY SYNOPTIC REPORT: ABNORMAL
PHOTO IMAGE: ABNORMAL

## 2025-07-18 NOTE — TELEPHONE ENCOUNTER
Deborah,     You have no hold spots at all next week, but do have an 11:30 am virtual appointment open on Friday July 25.     Ok to use that slot?  .  Please advise. Bernadette Ballard RN

## 2025-07-18 NOTE — PROGRESS NOTES
Physical Therapy Discharge Summary    Reason for therapy discharge:    Discharged to home with home therapy.    Progress towards therapy goal(s). See goals on Care Plan in [facility] electronic health record for goal details.  Goals not met.  Barriers to achieving goals:   discharge from facility.    Therapy recommendation(s):    Continued therapy is recommended.  Rationale/Recommendations:  Per last PT encounter, recommendation for home with assist and home PT.

## 2025-07-24 ENCOUNTER — TELEPHONE (OUTPATIENT)
Dept: FAMILY MEDICINE | Facility: CLINIC | Age: 70
End: 2025-07-24
Payer: MEDICARE

## 2025-07-24 NOTE — TELEPHONE ENCOUNTER
Home care calling to clarify patients Rybelsus 14mg tablet take 7-14 by mouth daily.     They need to know if it is 7mg or if it is 14mg? What should patient be on?    Call back is [phone]. OK to leave detailed VM on confidential line.   Jessie Melendez RN on 7/24/2025 at 1:05 PM

## 2025-07-24 NOTE — TELEPHONE ENCOUNTER
I think patient was splitting 14 mg tabs in half to make them last longer.  Confirm with patient which dose then update homecare

## 2025-07-24 NOTE — TELEPHONE ENCOUNTER
"Patient is taking a whole tablet every day. Home care was updated.    Patient also reports that he is taking the Metformin XR 24hr tablet but he is noticing that they are coming out whole in his ostomy bag. He first noticed this a few days. He states that blood sugar was 170 a few days ago, and he will put on kelvin today. He states blood sugars are fine lately and not noticing symptoms of feeling it is running high.    He also says \"heads up to Deborah I will be down about 20lb when she sees me next\"    Please advise on changing metformin from extended release.  Pharmacy: [facility].    Mara Tyson RN on 7/24/2025 at 3:44 PM      "

## 2025-07-25 ENCOUNTER — OFFICE VISIT (OUTPATIENT)
Dept: FAMILY MEDICINE | Facility: CLINIC | Age: 70
End: 2025-07-25
Payer: MEDICARE

## 2025-07-25 VITALS
SYSTOLIC BLOOD PRESSURE: 124 MMHG | OXYGEN SATURATION: 98 % | TEMPERATURE: 98.7 F | RESPIRATION RATE: 16 BRPM | BODY MASS INDEX: 31.42 KG/M2 | DIASTOLIC BLOOD PRESSURE: 68 MMHG | WEIGHT: 232 LBS | HEART RATE: 94 BPM | HEIGHT: 72 IN

## 2025-07-25 DIAGNOSIS — E11.29 TYPE 2 DIABETES MELLITUS WITH MICROALBUMINURIA, WITHOUT LONG-TERM CURRENT USE OF INSULIN (H): ICD-10-CM

## 2025-07-25 DIAGNOSIS — I10 ESSENTIAL HYPERTENSION: ICD-10-CM

## 2025-07-25 DIAGNOSIS — R80.9 TYPE 2 DIABETES MELLITUS WITH MICROALBUMINURIA, WITHOUT LONG-TERM CURRENT USE OF INSULIN (H): ICD-10-CM

## 2025-07-25 DIAGNOSIS — C19 MALIGNANT NEOPLASM OF RECTOSIGMOID JUNCTION (H): ICD-10-CM

## 2025-07-25 DIAGNOSIS — Z93.2 ILEOSTOMY STATUS (H): Primary | ICD-10-CM

## 2025-07-25 PROBLEM — T45.1X5A CHEMOTHERAPY-INDUCED NEUTROPENIA: Status: RESOLVED | Noted: 2024-12-06 | Resolved: 2025-07-25

## 2025-07-25 PROBLEM — K52.1 CHEMOTHERAPY INDUCED DIARRHEA: Status: RESOLVED | Noted: 2025-01-03 | Resolved: 2025-07-25

## 2025-07-25 PROBLEM — D70.2 DRUG-INDUCED NEUTROPENIA: Status: RESOLVED | Noted: 2024-12-06 | Resolved: 2025-07-25

## 2025-07-25 PROBLEM — D70.1 CHEMOTHERAPY-INDUCED NEUTROPENIA: Status: RESOLVED | Noted: 2024-12-06 | Resolved: 2025-07-25

## 2025-07-25 PROBLEM — T45.1X5A CHEMOTHERAPY INDUCED DIARRHEA: Status: RESOLVED | Noted: 2025-01-03 | Resolved: 2025-07-25

## 2025-07-25 PROCEDURE — G2211 COMPLEX E/M VISIT ADD ON: HCPCS | Performed by: PHYSICIAN ASSISTANT

## 2025-07-25 PROCEDURE — 3078F DIAST BP <80 MM HG: CPT | Performed by: PHYSICIAN ASSISTANT

## 2025-07-25 PROCEDURE — 1126F AMNT PAIN NOTED NONE PRSNT: CPT | Performed by: PHYSICIAN ASSISTANT

## 2025-07-25 PROCEDURE — 1111F DSCHRG MED/CURRENT MED MERGE: CPT | Performed by: PHYSICIAN ASSISTANT

## 2025-07-25 PROCEDURE — 3074F SYST BP LT 130 MM HG: CPT | Performed by: PHYSICIAN ASSISTANT

## 2025-07-25 PROCEDURE — 99214 OFFICE O/P EST MOD 30 MIN: CPT | Performed by: PHYSICIAN ASSISTANT

## 2025-07-25 RX ORDER — ORAL SEMAGLUTIDE 14 MG/1
14 TABLET ORAL DAILY
Qty: 90 TABLET | Refills: 3 | Status: SHIPPED | OUTPATIENT
Start: 2025-07-25

## 2025-07-25 RX ORDER — LISINOPRIL 40 MG/1
20 TABLET ORAL DAILY
Status: SHIPPED
Start: 2025-07-25

## 2025-07-25 ASSESSMENT — PAIN SCALES - GENERAL: PAINLEVEL_OUTOF10: NO PAIN (0)

## 2025-07-25 NOTE — PROGRESS NOTES
"  Assessment & Plan     Ileostomy status (H)  Malignant neoplasm of rectosigmoid junction (H)  Surgery sounds successful, no cancer in lymph nodes.  Ileostomy improving, output decreasing, tissues appear healthy today.  Patient emotionally adjusting.    Type 2 diabetes mellitus with microalbuminuria, without long-term current use of insulin (H)  Improved since surgery, sugars being good despite holding pioglitazone.  Reports seeing whole capsules of metformin in ileostomy output,.  Unclear if these are \"ghost\" pills that have successfully released their contents in extended release fashion, therefore patient is interested in transitioning from extended release to short acting.  - Semaglutide (RYBELSUS) 14 MG tablet; Take 14 mg by mouth daily.  - metFORMIN (GLUCOPHAGE) 500 MG tablet; Take 1 tablet (500 mg) by mouth 2 times daily (with meals).    Essential hypertension  Controlled, needs for medication have decreased since his surgery.  However due to DM2 would benefit from primary HTN medication being lisinopril.  Restart lisinopril, discontinue doxazosin (unclear he ever was on this for BPH, has been on for many years).  He will monitor for any new BPH symptoms.  - lisinopril (ZESTRIL) 40 MG tablet; Take 0.5 tablets (20 mg) by mouth daily.    MED REC REQUIRED  Post Medication Reconciliation Status: discharge medications reconciled and changed, per note/orders    The longitudinal plan of care for the diagnosis(es)/condition(s) as documented were addressed during this visit. Due to the added complexity in care, I will continue to support Celestine in the subsequent management and with ongoing continuity of care.    Patient Instructions   Stop doxazosin to restart lisinopril at half tab daily  Monitor BPs - may need to adjust dose up or down.      Stay off pioglitazone  Metformin changed to short acting - may cause change in any side effects (nausea, diarrhea, bloating)    Eye appt sometime     Recheck with me in 3 months "       Dayo Sprague is a 70 year old, presenting for the following health issues:  Hypertension (Stopped his lisinopril, and water pill.  Only taking the lisinopril if his home blood pressure diastolic is 145, stops lisinopril if diastolic is below 110.  Has not taken Lisinopril or Hydrochlorothiazide since being discharged from hospital 2 weeks ago, per discharge instructions.) and Diabetes (Finding Metformin XR pills whole in stoma bag.)        7/25/2025    11:13 AM   Additional Questions   Roomed by Fany ROCA   Accompanied by Spouse: Ina         7/25/2025    11:13 AM   Patient Reported Additional Medications   Patient reports taking the following new medications None      History of Present Illness       Diabetes:   He presents for follow up of diabetes.  He is checking home blood glucose a few times a week.   He checks blood glucose before and after meals.  Blood glucose is sometimes over 200 and never under 70.  When his blood glucose is low, the patient is asymptomatic for confusion, blurred vision, lethargy and reports not feeling dizzy, shaky, or weak.   He has no concerns regarding his diabetes at this time.   He is not experiencing numbness or burning in feet, excessive thirst, blurry vision, weight changes or redness, sores or blisters on feet. The patient has not had a diabetic eye exam in the last 12 months.      Hypertension: He presents for follow up of hypertension.  He does check blood pressure  regularly outside of the clinic. Outpatient blood pressures have not been over 140/90. He does not follow a low salt diet.     122-132/70-80.  Measured before taking meds.      He eats 2-3 servings of fruits and vegetables daily.He consumes 1 sweetened beverage(s) daily.He exercises with enough effort to increase his heart rate 9 or less minutes per day.  He exercises with enough effort to increase his heart rate 3 or less days per week.   He is taking medications regularly.        7/18/2025   Post  Discharge Outreach   How are you doing now that you are home? Excellent care received at the hospital and my home care nurse just left, things are all good.   How are your symptoms? (Red Flag symptoms escalate to triage hotline per guidelines) Improved   Does the patient have their discharge instructions?  Yes   Does the patient have questions regarding their discharge instructions?  No   Were you started on any new medications or were there changes to any of your previous medications?  Yes   Do you have questions regarding any of your medications?  No   Do you have all of your needed medical supplies or equipment (DME)?  (i.e. oxygen tank, CPAP, cane, etc.) Yes   Discharge Follow Up Appointment Date 7/30/2025   Discharge Follow Up Appointment Scheduled with? Specialty Care Provider       Hospital Follow-up Visit:    Hospital/Nursing Home/IP Rehab Facility: [facility]  Most Recent Admission Date: 7/11/2025   Most Recent Admission Diagnosis: Malignant neoplasm of rectosigmoid junction (H) - C19  Post-operative state - Z98.890     Most Recent Discharge Date: 7/17/2025   Most Recent Discharge Diagnosis: Post-operative state - Z98.890  Acute post-operative pain - G89.18  High output ileostomy (H) - R19.8, Z93.2  Malignant neoplasm of rectosigmoid junction (H) - C19  S/P ileostomy (H) - Z93.2  Dehydration - E86.0  Hypomagnesemia - E83.42   Do you have any other stressors you would like to discuss with your provider? No    Problems taking medications regularly:  metformin in stools  Medication changes since discharge: holding pioglitazone and lisinopril  Problems adhering to non-medication therapy:  None    Summary of hospitalization:  [facility] discharge summary reviewed  Diagnostic Tests/Treatments reviewed.  Follow up needed: none  Other Healthcare Providers Involved in Patient s Care:         colorectal surgeon, oncology  Update since discharge: improved.          Plan of care communicated with patient, spouse        Objective    /68   Pulse 94   Temp 98.7  F (37.1  C) (Tympanic)   Resp 16   Ht 1.829 m (6')   Wt 105.2 kg (232 lb)   SpO2 98%   BMI 31.46 kg/m    Body mass index is 31.46 kg/m .          Signed Electronically by: Deborah Delgado PA-C

## 2025-07-25 NOTE — PATIENT INSTRUCTIONS
Stop doxazosin to restart lisinopril at half tab daily  Monitor BPs - may need to adjust dose up or down.      Stay off pioglitazone  Metformin changed to short acting - may cause change in any side effects (nausea, diarrhea, bloating)    Eye appt sometime     Recheck with me in 3 months

## 2025-07-28 ENCOUNTER — OFFICE VISIT (OUTPATIENT)
Dept: SURGERY | Facility: CLINIC | Age: 70
End: 2025-07-28
Payer: MEDICARE

## 2025-07-28 ENCOUNTER — ANCILLARY PROCEDURE (OUTPATIENT)
Dept: CT IMAGING | Facility: CLINIC | Age: 70
End: 2025-07-28
Attending: PHYSICIAN ASSISTANT
Payer: MEDICARE

## 2025-07-28 VITALS
BODY MASS INDEX: 31.55 KG/M2 | SYSTOLIC BLOOD PRESSURE: 116 MMHG | DIASTOLIC BLOOD PRESSURE: 77 MMHG | HEART RATE: 78 BPM | WEIGHT: 232.6 LBS | OXYGEN SATURATION: 98 % | TEMPERATURE: 98.1 F

## 2025-07-28 DIAGNOSIS — R90.89 ABNORMAL BRAIN MRI: ICD-10-CM

## 2025-07-28 DIAGNOSIS — C19 MALIGNANT NEOPLASM OF RECTOSIGMOID JUNCTION (H): ICD-10-CM

## 2025-07-28 DIAGNOSIS — C20 RECTAL CANCER (H): Primary | ICD-10-CM

## 2025-07-28 LAB
CREAT BLD-MCNC: 1 MG/DL (ref 0.7–1.2)
EGFRCR SERPLBLD CKD-EPI 2021: >60 ML/MIN/1.73M2

## 2025-07-28 PROCEDURE — 3074F SYST BP LT 130 MM HG: CPT | Performed by: NURSE PRACTITIONER

## 2025-07-28 PROCEDURE — 82565 ASSAY OF CREATININE: CPT | Performed by: PATHOLOGY

## 2025-07-28 PROCEDURE — 1126F AMNT PAIN NOTED NONE PRSNT: CPT | Performed by: NURSE PRACTITIONER

## 2025-07-28 PROCEDURE — 70470 CT HEAD/BRAIN W/O & W/DYE: CPT | Mod: GC | Performed by: RADIOLOGY

## 2025-07-28 PROCEDURE — 99024 POSTOP FOLLOW-UP VISIT: CPT | Performed by: NURSE PRACTITIONER

## 2025-07-28 PROCEDURE — 3078F DIAST BP <80 MM HG: CPT | Performed by: NURSE PRACTITIONER

## 2025-07-28 RX ORDER — IOPAMIDOL 755 MG/ML
70 INJECTION, SOLUTION INTRAVASCULAR ONCE
Status: COMPLETED | OUTPATIENT
Start: 2025-07-28 | End: 2025-07-28

## 2025-07-28 RX ADMIN — IOPAMIDOL 70 ML: 755 INJECTION, SOLUTION INTRAVASCULAR at 07:58

## 2025-07-28 ASSESSMENT — PAIN SCALES - GENERAL: PAINLEVEL_OUTOF10: NO PAIN (0)

## 2025-07-28 NOTE — DISCHARGE INSTRUCTIONS
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
## 2025-07-28 NOTE — NURSING NOTE
Chief Complaint   Patient presents with    Surgical Followup     2-3 week post-op DOS 7/11/2025        Vitals:    07/28/25 1048   BP: 116/77   BP Location: Left arm   Patient Position: Sitting   Cuff Size: Adult Regular   Pulse: 78   Temp: 98.1  F (36.7  C)   TempSrc: Oral   SpO2: 98%   Weight: 232 lb 9.6 oz       Body mass index is 31.55 kg/m .                          Claire Hoff, EMT
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
## 2025-07-29 ENCOUNTER — LAB (OUTPATIENT)
Dept: LAB | Facility: CLINIC | Age: 70
End: 2025-07-29
Attending: INTERNAL MEDICINE
Payer: MEDICARE

## 2025-07-29 DIAGNOSIS — Z93.2 HIGH OUTPUT ILEOSTOMY (H): ICD-10-CM

## 2025-07-29 DIAGNOSIS — R19.8 HIGH OUTPUT ILEOSTOMY (H): ICD-10-CM

## 2025-07-29 DIAGNOSIS — C19 MALIGNANT NEOPLASM OF RECTOSIGMOID JUNCTION (H): ICD-10-CM

## 2025-07-29 DIAGNOSIS — E86.0 DEHYDRATION: ICD-10-CM

## 2025-07-29 DIAGNOSIS — Z93.2 S/P ILEOSTOMY (H): ICD-10-CM

## 2025-07-29 PROBLEM — G93.9 BRAIN LESION: Status: ACTIVE | Noted: 2025-07-29

## 2025-07-29 LAB
ALBUMIN SERPL BCG-MCNC: 4 G/DL (ref 3.5–5.2)
ALP SERPL-CCNC: 85 U/L (ref 40–150)
ALT SERPL W P-5'-P-CCNC: 45 U/L (ref 0–70)
ANION GAP SERPL CALCULATED.3IONS-SCNC: 13 MMOL/L (ref 7–15)
AST SERPL W P-5'-P-CCNC: 34 U/L (ref 0–45)
BASOPHILS # BLD AUTO: 0 10E3/UL (ref 0–0.2)
BASOPHILS NFR BLD AUTO: 0 %
BILIRUB SERPL-MCNC: 0.3 MG/DL
BUN SERPL-MCNC: 18.1 MG/DL (ref 8–23)
CALCIUM SERPL-MCNC: 10.6 MG/DL (ref 8.8–10.4)
CEA SERPL-MCNC: 0.7 NG/ML
CHLORIDE SERPL-SCNC: 100 MMOL/L (ref 98–107)
CREAT SERPL-MCNC: 0.97 MG/DL (ref 0.67–1.17)
EGFRCR SERPLBLD CKD-EPI 2021: 85 ML/MIN/1.73M2
EOSINOPHIL # BLD AUTO: 0.1 10E3/UL (ref 0–0.7)
EOSINOPHIL NFR BLD AUTO: 3 %
ERYTHROCYTE [DISTWIDTH] IN BLOOD BY AUTOMATED COUNT: 14.5 % (ref 10–15)
FERRITIN SERPL-MCNC: 68 NG/ML (ref 31–409)
GLUCOSE SERPL-MCNC: 145 MG/DL (ref 70–99)
HCO3 SERPL-SCNC: 23 MMOL/L (ref 22–29)
HCT VFR BLD AUTO: 34.8 % (ref 40–53)
HGB BLD-MCNC: 11.2 G/DL (ref 13.3–17.7)
IMM GRANULOCYTES # BLD: 0 10E3/UL
IMM GRANULOCYTES NFR BLD: 0 %
IRON BINDING CAPACITY (ROCHE): 378 UG/DL (ref 240–430)
IRON SATN MFR SERPL: 7 % (ref 15–46)
IRON SERPL-MCNC: 28 UG/DL (ref 61–157)
LYMPHOCYTES # BLD AUTO: 0.7 10E3/UL (ref 0.8–5.3)
LYMPHOCYTES NFR BLD AUTO: 15 %
MAGNESIUM SERPL-MCNC: 1.6 MG/DL (ref 1.7–2.3)
MCH RBC QN AUTO: 28.6 PG (ref 26.5–33)
MCHC RBC AUTO-ENTMCNC: 32.2 G/DL (ref 31.5–36.5)
MCV RBC AUTO: 89 FL (ref 78–100)
MONOCYTES # BLD AUTO: 0.4 10E3/UL (ref 0–1.3)
MONOCYTES NFR BLD AUTO: 9 %
NEUTROPHILS # BLD AUTO: 3.4 10E3/UL (ref 1.6–8.3)
NEUTROPHILS NFR BLD AUTO: 73 %
PHOSPHATE SERPL-MCNC: 3.3 MG/DL (ref 2.5–4.5)
PLATELET # BLD AUTO: 374 10E3/UL (ref 150–450)
POTASSIUM SERPL-SCNC: 5.1 MMOL/L (ref 3.4–5.3)
PROT SERPL-MCNC: 7.3 G/DL (ref 6.4–8.3)
RBC # BLD AUTO: 3.91 10E6/UL (ref 4.4–5.9)
SODIUM SERPL-SCNC: 136 MMOL/L (ref 135–145)
WBC # BLD AUTO: 4.7 10E3/UL (ref 4–11)

## 2025-07-29 PROCEDURE — 83735 ASSAY OF MAGNESIUM: CPT

## 2025-07-29 PROCEDURE — 84100 ASSAY OF PHOSPHORUS: CPT

## 2025-07-29 PROCEDURE — 82728 ASSAY OF FERRITIN: CPT

## 2025-07-29 PROCEDURE — 36415 COLL VENOUS BLD VENIPUNCTURE: CPT

## 2025-07-29 PROCEDURE — 80053 COMPREHEN METABOLIC PANEL: CPT

## 2025-07-29 PROCEDURE — 83550 IRON BINDING TEST: CPT

## 2025-07-29 PROCEDURE — 85025 COMPLETE CBC W/AUTO DIFF WBC: CPT

## 2025-07-29 PROCEDURE — 83540 ASSAY OF IRON: CPT

## 2025-07-29 PROCEDURE — 82378 CARCINOEMBRYONIC ANTIGEN: CPT

## 2025-07-30 ENCOUNTER — ONCOLOGY VISIT (OUTPATIENT)
Dept: ONCOLOGY | Facility: CLINIC | Age: 70
End: 2025-07-30
Attending: INTERNAL MEDICINE
Payer: MEDICARE

## 2025-07-30 VITALS
RESPIRATION RATE: 16 BRPM | HEART RATE: 87 BPM | HEIGHT: 72 IN | OXYGEN SATURATION: 97 % | WEIGHT: 230 LBS | BODY MASS INDEX: 31.15 KG/M2 | SYSTOLIC BLOOD PRESSURE: 120 MMHG | DIASTOLIC BLOOD PRESSURE: 76 MMHG | TEMPERATURE: 98.5 F

## 2025-07-30 DIAGNOSIS — C20 RECTAL CANCER (H): ICD-10-CM

## 2025-07-30 DIAGNOSIS — C19 MALIGNANT NEOPLASM OF RECTOSIGMOID JUNCTION (H): Primary | ICD-10-CM

## 2025-07-30 DIAGNOSIS — Z93.2 ILEOSTOMY STATUS (H): ICD-10-CM

## 2025-07-30 DIAGNOSIS — C19 MALIGNANT NEOPLASM OF RECTOSIGMOID JUNCTION (H): ICD-10-CM

## 2025-07-30 DIAGNOSIS — Z93.2 ILEOSTOMY STATUS (H): Primary | ICD-10-CM

## 2025-07-30 PROCEDURE — 99215 OFFICE O/P EST HI 40 MIN: CPT | Performed by: INTERNAL MEDICINE

## 2025-07-30 PROCEDURE — G2211 COMPLEX E/M VISIT ADD ON: HCPCS | Performed by: INTERNAL MEDICINE

## 2025-07-30 PROCEDURE — G0463 HOSPITAL OUTPT CLINIC VISIT: HCPCS | Performed by: INTERNAL MEDICINE

## 2025-07-30 ASSESSMENT — PAIN SCALES - GENERAL: PAINLEVEL_OUTOF10: NO PAIN (0)

## 2025-07-30 NOTE — PROGRESS NOTES
Oncology Rooming Note    July 30, 2025 8:25 AM   Celestine Simon is a 70 year old male who presents for:    Chief Complaint   Patient presents with    Oncology Clinic Visit     Malignant neoplasm of rectosigmoid junction - Provider visit only     Initial Vitals: /76 (BP Location: Right arm, Patient Position: Sitting, Cuff Size: Adult Large)   Pulse 87   Temp 98.5  F (36.9  C) (Tympanic)   Resp 16   Ht 1.829 m (6')   Wt 104.3 kg (230 lb)   SpO2 97%   BMI 31.19 kg/m   Estimated body mass index is 31.19 kg/m  as calculated from the following:    Height as of this encounter: 1.829 m (6').    Weight as of this encounter: 104.3 kg (230 lb). Body surface area is 2.3 meters squared.  No Pain (0) Comment: Data Unavailable   No LMP for male patient.  Allergies reviewed: Yes  Medications reviewed: Yes    Medications: Medication refills not needed today.  Pharmacy name entered into [facility]:    [facility] - [place], [place] - [address] [facility]  [place] [email or web address] [place] [address]  [facility] [place] - [address] [place] [address] [facility] [place] [address] [facility] - [place], [place] - [address] [facility] - [place], [place] - [address] [facility] - [place], [place] - [address]    PHQ9:  Did this patient require a PHQ9?: No      Clinical concerns:  None today      Lu Zabala, NGA
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
## 2025-08-04 ENCOUNTER — TELEPHONE (OUTPATIENT)
Dept: FAMILY MEDICINE | Facility: CLINIC | Age: 70
End: 2025-08-04
Payer: MEDICARE

## 2025-08-04 ENCOUNTER — TELEPHONE (OUTPATIENT)
Dept: OTOLARYNGOLOGY | Facility: CLINIC | Age: 70
End: 2025-08-04
Payer: MEDICARE

## 2025-08-04 ENCOUNTER — TELEPHONE (OUTPATIENT)
Dept: WOUND CARE | Facility: CLINIC | Age: 70
End: 2025-08-04
Payer: MEDICARE

## 2025-08-04 DIAGNOSIS — Z93.2 ILEOSTOMY STATUS (H): Primary | ICD-10-CM

## 2025-08-05 ENCOUNTER — PRE VISIT (OUTPATIENT)
Dept: OTOLARYNGOLOGY | Facility: CLINIC | Age: 70
End: 2025-08-05

## 2025-08-05 ENCOUNTER — OFFICE VISIT (OUTPATIENT)
Dept: WOUND CARE | Facility: CLINIC | Age: 70
End: 2025-08-05
Attending: PHYSICIAN ASSISTANT
Payer: MEDICARE

## 2025-08-05 ENCOUNTER — OFFICE VISIT (OUTPATIENT)
Dept: OTOLARYNGOLOGY | Facility: CLINIC | Age: 70
End: 2025-08-05
Attending: PHYSICIAN ASSISTANT
Payer: MEDICARE

## 2025-08-05 VITALS — DIASTOLIC BLOOD PRESSURE: 84 MMHG | SYSTOLIC BLOOD PRESSURE: 128 MMHG | TEMPERATURE: 98.2 F | HEART RATE: 83 BPM

## 2025-08-05 VITALS
WEIGHT: 232.1 LBS | SYSTOLIC BLOOD PRESSURE: 129 MMHG | HEART RATE: 88 BPM | OXYGEN SATURATION: 97 % | BODY MASS INDEX: 31.44 KG/M2 | DIASTOLIC BLOOD PRESSURE: 78 MMHG | HEIGHT: 72 IN

## 2025-08-05 DIAGNOSIS — J32.3 CHRONIC SPHENOIDAL SINUSITIS: Primary | ICD-10-CM

## 2025-08-05 DIAGNOSIS — M85.00 FIBROUS DYSPLASIA OF BONE: ICD-10-CM

## 2025-08-05 DIAGNOSIS — Z93.2 ILEOSTOMY STATUS (H): Primary | ICD-10-CM

## 2025-08-05 PROBLEM — R90.89 ABNORMAL BRAIN MRI: Status: ACTIVE | Noted: 2025-08-05

## 2025-08-05 PROCEDURE — G0463 HOSPITAL OUTPT CLINIC VISIT: HCPCS

## 2025-08-05 PROCEDURE — 99204 OFFICE O/P NEW MOD 45 MIN: CPT | Mod: 25 | Performed by: STUDENT IN AN ORGANIZED HEALTH CARE EDUCATION/TRAINING PROGRAM

## 2025-08-05 PROCEDURE — 3074F SYST BP LT 130 MM HG: CPT | Performed by: STUDENT IN AN ORGANIZED HEALTH CARE EDUCATION/TRAINING PROGRAM

## 2025-08-05 PROCEDURE — 1126F AMNT PAIN NOTED NONE PRSNT: CPT | Performed by: STUDENT IN AN ORGANIZED HEALTH CARE EDUCATION/TRAINING PROGRAM

## 2025-08-05 PROCEDURE — 3078F DIAST BP <80 MM HG: CPT | Performed by: STUDENT IN AN ORGANIZED HEALTH CARE EDUCATION/TRAINING PROGRAM

## 2025-08-05 PROCEDURE — 31231 NASAL ENDOSCOPY DX: CPT | Performed by: STUDENT IN AN ORGANIZED HEALTH CARE EDUCATION/TRAINING PROGRAM

## 2025-08-05 ASSESSMENT — PAIN SCALES - GENERAL: PAINLEVEL_OUTOF10: NO PAIN (0)

## 2025-08-07 ENCOUNTER — TELEPHONE (OUTPATIENT)
Dept: WOUND CARE | Facility: CLINIC | Age: 70
End: 2025-08-07
Payer: MEDICARE

## 2025-08-14 ENCOUNTER — TELEPHONE (OUTPATIENT)
Dept: FAMILY MEDICINE | Facility: CLINIC | Age: 70
End: 2025-08-14
Payer: MEDICARE

## 2025-08-15 ENCOUNTER — ANCILLARY PROCEDURE (OUTPATIENT)
Dept: ULTRASOUND IMAGING | Facility: CLINIC | Age: 70
End: 2025-08-15
Attending: PHYSICIAN ASSISTANT
Payer: MEDICARE

## 2025-08-15 ENCOUNTER — HOSPITAL ENCOUNTER (OUTPATIENT)
Dept: GENERAL RADIOLOGY | Facility: CLINIC | Age: 70
Discharge: HOME | End: 2025-08-15
Attending: COLON & RECTAL SURGERY | Admitting: COLON & RECTAL SURGERY
Payer: MEDICARE

## 2025-08-15 DIAGNOSIS — Z93.2 ILEOSTOMY STATUS (H): ICD-10-CM

## 2025-08-15 DIAGNOSIS — E04.2 MULTIPLE THYROID NODULES: ICD-10-CM

## 2025-08-15 DIAGNOSIS — C19 MALIGNANT NEOPLASM OF RECTOSIGMOID JUNCTION (H): ICD-10-CM

## 2025-08-15 PROCEDURE — 74270 X-RAY XM COLON 1CNTRST STD: CPT | Mod: 26 | Performed by: RADIOLOGY

## 2025-08-15 PROCEDURE — 74270 X-RAY XM COLON 1CNTRST STD: CPT

## 2025-08-15 PROCEDURE — 76536 US EXAM OF HEAD AND NECK: CPT | Performed by: RADIOLOGY

## 2025-08-15 RX ORDER — DIATRIZOATE MEGLUMINE AND DIATRIZOATE SODIUM 660; 100 MG/ML; MG/ML
120 SOLUTION ORAL; RECTAL ONCE
Status: COMPLETED | OUTPATIENT
Start: 2025-08-15 | End: 2025-08-15

## 2025-08-15 RX ADMIN — DIATRIZOATE MEGLUMINE AND DIATRIZOATE SODIUM 120 ML: 660; 100 SOLUTION ORAL; RECTAL at 11:17

## 2025-08-19 ENCOUNTER — OFFICE VISIT (OUTPATIENT)
Dept: WOUND CARE | Facility: CLINIC | Age: 70
End: 2025-08-19
Attending: COLON & RECTAL SURGERY
Payer: MEDICARE

## 2025-08-19 DIAGNOSIS — Z93.2 ILEOSTOMY STATUS (H): Primary | ICD-10-CM

## 2025-08-19 PROCEDURE — G0463 HOSPITAL OUTPT CLINIC VISIT: HCPCS

## 2025-08-20 ENCOUNTER — OFFICE VISIT (OUTPATIENT)
Dept: SURGERY | Facility: CLINIC | Age: 70
End: 2025-08-20
Payer: MEDICARE

## 2025-08-20 VITALS — WEIGHT: 233.3 LBS | HEIGHT: 72 IN | BODY MASS INDEX: 31.6 KG/M2 | OXYGEN SATURATION: 98 % | HEART RATE: 85 BPM

## 2025-08-20 DIAGNOSIS — Z93.2 ILEOSTOMY IN PLACE (H): ICD-10-CM

## 2025-08-20 DIAGNOSIS — Z09 FOLLOW-UP EXAMINATION AFTER COLORECTAL SURGERY: Primary | ICD-10-CM

## 2025-08-20 ASSESSMENT — PAIN SCALES - GENERAL: PAINLEVEL_OUTOF10: NO PAIN (0)

## 2025-08-21 ENCOUNTER — TELEPHONE (OUTPATIENT)
Dept: OTOLARYNGOLOGY | Facility: CLINIC | Age: 70
End: 2025-08-21
Payer: MEDICARE

## 2025-08-21 DIAGNOSIS — E04.1 THYROID NODULE: Primary | ICD-10-CM

## 2025-08-22 ENCOUNTER — PRE VISIT (OUTPATIENT)
Dept: OTOLARYNGOLOGY | Facility: CLINIC | Age: 70
End: 2025-08-22

## 2025-08-26 ENCOUNTER — HOSPITAL ENCOUNTER (OUTPATIENT)
Dept: ULTRASOUND IMAGING | Facility: CLINIC | Age: 70
Discharge: HOME | End: 2025-08-26
Attending: STUDENT IN AN ORGANIZED HEALTH CARE EDUCATION/TRAINING PROGRAM | Admitting: STUDENT IN AN ORGANIZED HEALTH CARE EDUCATION/TRAINING PROGRAM
Payer: MEDICARE

## 2025-08-26 DIAGNOSIS — E04.1 THYROID NODULE: ICD-10-CM

## 2025-08-26 PROCEDURE — 88173 CYTOPATH EVAL FNA REPORT: CPT | Mod: TC | Performed by: STUDENT IN AN ORGANIZED HEALTH CARE EDUCATION/TRAINING PROGRAM

## 2025-08-26 PROCEDURE — 272N000710 US BIOPSY THYROID FINE NEEDLE ASPIRATION

## 2025-09-03 ENCOUNTER — TELEPHONE (OUTPATIENT)
Dept: OTOLARYNGOLOGY | Facility: CLINIC | Age: 70
End: 2025-09-03
Payer: MEDICARE

## 2025-10-01 ENCOUNTER — PRE VISIT (OUTPATIENT)
Dept: SURGERY | Facility: CLINIC | Age: 70
End: 2025-10-01

## (undated) DEVICE — SYR ENDO MARKER SPOT GI 5ML GIS-45

## (undated) DEVICE — BASIN SET MINOR DISP

## (undated) DEVICE — SU STRATAFIX MONOCRYL 3-0 SPIRAL PS-2 30CM SXMP1B106

## (undated) DEVICE — HOLDER LIMB VELCRO OR 0814-1533

## (undated) DEVICE — BARRIER SEPRAFILM 3X5" X6 SHEETS 5086-02

## (undated) DEVICE — ADH LIQUID MASTISOL TOPICAL VIAL 2-3ML 0523-48

## (undated) DEVICE — SUTURE VICRYL+ 0 27IN CT-1 UND VCP260H

## (undated) DEVICE — DECANTER VIAL 2006S

## (undated) DEVICE — DECANTER BAG 2002S

## (undated) DEVICE — DRSG AQUACEL AG 3.5X12" HYDROFIBER 420670

## (undated) DEVICE — SOL NACL 0.9% INJ 1000ML BAG 2B1324X

## (undated) DEVICE — SU STRATAFIX PDS PLUS 1 CT-1 18" SXPP1A404

## (undated) DEVICE — SYR 10ML FINGER CONTROL W/O NDL 309695

## (undated) DEVICE — GLOVE SURG PI ULTRA TOUCH M SZ 8 LF

## (undated) DEVICE — SOL NACL 0.9% IRRIG 1000ML BOTTLE 2F7124

## (undated) DEVICE — LABEL MEDICATION SYSTEM  3304

## (undated) DEVICE — GLOVE BIOGEL PI MICRO SZ 7.0 48570

## (undated) DEVICE — STPL LINEAR CUT 75MM TLC75

## (undated) DEVICE — ESU ELEC BLADE 6" COATED E1450-6

## (undated) DEVICE — SUCTION IRR SYSTEM W/O TIP INTERPULSE HANDPIECE 0210-100-000

## (undated) DEVICE — LIGHT HANDLE X2

## (undated) DEVICE — STPL ECHELON CONTOUR CUTTER CVD 40MM GREEN GCS40G

## (undated) DEVICE — SOL WATER IRRIG 3000ML BAG 2B7117

## (undated) DEVICE — SUCTION MANIFOLD NEPTUNE 2 SYS 4 PORT 0702-020-000

## (undated) DEVICE — COVER NEOPROBE SOFTFLEX 5X96" W/BANDS 20-PC596

## (undated) DEVICE — GLOVE BIOGEL PI MICRO INDICATOR UNDERGLOVE SZ 7.5 48975

## (undated) DEVICE — SU PDS II 1 TP-1 48" Z880G

## (undated) DEVICE — PLATE GROUNDING ADULT W/CORD 9165L

## (undated) DEVICE — DRAIN JACKSON PRATT CHANNEL 19FR ROUND HUBLESS SIL JP-2230

## (undated) DEVICE — SU VICRYL 3-0 SH 27" UND J416H

## (undated) DEVICE — SUTURE VICRYL+ 2-0 27IN CT-1 UND VCP259H

## (undated) DEVICE — CUSTOM PACK TOTAL HIP SOP5BTHHEA

## (undated) DEVICE — PACK THORACOTOMY UMMC LF SCV32THF13

## (undated) DEVICE — SOL NACL 0.9% IRRIG 1000ML BOTTLE 07138-09

## (undated) DEVICE — ENDO TROCAR BLUNT TIP KII BALLOON 12X100MM C0R47

## (undated) DEVICE — BONE CLEANING TIP INTERPULSE  0210-010-000

## (undated) DEVICE — GLOVE BIOGEL PI ULTRATOUCH G SZ 7.0 42170

## (undated) DEVICE — BLADE KNIFE SURG 15 371115

## (undated) DEVICE — GOWN IMPERVIOUS BREATHABLE SMART XLG 89045

## (undated) DEVICE — DRSG AQUACEL AG HYDROFIBER  3.5X10" 422605

## (undated) DEVICE — SU DERMABOND ADVANCED .7ML DNX12

## (undated) DEVICE — ENDO MARKER 5ML SOLUTION FOR GI TRACT BX00711836

## (undated) DEVICE — NDL COUNTER 20CT 31142493

## (undated) DEVICE — PACK LAP TRANSVERSE STD

## (undated) DEVICE — GLOVE BIOGEL INDICATOR 7.5 LF 41675

## (undated) DEVICE — ENDO SNARE EXACTO COLD 9MM LOOP 2.4MMX230CM 00711115

## (undated) DEVICE — SPONGE LAP 18X18" X8435

## (undated) DEVICE — STOCKING SLEEVE COMPRESSION CALF LG

## (undated) DEVICE — DRAPE SHEET REV FOLD 3/4 9349

## (undated) DEVICE — ESU LIGASURE SEALER/DIVIDER RETRACT L-HOOK 37CM LF5637

## (undated) DEVICE — BNDG ABDOMINAL BINDER 9X62-84" 79-89210

## (undated) DEVICE — SOL WATER IRRIG 1000ML BOTTLE 2F7114

## (undated) DEVICE — SURGICEL HEMOSTAT 4X8" 1952

## (undated) DEVICE — DRAPE IOBAN INCISE 23X17" 6650EZ

## (undated) DEVICE — ENDO TUBING CO2 SMARTCAP STERILE DISP 100145CO2EXT

## (undated) DEVICE — Device

## (undated) DEVICE — SU VICRYL+ 0 27 UR6 VLT VCP603H

## (undated) DEVICE — GOWN XLG DISP 9545

## (undated) DEVICE — ENDO FORCEP ENDOJAW BIOPSY 3.7MMX230CM FB-222U

## (undated) DEVICE — ENDO TROCAR SLEEVE KII ADV FIXATION 05X100MM CFS02

## (undated) DEVICE — SU PROLENE 3-0 SHDA 36" 8522H

## (undated) DEVICE — LEGGINGS CLEAR TELESCOPIC XLONG 55X30.75IN 6IN 8430

## (undated) DEVICE — CATH FOLEY 14FR 5ML SILVER COAT LATEX 0165SI14

## (undated) DEVICE — KIT CONNECTOR FOR OLYMPUS ENDOSCOPES DEFENDO 100310

## (undated) DEVICE — STPL CIRCULAR 29MM CVD CDH29P

## (undated) DEVICE — TUBING SUCTION MEDI-VAC SOFT 3/16"X20' N520A

## (undated) DEVICE — SU MONOCRYL 4-0 PS-2 18" UND Y496G

## (undated) DEVICE — SU VICRYL 3-0 SH 8X18" UND J864D

## (undated) DEVICE — LINEN TOWEL PACK X30 5481

## (undated) DEVICE — ESU GROUND PAD ADULT W/CORD E7507

## (undated) DEVICE — TUBING SMOKE EVAC PNEUMOCLEAR HIGH FLOW 0620050250

## (undated) DEVICE — SU ETHIBOND 1 CT-1 30" X425H

## (undated) DEVICE — SUCTION SLEEVE NEPTUNE 2 165MM 0703-005-165

## (undated) DEVICE — SPONGE RAY-TEC 4X8" 7318

## (undated) DEVICE — BLADE KNIFE SURG 11 371111

## (undated) DEVICE — MAT FLOOR SURGICAL 40X38 0702140238

## (undated) DEVICE — SU PROLENE 2-0 SHDA 36" 8523H

## (undated) DEVICE — SU MONOCRYL 4-0 PS-2 27" UND Y426H

## (undated) DEVICE — RX SURGIFLO HEMOSTATIC MATRIX W/THROMBIN 8ML 2994

## (undated) DEVICE — ESU PENCIL SMOKE EVAC W/ROCKER SWITCH 0703-047-000

## (undated) DEVICE — SOL WATER IRRIG 1000ML BOTTLE 07139-09

## (undated) DEVICE — PREP CHLORAPREP 26ML TINTED HI-LITE ORANGE 930815

## (undated) DEVICE — DRSG PRIMAPORE 02X3" 7133

## (undated) DEVICE — DRAIN RESERVOIR 100ML JP 0070740

## (undated) DEVICE — ANTIFOG SOLUTION SEE SHARP 150M TROCAR SWABS 30978 (COI)

## (undated) DEVICE — PREP CHLORAPREP 26ML TINTED ORANGE  260815

## (undated) DEVICE — DRAPE C-ARM 60X42" 1013

## (undated) DEVICE — ENDO CAP AND TUBING STERILE FOR ENDOGATOR  100130

## (undated) DEVICE — ENDO TROCAR FIRST ENTRY KII FIOS ADV FIX 05X100MM CFF03

## (undated) DEVICE — SYR 10ML LL W/O NDL

## (undated) DEVICE — A3 SUPPLIES- SEE NURSING INFO PAGE

## (undated) DEVICE — SUCTION IRR STRYKERFLOW II W/TIP 250-070-520

## (undated) DEVICE — STPL SKIN PROXIMATE 35 WIDE PMW35

## (undated) DEVICE — ENDO SYSTEM WATER BOTTLE & TUBING W/CO2 FILTER 00711549

## (undated) DEVICE — DRAPE POUCH INSTRUMENT 3 POCKET 1018L

## (undated) DEVICE — SUCTION MANIFOLD NEPTUNE 2 SYS 1 PORT 702-025-000

## (undated) DEVICE — NDL 25GA 1.5" 305127

## (undated) DEVICE — SU VICRYL 2-0 SH 27" UND J417H

## (undated) DEVICE — GOWN IMPERVIOUS BREATHABLE 2XL/XLONG

## (undated) DEVICE — KIT POSITIONER TRENDELENBURG NUBLUE TP3000-NB

## (undated) DEVICE — LINEN TOWEL PACK X6 WHITE 5487

## (undated) DEVICE — GLOVE UNDER INDICATOR PI SZ 8.5 LF 41685

## (undated) DEVICE — KIT NEW IMAGE COLOSTOMY/ILEOSTOMY 2 3/4" LF 19354

## (undated) RX ORDER — PHENYLEPHRINE HYDROCHLORIDE 10 MG/ML
INJECTION INTRAVENOUS
Status: DISPENSED
Start: 2024-11-20

## (undated) RX ORDER — EPHEDRINE SULFATE 50 MG/ML
INJECTION, SOLUTION INTRAVENOUS
Status: DISPENSED
Start: 2024-11-20

## (undated) RX ORDER — DEXAMETHASONE SODIUM PHOSPHATE 4 MG/ML
INJECTION, SOLUTION INTRA-ARTICULAR; INTRALESIONAL; INTRAMUSCULAR; INTRAVENOUS; SOFT TISSUE
Status: DISPENSED
Start: 2024-11-20

## (undated) RX ORDER — LIDOCAINE HYDROCHLORIDE AND EPINEPHRINE 10; 10 MG/ML; UG/ML
INJECTION, SOLUTION INFILTRATION; PERINEURAL
Status: DISPENSED
Start: 2024-11-20

## (undated) RX ORDER — FENTANYL CITRATE 50 UG/ML
INJECTION, SOLUTION INTRAMUSCULAR; INTRAVENOUS
Status: DISPENSED
Start: 2022-06-24

## (undated) RX ORDER — PROPOFOL 10 MG/ML
INJECTION, EMULSION INTRAVENOUS
Status: DISPENSED
Start: 2025-07-11

## (undated) RX ORDER — DEXAMETHASONE SODIUM PHOSPHATE 4 MG/ML
INJECTION, SOLUTION INTRA-ARTICULAR; INTRALESIONAL; INTRAMUSCULAR; INTRAVENOUS; SOFT TISSUE
Status: DISPENSED
Start: 2025-07-11

## (undated) RX ORDER — LIDOCAINE HYDROCHLORIDE 10 MG/ML
INJECTION, SOLUTION EPIDURAL; INFILTRATION; INTRACAUDAL; PERINEURAL
Status: DISPENSED
Start: 2024-11-20

## (undated) RX ORDER — FENTANYL CITRATE-0.9 % NACL/PF 10 MCG/ML
PLASTIC BAG, INJECTION (ML) INTRAVENOUS
Status: DISPENSED
Start: 2025-07-11

## (undated) RX ORDER — SODIUM CHLORIDE, SODIUM LACTATE, POTASSIUM CHLORIDE, CALCIUM CHLORIDE 600; 310; 30; 20 MG/100ML; MG/100ML; MG/100ML; MG/100ML
INJECTION, SOLUTION INTRAVENOUS
Status: DISPENSED
Start: 2025-07-11

## (undated) RX ORDER — CEFAZOLIN SODIUM 1 G/3ML
INJECTION, POWDER, FOR SOLUTION INTRAMUSCULAR; INTRAVENOUS
Status: DISPENSED
Start: 2025-07-11

## (undated) RX ORDER — FENTANYL CITRATE 50 UG/ML
INJECTION, SOLUTION INTRAMUSCULAR; INTRAVENOUS
Status: DISPENSED
Start: 2025-07-11

## (undated) RX ORDER — BUPIVACAINE HYDROCHLORIDE 5 MG/ML
INJECTION, SOLUTION EPIDURAL; INTRACAUDAL
Status: DISPENSED
Start: 2024-11-20

## (undated) RX ORDER — METRONIDAZOLE 500 MG/100ML
INJECTION, SOLUTION INTRAVENOUS
Status: DISPENSED
Start: 2025-07-11

## (undated) RX ORDER — EPHEDRINE SULFATE 50 MG/ML
INJECTION, SOLUTION INTRAMUSCULAR; INTRAVENOUS; SUBCUTANEOUS
Status: DISPENSED
Start: 2025-07-11

## (undated) RX ORDER — LIDOCAINE HYDROCHLORIDE 10 MG/ML
INJECTION, SOLUTION EPIDURAL; INFILTRATION; INTRACAUDAL; PERINEURAL
Status: DISPENSED
Start: 2022-06-21

## (undated) RX ORDER — GABAPENTIN 100 MG/1
CAPSULE ORAL
Status: DISPENSED
Start: 2024-11-20

## (undated) RX ORDER — EPHEDRINE SULFATE 50 MG/ML
INJECTION, SOLUTION INTRAMUSCULAR; INTRAVENOUS; SUBCUTANEOUS
Status: DISPENSED
Start: 2022-06-24

## (undated) RX ORDER — FENTANYL CITRATE 50 UG/ML
INJECTION, SOLUTION INTRAMUSCULAR; INTRAVENOUS
Status: DISPENSED
Start: 2024-11-20

## (undated) RX ORDER — FENTANYL CITRATE-0.9 % NACL/PF 10 MCG/ML
PLASTIC BAG, INJECTION (ML) INTRAVENOUS
Status: DISPENSED
Start: 2022-06-21

## (undated) RX ORDER — PROPOFOL 10 MG/ML
INJECTION, EMULSION INTRAVENOUS
Status: DISPENSED
Start: 2024-11-20

## (undated) RX ORDER — PROPOFOL 10 MG/ML
INJECTION, EMULSION INTRAVENOUS
Status: DISPENSED
Start: 2022-06-18

## (undated) RX ORDER — ONDANSETRON 2 MG/ML
INJECTION INTRAMUSCULAR; INTRAVENOUS
Status: DISPENSED
Start: 2024-11-20

## (undated) RX ORDER — LIDOCAINE HYDROCHLORIDE 10 MG/ML
INJECTION, SOLUTION EPIDURAL; INFILTRATION; INTRACAUDAL; PERINEURAL
Status: DISPENSED
Start: 2018-04-09

## (undated) RX ORDER — ACETAMINOPHEN 325 MG/1
TABLET ORAL
Status: DISPENSED
Start: 2024-11-20

## (undated) RX ORDER — CEFAZOLIN SODIUM/WATER 2 G/20 ML
SYRINGE (ML) INTRAVENOUS
Status: DISPENSED
Start: 2024-11-20

## (undated) RX ORDER — ALBUTEROL SULFATE 90 UG/1
INHALANT RESPIRATORY (INHALATION)
Status: DISPENSED
Start: 2025-07-11

## (undated) RX ORDER — CALCIUM CHLORIDE 100 MG/ML
INJECTION INTRAVENOUS; INTRAVENTRICULAR
Status: DISPENSED
Start: 2025-07-11

## (undated) RX ORDER — EPHEDRINE SULFATE 50 MG/ML
INJECTION, SOLUTION INTRAMUSCULAR; INTRAVENOUS; SUBCUTANEOUS
Status: DISPENSED
Start: 2024-11-20

## (undated) RX ORDER — HEPARIN SODIUM 5000 [USP'U]/.5ML
INJECTION, SOLUTION INTRAVENOUS; SUBCUTANEOUS
Status: DISPENSED
Start: 2025-07-11

## (undated) RX ORDER — HYDROMORPHONE HYDROCHLORIDE 1 MG/ML
INJECTION, SOLUTION INTRAMUSCULAR; INTRAVENOUS; SUBCUTANEOUS
Status: DISPENSED
Start: 2025-07-11

## (undated) RX ORDER — PROPOFOL 10 MG/ML
INJECTION, EMULSION INTRAVENOUS
Status: DISPENSED
Start: 2022-06-21